# Patient Record
Sex: MALE | Race: OTHER | HISPANIC OR LATINO
[De-identification: names, ages, dates, MRNs, and addresses within clinical notes are randomized per-mention and may not be internally consistent; named-entity substitution may affect disease eponyms.]

---

## 2022-02-14 ENCOUNTER — APPOINTMENT (OUTPATIENT)
Dept: NEUROLOGY | Facility: CLINIC | Age: 77
End: 2022-02-14

## 2022-02-15 ENCOUNTER — APPOINTMENT (OUTPATIENT)
Dept: NEUROLOGY | Facility: CLINIC | Age: 77
End: 2022-02-15

## 2022-03-11 ENCOUNTER — APPOINTMENT (OUTPATIENT)
Dept: CARDIOLOGY | Facility: CLINIC | Age: 77
End: 2022-03-11

## 2022-03-25 ENCOUNTER — APPOINTMENT (OUTPATIENT)
Dept: UROLOGY | Facility: CLINIC | Age: 77
End: 2022-03-25

## 2022-05-24 ENCOUNTER — EMERGENCY (EMERGENCY)
Facility: HOSPITAL | Age: 77
LOS: 1 days | Discharge: HOME | End: 2022-05-24
Attending: EMERGENCY MEDICINE
Payer: COMMERCIAL

## 2022-05-24 VITALS
OXYGEN SATURATION: 97 % | RESPIRATION RATE: 18 BRPM | HEART RATE: 78 BPM | TEMPERATURE: 98 F | SYSTOLIC BLOOD PRESSURE: 175 MMHG | DIASTOLIC BLOOD PRESSURE: 82 MMHG | WEIGHT: 160.06 LBS

## 2022-05-24 LAB
ALBUMIN SERPL ELPH-MCNC: 4.6 G/DL — SIGNIFICANT CHANGE UP (ref 3.3–5)
ALP SERPL-CCNC: 104 U/L — SIGNIFICANT CHANGE UP (ref 40–120)
ALT FLD-CCNC: 46 U/L — HIGH (ref 10–45)
ANION GAP SERPL CALC-SCNC: 15 MMOL/L — SIGNIFICANT CHANGE UP (ref 5–17)
AST SERPL-CCNC: 137 U/L — HIGH (ref 10–40)
BASOPHILS # BLD AUTO: 0.06 K/UL — SIGNIFICANT CHANGE UP (ref 0–0.2)
BASOPHILS NFR BLD AUTO: 0.7 % — SIGNIFICANT CHANGE UP (ref 0–2)
BILIRUB SERPL-MCNC: 0.3 MG/DL — SIGNIFICANT CHANGE UP (ref 0.2–1.2)
BUN SERPL-MCNC: 21 MG/DL — SIGNIFICANT CHANGE UP (ref 7–23)
CALCIUM SERPL-MCNC: 9.2 MG/DL — SIGNIFICANT CHANGE UP (ref 8.4–10.5)
CHLORIDE SERPL-SCNC: 101 MMOL/L — SIGNIFICANT CHANGE UP (ref 96–108)
CO2 SERPL-SCNC: 26 MMOL/L — SIGNIFICANT CHANGE UP (ref 22–31)
CREAT SERPL-MCNC: 1.96 MG/DL — HIGH (ref 0.5–1.3)
EGFR: 35 ML/MIN/1.73M2 — LOW
EOSINOPHIL # BLD AUTO: 0.67 K/UL — HIGH (ref 0–0.5)
EOSINOPHIL NFR BLD AUTO: 7.6 % — HIGH (ref 0–6)
GLUCOSE SERPL-MCNC: 115 MG/DL — HIGH (ref 70–99)
HCT VFR BLD CALC: 41.8 % — SIGNIFICANT CHANGE UP (ref 39–50)
HGB BLD-MCNC: 13.9 G/DL — SIGNIFICANT CHANGE UP (ref 13–17)
IMM GRANULOCYTES NFR BLD AUTO: 0.3 % — SIGNIFICANT CHANGE UP (ref 0–1.5)
LIDOCAIN IGE QN: 38 U/L — SIGNIFICANT CHANGE UP (ref 7–60)
LYMPHOCYTES # BLD AUTO: 2.51 K/UL — SIGNIFICANT CHANGE UP (ref 1–3.3)
LYMPHOCYTES # BLD AUTO: 28.6 % — SIGNIFICANT CHANGE UP (ref 13–44)
MCHC RBC-ENTMCNC: 27.8 PG — SIGNIFICANT CHANGE UP (ref 27–34)
MCHC RBC-ENTMCNC: 33.3 GM/DL — SIGNIFICANT CHANGE UP (ref 32–36)
MCV RBC AUTO: 83.6 FL — SIGNIFICANT CHANGE UP (ref 80–100)
MONOCYTES # BLD AUTO: 0.99 K/UL — HIGH (ref 0–0.9)
MONOCYTES NFR BLD AUTO: 11.3 % — SIGNIFICANT CHANGE UP (ref 2–14)
NEUTROPHILS # BLD AUTO: 4.51 K/UL — SIGNIFICANT CHANGE UP (ref 1.8–7.4)
NEUTROPHILS NFR BLD AUTO: 51.5 % — SIGNIFICANT CHANGE UP (ref 43–77)
NRBC # BLD: 0 /100 WBCS — SIGNIFICANT CHANGE UP (ref 0–0)
PLATELET # BLD AUTO: 251 K/UL — SIGNIFICANT CHANGE UP (ref 150–400)
POTASSIUM SERPL-MCNC: 3.4 MMOL/L — LOW (ref 3.5–5.3)
POTASSIUM SERPL-SCNC: 3.4 MMOL/L — LOW (ref 3.5–5.3)
PROT SERPL-MCNC: 7.6 G/DL — SIGNIFICANT CHANGE UP (ref 6–8.3)
RBC # BLD: 5 M/UL — SIGNIFICANT CHANGE UP (ref 4.2–5.8)
RBC # FLD: 14.7 % — HIGH (ref 10.3–14.5)
SODIUM SERPL-SCNC: 142 MMOL/L — SIGNIFICANT CHANGE UP (ref 135–145)
WBC # BLD: 8.77 K/UL — SIGNIFICANT CHANGE UP (ref 3.8–10.5)
WBC # FLD AUTO: 8.77 K/UL — SIGNIFICANT CHANGE UP (ref 3.8–10.5)

## 2022-05-24 PROCEDURE — 93005 ELECTROCARDIOGRAM TRACING: CPT

## 2022-05-24 PROCEDURE — 72170 X-RAY EXAM OF PELVIS: CPT

## 2022-05-24 PROCEDURE — 85025 COMPLETE CBC W/AUTO DIFF WBC: CPT

## 2022-05-24 PROCEDURE — 70450 CT HEAD/BRAIN W/O DYE: CPT | Mod: 26,MA

## 2022-05-24 PROCEDURE — 71045 X-RAY EXAM CHEST 1 VIEW: CPT | Mod: 26

## 2022-05-24 PROCEDURE — 87086 URINE CULTURE/COLONY COUNT: CPT

## 2022-05-24 PROCEDURE — 82553 CREATINE MB FRACTION: CPT

## 2022-05-24 PROCEDURE — 83880 ASSAY OF NATRIURETIC PEPTIDE: CPT

## 2022-05-24 PROCEDURE — 84484 ASSAY OF TROPONIN QUANT: CPT

## 2022-05-24 PROCEDURE — 80053 COMPREHEN METABOLIC PANEL: CPT

## 2022-05-24 PROCEDURE — U0003: CPT

## 2022-05-24 PROCEDURE — 36415 COLL VENOUS BLD VENIPUNCTURE: CPT

## 2022-05-24 PROCEDURE — 99285 EMERGENCY DEPT VISIT HI MDM: CPT

## 2022-05-24 PROCEDURE — 99285 EMERGENCY DEPT VISIT HI MDM: CPT | Mod: 25

## 2022-05-24 PROCEDURE — 70450 CT HEAD/BRAIN W/O DYE: CPT | Mod: MA

## 2022-05-24 PROCEDURE — 81001 URINALYSIS AUTO W/SCOPE: CPT

## 2022-05-24 PROCEDURE — 83690 ASSAY OF LIPASE: CPT

## 2022-05-24 PROCEDURE — 71045 X-RAY EXAM CHEST 1 VIEW: CPT

## 2022-05-24 PROCEDURE — U0005: CPT

## 2022-05-24 RX ORDER — MECLIZINE HCL 12.5 MG
50 TABLET ORAL ONCE
Refills: 0 | Status: COMPLETED | OUTPATIENT
Start: 2022-05-24 | End: 2022-05-24

## 2022-05-24 RX ORDER — AMLODIPINE BESYLATE 2.5 MG/1
5 TABLET ORAL ONCE
Refills: 0 | Status: COMPLETED | OUTPATIENT
Start: 2022-05-24 | End: 2022-05-24

## 2022-05-24 RX ORDER — SODIUM CHLORIDE 9 MG/ML
500 INJECTION INTRAMUSCULAR; INTRAVENOUS; SUBCUTANEOUS ONCE
Refills: 0 | Status: COMPLETED | OUTPATIENT
Start: 2022-05-24 | End: 2022-05-24

## 2022-05-24 RX ORDER — ACETAMINOPHEN 500 MG
975 TABLET ORAL ONCE
Refills: 0 | Status: COMPLETED | OUTPATIENT
Start: 2022-05-24 | End: 2022-05-24

## 2022-05-24 RX ORDER — POTASSIUM CHLORIDE 20 MEQ
40 PACKET (EA) ORAL ONCE
Refills: 0 | Status: COMPLETED | OUTPATIENT
Start: 2022-05-24 | End: 2022-05-24

## 2022-05-24 RX ADMIN — AMLODIPINE BESYLATE 5 MILLIGRAM(S): 2.5 TABLET ORAL at 22:08

## 2022-05-24 RX ADMIN — SODIUM CHLORIDE 500 MILLILITER(S): 9 INJECTION INTRAMUSCULAR; INTRAVENOUS; SUBCUTANEOUS at 22:06

## 2022-05-24 RX ADMIN — Medication 40 MILLIEQUIVALENT(S): at 23:45

## 2022-05-24 RX ADMIN — Medication 975 MILLIGRAM(S): at 23:45

## 2022-05-24 RX ADMIN — Medication 50 MILLIGRAM(S): at 22:06

## 2022-05-24 NOTE — ED PROVIDER NOTE - CLINICAL SUMMARY MEDICAL DECISION MAKING FREE TEXT BOX
Amado: 77 year old male with vertigo here with b/l le swelling last four days and exertional fatigue.  seen by pmd and prescribed meclizine and anti-hypertenisves.  states has increased vertigo.  will get labs, mediciations, imaging, reassess.

## 2022-05-24 NOTE — ED PROVIDER NOTE - OBJECTIVE STATEMENT
76 y/o male PMHx HTN, vertigo prescribed prn meclizine now presenting to the ED with multiple complaints. Patient reported worsening b/l lower extremity swelling over the last four days with exertional fatigue but no SOB at rest, orthopnea, or weight gain. Patient reported intermittent headache, dizziness with room spinning making it difficult for him to walk as he feels like he is going to fall despite using a cane and left knee pain for the last two weeks. In addition patient reports he feels like he is going deaf. Patient seen by PMD Dr. Kate Elder 3-4 weeks ago and prescribed pt meclizine with anti-hypertensives. Patient unable to reschedule f/u due to insurance issues. Patient denied CP, SOB, abdominal pain, N/V/D, fever chills, cough, syncope, palpitations. Patient does not have a cardiologist     [id number]

## 2022-05-24 NOTE — ED PROVIDER NOTE - NSFOLLOWUPCLINICS_GEN_ALL_ED_FT
[facility]  Family Medicine  [address] [place] [id number]  Phone: [phone]  Fax:   Follow Up Time: Routine

## 2022-05-24 NOTE — ED PROVIDER NOTE - NS ED ATTENDING STATEMENT MOD
This was a shared visit with the UBALDO. I reviewed and verified the documentation and independently performed the documented:

## 2022-05-24 NOTE — ED PROVIDER NOTE - NSFOLLOWUPINSTRUCTIONS_ED_ALL_ED_FT
Your workup today did not show any dangerous conditions - while we do not know the exact cause of your symptoms, we feel it is ok for you to go home.    Take tylenol as needed for pain or fever.    Continue to take meclizine for dizziness    Follow up with your primary care doctor if symptoms persist for more than a few days. If you need a primary doctor, you can call the clinic listed here.    Return to the ED if you have severe pain, inability to eat or drink, fever, or any other major concern.

## 2022-05-24 NOTE — ED PROVIDER NOTE - PATIENT PORTAL LINK FT
You can access the FollowMyHealth Patient Portal offered by [facility] by registering at the following website: [email or web address]. By joining [facility]’s FollowMyHealth portal, you will also be able to view your health information using other applications (apps) compatible with our system.

## 2022-05-24 NOTE — ED ADULT TRIAGE NOTE - CHIEF COMPLAINT QUOTE
dizziness with vomit x 10 months C/o l ear ringing and room spinning  , bilat feet swelling with HTN  BEFAST NEG

## 2022-05-24 NOTE — ED PROVIDER NOTE - PROGRESS NOTE DETAILS
Dr. Singh's note: At the beginning of my shift, i took over care of the patient from outgoing physician with plan to re-evalaute after meds, ambulate. On re-evaluation, pt is well appearing, stable vitals, tolerating PO, ambulatory. Patient is safe for d/c with supportive care, return precautions, and outpt f/u as needed.

## 2022-05-24 NOTE — ED PROVIDER NOTE - RAPID ASSESSMENT
77y M Welsh speaking with translation provided by daughter with PMHx of HTN, anemia, and HLD presents to the ED with bilateral feet swelling x 4 days, dizziness, and difficulty walking 2/2 pain. Also notes multiple other ongoing medical complaints including headaches, dizziness when walking, vomiting x 10 months with difficulty eating, HTN, chills x 2 years. Denies CP, SOB, abdominal pain. No diabetes. Patient is in no acute distress.    Scribe Statement: ILogan Grace, attest that this documentation has been prepared under the direction and in the presence of Chen Vargas (PA) 77y M Malawian speaking with translation provided by daughter with PMHx of HTN, anemia, and HLD presents to the ED with bilateral feet swelling x 4 days, dizziness, and difficulty walking 2/2 pain. Also notes multiple other ongoing medical complaints including headaches, dizziness when walking, vomiting x 10 months with difficulty eating, HTN, chills x 2 years. Denies CP, SOB, abdominal pain. No diabetes. Patient is in no acute distress.    Scribe Statement: I, Vale Ford, attest that this documentation has been prepared under the direction and in the presence of Chen Vargas (PA)      Chen LESTER PA-C, personally performed the service described in the documentation recorded by the scribe in my presence, and it accurately and completely records my words and actions. Pt to receive full H&P in Main ER. 77y M Peruvian speaking with translation provided by daughter with PMHx of HTN, anemia, and HLD presents to the ED with bilateral feet swelling x 4 days, dizziness, and difficulty walking 2/2 pain. Also notes multiple other ongoing medical complaints including headaches, dizziness when walking, vomiting x 10 months with difficulty eating, HTN, chills x 2 years. Denies CP, SOB, abdominal pain. No diabetes. Patient is in no acute distress.    Scribe Statement: I, Vale Ford, attest that this documentation has been prepared under the direction and in the presence of Chen Vargas (PA)      Chen LESTER PA-C, personally performed the service described in the documentation recorded by the scribe in my presence, and it accurately and completely records my words and actions. Pt to receive full H&P in Main ER.    Pt seen by PA via Remote/TELE/Q-doc in triage. MD immediately available for consultation. Pt not eval by this MD Full exam to be performed once pt is transferred to Main ED  Jose Rafael Madsen MD, Facep

## 2022-05-25 VITALS — DIASTOLIC BLOOD PRESSURE: 86 MMHG | HEART RATE: 70 BPM | SYSTOLIC BLOOD PRESSURE: 148 MMHG

## 2022-05-25 LAB
APPEARANCE UR: CLEAR — SIGNIFICANT CHANGE UP
BACTERIA # UR AUTO: NEGATIVE — SIGNIFICANT CHANGE UP
BILIRUB UR-MCNC: NEGATIVE — SIGNIFICANT CHANGE UP
COLOR SPEC: SIGNIFICANT CHANGE UP
CULTURE RESULTS: SIGNIFICANT CHANGE UP
DIFF PNL FLD: ABNORMAL
EPI CELLS # UR: 1 /HPF — SIGNIFICANT CHANGE UP
GLUCOSE UR QL: NEGATIVE — SIGNIFICANT CHANGE UP
HYALINE CASTS # UR AUTO: 0 /LPF — SIGNIFICANT CHANGE UP (ref 0–2)
KETONES UR-MCNC: NEGATIVE — SIGNIFICANT CHANGE UP
LEUKOCYTE ESTERASE UR-ACNC: NEGATIVE — SIGNIFICANT CHANGE UP
NITRITE UR-MCNC: NEGATIVE — SIGNIFICANT CHANGE UP
PH UR: 6.5 — SIGNIFICANT CHANGE UP (ref 5–8)
PROT UR-MCNC: ABNORMAL
RBC CASTS # UR COMP ASSIST: 1 /HPF — SIGNIFICANT CHANGE UP (ref 0–4)
SARS-COV-2 RNA SPEC QL NAA+PROBE: SIGNIFICANT CHANGE UP
SP GR SPEC: 1.01 — SIGNIFICANT CHANGE UP (ref 1.01–1.02)
SPECIMEN SOURCE: SIGNIFICANT CHANGE UP
TROPONIN T, HIGH SENSITIVITY RESULT: 26 NG/L — SIGNIFICANT CHANGE UP (ref 0–51)
UROBILINOGEN FLD QL: NEGATIVE — SIGNIFICANT CHANGE UP
WBC UR QL: 1 /HPF — SIGNIFICANT CHANGE UP (ref 0–5)

## 2022-05-25 PROCEDURE — 72170 X-RAY EXAM OF PELVIS: CPT | Mod: 26

## 2022-05-25 NOTE — ED ADULT NURSE NOTE - OBJECTIVE STATEMENT
78 y/o male presents to the ED with dizziness, b/l leg pain and swelling, HA, and LE edema. Reports to feeling of "room spinning and he is going to fall," denies falls/ LOC. reports to following with PCP; received Meclizine without relief; unable to follow back with PCP due to insurance issues. denies cp, sob, n/v/d, fevers, chills, cough. neuro intact- moving all extremities. blind in left eye; reports to surgeries for eye in past. a&ox3- primarily Honduran speaking;  [id number] used. breathing comfortably in bed- no distress. abdomen soft nondistended. safety maintained- bed locked in lowest position. MD aware of blood pressure; awaiting medication orders. pending labs radiology and dispo.

## 2022-05-25 NOTE — ED ADULT NURSE REASSESSMENT NOTE - NS ED NURSE REASSESS COMMENT FT1
patient ambulated without difficulty; pending dispo. patient ambulated without difficulty; reports to relief of symptoms. pending dispo.

## 2022-05-25 NOTE — ED ADULT NURSE NOTE - NSFALLRSKASSESSDT_ED_ALL_ED
Received message from nursing \"pt lactic 4.4 trending down from 5.3, received 1L LR bolus earlier, if you'd like I will put in a repeat for 0945, would you like to do any other interventions @ this time, pt K this am 3.3, would you like to do a one time dose of PO K+?\"    Patient noted to still be tachycardic. Ordered repeat LR bolus (this would be her third liter) and p.o. potassium. Repeat lactic acid per protocol. 24-May-2022 22:00

## 2022-05-25 NOTE — ED ADULT NURSE NOTE - NS ED NURSE DC PT EDUCATION RESOURCES
Kellie Brown  [address] [facility] [address]      November 10, 2021    YOB: 2003    To Whom it may concern:    Kellie Brown was seen in the pediatric cardiology clinic at [facility]. She has neurocardiogenic insufficiency which can make her dizzy, lightheaded or pass out if she has sudden postural changes. She can take part in the gym schedule at school. However, she needs to rest when she has symptoms of dizziness, lightheadedness, chest pain or shortness of breath.    If there are any questions or concerns, please do not hesitate to call at  Dept: [phone].    Sincerely,          Chris Fuentes MD   [facility] [place] [address] [facility] [address]  Dept Phone: [phone]   None needed

## 2022-06-01 ENCOUNTER — OUTPATIENT (OUTPATIENT)
Dept: OUTPATIENT SERVICES | Facility: HOSPITAL | Age: 77
LOS: 1 days | End: 2022-06-01
Payer: COMMERCIAL

## 2022-06-01 ENCOUNTER — APPOINTMENT (OUTPATIENT)
Dept: FAMILY MEDICINE | Facility: HOSPITAL | Age: 77
End: 2022-06-01

## 2022-06-01 VITALS
HEIGHT: 64 IN | TEMPERATURE: 96.8 F | RESPIRATION RATE: 16 BRPM | OXYGEN SATURATION: 97 % | HEART RATE: 74 BPM | SYSTOLIC BLOOD PRESSURE: 150 MMHG | WEIGHT: 175 LBS | BODY MASS INDEX: 29.88 KG/M2 | DIASTOLIC BLOOD PRESSURE: 74 MMHG

## 2022-06-01 DIAGNOSIS — E87.6 HYPOKALEMIA: ICD-10-CM

## 2022-06-01 DIAGNOSIS — Z00.00 ENCOUNTER FOR GENERAL ADULT MEDICAL EXAMINATION WITHOUT ABNORMAL FINDINGS: ICD-10-CM

## 2022-06-01 PROBLEM — Z86.79 PERSONAL HISTORY OF OTHER DISEASES OF THE CIRCULATORY SYSTEM: Chronic | Status: ACTIVE | Noted: 2022-05-24

## 2022-06-01 PROCEDURE — 83036 HEMOGLOBIN GLYCOSYLATED A1C: CPT

## 2022-06-01 PROCEDURE — 80061 LIPID PANEL: CPT

## 2022-06-01 PROCEDURE — 83735 ASSAY OF MAGNESIUM: CPT

## 2022-06-01 PROCEDURE — G0463: CPT

## 2022-06-01 PROCEDURE — 80053 COMPREHEN METABOLIC PANEL: CPT

## 2022-06-02 LAB
CHOLEST SERPL-MCNC: 179 MG/DL
HDLC SERPL-MCNC: 21 MG/DL
LDLC SERPL CALC-MCNC: 80 MG/DL
NONHDLC SERPL-MCNC: 158 MG/DL
TRIGL SERPL-MCNC: 386 MG/DL

## 2022-06-06 DIAGNOSIS — H54.40 BLINDNESS, ONE EYE, UNSPECIFIED EYE: ICD-10-CM

## 2022-06-06 DIAGNOSIS — H81.10 BENIGN PAROXYSMAL VERTIGO, UNSPECIFIED EAR: ICD-10-CM

## 2022-06-06 DIAGNOSIS — I10 ESSENTIAL (PRIMARY) HYPERTENSION: ICD-10-CM

## 2022-06-13 LAB
ALBUMIN SERPL ELPH-MCNC: 4.8 G/DL
ALP BLD-CCNC: 109 U/L
ALT SERPL-CCNC: 30 U/L
ANION GAP SERPL CALC-SCNC: 15 MMOL/L
AST SERPL-CCNC: 28 U/L
BILIRUB SERPL-MCNC: 0.4 MG/DL
BUN SERPL-MCNC: 29 MG/DL
CALCIUM SERPL-MCNC: 9 MG/DL
CHLORIDE SERPL-SCNC: 102 MMOL/L
CO2 SERPL-SCNC: 23 MMOL/L
CREAT SERPL-MCNC: 1.95 MG/DL
EGFR: 35 ML/MIN/1.73M2
ESTIMATED AVERAGE GLUCOSE: 140 MG/DL
GLUCOSE SERPL-MCNC: 163 MG/DL
HBA1C MFR BLD HPLC: 6.5 %
MAGNESIUM SERPL-MCNC: 2 MG/DL
POTASSIUM SERPL-SCNC: 3.6 MMOL/L
PROT SERPL-MCNC: 7.6 G/DL
SODIUM SERPL-SCNC: 140 MMOL/L

## 2022-06-15 ENCOUNTER — OUTPATIENT (OUTPATIENT)
Dept: OUTPATIENT SERVICES | Facility: HOSPITAL | Age: 77
LOS: 1 days | End: 2022-06-15
Payer: COMMERCIAL

## 2022-06-15 ENCOUNTER — APPOINTMENT (OUTPATIENT)
Dept: FAMILY MEDICINE | Facility: HOSPITAL | Age: 77
End: 2022-06-15

## 2022-06-15 VITALS
TEMPERATURE: 97 F | BODY MASS INDEX: 29.18 KG/M2 | HEART RATE: 71 BPM | OXYGEN SATURATION: 97 % | SYSTOLIC BLOOD PRESSURE: 173 MMHG | WEIGHT: 170 LBS | RESPIRATION RATE: 16 BRPM | DIASTOLIC BLOOD PRESSURE: 85 MMHG

## 2022-06-15 VITALS — DIASTOLIC BLOOD PRESSURE: 75 MMHG | SYSTOLIC BLOOD PRESSURE: 151 MMHG

## 2022-06-15 DIAGNOSIS — Z00.00 ENCOUNTER FOR GENERAL ADULT MEDICAL EXAMINATION WITHOUT ABNORMAL FINDINGS: ICD-10-CM

## 2022-06-15 DIAGNOSIS — R60.0 LOCALIZED EDEMA: ICD-10-CM

## 2022-06-15 DIAGNOSIS — H35.70: ICD-10-CM

## 2022-06-15 PROCEDURE — G0463: CPT

## 2022-06-16 NOTE — PLAN
[FreeTextEntry1] : 78 yo male with PMHx HTN, Potassium waisting without obvious cause and albuminuria presents today for follow up.\par \par #Benign paroxysmal positional vertigo\par - Improving\par - Patient had a recent ED visit for vertigo and was evaluated last appointment. \par - CT head normal.\par - Continue Meclizine\par - Per patient he was seen by Neurologist and was told everything was good and that he needed to f/u with ENT\par - ENT referral given today \par \par #HTN\par - BP today 151/75\par - Review record of 2014, SBP used to be [id number] \par - Patient started having b/l lower extremity edema after starting amlodipine\par - Continue Losartan 100mg qd\par - Will decrease amlodipine to 5mg qd\par - Will start Spironolactone 25mg qd\par - f/u Cardiology recommendations\par \par #Hypokalemia\par - Patient was following with Nephrology and was told he had potassium waisting without obvious cause\par - Currently stable\par - K last appointment 3.6\par \par #CKD3\par - Cr 1.95, eGFR 35\par - Referral for Nephrology given today\par \par #b/l lower extremity edema\par - Possibly 2/2 amlodipine vs less likely CHF\par - Started after increasing amlodipine dose\par - Will continue on amlodipine for now since patient has uncontrolled HTN and leg edema is mild.\par - BNP in the ED on 5/24 was 724\par - CXR in the ED was clear and PE today was normal and had no crackles\par \par #Blindness of left eye\par - Hx of left retinal detachment\par \par #Dispo\par - f/u ENT, Nephrology and Cardiology recommendations\par \par *Case discussed with Dr. Bronson

## 2022-06-16 NOTE — PHYSICAL EXAM
[Normal S1, S2] : normal S1 and S2 [No Murmur] : no murmur heard [de-identified] : +left eye blindness 2/2 left retinal detachment [de-identified] : +b/l cerument impactation [de-identified] : 2+ b/l lower extremity edema

## 2022-06-16 NOTE — HISTORY OF PRESENT ILLNESS
[FreeTextEntry1] : f/u  [de-identified] : 76 yo male with PMHx HTN, Potassium waisting without obvious cause and albuminuria presents today for follow up.  Patient mentions he stopped following with the nephrologist and cardiologist because he was feeling good. \par He still feeling dizzy and after following with neurology and going to the ED he was told it was Vertigo and that he should follow up with an ENT for further management and treatment.  Patient denies abdominal pain, nausea, vomiting, diarrhea, constipation, chest pain, headache, shortness of breath or lightheadedness.

## 2022-06-17 DIAGNOSIS — I10 ESSENTIAL (PRIMARY) HYPERTENSION: ICD-10-CM

## 2022-06-17 DIAGNOSIS — H81.10 BENIGN PAROXYSMAL VERTIGO, UNSPECIFIED EAR: ICD-10-CM

## 2022-06-17 DIAGNOSIS — R60.9 EDEMA, UNSPECIFIED: ICD-10-CM

## 2022-06-17 DIAGNOSIS — E11.9 TYPE 2 DIABETES MELLITUS WITHOUT COMPLICATIONS: ICD-10-CM

## 2022-06-24 ENCOUNTER — OUTPATIENT (OUTPATIENT)
Dept: OUTPATIENT SERVICES | Facility: HOSPITAL | Age: 77
LOS: 1 days | End: 2022-06-24
Payer: COMMERCIAL

## 2022-06-24 ENCOUNTER — APPOINTMENT (OUTPATIENT)
Dept: FAMILY MEDICINE | Facility: HOSPITAL | Age: 77
End: 2022-06-24

## 2022-06-24 VITALS — SYSTOLIC BLOOD PRESSURE: 172 MMHG | DIASTOLIC BLOOD PRESSURE: 96 MMHG

## 2022-06-24 VITALS
OXYGEN SATURATION: 96 % | HEART RATE: 73 BPM | BODY MASS INDEX: 29.52 KG/M2 | TEMPERATURE: 98.5 F | DIASTOLIC BLOOD PRESSURE: 90 MMHG | SYSTOLIC BLOOD PRESSURE: 200 MMHG | RESPIRATION RATE: 16 BRPM | WEIGHT: 172 LBS

## 2022-06-24 DIAGNOSIS — Z00.00 ENCOUNTER FOR GENERAL ADULT MEDICAL EXAMINATION WITHOUT ABNORMAL FINDINGS: ICD-10-CM

## 2022-06-24 DIAGNOSIS — H61.20 IMPACTED CERUMEN, UNSPECIFIED EAR: ICD-10-CM

## 2022-06-24 PROCEDURE — 82043 UR ALBUMIN QUANTITATIVE: CPT

## 2022-06-24 PROCEDURE — G0463: CPT

## 2022-06-25 PROBLEM — H61.20 CERUMEN IMPACTION: Status: ACTIVE | Noted: 2022-06-01

## 2022-06-25 LAB
CREAT SPEC-SCNC: 174 MG/DL
MICROALBUMIN 24H UR DL<=1MG/L-MCNC: 10.2 MG/DL
MICROALBUMIN/CREAT 24H UR-RTO: 59 MG/G

## 2022-06-25 RX ORDER — AMLODIPINE BESYLATE 5 MG/1
5 TABLET ORAL
Qty: 90 | Refills: 1 | Status: COMPLETED | COMMUNITY
Start: 2022-06-01 | End: 2022-06-25

## 2022-06-25 RX ORDER — LOSARTAN POTASSIUM 50 MG/1
50 TABLET, FILM COATED ORAL
Qty: 90 | Refills: 0 | Status: DISCONTINUED | COMMUNITY
Start: 2022-01-08

## 2022-06-25 RX ORDER — OMEGA-3-ACID ETHYL ESTERS CAPSULES 1 G/1
1 CAPSULE, LIQUID FILLED ORAL
Qty: 360 | Refills: 0 | Status: DISCONTINUED | COMMUNITY
Start: 2022-01-08

## 2022-06-25 RX ORDER — FOLIC ACID 1 MG/1
1 TABLET ORAL
Qty: 30 | Refills: 0 | Status: DISCONTINUED | COMMUNITY
Start: 2022-04-02

## 2022-06-25 RX ORDER — ERGOCALCIFEROL 1.25 MG/1
1.25 MG CAPSULE, LIQUID FILLED ORAL
Qty: 12 | Refills: 0 | Status: DISCONTINUED | COMMUNITY
Start: 2022-04-02

## 2022-06-30 DIAGNOSIS — E66.9 OBESITY, UNSPECIFIED: ICD-10-CM

## 2022-06-30 DIAGNOSIS — I10 ESSENTIAL (PRIMARY) HYPERTENSION: ICD-10-CM

## 2022-07-11 NOTE — PLAN
[FreeTextEntry1] : 76 yo male with PMHx hypertension, potassium wasting without obvious cause and albuminuria presents today to the clinic to establish care.  Patient went to [facility] on 5/24 with dizziness. \par \par #Benign paroxysmal  positional vertigo\par - Patient had a recent ED visit for vertigo.  CT head normal.\par - Was improving with Meclizine\par - Per patient he was seen by Neurologist and was told everything was good and that he needed to follow up with ENT, will try to get records and bring on next appointment.\par - Continue taking Meclizine daily\par - ENT referral given today \par \par #HTN\par - BP today 150/74\par - Review record of 2014, SBP used to be [id number] \par - Continue Amlodipine 10mg and Losartan 100mg qd for now, will make adjustments after blood work\par - Cardiology referral given today \par - Per chart review, ?Hx of Aortic regurgitation. normal PE today\par \par #Hypokalemia\par - K in the ED was 3.4\par - Patient was following with Nephrology in 2014 for unexplained potassium waisting\par - Will repeat CMP and mag today and replete if needed\par \par #b/l cerumen impactation\par - Debrox sent to pharmacy\par - Instructions on how to use it were provided today\par - RTC 2 weeks for cerumen removal\par \par #b/l lower extremity edema\par - Possibly 2/2 amlodipine vs less likely CHF\par - Started after increasing amlodipine dose\par - Will continue on amlodipine for now since patient has uncontrolled HTN and leg edema is mild.\par - BNP in the ED on 5/24 was 724\par - CXR in the ED was clear and PE today was normal and had no crackles\par \par #Blindness of left eye\par - Hx of left retinal detachment\par \par #Dispo\par - RTC 2 weeks for cerumen removal\par - f/u ENT and Cardiology recommendations\par \par *Case discussed with Dr. Bronson \par

## 2022-07-11 NOTE — CURRENT MEDS
[FreeTextEntry1] : Losartan 100mg qd\par Amlodipine 10mg qd\par Vitamin D\par Folic Acid 1mg qd\par Meclizine 25mg

## 2022-07-11 NOTE — PHYSICAL EXAM
[Normal] : no acute distress, well nourished, well developed and well-appearing [No JVD] : no jugular venous distention [No Lymphadenopathy] : no lymphadenopathy [Supple] : supple [Thyroid Normal, No Nodules] : the thyroid was normal and there were no nodules present [No Respiratory Distress] : no respiratory distress  [No Accessory Muscle Use] : no accessory muscle use [Clear to Auscultation] : lungs were clear to auscultation bilaterally [Normal Rate] : normal rate  [Regular Rhythm] : with a regular rhythm [Normal S1, S2] : normal S1 and S2 [No Carotid Bruits] : no carotid bruits [No Abdominal Bruit] : a ~M bruit was not heard ~T in the abdomen [No Varicosities] : no varicosities [Pedal Pulses Present] : the pedal pulses are present [No Extremity Clubbing/Cyanosis] : no extremity clubbing/cyanosis [Soft] : abdomen soft [Non Tender] : non-tender [Normal Bowel Sounds] : normal bowel sounds [No CVA Tenderness] : no CVA  tenderness [No Joint Swelling] : no joint swelling [No Rash] : no rash [Coordination Grossly Intact] : coordination grossly intact [Deep Tendon Reflexes (DTR)] : deep tendon reflexes were 2+ and symmetric [Speech Grossly Normal] : speech grossly normal [Normal Affect] : the affect was normal [Normal Insight/Judgement] : insight and judgment were intact [de-identified] : +b/l cerumen impactation [de-identified] : +Legally blind from the left side. s/p left retinal detachment [de-identified] : +b/l lower extremity edema 1+ [de-identified] : +ataxic gait, uses cane to walk

## 2022-07-11 NOTE — HISTORY OF PRESENT ILLNESS
[de-identified] : 76 YO M with PMH Of DM2, HTN, albuminurea, potassium wasting presents for cleaning of his ears from cerumen. Blood pressure was found to be elevated and repeated after the physical exmination. Patient denies any headache, vomiting, shortness of breath, acute changes in vision. Patient present with wife who states that he takes his antihypertensive medications of amlodpipine, losartan and HCTZ , although they do not know the names and only refer to the bottles and take one a day. They are both unaware of the diagnosis of diabetes. A1c 6.5 they have a visit with DM Clinic on 7/18 to follow up. Currently not on any medications.

## 2022-07-11 NOTE — HISTORY OF PRESENT ILLNESS
[FreeTextEntry8] : 76 yo male with PMHx hypertension, potassium wasting without obvious cause and albuminuria presents today to the clinic to establish care.  Patient went to [place] on 5/24 with dizziness.  Patient had b/l lower extremity, dizziness and difficulty walking 2/2 pain for 4 days before going to the ED. \par He described his dizziness as if he was feeling the room was spinning and that made it difficult for him to walk.  \par Patient was seen by PMD Dr. Kate Elder a month before going to the ED and prescribed him Meclizine\par CBC wnl, CMP showed elevated Cr 1.96 eGFR 35 (unknown baseline),  hypokalemia 3.4, , ALT 46\par CXR did no show any acute pathology and had no pleural effusion or pneumothorax and CT showed no acute intracranial hemorrhage, mass effect or midline shift.

## 2022-07-11 NOTE — REVIEW OF SYSTEMS
[Hearing Loss] : hearing loss [Lower Ext Edema] : lower extremity edema [Orthopena] : orthopnea [Dyspnea on Exertion] : dyspnea on exertion [Negative] : Heme/Lymph [Discharge] : no discharge [Pain] : no pain [Redness] : no redness [Dryness] : no dryness [Vision Problems] : no vision problems [Itching] : no itching [Earache] : no earache [Nosebleeds] : no nosebleeds [Postnasal Drip] : no postnasal drip [Sore Throat] : no sore throat [Nasal Discharge] : no nasal discharge [Hoarseness] : no hoarseness [Chest Pain] : no chest pain [Palpitations] : no palpitations [Claudication] : no  leg claudication [Paroxysmal Nocturnal Dyspnea] : no paroxysmal nocturnal dyspnea [Shortness Of Breath] : no shortness of breath [Wheezing] : no wheezing [Cough] : no cough [FreeTextEntry3] : +legally blind from the left side

## 2022-07-11 NOTE — PHYSICAL EXAM
[Normal TMs] : both tympanic membranes were normal [Normal] : no rash [Comprehensive Foot Exam Normal] : Right and left foot were examined and both feet are normal. No ulcers in either foot. Toes are normal and with full ROM.  Normal tactile sensation with monofilament testing throughout both feet [de-identified] : cerumen cleaned bilaterally.

## 2022-07-11 NOTE — HEALTH RISK ASSESSMENT
[Never] : Never [No] : In the past 12 months have you used drugs other than those required for medical reasons? No [No falls in past year] : Patient reported no falls in the past year [0] : 2) Feeling down, depressed, or hopeless: Not at all (0) [PHQ-2 Negative - No further assessment needed] : PHQ-2 Negative - No further assessment needed [[id number]] : 0

## 2022-07-17 ENCOUNTER — RESULT CHARGE (OUTPATIENT)
Age: 77
End: 2022-07-17

## 2022-07-18 ENCOUNTER — OUTPATIENT (OUTPATIENT)
Dept: OUTPATIENT SERVICES | Facility: HOSPITAL | Age: 77
LOS: 1 days | End: 2022-07-18
Payer: COMMERCIAL

## 2022-07-18 ENCOUNTER — APPOINTMENT (OUTPATIENT)
Dept: FAMILY MEDICINE | Facility: HOSPITAL | Age: 77
End: 2022-07-18

## 2022-07-18 VITALS
OXYGEN SATURATION: 99 % | RESPIRATION RATE: 14 BRPM | DIASTOLIC BLOOD PRESSURE: 94 MMHG | BODY MASS INDEX: 28.67 KG/M2 | HEART RATE: 69 BPM | WEIGHT: 167 LBS | TEMPERATURE: 98.4 F | SYSTOLIC BLOOD PRESSURE: 178 MMHG

## 2022-07-18 VITALS — SYSTOLIC BLOOD PRESSURE: 161 MMHG | DIASTOLIC BLOOD PRESSURE: 83 MMHG

## 2022-07-18 DIAGNOSIS — Z00.00 ENCOUNTER FOR GENERAL ADULT MEDICAL EXAMINATION WITHOUT ABNORMAL FINDINGS: ICD-10-CM

## 2022-07-18 PROCEDURE — 93010 ELECTROCARDIOGRAM REPORT: CPT

## 2022-07-18 PROCEDURE — 93005 ELECTROCARDIOGRAM TRACING: CPT

## 2022-07-18 PROCEDURE — G0463: CPT

## 2022-07-18 NOTE — HISTORY OF PRESENT ILLNESS
[de-identified] : 78 YO M with PMH Of DM2, HTN, albuminuria presents to Diabetics clinic for new diagnosis of T2DM. A1c at last visit was 6.5. Patient seen with Diabetes Education Deidre NICHOLAS. Patient states he eats a well rounded diet and tries to exercise but he has difficult ambulating. Diabetic monofilament testing was performed at last visit and WNL. He denies weight gain/loss, polyuria, polydipsia or significant fatigue. \par \par  At this visit, patient states he has painful pre-orbital headaches which are pulsating in nature and associated with blurry vision. Of note BP was elevated consistently over last few visits in upper 170s/90s. Patient states his BP meds were never sent to this pharmacy. Patient also endorses occasional substernal chest pain that feels like a "knot" and worsens on exertion. Patient was given an appointment for cardiology and nephrology at the last visit however he never followed up. EKG performed in house was significant for Left axis deviation with LVH but no ST segment changes or TWI.

## 2022-07-18 NOTE — PHYSICAL EXAM
[No JVD] : no jugular venous distention [No Lymphadenopathy] : no lymphadenopathy [No Respiratory Distress] : no respiratory distress  [No Accessory Muscle Use] : no accessory muscle use [Clear to Auscultation] : lungs were clear to auscultation bilaterally [Normal Percussion] : the chest was normal to percussion [Normal Rate] : normal rate  [Regular Rhythm] : with a regular rhythm [Normal S1, S2] : normal S1 and S2 [No Murmur] : no murmur heard [No Carotid Bruits] : no carotid bruits [No Abdominal Bruit] : a ~M bruit was not heard ~T in the abdomen [Pedal Pulses Present] : the pedal pulses are present [No Edema] : there was no peripheral edema [No Palpable Aorta] : no palpable aorta [No Extremity Clubbing/Cyanosis] : no extremity clubbing/cyanosis [Non Tender] : non-tender [Non-distended] : non-distended [Normal Bowel Sounds] : normal bowel sounds [Normal] : soft, non-tender, non-distended, no masses palpated, no HSM and normal bowel sounds [No CVA Tenderness] : no CVA  tenderness [No Joint Swelling] : no joint swelling [No Rash] : no rash [No Skin Lesions] : no skin lesions [Coordination Grossly Intact] : coordination grossly intact [No Focal Deficits] : no focal deficits [Normal Gait] : normal gait [de-identified] : +right conjunctival hemmorage, stable/chronic

## 2022-07-18 NOTE — REVIEW OF SYSTEMS
[Vision Problems] : vision problems [Earache] : earache [Chest Pain] : chest pain [Dyspnea on Exertion] : dyspnea on exertion [Headache] : headache [Negative] : Musculoskeletal [Hearing Loss] : no hearing loss [Palpitations] : no palpitations [Claudication] : no  leg claudication [Lower Ext Edema] : no lower extremity edema [Orthopena] : no orthopnea [Paroxysmal Nocturnal Dyspnea] : no paroxysmal nocturnal dyspnea [Shortness Of Breath] : no shortness of breath [Wheezing] : no wheezing [Cough] : no cough [Abdominal Pain] : no abdominal pain [Nausea] : no nausea [Constipation] : no constipation [Diarrhea] : no diarrhea [Vomiting] : no vomiting [Heartburn] : no heartburn [Dysuria] : no dysuria [Incontinence] : no incontinence [Hesitancy] : no hesitancy [Nocturia] : no nocturia [Frequency] : no frequency

## 2022-07-19 DIAGNOSIS — E11.9 TYPE 2 DIABETES MELLITUS WITHOUT COMPLICATIONS: ICD-10-CM

## 2022-07-19 DIAGNOSIS — N18.30 CHRONIC KIDNEY DISEASE, STAGE 3 UNSPECIFIED: ICD-10-CM

## 2022-07-19 DIAGNOSIS — I10 ESSENTIAL (PRIMARY) HYPERTENSION: ICD-10-CM

## 2022-07-19 DIAGNOSIS — R07.9 CHEST PAIN, UNSPECIFIED: ICD-10-CM

## 2022-07-26 ENCOUNTER — APPOINTMENT (OUTPATIENT)
Dept: FAMILY MEDICINE | Facility: HOSPITAL | Age: 77
End: 2022-07-26

## 2022-07-26 ENCOUNTER — OUTPATIENT (OUTPATIENT)
Dept: OUTPATIENT SERVICES | Facility: HOSPITAL | Age: 77
LOS: 1 days | End: 2022-07-26
Payer: COMMERCIAL

## 2022-07-26 VITALS
OXYGEN SATURATION: 96 % | SYSTOLIC BLOOD PRESSURE: 162 MMHG | WEIGHT: 170 LBS | HEIGHT: 64 IN | TEMPERATURE: 98.3 F | BODY MASS INDEX: 29.02 KG/M2 | RESPIRATION RATE: 14 BRPM | HEART RATE: 84 BPM | DIASTOLIC BLOOD PRESSURE: 87 MMHG

## 2022-07-26 VITALS — HEART RATE: 61 BPM | DIASTOLIC BLOOD PRESSURE: 90 MMHG | SYSTOLIC BLOOD PRESSURE: 149 MMHG

## 2022-07-26 DIAGNOSIS — H81.10 BENIGN PAROXYSMAL VERTIGO, UNSPECIFIED EAR: ICD-10-CM

## 2022-07-26 DIAGNOSIS — Z00.00 ENCOUNTER FOR GENERAL ADULT MEDICAL EXAMINATION WITHOUT ABNORMAL FINDINGS: ICD-10-CM

## 2022-07-26 PROCEDURE — G0463: CPT

## 2022-07-27 DIAGNOSIS — I10 ESSENTIAL (PRIMARY) HYPERTENSION: ICD-10-CM

## 2022-07-27 DIAGNOSIS — R07.9 CHEST PAIN, UNSPECIFIED: ICD-10-CM

## 2022-07-27 DIAGNOSIS — N18.30 CHRONIC KIDNEY DISEASE, STAGE 3 UNSPECIFIED: ICD-10-CM

## 2022-07-27 DIAGNOSIS — E11.9 TYPE 2 DIABETES MELLITUS WITHOUT COMPLICATIONS: ICD-10-CM

## 2022-07-27 DIAGNOSIS — H81.10 BENIGN PAROXYSMAL VERTIGO, UNSPECIFIED EAR: ICD-10-CM

## 2022-08-08 ENCOUNTER — APPOINTMENT (OUTPATIENT)
Dept: FAMILY MEDICINE | Facility: HOSPITAL | Age: 77
End: 2022-08-08

## 2022-08-09 ENCOUNTER — OUTPATIENT (OUTPATIENT)
Dept: OUTPATIENT SERVICES | Facility: HOSPITAL | Age: 77
LOS: 1 days | End: 2022-08-09
Payer: COMMERCIAL

## 2022-08-09 DIAGNOSIS — R07.9 CHEST PAIN, UNSPECIFIED: ICD-10-CM

## 2022-08-09 PROCEDURE — 93306 TTE W/DOPPLER COMPLETE: CPT | Mod: 26

## 2022-08-09 PROCEDURE — 93306 TTE W/DOPPLER COMPLETE: CPT

## 2022-08-11 DIAGNOSIS — R93.1 ABNORMAL FINDINGS ON DIAGNOSTIC IMAGING OF HEART AND CORONARY CIRCULATION: ICD-10-CM

## 2022-08-16 VITALS — SYSTOLIC BLOOD PRESSURE: 149 MMHG | DIASTOLIC BLOOD PRESSURE: 90 MMHG | HEART RATE: 61 BPM

## 2022-08-22 NOTE — HISTORY OF PRESENT ILLNESS
[FreeTextEntry1] : BP check  [de-identified] : 76 YO M with PMH Of DM2, HTN, albuminuria presents clinic for bp check.Patient last seen one week ago where he was diagnosed with T2DM. A1c at last visit was 6.5. Patient seen with Diabetes Education Deidre NP. Patients chest discomfort and headaches which he complaint on last visit has improved now that he received his medications from pharmacy and is taking as prescribed. He denies weight gain/loss, polyuria, polydipsia or significant fatigue, cp, ha.

## 2022-08-22 NOTE — PLAN
[FreeTextEntry1] : Hypertension, uncontrolled (401.9) (I10) Improved\par  · - /90  \par     - optimize BP meds: Amlodipine 10 mg qd, losartan 100mg qd, spironolactone qd\par     -Maintain Low fat diet\par \par Chest pain, unspecified type (786.50) (R07.9)\par  · - patient c/o occasional chest pain worsened by exertion now improved\par     - EKG shows LAD with LVH\par     - no ST changes or TWI\par     - ECHO ordered\par \par CKD (chronic kidney disease), stage III (585.3) (N18.30)\par  · - stage 3b CKD\par     - nephro consult given \par     - ok to c/w Losartan 100mg QD in setting of uncontrolled hypertension\par \par Diabetes mellitus, type 2 (250.00) (E11.9)\par  · Type 2 DM\par     - patient originally unaware of DM diagnosis\par     - POCT/Last A1c 6.5 \par     - Maintain DM Diet. \par     - c/w current diet plan\par     - no indication for Diabetes medications at this time \par     - HCTZ discontinued last visit as it causes hyperglycemia\par \par RTC in 1-2 months w/ pcp for CPE\par \par d/w Dr. Constantino\par \par \par

## 2022-08-22 NOTE — HISTORY OF PRESENT ILLNESS
[FreeTextEntry1] : BP check [de-identified] : 76 YO M with PMH Of DM2, HTN, albuminuria presents to clinic  for BP check. Patient recently seen in diabetic clinic for new diagnosis of T2DM. A1c at last visit was 6.5. Patient seen with Diabetes Education Deidre NICHOLAS. Patient states he eats a well rounded diet and tries to exercise but he has difficult ambulating. Diabetic monofilament testing was performed at last visit and WNL. He denies weight gain/loss, polyuria, polydipsia or significant fatigue. \par \par  At this visit, patient states he has painful pre-orbital headaches which are pulsating in nature and associated with blurry vision. Of note BP was elevated consistently over last few visits in upper 170s/90s. Patient states his BP meds were never sent to this pharmacy. Patient also endorses occasional substernal chest pain that feels like a "knot" and worsens on exertion. Patient was given an appointment for cardiology and nephrology at the last visit however he never followed up. EKG performed in house was significant for Left axis deviation with LVH but no ST segment changes or TWI. \par

## 2022-08-22 NOTE — PLAN
[FreeTextEntry1] : Hypertension, uncontrolled (401.9) (I10)\par  · - Improved. BP today 149/90\par     - optimize BP meds: Amlodipine 10 mg qd, losartan 100mg qd, spironolactone qd\par     - Low salt diet\par \par Chest pain, unspecified type (786.50) (R07.9)\par    - Improved\par  · - patient c/o occasional chest pain worsened by exertion\par     - EKG  last visit shows LAD with LVH, no ST changes or TWI\par     - ECHO ordered today\par \par CKD (chronic kidney disease), stage III (585.3) (N18.30)\par  · - stage 3b CKD\par     - nephro consult given \par     - ok to c/w Losartan 100mg QD in setting of uncontrolled hypertension\par \par Diabetes mellitus, type 2 (250.00) (E11.9)\par  · Type 2 DM\par     - patient originally unaware of DM diagnosis\par     - POCT/Last A1c 6.5 \par     - Maintain DM Diet. \par     - c/w current diet plan\par     - no indication for Diabetes medications at this time \par     - HCTZ discontinued on last visit as it causes hyperglycemia\par \par RTC in 1-2 month for CPE with PCP\par \par d/w Vira Mckeon\par \par

## 2022-09-22 ENCOUNTER — NON-APPOINTMENT (OUTPATIENT)
Age: 77
End: 2022-09-22

## 2022-09-22 ENCOUNTER — APPOINTMENT (OUTPATIENT)
Dept: CARDIOLOGY | Facility: CLINIC | Age: 77
End: 2022-09-22

## 2022-09-22 VITALS
HEART RATE: 90 BPM | DIASTOLIC BLOOD PRESSURE: 103 MMHG | BODY MASS INDEX: 27 KG/M2 | TEMPERATURE: 98.4 F | OXYGEN SATURATION: 98 % | WEIGHT: 168 LBS | RESPIRATION RATE: 16 BRPM | SYSTOLIC BLOOD PRESSURE: 183 MMHG | HEIGHT: 66 IN

## 2022-09-22 DIAGNOSIS — Z00.00 ENCOUNTER FOR GENERAL ADULT MEDICAL EXAMINATION W/OUT ABNORMAL FINDINGS: ICD-10-CM

## 2022-09-22 DIAGNOSIS — R07.89 OTHER CHEST PAIN: ICD-10-CM

## 2022-09-22 PROCEDURE — 99204 OFFICE O/P NEW MOD 45 MIN: CPT | Mod: 25

## 2022-09-22 PROCEDURE — 93000 ELECTROCARDIOGRAM COMPLETE: CPT

## 2022-09-24 PROBLEM — R07.89 CHEST DISCOMFORT: Status: ACTIVE | Noted: 2022-09-24

## 2022-10-04 ENCOUNTER — NON-APPOINTMENT (OUTPATIENT)
Age: 77
End: 2022-10-04

## 2022-10-05 ENCOUNTER — APPOINTMENT (OUTPATIENT)
Dept: UROLOGY | Facility: CLINIC | Age: 77
End: 2022-10-05

## 2022-10-06 ENCOUNTER — APPOINTMENT (OUTPATIENT)
Dept: CARDIOLOGY | Facility: CLINIC | Age: 77
End: 2022-10-06

## 2022-10-06 VITALS
SYSTOLIC BLOOD PRESSURE: 170 MMHG | DIASTOLIC BLOOD PRESSURE: 82 MMHG | HEART RATE: 71 BPM | BODY MASS INDEX: 27 KG/M2 | WEIGHT: 168 LBS | RESPIRATION RATE: 18 BRPM | OXYGEN SATURATION: 97 % | HEIGHT: 66 IN | TEMPERATURE: 96.7 F

## 2022-10-06 DIAGNOSIS — E78.1 PURE HYPERGLYCERIDEMIA: ICD-10-CM

## 2022-10-06 PROCEDURE — 93306 TTE W/DOPPLER COMPLETE: CPT

## 2022-10-06 PROCEDURE — 99214 OFFICE O/P EST MOD 30 MIN: CPT | Mod: 25

## 2022-10-06 PROCEDURE — 93000 ELECTROCARDIOGRAM COMPLETE: CPT

## 2022-10-09 ENCOUNTER — NON-APPOINTMENT (OUTPATIENT)
Age: 77
End: 2022-10-09

## 2022-10-09 PROBLEM — E78.1 HYPERTRIGLYCERIDEMIA: Status: ACTIVE | Noted: 2022-09-24

## 2022-10-09 NOTE — REVIEW OF SYSTEMS
[Headache] : no headache [Earache] : no earache [Sore Throat] : no sore throat [SOB] : no shortness of breath [Chest Discomfort] : no chest discomfort [Lower Ext Edema] : no extremity edema [Syncope] : no syncope [Cough] : no cough [Abdominal Pain] : no abdominal pain [Urinary Frequency] : no change in urinary frequency [Joint Pain] : no joint pain [Rash] : no rash [Dizziness] : no dizziness [Confusion] : no confusion was observed [Easy Bruising] : no tendency for easy bruising

## 2022-10-09 NOTE — PHYSICAL EXAM
[Normal] : alert and oriented, normal memory [de-identified] : elderly man, no acute distress [de-identified] : chronic left eye injury [de-identified] : supple [de-identified] : JVP ~ 7 cm H20, RRR, s1, s2, no murmurs/rubs [de-identified] : unlabored respirations, clear lung fields [de-identified] : non-distended

## 2022-10-09 NOTE — ASSESSMENT
[FreeTextEntry1] : Assessment:\par David Mai is a 77 year old man, former cigarette smoker with past medical history of Hypertension presents for follow up visit regarding test results ordered for evaluation of hypertension and chest discomfort.\par \par Since his last visit it appears the patient was unable to receive the HCTZ in the pharmacy. BP remains elevated today. ECG consistent with sinus rhythm and nonspecific ST abnormalities. Echocardiogram done today, images reviewed and consistent with normal LV and RV systolic function, mildly dilated aortic root, mild-moderate aortic regurgitation. Labs reviewed and notable for hypertriglyceridemia and abnormal renal function with Cr of 2.0 and GFR 34. Given age, risk factors, including abnormal EKG, recommend further evaluation of chest pain for structural and ischemic heart disease. Chest discomfort may also be due to uncontrolled hypertension.\par \par Recommendations:\par [] Chest discomfort: Symptoms stable at this time. Will proceed with pharmacologic nuclear stress test to evaluate for ischemic heart disease. Recommend hypertension management per below.\par [] Hypertension: BP slightly improved but still uncontrolled, called pharmacy and patient has HCTZ 25 mg prescription already present. Explained to patient and daughter that patient should start HCTZ 25 mg daily. Patient to continue his meds: Losartan 100 mg daily, Spironolactone 25 mg daily but explained to patient that if patient has an BRUNILDA, he would benefit from alternative antihypertensives, follow up Renal. Recommend DASH diet plan. Patient to check BP daily at home, has not been checking BP. \par [] Hypertriglyceridemia: , LDL unable to be calculated, unclear if patient was fasting, will need repeat lipid panel to obtain LDL\par [] CKD: Likely secondary to uncontrolled hypertension, recommend hypertension management as per above. Patient to follow up with Renal\par [] Return to office: 1 month

## 2022-10-09 NOTE — CARDIOLOGY SUMMARY
[de-identified] : ECG (9/22/22): normal sinus rhythm, left axis deviation \par ECG (10/6/22): normal sinus rhythm, left axis deviation, nonspecific ST abnormalities  [de-identified] : TTE (10/6/22): (Images reviewed): Normal LV systolic function,mild LVH. Normal RV size and function. Mildly dilated aortic root (3.9 cm). Mild-moderate AR. No pericardial effusion

## 2022-10-09 NOTE — HISTORY OF PRESENT ILLNESS
[FreeTextEntry1] : David Mai is a 77 year old man, former cigarette smoker with past medical history of Hypertension presents for follow up visit regarding test results ordered for evaluation of hypertension and chest discomfort.\par \par The patient's daughter is present to help translate. The patient denies any chest discomfort recently or shortness of breath. Has noticed neck discomfort when walking. Denies headache. They state that they were not able to receive the hydrochlorothiazide medication in the pharmacy. Has not been checking BP at home.

## 2022-10-20 ENCOUNTER — APPOINTMENT (OUTPATIENT)
Dept: CARDIOLOGY | Facility: CLINIC | Age: 77
End: 2022-10-20

## 2022-11-04 ENCOUNTER — APPOINTMENT (OUTPATIENT)
Dept: CARDIOLOGY | Facility: HOSPITAL | Age: 77
End: 2022-11-04

## 2022-11-10 ENCOUNTER — APPOINTMENT (OUTPATIENT)
Dept: CARDIOLOGY | Facility: CLINIC | Age: 77
End: 2022-11-10

## 2022-11-10 PROCEDURE — A9500: CPT

## 2022-11-10 PROCEDURE — 93015 CV STRESS TEST SUPVJ I&R: CPT

## 2022-11-10 PROCEDURE — 78452 HT MUSCLE IMAGE SPECT MULT: CPT

## 2022-11-11 ENCOUNTER — INPATIENT (INPATIENT)
Facility: HOSPITAL | Age: 77
LOS: 9 days | Discharge: HOME | DRG: 247 | End: 2022-11-21
Attending: STUDENT IN AN ORGANIZED HEALTH CARE EDUCATION/TRAINING PROGRAM | Admitting: STUDENT IN AN ORGANIZED HEALTH CARE EDUCATION/TRAINING PROGRAM
Payer: MEDICARE

## 2022-11-11 ENCOUNTER — NON-APPOINTMENT (OUTPATIENT)
Age: 77
End: 2022-11-11

## 2022-11-11 ENCOUNTER — APPOINTMENT (OUTPATIENT)
Dept: CARDIOLOGY | Facility: CLINIC | Age: 77
End: 2022-11-11

## 2022-11-11 VITALS
DIASTOLIC BLOOD PRESSURE: 92 MMHG | HEIGHT: 66 IN | TEMPERATURE: 97.8 F | HEART RATE: 68 BPM | RESPIRATION RATE: 16 BRPM | OXYGEN SATURATION: 97 % | SYSTOLIC BLOOD PRESSURE: 223 MMHG

## 2022-11-11 VITALS
DIASTOLIC BLOOD PRESSURE: 86 MMHG | HEART RATE: 65 BPM | RESPIRATION RATE: 16 BRPM | HEIGHT: 65 IN | SYSTOLIC BLOOD PRESSURE: 211 MMHG | OXYGEN SATURATION: 94 % | WEIGHT: 149.91 LBS | TEMPERATURE: 98 F

## 2022-11-11 DIAGNOSIS — Z87.898 PERSONAL HISTORY OF OTHER SPECIFIED CONDITIONS: ICD-10-CM

## 2022-11-11 DIAGNOSIS — R94.39 ABNORMAL RESULT OF OTHER CARDIOVASCULAR FUNCTION STUDY: ICD-10-CM

## 2022-11-11 LAB
ALBUMIN SERPL ELPH-MCNC: 4.1 G/DL — SIGNIFICANT CHANGE UP (ref 3.3–5)
ALP SERPL-CCNC: 96 U/L — SIGNIFICANT CHANGE UP (ref 40–120)
ALT FLD-CCNC: 19 U/L — SIGNIFICANT CHANGE UP (ref 10–45)
ANION GAP SERPL CALC-SCNC: 15 MMOL/L — SIGNIFICANT CHANGE UP (ref 5–17)
APTT BLD: 31.7 SEC — SIGNIFICANT CHANGE UP (ref 27.5–35.5)
AST SERPL-CCNC: 20 U/L — SIGNIFICANT CHANGE UP (ref 10–40)
BASOPHILS # BLD AUTO: 0.08 K/UL — SIGNIFICANT CHANGE UP (ref 0–0.2)
BASOPHILS NFR BLD AUTO: 0.8 % — SIGNIFICANT CHANGE UP (ref 0–2)
BILIRUB SERPL-MCNC: 0.3 MG/DL — SIGNIFICANT CHANGE UP (ref 0.2–1.2)
BUN SERPL-MCNC: 28 MG/DL — HIGH (ref 7–23)
CALCIUM SERPL-MCNC: 8.9 MG/DL — SIGNIFICANT CHANGE UP (ref 8.4–10.5)
CHLORIDE SERPL-SCNC: 98 MMOL/L — SIGNIFICANT CHANGE UP (ref 96–108)
CO2 SERPL-SCNC: 27 MMOL/L — SIGNIFICANT CHANGE UP (ref 22–31)
CREAT SERPL-MCNC: 1.94 MG/DL — HIGH (ref 0.5–1.3)
EGFR: 35 ML/MIN/1.73M2 — LOW
EOSINOPHIL # BLD AUTO: 0.32 K/UL — SIGNIFICANT CHANGE UP (ref 0–0.5)
EOSINOPHIL NFR BLD AUTO: 3.2 % — SIGNIFICANT CHANGE UP (ref 0–6)
GLUCOSE SERPL-MCNC: 206 MG/DL — HIGH (ref 70–99)
HCT VFR BLD CALC: 39.9 % — SIGNIFICANT CHANGE UP (ref 39–50)
HGB BLD-MCNC: 13.7 G/DL — SIGNIFICANT CHANGE UP (ref 13–17)
IMM GRANULOCYTES NFR BLD AUTO: 0.6 % — SIGNIFICANT CHANGE UP (ref 0–0.9)
INR BLD: 1.09 RATIO — SIGNIFICANT CHANGE UP (ref 0.88–1.16)
LYMPHOCYTES # BLD AUTO: 3.12 K/UL — SIGNIFICANT CHANGE UP (ref 1–3.3)
LYMPHOCYTES # BLD AUTO: 30.8 % — SIGNIFICANT CHANGE UP (ref 13–44)
MAGNESIUM SERPL-MCNC: 1.8 MG/DL — SIGNIFICANT CHANGE UP (ref 1.6–2.6)
MCHC RBC-ENTMCNC: 29.5 PG — SIGNIFICANT CHANGE UP (ref 27–34)
MCHC RBC-ENTMCNC: 34.3 GM/DL — SIGNIFICANT CHANGE UP (ref 32–36)
MCV RBC AUTO: 85.8 FL — SIGNIFICANT CHANGE UP (ref 80–100)
MONOCYTES # BLD AUTO: 0.79 K/UL — SIGNIFICANT CHANGE UP (ref 0–0.9)
MONOCYTES NFR BLD AUTO: 7.8 % — SIGNIFICANT CHANGE UP (ref 2–14)
NEUTROPHILS # BLD AUTO: 5.76 K/UL — SIGNIFICANT CHANGE UP (ref 1.8–7.4)
NEUTROPHILS NFR BLD AUTO: 56.8 % — SIGNIFICANT CHANGE UP (ref 43–77)
NRBC # BLD: 0 /100 WBCS — SIGNIFICANT CHANGE UP (ref 0–0)
NT-PROBNP SERPL-SCNC: 1778 PG/ML — HIGH (ref 0–300)
PLATELET # BLD AUTO: 234 K/UL — SIGNIFICANT CHANGE UP (ref 150–400)
POTASSIUM SERPL-MCNC: 2.6 MMOL/L — CRITICAL LOW (ref 3.5–5.3)
POTASSIUM SERPL-SCNC: 2.6 MMOL/L — CRITICAL LOW (ref 3.5–5.3)
PROT SERPL-MCNC: 7.7 G/DL — SIGNIFICANT CHANGE UP (ref 6–8.3)
PROTHROM AB SERPL-ACNC: 12.6 SEC — SIGNIFICANT CHANGE UP (ref 10.5–13.4)
RBC # BLD: 4.65 M/UL — SIGNIFICANT CHANGE UP (ref 4.2–5.8)
RBC # FLD: 14.1 % — SIGNIFICANT CHANGE UP (ref 10.3–14.5)
SODIUM SERPL-SCNC: 140 MMOL/L — SIGNIFICANT CHANGE UP (ref 135–145)
TROPONIN T, HIGH SENSITIVITY RESULT: 39 NG/L — SIGNIFICANT CHANGE UP (ref 0–51)
WBC # BLD: 10.13 K/UL — SIGNIFICANT CHANGE UP (ref 3.8–10.5)
WBC # FLD AUTO: 10.13 K/UL — SIGNIFICANT CHANGE UP (ref 3.8–10.5)

## 2022-11-11 PROCEDURE — 99214 OFFICE O/P EST MOD 30 MIN: CPT

## 2022-11-11 PROCEDURE — 99285 EMERGENCY DEPT VISIT HI MDM: CPT | Mod: FS,CS

## 2022-11-11 PROCEDURE — 93000 ELECTROCARDIOGRAM COMPLETE: CPT

## 2022-11-11 PROCEDURE — 71045 X-RAY EXAM CHEST 1 VIEW: CPT | Mod: 26

## 2022-11-11 RX ORDER — LABETALOL HCL 100 MG
10 TABLET ORAL ONCE
Refills: 0 | Status: DISCONTINUED | OUTPATIENT
Start: 2022-11-11 | End: 2022-11-11

## 2022-11-11 RX ORDER — POTASSIUM CHLORIDE 20 MEQ
20 PACKET (EA) ORAL
Refills: 0 | Status: COMPLETED | OUTPATIENT
Start: 2022-11-11 | End: 2022-11-12

## 2022-11-11 RX ORDER — POTASSIUM CHLORIDE 20 MEQ
20 PACKET (EA) ORAL ONCE
Refills: 0 | Status: COMPLETED | OUTPATIENT
Start: 2022-11-11 | End: 2022-11-11

## 2022-11-11 RX ORDER — LABETALOL HCL 100 MG
10 TABLET ORAL ONCE
Refills: 0 | Status: COMPLETED | OUTPATIENT
Start: 2022-11-11 | End: 2022-11-11

## 2022-11-11 RX ADMIN — Medication 10 MILLIGRAM(S): at 22:13

## 2022-11-11 RX ADMIN — Medication 20 MILLIEQUIVALENT(S): at 23:08

## 2022-11-11 RX ADMIN — Medication 50 MILLIEQUIVALENT(S): at 23:25

## 2022-11-11 NOTE — ED PROVIDER NOTE - OBJECTIVE STATEMENT
mew pt of  CKD, HTN,     saw few weeks ago for chest pain, nuclear stress test yesterday which showed inferior ischemia, low EF 30% on stress  today  systolic.   Sent to ED for angio, renal clearance. 78 yo M hx of HTN, HLD, CKD presenting for abnormal outpatient stress test. Pt staets he has intermittent CP for past few months associated with exertion. No associatoin with SOB, vomiting, radiation of pain. Sees Dr. Albright. Had stress test yesterday, and was seeing cardiologist Dr. Albright today and referred to ED. States he had some issue with his kidney but unsure. Pt is poor historian. Pts son at bedside but unable to provide further history, just states pt was sent in for kidney test.   Spoke to Dr. Albright over the phone and she states she sent pt in for r/o HTN emergency. States he is relatively new pt of hers, she has been seeing him for few weeks. Had an outpatient nuclear stress test yesterday which was concerning for inferior ischemia and pt with EF of 30% during stress, compared to 50-55% EF on echo done a few weeks ago. She states she saw him today for followup and his SBP was 220 and so she sent him to ED for r/o HTN emergency. States he needs cardio consult, angio during his stay and likely renal clearance.

## 2022-11-11 NOTE — ED PROVIDER NOTE - INTERPRETATION SERVICES DECLINED
Monitoring labs and inflammatory markers are stable.  
Patient has been updated via [facility]  
Patient/Caregiver requests family/friend to interpret.

## 2022-11-11 NOTE — ASSESSMENT
[FreeTextEntry1] : Assessment:\par David Mai is a 77 year old man, former cigarette smoker with past medical history of Hypertension and Chronic kidney disease presents today for follow-up visit regarding abnormal nuclear stress test.\par \par The patient reports that he has a headache and pain behind his eyes today. Denies chest pain or shortness of breath. ECG today consistent with sinus rhythm, LVH, PVC, nonspecific ST abnormalities. Nuclear stress test from yesterday consistent with medium sized, moderate defects in anterolateral, inferolateral walls that are partially reversible, suggestive of infarction with moderate beatris-infarct ischemia with abnormal post stress LVEF 39%. Echocardiogram images reviewed and consistent with normal LV and RV systolic function, mildly dilated aortic root, mild-moderate aortic regurgitation. Labs reviewed and notable for hypertriglyceridemia and abnormal renal function with Cr of 2.0 and GFR 34. \par \par Recommendations:\par [] Abnormal nuclear stress test: Nuclear stress images reviewed, most notably is moderately reduced LV systolic function. Patient also with hypertensive emergency today in office, repeat /90. Given hypertensive emergency with symptoms of headache, abnormal nuclear stress test and history of chest pain recommended inpatient evaluation at [facility] for coronary angiogram urgently. The patient has chronic kidney disease and so will need Nephrology evaluation and clearance/optimization prior to coronary angiogram. This was discussed with daughter, Gita and patient via  and they agree for inpatient admission tonight. Cardiology team and Interventional cardiology to be consulted in addition to Nephrology when patient is admitted. [facility] ER triage called and notified them of patient's case.\par [] Return to office: Patient will follow up after discharge from [facility]

## 2022-11-11 NOTE — REVIEW OF SYSTEMS
[Headache] : headache [Earache] : no earache [Sore Throat] : no sore throat [SOB] : no shortness of breath [Chest Discomfort] : no chest discomfort [Lower Ext Edema] : no extremity edema [Syncope] : no syncope [Cough] : no cough [Abdominal Pain] : no abdominal pain [Urinary Frequency] : no change in urinary frequency [Joint Pain] : no joint pain [Rash] : no rash [Dizziness] : no dizziness [Confusion] : no confusion was observed [Easy Bruising] : no tendency for easy bruising

## 2022-11-11 NOTE — CARDIOLOGY SUMMARY
[de-identified] : ECG (9/22/22): normal sinus rhythm, left axis deviation \par ECG (10/6/22): normal sinus rhythm, left axis deviation, nonspecific ST abnormalities \par ECG (11/11/22): normal sinus rhythm, left axis deviation, LVH, IVCD, PVC, nonspecific ST abnormalities  [de-identified] : Nuclear Stress Test (11/10/22): Medium-sized, moderate defects in anterolateral, inferolateral walls that are partially reversible, suggestive of infarction with moderate beatris-infarct ischemia. Post stress LVEF 39%.  [de-identified] : TTE (10/6/22): (Images reviewed): Normal LV systolic function,mild LVH. Normal RV size and function. Mildly dilated aortic root (3.9 cm). Mild-moderate AR. No pericardial effusion

## 2022-11-11 NOTE — ED PROVIDER NOTE - CLINICAL SUMMARY MEDICAL DECISION MAKING FREE TEXT BOX
78 yo M hx of HTN, HLD, CKD presenting for abnormal outpatient stress test. Pt staets he has intermittent CP for past few months associated with exertion. No associatoin with SOB, vomiting, radiation of pain. Sees Dr. Albright. Had stress test yesterday, and was seeing cardiologist Dr. Albright today and referred to ED. States he had some issue with his kidney but unsure. Pt is poor historian. Pts son at bedside but unable to provide further history, just states pt was sent in for kidney test.   Spoke to Dr. Albright over the phone and she states she sent pt in for r/o HTN emergency. States he is relatively new pt of hers, she has been seeing him for few weeks. Had an outpatient nuclear stress test yesterday which was concerning for inferior ischemia and pt with EF of 30% during stress, compared to 50-55% EF on echo done a few weeks ago. She states she saw him today for followup and his SBP was 220 and so she sent him to ED for r/o HTN emergency. States he needs cardio consult, angio during his stay and likely renal clearance.   On exam pt with marked HTN, . Otherwise well appearing, non-toxic appearing. Heart exam normal. Lungs clear. Normal and equal radial pulses bilaterally and equal BP bilaterally. No chest pain, no abd pain or HA, less concern for dissection. Will get labs to screen for end organ damage. Will give IV labetalol, reassess BP. Cardiology consulted as well.

## 2022-11-11 NOTE — ED PROVIDER NOTE - NS ED ROS FT
CONSTITUTIONAL: No fevers, chills, fatigue, dizziness, weakness, unexpected weight change  EYES: No double vision, blurry vision  ENT: No ear pain, nasal congestion, runny nose, sore throat  CV: + intermittent chest pain, palpitations  PULM: No cough, shortness of breath  GI: No abdominal pain, nausea, vomiting, diarrhea, constipation  : No dysuria, polyuria, hematuria  SKIN: No rashes, swelling  MSK: No muscle aches  NEURO: No headache, paresthesias  PSYCHIATRIC: Denies suicidal, homicidal ideations. No auditory, visual, tactile hallucinations

## 2022-11-11 NOTE — ED PROVIDER NOTE - RAPID ASSESSMENT
77y M w/ PMHx of HTN presents to the ED for evaluation s/p abnormal stress test and elevated BP done earlier today. Pt was sent by Cardiologist Dr. Albright. Also states his kidneys should be checked due to abnormal labs. Denies CP, difficulty urinating, dysuria. Pt is well appearing in triage.    Cosme LESTER (Scribe) have documented this rapid assessment note under the dictation of Jaki Dukes (PA) which has been reviewed and affirmed to be accurate. Patient was seen as a QPA patient. 77y M w/ PMHx of HTN presents to the ED for evaluation s/p abnormal stress test and elevated BP done earlier today. Pt was sent by Cardiologist Dr. Albright. Also states his kidneys should be checked due to abnormal labs. Denies CP, difficulty urinating, dysuria. Pt is well appearing in triage.    ICosme (Scribe) have documented this rapid assessment note under the dictation of Jaki Dukes (PA) which has been reviewed and affirmed to be accurate. Patient was seen as a QPA patient.    Rapid assessment by Jaki Dukes PA-C full eval to be performed in ED. Above documentation completed by scribe above. I was present for and agree with documentation.   Jaki Dukes PA-C 77y M w/ PMHx of HTN presents to the ED for evaluation s/p abnormal stress test and elevated BP done earlier today. Pt was sent by Cardiologist Dr. Albright. Also states his kidneys should be checked due to abnormal labs. Denies CP, difficulty urinating, dysuria. Pt is well appearing in triage.    Cosme LESTER ([place]) have documented this rapid assessment note under the dictation of Jaki Dukes (PA) which has been reviewed and affirmed to be accurate. Patient was seen as a QPA patient. ap- Patient seen by tele qPa- I available for questions regarding this patient and no questions were asked.     Rapid assessment by Jaki Dukes PA-C full eval to be performed in ED. Above documentation completed by carlos above. I was present for and agree with documentation.   Jaki Dukes PA-C

## 2022-11-11 NOTE — HISTORY OF PRESENT ILLNESS
[FreeTextEntry1] :  ID#: [id number]\par \par David Mai is a 77 year old man, former cigarette smoker with past medical history of Hypertension and Chronic kidney disease presents today for follow-up visit regarding abnormal stress test.\par \par The patient reports that he has a headache and pain behind his eyes today. Denies chest pain or shortness of breath.

## 2022-11-11 NOTE — PHYSICAL EXAM
[Normal] : alert and oriented, normal memory [de-identified] : elderly man, no acute distress [de-identified] : chronic left eye injury [de-identified] : supple [de-identified] : JVP ~ 7 cm H20, RRR, s1, s2, no murmurs/rubs [de-identified] : unlabored respirations, clear lung fields [de-identified] : non-distended

## 2022-11-11 NOTE — ED PROVIDER NOTE - PROGRESS NOTE DETAILS
Trop negative, proBNP mildly elevated.   Renal function at baseline. Cardio recommends PO meds (see chart for recs), no indication for drip.   BP improved s/p IV labetalol. Admitted to medicine under tele.

## 2022-11-11 NOTE — ED PROVIDER NOTE - PHYSICAL EXAMINATION
GENERAL: Vital signs notable for HTN, otherwise wnl  EYES: Conjunctiva noninjected or pale, sclera anicteric  HENT: NC/AT, moist mucous membranes  NECK: Supple, trachea midline  LUNG: Nonlabored respirations, no wheezes, rales  CV: RRR, Pulses- Radial/dorsalis pedis: 2+ bilateral and equal  ABDOMEN: Nondistended, nontender  MSK: No visible deformities, nontender extremities  SKIN: No rashes, bruises  NEURO: AAOx4 (to person, place, time, event), no tremor, steady gait  PSYCH: Normal mood and affect

## 2022-11-12 DIAGNOSIS — I50.9 HEART FAILURE, UNSPECIFIED: ICD-10-CM

## 2022-11-12 DIAGNOSIS — E87.6 HYPOKALEMIA: ICD-10-CM

## 2022-11-12 DIAGNOSIS — I20.0 UNSTABLE ANGINA: ICD-10-CM

## 2022-11-12 DIAGNOSIS — I16.1 HYPERTENSIVE EMERGENCY: ICD-10-CM

## 2022-11-12 DIAGNOSIS — R07.9 CHEST PAIN, UNSPECIFIED: ICD-10-CM

## 2022-11-12 DIAGNOSIS — G44.89 OTHER HEADACHE SYNDROME: ICD-10-CM

## 2022-11-12 DIAGNOSIS — N18.9 CHRONIC KIDNEY DISEASE, UNSPECIFIED: ICD-10-CM

## 2022-11-12 DIAGNOSIS — H57.10 OCULAR PAIN, UNSPECIFIED EYE: ICD-10-CM

## 2022-11-12 LAB
A1C WITH ESTIMATED AVERAGE GLUCOSE RESULT: 6.4 % — HIGH (ref 4–5.6)
ALBUMIN SERPL ELPH-MCNC: 3.4 G/DL — SIGNIFICANT CHANGE UP (ref 3.3–5)
ALP SERPL-CCNC: 79 U/L — SIGNIFICANT CHANGE UP (ref 40–120)
ALT FLD-CCNC: 14 U/L — SIGNIFICANT CHANGE UP (ref 10–45)
ANION GAP SERPL CALC-SCNC: 11 MMOL/L — SIGNIFICANT CHANGE UP (ref 5–17)
APTT BLD: 29.9 SEC — SIGNIFICANT CHANGE UP (ref 27.5–35.5)
AST SERPL-CCNC: 16 U/L — SIGNIFICANT CHANGE UP (ref 10–40)
BASOPHILS # BLD AUTO: 0.05 K/UL — SIGNIFICANT CHANGE UP (ref 0–0.2)
BASOPHILS NFR BLD AUTO: 0.5 % — SIGNIFICANT CHANGE UP (ref 0–2)
BILIRUB SERPL-MCNC: 0.3 MG/DL — SIGNIFICANT CHANGE UP (ref 0.2–1.2)
BUN SERPL-MCNC: 28 MG/DL — HIGH (ref 7–23)
CALCIUM SERPL-MCNC: 7.9 MG/DL — LOW (ref 8.4–10.5)
CHLORIDE SERPL-SCNC: 103 MMOL/L — SIGNIFICANT CHANGE UP (ref 96–108)
CO2 SERPL-SCNC: 27 MMOL/L — SIGNIFICANT CHANGE UP (ref 22–31)
CREAT SERPL-MCNC: 1.72 MG/DL — HIGH (ref 0.5–1.3)
EGFR: 41 ML/MIN/1.73M2 — LOW
EOSINOPHIL # BLD AUTO: 0.3 K/UL — SIGNIFICANT CHANGE UP (ref 0–0.5)
EOSINOPHIL NFR BLD AUTO: 3 % — SIGNIFICANT CHANGE UP (ref 0–6)
ESTIMATED AVERAGE GLUCOSE: 137 MG/DL — HIGH (ref 68–114)
GLUCOSE SERPL-MCNC: 116 MG/DL — HIGH (ref 70–99)
HCT VFR BLD CALC: 33.7 % — LOW (ref 39–50)
HGB BLD-MCNC: 11.7 G/DL — LOW (ref 13–17)
IMM GRANULOCYTES NFR BLD AUTO: 0.4 % — SIGNIFICANT CHANGE UP (ref 0–0.9)
INR BLD: 1.1 RATIO — SIGNIFICANT CHANGE UP (ref 0.88–1.16)
LYMPHOCYTES # BLD AUTO: 2.7 K/UL — SIGNIFICANT CHANGE UP (ref 1–3.3)
LYMPHOCYTES # BLD AUTO: 27.4 % — SIGNIFICANT CHANGE UP (ref 13–44)
MAGNESIUM SERPL-MCNC: 1.6 MG/DL — SIGNIFICANT CHANGE UP (ref 1.6–2.6)
MCHC RBC-ENTMCNC: 29.8 PG — SIGNIFICANT CHANGE UP (ref 27–34)
MCHC RBC-ENTMCNC: 34.7 GM/DL — SIGNIFICANT CHANGE UP (ref 32–36)
MCV RBC AUTO: 86 FL — SIGNIFICANT CHANGE UP (ref 80–100)
MONOCYTES # BLD AUTO: 0.86 K/UL — SIGNIFICANT CHANGE UP (ref 0–0.9)
MONOCYTES NFR BLD AUTO: 8.7 % — SIGNIFICANT CHANGE UP (ref 2–14)
NEUTROPHILS # BLD AUTO: 5.91 K/UL — SIGNIFICANT CHANGE UP (ref 1.8–7.4)
NEUTROPHILS NFR BLD AUTO: 60 % — SIGNIFICANT CHANGE UP (ref 43–77)
NRBC # BLD: 0 /100 WBCS — SIGNIFICANT CHANGE UP (ref 0–0)
PHOSPHATE SERPL-MCNC: 1.7 MG/DL — LOW (ref 2.5–4.5)
PLATELET # BLD AUTO: 208 K/UL — SIGNIFICANT CHANGE UP (ref 150–400)
POTASSIUM SERPL-MCNC: 3.2 MMOL/L — LOW (ref 3.5–5.3)
POTASSIUM SERPL-SCNC: 3.2 MMOL/L — LOW (ref 3.5–5.3)
PROT SERPL-MCNC: 6.4 G/DL — SIGNIFICANT CHANGE UP (ref 6–8.3)
PROTHROM AB SERPL-ACNC: 12.8 SEC — SIGNIFICANT CHANGE UP (ref 10.5–13.4)
RBC # BLD: 3.92 M/UL — LOW (ref 4.2–5.8)
RBC # FLD: 14.4 % — SIGNIFICANT CHANGE UP (ref 10.3–14.5)
SARS-COV-2 RNA SPEC QL NAA+PROBE: SIGNIFICANT CHANGE UP
SODIUM SERPL-SCNC: 141 MMOL/L — SIGNIFICANT CHANGE UP (ref 135–145)
TROPONIN T, HIGH SENSITIVITY RESULT: 39 NG/L — SIGNIFICANT CHANGE UP (ref 0–51)
TSH SERPL-MCNC: 1.31 UIU/ML — SIGNIFICANT CHANGE UP (ref 0.27–4.2)
WBC # BLD: 9.86 K/UL — SIGNIFICANT CHANGE UP (ref 3.8–10.5)
WBC # FLD AUTO: 9.86 K/UL — SIGNIFICANT CHANGE UP (ref 3.8–10.5)

## 2022-11-12 PROCEDURE — 99223 1ST HOSP IP/OBS HIGH 75: CPT

## 2022-11-12 PROCEDURE — 93306 TTE W/DOPPLER COMPLETE: CPT | Mod: 26

## 2022-11-12 PROCEDURE — 99222 1ST HOSP IP/OBS MODERATE 55: CPT

## 2022-11-12 PROCEDURE — 70450 CT HEAD/BRAIN W/O DYE: CPT | Mod: 26

## 2022-11-12 RX ORDER — ISOSORBIDE DINITRATE 5 MG/1
20 TABLET ORAL ONCE
Refills: 0 | Status: COMPLETED | OUTPATIENT
Start: 2022-11-12 | End: 2022-11-12

## 2022-11-12 RX ORDER — OMEGA-3 ACID ETHYL ESTERS 1 G
0 CAPSULE ORAL
Qty: 0 | Refills: 0 | DISCHARGE

## 2022-11-12 RX ORDER — ASPIRIN/CALCIUM CARB/MAGNESIUM 324 MG
81 TABLET ORAL DAILY
Refills: 0 | Status: DISCONTINUED | OUTPATIENT
Start: 2022-11-12 | End: 2022-11-21

## 2022-11-12 RX ORDER — POTASSIUM PHOSPHATE, MONOBASIC POTASSIUM PHOSPHATE, DIBASIC 236; 224 MG/ML; MG/ML
15 INJECTION, SOLUTION INTRAVENOUS ONCE
Refills: 0 | Status: COMPLETED | OUTPATIENT
Start: 2022-11-12 | End: 2022-11-12

## 2022-11-12 RX ORDER — HYDRALAZINE HCL 50 MG
25 TABLET ORAL EVERY 8 HOURS
Refills: 0 | Status: DISCONTINUED | OUTPATIENT
Start: 2022-11-12 | End: 2022-11-13

## 2022-11-12 RX ORDER — POTASSIUM CHLORIDE 20 MEQ
40 PACKET (EA) ORAL ONCE
Refills: 0 | Status: COMPLETED | OUTPATIENT
Start: 2022-11-12 | End: 2022-11-12

## 2022-11-12 RX ORDER — CARVEDILOL PHOSPHATE 80 MG/1
6.25 CAPSULE, EXTENDED RELEASE ORAL EVERY 12 HOURS
Refills: 0 | Status: DISCONTINUED | OUTPATIENT
Start: 2022-11-12 | End: 2022-11-14

## 2022-11-12 RX ORDER — ATORVASTATIN CALCIUM 80 MG/1
40 TABLET, FILM COATED ORAL AT BEDTIME
Refills: 0 | Status: DISCONTINUED | OUTPATIENT
Start: 2022-11-12 | End: 2022-11-21

## 2022-11-12 RX ORDER — HYDRALAZINE HCL 50 MG
25 TABLET ORAL ONCE
Refills: 0 | Status: COMPLETED | OUTPATIENT
Start: 2022-11-12 | End: 2022-11-12

## 2022-11-12 RX ORDER — ACETAMINOPHEN 500 MG
650 TABLET ORAL EVERY 6 HOURS
Refills: 0 | Status: DISCONTINUED | OUTPATIENT
Start: 2022-11-12 | End: 2022-11-21

## 2022-11-12 RX ORDER — ISOSORBIDE DINITRATE 5 MG/1
10 TABLET ORAL THREE TIMES A DAY
Refills: 0 | Status: DISCONTINUED | OUTPATIENT
Start: 2022-11-12 | End: 2022-11-14

## 2022-11-12 RX ORDER — ASPIRIN/CALCIUM CARB/MAGNESIUM 324 MG
325 TABLET ORAL ONCE
Refills: 0 | Status: COMPLETED | OUTPATIENT
Start: 2022-11-12 | End: 2022-11-12

## 2022-11-12 RX ADMIN — ATORVASTATIN CALCIUM 40 MILLIGRAM(S): 80 TABLET, FILM COATED ORAL at 21:50

## 2022-11-12 RX ADMIN — ISOSORBIDE DINITRATE 10 MILLIGRAM(S): 5 TABLET ORAL at 11:35

## 2022-11-12 RX ADMIN — POTASSIUM PHOSPHATE, MONOBASIC POTASSIUM PHOSPHATE, DIBASIC 62.5 MILLIMOLE(S): 236; 224 INJECTION, SOLUTION INTRAVENOUS at 15:58

## 2022-11-12 RX ADMIN — Medication 650 MILLIGRAM(S): at 22:50

## 2022-11-12 RX ADMIN — Medication 40 MILLIEQUIVALENT(S): at 14:22

## 2022-11-12 RX ADMIN — ISOSORBIDE DINITRATE 10 MILLIGRAM(S): 5 TABLET ORAL at 04:35

## 2022-11-12 RX ADMIN — Medication 25 MILLIGRAM(S): at 21:50

## 2022-11-12 RX ADMIN — ISOSORBIDE DINITRATE 20 MILLIGRAM(S): 5 TABLET ORAL at 00:08

## 2022-11-12 RX ADMIN — Medication 650 MILLIGRAM(S): at 21:51

## 2022-11-12 RX ADMIN — Medication 25 MILLIGRAM(S): at 00:06

## 2022-11-12 RX ADMIN — Medication 50 MILLIEQUIVALENT(S): at 03:23

## 2022-11-12 RX ADMIN — ISOSORBIDE DINITRATE 10 MILLIGRAM(S): 5 TABLET ORAL at 15:58

## 2022-11-12 RX ADMIN — Medication 50 MILLIEQUIVALENT(S): at 01:17

## 2022-11-12 RX ADMIN — CARVEDILOL PHOSPHATE 6.25 MILLIGRAM(S): 80 CAPSULE, EXTENDED RELEASE ORAL at 17:07

## 2022-11-12 RX ADMIN — Medication 325 MILLIGRAM(S): at 03:21

## 2022-11-12 NOTE — PATIENT PROFILE ADULT - FALL HARM RISK - HARM RISK INTERVENTIONS

## 2022-11-12 NOTE — CHART NOTE - NSCHARTNOTEFT_GEN_A_CORE
: [id number].    Pt feels well, denies any acute complaints including chest pain, sob, headache.     GENERAL: NAD, comfortable appearing  HEAD:  NCAT  EYES: EOMI, PERRL, R eye unable to close completely  CHEST/LUNG: Clear to auscultation bilaterally; No wheeze  HEART: Regular rate and rhythm; No murmurs, rubs, or gallops  ABDOMEN: Soft, Nontender, Nondistended; Bowel sounds present  PSYCH: AAOx2, appropriate affect  NEUROLOGY: non-focal, burgess  SKIN: No rashes or lesions    Trops flat, awaiting echo, cardiology evaluation. Rest of plan as detailed in H&P : [id number].    Pt feels well, denies any acute complaints including chest pain, sob, headache.     Vital Signs Last 24 Hrs  T(C): 36.9 (12 Nov 2022 05:21), Max: 37.2 (11 Nov 2022 21:57)  T(F): 98.4 (12 Nov 2022 05:21), Max: 98.9 (11 Nov 2022 21:57)  HR: 64 (12 Nov 2022 05:21) (62 - 77)  BP: 167/81 (12 Nov 2022 05:21) (163/78 - 223/105)  BP(mean): 113 (11 Nov 2022 22:42) (113 - 113)  RR: 18 (12 Nov 2022 05:21) (16 - 18)  SpO2: 93% (12 Nov 2022 05:21) (93% - 98%)    Parameters below as of 12 Nov 2022 05:21  Patient On (Oxygen Delivery Method): room air    GENERAL: NAD, comfortable appearing  HEAD:  NCAT  EYES: EOMI, PERRL, R eye unable to close completely  CHEST/LUNG: Clear to auscultation bilaterally; No wheeze  HEART: Regular rate and rhythm; No murmurs, rubs, or gallops  ABDOMEN: Soft, Nontender, Nondistended; Bowel sounds present  PSYCH: AAOx2, appropriate affect  NEUROLOGY: non-focal, burgess  SKIN: No rashes or lesions    Trops flat, awaiting echo, cardiology evaluation. Rest of plan as detailed in H&P : [id number].    Pt feels well, denies any acute complaints including chest pain, sob, headache.     Vital Signs Last 24 Hrs  T(C): 36.9 (12 Nov 2022 05:21), Max: 37.2 (11 Nov 2022 21:57)  T(F): 98.4 (12 Nov 2022 05:21), Max: 98.9 (11 Nov 2022 21:57)  HR: 64 (12 Nov 2022 05:21) (62 - 77)  BP: 167/81 (12 Nov 2022 05:21) (163/78 - 223/105)  BP(mean): 113 (11 Nov 2022 22:42) (113 - 113)  RR: 18 (12 Nov 2022 05:21) (16 - 18)  SpO2: 93% (12 Nov 2022 05:21) (93% - 98%)    Parameters below as of 12 Nov 2022 05:21  Patient On (Oxygen Delivery Method): room air    GENERAL: NAD, comfortable appearing  HEAD:  NCAT  EYES: EOMI, PERRL, R eye unable to close completely  CHEST/LUNG: Clear to auscultation bilaterally; No wheeze  HEART: Regular rate and rhythm; No murmurs, rubs, or gallops  ABDOMEN: Soft, Nontender, Nondistended; Bowel sounds present  PSYCH: AAOx2, appropriate affect  NEUROLOGY: non-focal, burgess  SKIN: No rashes or lesions    Trops flat, awaiting echo, cardiology evaluation. Outpatient labs reviewed: Cr 06/22 1.95, likely with CKD secondary to long standing uncontrolled HTN and diabetes. Chronic left eye blindness, will defer inpatient ophthalmology evaluation.    Rest of plan as detailed in H&P : [id number].    Pt feels well, denies any acute complaints including chest pain, sob, headache.     Vital Signs Last 24 Hrs  T(C): 36.9 (12 Nov 2022 05:21), Max: 37.2 (11 Nov 2022 21:57)  T(F): 98.4 (12 Nov 2022 05:21), Max: 98.9 (11 Nov 2022 21:57)  HR: 64 (12 Nov 2022 05:21) (62 - 77)  BP: 167/81 (12 Nov 2022 05:21) (163/78 - 223/105)  BP(mean): 113 (11 Nov 2022 22:42) (113 - 113)  RR: 18 (12 Nov 2022 05:21) (16 - 18)  SpO2: 93% (12 Nov 2022 05:21) (93% - 98%)    Parameters below as of 12 Nov 2022 05:21  Patient On (Oxygen Delivery Method): room air    GENERAL: NAD, comfortable appearing  HEAD:  NCAT  EYES: EOMI, PERRL, R eye unable to close completely  CHEST/LUNG: Clear to auscultation bilaterally; No wheeze  HEART: Regular rate and rhythm; No murmurs, rubs, or gallops  ABDOMEN: Soft, Nontender, Nondistended; Bowel sounds present  PSYCH: AAOx2, appropriate affect  NEUROLOGY: non-focal, burgess  SKIN: No rashes or lesions    - Trops flat, awaiting echo, cardiology evaluation. Outpatient labs reviewed. Unable to obtain records from Dr. Albright's office (closed on weekends).  - Cr 06/22 1.95, likely with CKD secondary to long standing uncontrolled HTN and diabetes. F/u renal US. If parenchymal disease is noted on echo, no indication for renal consult.  - Chronic left eye blindness, will defer inpatient ophthalmology evaluation.  - Rest of plan as detailed in H&P : [id number].    Pt feels well, denies any acute complaints including chest pain, sob, headache.     Vital Signs Last 24 Hrs  T(C): 36.9 (12 Nov 2022 05:21), Max: 37.2 (11 Nov 2022 21:57)  T(F): 98.4 (12 Nov 2022 05:21), Max: 98.9 (11 Nov 2022 21:57)  HR: 64 (12 Nov 2022 05:21) (62 - 77)  BP: 167/81 (12 Nov 2022 05:21) (163/78 - 223/105)  BP(mean): 113 (11 Nov 2022 22:42) (113 - 113)  RR: 18 (12 Nov 2022 05:21) (16 - 18)  SpO2: 93% (12 Nov 2022 05:21) (93% - 98%)    Parameters below as of 12 Nov 2022 05:21  Patient On (Oxygen Delivery Method): room air    GENERAL: NAD, comfortable appearing  HEAD:  NCAT  EYES: EOMI, PERRL, R eye unable to close completely  CHEST/LUNG: Clear to auscultation bilaterally; No wheeze  HEART: Regular rate and rhythm; No murmurs, rubs, or gallops  ABDOMEN: Soft, Nontender, Nondistended; Bowel sounds present  PSYCH: AAOx2, appropriate affect  NEUROLOGY: non-focal, burgess  SKIN: No rashes or lesions    - Trops flat, awaiting echo, cardiology evaluation. Outpatient labs reviewed. Unable to obtain records from Dr. Albright's office (closed on weekends).  - Gradual return to normotension - start carvedilol today, can start hydralazine tomorrow. Start aspirin, statin, f/u lipid panel.   - Cr 06/22 1.95, likely with CKD secondary to long standing uncontrolled HTN and diabetes. F/u renal US. If parenchymal disease is noted on echo, no indication for renal consult.  - Chronic left eye blindness, will defer inpatient ophthalmology evaluation.  - Rest of plan as detailed in H&P : [id number].    Pt feels well, denies any acute complaints including chest pain, sob, headache.     Vital Signs Last 24 Hrs  T(C): 36.9 (12 Nov 2022 05:21), Max: 37.2 (11 Nov 2022 21:57)  T(F): 98.4 (12 Nov 2022 05:21), Max: 98.9 (11 Nov 2022 21:57)  HR: 64 (12 Nov 2022 05:21) (62 - 77)  BP: 167/81 (12 Nov 2022 05:21) (163/78 - 223/105)  BP(mean): 113 (11 Nov 2022 22:42) (113 - 113)  RR: 18 (12 Nov 2022 05:21) (16 - 18)  SpO2: 93% (12 Nov 2022 05:21) (93% - 98%)    Parameters below as of 12 Nov 2022 05:21  Patient On (Oxygen Delivery Method): room air    GENERAL: NAD, comfortable appearing  HEAD:  NCAT  EYES: EOMI, PERRL, R eye unable to close completely  CHEST/LUNG: Clear to auscultation bilaterally; No wheeze  HEART: Regular rate and rhythm; No murmurs, rubs, or gallops  ABDOMEN: Soft, Nontender, Nondistended; Bowel sounds present  PSYCH: AAOx2, appropriate affect  NEUROLOGY: non-focal, burgess  SKIN: No rashes or lesions    - Trops flat, awaiting echo, cardiology evaluation. Outpatient labs reviewed. Unable to obtain records from Dr. Albright's office (closed on weekends).  - Gradual return to normotension - start carvedilol and hydralazine. Can uptitrate ISDN tomorrow if further control is required. Start aspirin, statin, f/u lipid panel.   - Cr 06/22 1.95, likely with CKD secondary to long standing uncontrolled HTN and diabetes. F/u renal US. If parenchymal disease is noted on echo, no indication for renal consult.  - Chronic left eye blindness, will defer inpatient ophthalmology evaluation.  - Rest of plan as detailed in H&P : [id number].    Pt feels well, denies any acute complaints including chest pain, sob, headache.     Vital Signs Last 24 Hrs  T(C): 36.9 (12 Nov 2022 05:21), Max: 37.2 (11 Nov 2022 21:57)  T(F): 98.4 (12 Nov 2022 05:21), Max: 98.9 (11 Nov 2022 21:57)  HR: 64 (12 Nov 2022 05:21) (62 - 77)  BP: 167/81 (12 Nov 2022 05:21) (163/78 - 223/105)  BP(mean): 113 (11 Nov 2022 22:42) (113 - 113)  RR: 18 (12 Nov 2022 05:21) (16 - 18)  SpO2: 93% (12 Nov 2022 05:21) (93% - 98%)    Parameters below as of 12 Nov 2022 05:21  Patient On (Oxygen Delivery Method): room air    GENERAL: NAD, comfortable appearing  HEAD:  NCAT  EYES: EOMI, PERRL, R eye unable to close completely  CHEST/LUNG: Clear to auscultation bilaterally; No wheeze  HEART: Regular rate and rhythm; No murmurs, rubs, or gallops  ABDOMEN: Soft, Nontender, Nondistended; Bowel sounds present  PSYCH: AAOx2, appropriate affect  NEUROLOGY: non-focal, burgess  SKIN: No rashes or lesions    - Trops flat, awaiting echo, cardiology evaluation. Outpatient labs reviewed. Unable to obtain records from Dr. Albright's office (closed on weekends).  - Gradual return to normotension - start carvedilol and hydralazine. Can uptitrate ISDN tomorrow if further control is required. Start aspirin, statin, f/u lipid panel.   - Cr 06/22 1.95, likely with CKD secondary to long standing uncontrolled HTN and diabetes. F/u renal US. If parenchymal disease is noted on US, this is likely secondary to his medical conditions and is his new baseline Cr; can defer renal consult.  - Chronic left eye blindness, will defer inpatient ophthalmology evaluation.  - Rest of plan as detailed in H&P

## 2022-11-12 NOTE — H&P ADULT - NSHPSOCIALHISTORY_GEN_ALL_CORE
Social History:    Marital Status: ( x ) , (  ) Single, (  ) , (  ) , (  )   # of Children: 6  Lives with: (  ) alone, ( x ) children, ( x ) spouse, (  ) parents, (  ) siblings, (  ) friends, (  ) other:   Occupation:     Substance Use/Illicit Drugs: (  ) never used vs other:   Tobacco Usage: (  ) never smoked, (  x) former smoker, (  ) current smoker and Total Pack-Years: 30 pk yrs  Last Alcohol Usage/Frequency/Amount/Withdrawal/Hx of Abuse:  10 years ago  Foreign travel:   Animal exposure:

## 2022-11-12 NOTE — H&P ADULT - ASSESSMENT
77M Greenlandic only speaking, c hx htn, ckd (baseline Cr 2), left eye blindness (for the past 2 yrs, unclear reason), pw reportedly abnormal stress test, new CM, hypertension.

## 2022-11-12 NOTE — ED ADULT NURSE NOTE - OBJECTIVE STATEMENT
77 y.o. Samoan speaking male coming in from home via private car for abnormal stress test. pt states that he was at his cardiologist today and was told that his stress test results were abnormal. pt states that he has been having intermittent CP on exertion x a few months but denies SOB/weakness/dizziness. pt denies CP/SOB/weakness/dizziness currently. States he had some issue with his kidney but unsure. Pt is poor historian. Pts son at bedside but unable to provide further history, just states pt was sent in for kidney test. PMH HTN. A&Ox3, pt hypertensive to 211/86, all other vitals stable, NSR on tele with PVCs, no abdominal tenderness on palpation, no lower extremity edema noted. bed in lowest position, call bell in reach and teaching on use completed. son at bedside preferring to translate

## 2022-11-12 NOTE — H&P ADULT - HISTORY OF PRESENT ILLNESS
77M Panamanian only speaking, c hx htn, ckd (baseline Cr 2), left eye blindness (for the past 2 yrs, unclear reason), pw reportedly abnormal stress test, new CM, hypertension.    History from chart and pt, who is a poor historian.  [id number].  Pt states he's had chest pain, occipital headache, and right eye pain for the past 1 year. Pt states the left sided CP is exertional, radiates to his back. It is not associated with palpitations.  Per charts, pt had a TTE "a few weeks ago" showing normal EF, and then stress test performed yesterday showed new EF of "30%" and was an "abnormal stress test". Paperwork from Sept '22 showed Cr of 2. Also pt with reportedly SBP to 220s, sent in by cardiologist for poss hypertensive emergency.  77M Bhutanese only speaking, c hx htn, ckd (baseline Cr 2), left eye blindness (for the past 2 yrs, unclear reason), pw reportedly abnormal stress test, new CM, hypertension.    History from chart and pt, who is a poor historian.  [id number].  Pt states he's had chest pain, occipital headache, and right eye pain for the past 1 year. Pt states the left sided CP is exertional, radiates to his back. It is not associated with palpitations.  Per charts, pt had a TTE "a few weeks ago" showing normal EF, and then stress test performed yesterday showed new EF of "30%" and was an "abnormal stress test". Paperwork from Sept '22 showed Cr of 2. Also pt with reportedly SBP to 220s, sent in by cardiologist for poss hypertensive emergency. Review of surescripts shows that pt used to be on losartan 100 and aldactone 25mg, last filled in Aug '22. Pt states that when he went to the pharmacy, he was not given those medications.

## 2022-11-12 NOTE — H&P ADULT - PROBLEM SELECTOR PLAN 1
- SBP 220s with persistent CP, new CM. pt's headache, eye pain are reportedly chronic.  - will cont isordil for now  - VS q4h - SBP 220s with persistent CP, new CM. pt's headache, eye pain are reportedly chronic.  - will cont isordil for now. add on BP meds as pressures increase.  - VS q4h

## 2022-11-12 NOTE — H&P ADULT - NSHPLABSRESULTS_GEN_ALL_CORE
Personally reviewed old records.  Personally reviewed labs.  Personally reviewed imaging.  Personally reviewed EKG. NSR rate 67, .  Ant fascic block. Criteria for LVH.  TWI in 3. No ST changes.                          13.7   10.13 )-----------( 234      ( 11 Nov 2022 22:00 )             39.9       11-11    140  |  98  |  28<H>  ----------------------------<  206<H>  2.6<LL>   |  27  |  1.94<H>    Ca    8.9      11 Nov 2022 22:00  Mg     1.8     11-11    TPro  7.7  /  Alb  4.1  /  TBili  0.3  /  DBili  x   /  AST  20  /  ALT  19  /  AlkPhos  96  11-11            LIVER FUNCTIONS - ( 11 Nov 2022 22:00 )  Alb: 4.1 g/dL / Pro: 7.7 g/dL / ALK PHOS: 96 U/L / ALT: 19 U/L / AST: 20 U/L / GGT: x             PT/INR - ( 11 Nov 2022 22:00 )   PT: 12.6 sec;   INR: 1.09 ratio         PTT - ( 11 Nov 2022 22:00 )  PTT:31.7 sec

## 2022-11-12 NOTE — H&P ADULT - NSHPREVIEWOFSYSTEMS_GEN_ALL_CORE
REVIEW OF SYSTEMS:  CONSTITUTIONAL: No weakness. No fevers. No chills. No rigors. +poor appetite.  EYES: No blurry or double vision. +eye pain.  ENT: No hearing difficulty. No sore throat. No Sinusitis/rhinorrhea.   NECK: No pain. No stiffness/rigidity.  CARDIAC: +chest pain. No palpitations. No lightheadedness. No syncope.  RESPIRATORY: No cough. No SOB.   GASTROINTESTINAL: No abdominal pain. No nausea. No vomiting. No diarrhea. No constipation.   GENITOURINARY: No dysuria. No frequency. No oliguria.  NEUROLOGICAL: No numbness/tingling. No focal weakness. +headache. +unsteady gait.  BACK: No back pain. No flank pain.  EXTREMITIES: No lower extremity edema. Full ROM. No joint pain.  SKIN: No rashes. No itching. No other lesions.  PSYCHIATRIC: No depression. No anxiety.   ALLERGIC: No lip swelling. No hives.  All other review of systems is negative unless indicated above.  Unless indicated above, unable to assess ROS 2/2

## 2022-11-12 NOTE — H&P ADULT - PROBLEM SELECTOR PLAN 2
- seen by cards  - TTE  - primary team to obtain outpt stress test and TTE results from pt's cardiologist  - f/u card regarding poss C  - tele  - trend ROSALIA  - asa

## 2022-11-12 NOTE — ED ADULT NURSE NOTE - NSIMPLEMENTINTERV_GEN_ALL_ED
Implemented All Universal Safety Interventions:  [place] to call system. Call bell, personal items and telephone within reach. Instruct patient to call for assistance. Room bathroom lighting operational. Non-slip footwear when patient is off stretcher. Physically safe environment: no spills, clutter or unnecessary equipment. Stretcher in lowest position, wheels locked, appropriate side rails in place.

## 2022-11-12 NOTE — H&P ADULT - NSHPPHYSICALEXAM_GEN_ALL_CORE
PHYSICAL EXAM:   GENERAL: Alert. Not confused. No acute distress. Not thin. Not cachectic. Not obese.  HEAD:  Atraumatic. Normocephalic.  EYES: EOMI. PERRLA. Normal conjunctiva/sclera.  ENT: Neck supple. No JVD. Moist oral mucosa. Not edentulous. No thrush.  LYMPH: Normal supraclavicular/cervical lymph nodes.   CARDIAC: Not tachy, Not chey. Regular rhythm. Not irregularly irregular. S1. S2.  LUNG/CHEST: CTAB. BS equal bilaterally. No wheezes. No rales. No rhonchi.  ABDOMEN: Soft. No tenderness. No distension. No fluid wave. Normal bowel sounds.  BACK: No midline/vertebral tenderness. No flank tenderness.  VASCULAR: +2 b/l radial or ulnar pulses. Palpable DP pulses.  EXTREMITIES:  No clubbing. No cyanosis. No edema. Moving all 4.  NEUROLOGY: A&Ox3. Non-focal exam. Cranial nerves intact. Normal speech. Sensation intact.  PSYCH: Normal behavior. Normal affect.  SKIN: No jaundice. No erythema. No rash/lesion.  ICU Vital Signs Last 24 Hrs  T(C): 37.2 (11 Nov 2022 21:57), Max: 37.2 (11 Nov 2022 21:57)  T(F): 98.9 (11 Nov 2022 21:57), Max: 98.9 (11 Nov 2022 21:57)  HR: 67 (12 Nov 2022 01:13) (62 - 77)  BP: 163/78 (12 Nov 2022 01:13) (163/78 - 223/105)  BP(mean): 113 (11 Nov 2022 22:42) (113 - 113)  ABP: --  ABP(mean): --  RR: 16 (12 Nov 2022 01:13) (16 - 18)  SpO2: 96% (12 Nov 2022 01:13) (94% - 98%)    O2 Parameters below as of 12 Nov 2022 01:13  Patient On (Oxygen Delivery Method): room air          I&O's Summary

## 2022-11-13 LAB
ANION GAP SERPL CALC-SCNC: 14 MMOL/L — SIGNIFICANT CHANGE UP (ref 5–17)
BUN SERPL-MCNC: 25 MG/DL — HIGH (ref 7–23)
CALCIUM SERPL-MCNC: 8.1 MG/DL — LOW (ref 8.4–10.5)
CHLORIDE SERPL-SCNC: 103 MMOL/L — SIGNIFICANT CHANGE UP (ref 96–108)
CHOLEST SERPL-MCNC: 138 MG/DL — SIGNIFICANT CHANGE UP
CO2 SERPL-SCNC: 26 MMOL/L — SIGNIFICANT CHANGE UP (ref 22–31)
CREAT SERPL-MCNC: 1.67 MG/DL — HIGH (ref 0.5–1.3)
EGFR: 42 ML/MIN/1.73M2 — LOW
GLUCOSE SERPL-MCNC: 109 MG/DL — HIGH (ref 70–99)
HCT VFR BLD CALC: 35.6 % — LOW (ref 39–50)
HCV AB S/CO SERPL IA: 0.38 S/CO — SIGNIFICANT CHANGE UP (ref 0–0.99)
HCV AB SERPL-IMP: SIGNIFICANT CHANGE UP
HDLC SERPL-MCNC: 20 MG/DL — LOW
HGB BLD-MCNC: 12 G/DL — LOW (ref 13–17)
LIPID PNL WITH DIRECT LDL SERPL: 61 MG/DL — SIGNIFICANT CHANGE UP
MAGNESIUM SERPL-MCNC: 1.6 MG/DL — SIGNIFICANT CHANGE UP (ref 1.6–2.6)
MCHC RBC-ENTMCNC: 29.5 PG — SIGNIFICANT CHANGE UP (ref 27–34)
MCHC RBC-ENTMCNC: 33.7 GM/DL — SIGNIFICANT CHANGE UP (ref 32–36)
MCV RBC AUTO: 87.5 FL — SIGNIFICANT CHANGE UP (ref 80–100)
MRSA PCR RESULT.: SIGNIFICANT CHANGE UP
NON HDL CHOLESTEROL: 118 MG/DL — SIGNIFICANT CHANGE UP
NRBC # BLD: 0 /100 WBCS — SIGNIFICANT CHANGE UP (ref 0–0)
PHOSPHATE SERPL-MCNC: 2 MG/DL — LOW (ref 2.5–4.5)
PLATELET # BLD AUTO: 196 K/UL — SIGNIFICANT CHANGE UP (ref 150–400)
POTASSIUM SERPL-MCNC: 2.9 MMOL/L — CRITICAL LOW (ref 3.5–5.3)
POTASSIUM SERPL-SCNC: 2.9 MMOL/L — CRITICAL LOW (ref 3.5–5.3)
RBC # BLD: 4.07 M/UL — LOW (ref 4.2–5.8)
RBC # FLD: 14.3 % — SIGNIFICANT CHANGE UP (ref 10.3–14.5)
S AUREUS DNA NOSE QL NAA+PROBE: SIGNIFICANT CHANGE UP
SODIUM SERPL-SCNC: 143 MMOL/L — SIGNIFICANT CHANGE UP (ref 135–145)
TRIGL SERPL-MCNC: 285 MG/DL — HIGH
WBC # BLD: 9.61 K/UL — SIGNIFICANT CHANGE UP (ref 3.8–10.5)
WBC # FLD AUTO: 9.61 K/UL — SIGNIFICANT CHANGE UP (ref 3.8–10.5)

## 2022-11-13 PROCEDURE — 76770 US EXAM ABDO BACK WALL COMP: CPT | Mod: 26

## 2022-11-13 PROCEDURE — 99233 SBSQ HOSP IP/OBS HIGH 50: CPT

## 2022-11-13 RX ORDER — CHLORHEXIDINE GLUCONATE 213 G/1000ML
1 SOLUTION TOPICAL DAILY
Refills: 0 | Status: DISCONTINUED | OUTPATIENT
Start: 2022-11-13 | End: 2022-11-21

## 2022-11-13 RX ORDER — SODIUM,POTASSIUM PHOSPHATES 278-250MG
1 POWDER IN PACKET (EA) ORAL
Refills: 0 | Status: COMPLETED | OUTPATIENT
Start: 2022-11-13 | End: 2022-11-14

## 2022-11-13 RX ORDER — POTASSIUM CHLORIDE 20 MEQ
40 PACKET (EA) ORAL
Refills: 0 | Status: COMPLETED | OUTPATIENT
Start: 2022-11-13 | End: 2022-11-13

## 2022-11-13 RX ORDER — HYDRALAZINE HCL 50 MG
50 TABLET ORAL EVERY 8 HOURS
Refills: 0 | Status: DISCONTINUED | OUTPATIENT
Start: 2022-11-13 | End: 2022-11-14

## 2022-11-13 RX ORDER — POTASSIUM PHOSPHATE, MONOBASIC POTASSIUM PHOSPHATE, DIBASIC 236; 224 MG/ML; MG/ML
15 INJECTION, SOLUTION INTRAVENOUS ONCE
Refills: 0 | Status: COMPLETED | OUTPATIENT
Start: 2022-11-13 | End: 2022-11-13

## 2022-11-13 RX ORDER — MAGNESIUM SULFATE 500 MG/ML
2 VIAL (ML) INJECTION ONCE
Refills: 0 | Status: COMPLETED | OUTPATIENT
Start: 2022-11-13 | End: 2022-11-13

## 2022-11-13 RX ADMIN — ATORVASTATIN CALCIUM 40 MILLIGRAM(S): 80 TABLET, FILM COATED ORAL at 22:13

## 2022-11-13 RX ADMIN — Medication 650 MILLIGRAM(S): at 19:17

## 2022-11-13 RX ADMIN — Medication 50 MILLIGRAM(S): at 13:43

## 2022-11-13 RX ADMIN — ISOSORBIDE DINITRATE 10 MILLIGRAM(S): 5 TABLET ORAL at 15:56

## 2022-11-13 RX ADMIN — ISOSORBIDE DINITRATE 10 MILLIGRAM(S): 5 TABLET ORAL at 05:59

## 2022-11-13 RX ADMIN — CARVEDILOL PHOSPHATE 6.25 MILLIGRAM(S): 80 CAPSULE, EXTENDED RELEASE ORAL at 05:59

## 2022-11-13 RX ADMIN — Medication 650 MILLIGRAM(S): at 06:45

## 2022-11-13 RX ADMIN — Medication 40 MILLIEQUIVALENT(S): at 07:00

## 2022-11-13 RX ADMIN — POTASSIUM PHOSPHATE, MONOBASIC POTASSIUM PHOSPHATE, DIBASIC 62.5 MILLIMOLE(S): 236; 224 INJECTION, SOLUTION INTRAVENOUS at 10:16

## 2022-11-13 RX ADMIN — Medication 25 MILLIGRAM(S): at 05:59

## 2022-11-13 RX ADMIN — Medication 50 MILLIGRAM(S): at 22:13

## 2022-11-13 RX ADMIN — Medication 81 MILLIGRAM(S): at 11:36

## 2022-11-13 RX ADMIN — Medication 40 MILLIEQUIVALENT(S): at 09:26

## 2022-11-13 RX ADMIN — CARVEDILOL PHOSPHATE 6.25 MILLIGRAM(S): 80 CAPSULE, EXTENDED RELEASE ORAL at 17:02

## 2022-11-13 RX ADMIN — Medication 650 MILLIGRAM(S): at 06:01

## 2022-11-13 RX ADMIN — CHLORHEXIDINE GLUCONATE 1 APPLICATION(S): 213 SOLUTION TOPICAL at 11:37

## 2022-11-13 RX ADMIN — Medication 650 MILLIGRAM(S): at 20:17

## 2022-11-13 RX ADMIN — ISOSORBIDE DINITRATE 10 MILLIGRAM(S): 5 TABLET ORAL at 11:36

## 2022-11-13 RX ADMIN — Medication 1 PACKET(S): at 17:02

## 2022-11-13 RX ADMIN — Medication 25 GRAM(S): at 08:41

## 2022-11-13 NOTE — PROGRESS NOTE ADULT - SUBJECTIVE AND OBJECTIVE BOX
Patient seen and examined at bedside.    Overnight Events:   No events. Denies CP, palpitations, SOB.     REVIEW OF SYSTEMS:  Constitutional:     [x ] negative [ ] fevers [ ] chills [ ] weight loss [ ] weight gain  HEENT:                  [x ] negative [ ] dry eyes [ ] eye irritation [ ] postnasal drip [ ] nasal congestion  CV:                         [ x] negative  [ ] chest pain [ ] orthopnea [ ] palpitations [ ] murmur  Resp:                     [x ] negative [ ] cough [ ] shortness of breath [ ] dyspnea [ ] wheezing [ ] sputum [ ]hemoptysis  GI:                          [x ] negative [ ] nausea [ ] vomiting [ ] diarrhea [ ] constipation [ ] abd pain [ ] dysphagia   :                        [ x] negative [ ] dysuria [ ] nocturia [ ] hematuria [ ] increased urinary frequency  Musculoskeletal: [x ] negative [ ] back pain [ ] myalgias [ ] arthralgias [ ] fracture  Skin:                       [ x] negative [ ] rash [ ] itch  Neurological:        [ x] negative [ ] headache [ ] dizziness [ ] syncope [ ] weakness [ ] numbness  Psychiatric:           [ x] negative [ ] anxiety [ ] depression  Endocrine:            [ x] negative [ ] diabetes [ ] thyroid problem  Heme/Lymph:      [ x] negative [ ] anemia [ ] bleeding problem  Allergic/Immune: [ x] negative [ ] itchy eyes [ ] nasal discharge [ ] hives [ ] angioedema    [ x] All other systems negative          Current Meds:  acetaminophen     Tablet .. 650 milliGRAM(s) Oral every 6 hours PRN  aspirin enteric coated 81 milliGRAM(s) Oral daily  atorvastatin 40 milliGRAM(s) Oral at bedtime  carvedilol 6.25 milliGRAM(s) Oral every 12 hours  hydrALAZINE 25 milliGRAM(s) Oral every 8 hours  isosorbide   dinitrate Tablet (ISORDIL) 10 milliGRAM(s) Oral three times a day  potassium phosphate IVPB 15 milliMole(s) IV Intermittent once      Vitals:  T(F): 97.5 (), Max: 98.6 ()  HR: 66 () (63 - 75)  BP: 174/81 () (172/75 - 190/87)  RR: 18 ()  SpO2: 96% ()  I&O's Summary    2022 07:01  -  2022 07:00  --------------------------------------------------------  IN: 240 mL / OUT: 0 mL / NET: 240 mL      PHYSICAL EXAM:  GENERAL: No acute distress, well-developed  HEAD:  Atraumatic, Normocephalic  ENT: EOMI, PERRLA, conjunctiva and sclera clear, Neck supple, No JVD, moist mucosa  CHEST/LUNG: Clear to auscultation bilaterally; No wheeze, equal breath sounds bilaterally   BACK: No spinal tenderness  HEART: Regular rate and rhythm; No murmurs, rubs, or gallops  ABDOMEN: Soft, Nontender, Nondistended; Bowel sounds present  EXTREMITIES:  nonpitting edema to the lower shins   PSYCH: Nl behavior, nl affect  NEUROLOGY: AAOx3, non-focal, cranial nerves intact  SKIN: Normal color, No rashes or lesions                          12.0   9.61  )-----------( 196      ( 2022 05:45 )             35.6     11-13    143  |  103  |  25<H>  ----------------------------<  109<H>  2.9<LL>   |  26  |  1.67<H>    Ca    8.1<L>      2022 05:45  Phos  2.0     11-  Mg     1.6     -    TPro  6.4  /  Alb  3.4  /  TBili  0.3  /  DBili  x   /  AST  16  /  ALT  14  /  AlkPhos  79  11-12    PT/INR - ( 2022 05:29 )   PT: 12.8 sec;   INR: 1.10 ratio         PTT - ( 2022 05:29 )  PTT:29.9 sec  CARDIAC MARKERS ( 2022 05:29 )  39 ng/L / x     / x     / x     / x     / x      CARDIAC MARKERS ( 2022 22:00 )  39 ng/L / x     / x     / x     / x     / x          Serum Pro-Brain Natriuretic Peptide: 1778 pg/mL ( @ 22:00)    Total Cholesterol: 138  LDL: --  HDL: 20  T        DIAGNOSTIC/IMAGING:  Conclusions:  1. Normal mitral valve. Minimal mitral regurgitation.  2. Calcified aortic valve. There is a focal calcification  seen on the right coronary cusp.  Peak transaortic valve  gradient equals 12 mm Hg, mean transaortic valve gradient  equals 7 mm Hg, aortic valve velocity time integral equals  34 cm, estimated aortic valve area equals 2.1 sqcm. Mild  aortic regurgitation.   ms.  3. Mildly dilated left atrium.  LA volume index = 37 cc/m2.  4. Normal left ventricular systolic function. No segmental  wall motion abnormalities. Endocardial visualization  enhanced with intravenous injection of Ultrasonic Enhancing  Agent (Lumason). No left ventricular thrombus. Septal  motion consistent with conduction defect.  5. Moderate diastolic dysfunction (Stage II).  6. Normal right ventricular size and function.  ------------------------------------------------------------------------  Confirmed on  2022 - 14:51:23 by Sonido Carr M.D.

## 2022-11-13 NOTE — PROGRESS NOTE ADULT - ASSESSMENT
77M Icelandic only speaking, c hx htn, ckd (baseline Cr 2), left eye blindness (for the past 2 yrs, unclear reason), pw reportedly abnormal stress test, new CM, hypertension.

## 2022-11-13 NOTE — PROGRESS NOTE ADULT - PROBLEM SELECTOR PLAN 1
- SBP 220s with persistent CP, new CM. pt's headache, eye pain are reportedly chronic.  - started hydralazine 25 tid, carvedilol 6.25 bid  - c/w isdn 10 mg tid  - VS q4h  - gradual return to normotension - SBP 220s with persistent CP, new CM. pt's headache, eye pain are reportedly chronic.  - started hydralazine 25 tid, carvedilol 6.25 bid 11/12  - increase hydralazine 50 tid 11/13  - c/w isdn 10 mg tid  - VS q4h  - gradual return to normotension - SBP 220s with persistent CP, new CM. pt's headache, eye pain are reportedly chronic.  - started hydralazine 25 tid, carvedilol 6.25 bid 11/12  - increase hydralazine 50 tid 11/13  - c/w isdn 10 mg tid  - VS q4h  - gradual return to normotension  - r/o hyperaldo given HTN and hypokalemia

## 2022-11-13 NOTE — PROGRESS NOTE ADULT - PROBLEM SELECTOR PLAN 2
- started aspirin, statin  - trops flat  - TTE  - Unable to obtain records from Dr. Albright's office (closed on weekends)  - f/u card regarding poss LHC  - tele

## 2022-11-13 NOTE — PROVIDER CONTACT NOTE (CRITICAL VALUE NOTIFICATION) - BACKGROUND
Patient admitted with chest pain, trops flat, +NST, pending renal U/S and cards recs for possible LHC.

## 2022-11-13 NOTE — PROGRESS NOTE ADULT - ASSESSMENT
77M w/ PMHx of HTN presents to the ED with likely acute on chronic hypertensive urgency, for which he is largely asymptomatic. He was triaged from outpatient cardiology in the context of +NST with inferior ischemia, and an interval decline in EF from 50-55% to 30% on stress.  Currently chest pain free, neg enzymes, EKG with LVH, suggesting against ACS. Likely new class B HF, currently with only cosmetic edema to ankles.    #Abnormal stress test  #HTN urgency/emergency  #BRUNILDA on CKD?  #Hypokalemia    Plan:  - Obtain outpatient NST results   - replete K/Mg to goal 4/2 respectively   - Please obtain A1c  - Renal consult, renal US. Anticipate will require hydration pre-cath/ischemic eval given Augusto score   - Continue ASA, statin, Coreg as written  - Increase Hydralazine to 50mg q8; continue Isordil as written for now     77M w/ PMHx of HTN presents to the ED with likely acute on chronic hypertensive urgency, for which he is largely asymptomatic. He was triaged from outpatient cardiology in the context of +NST with inferior ischemia, and an interval decline in EF from 50-55% to 30% on stress.  Currently chest pain free, neg enzymes, EKG with LVH, suggesting against ACS. Likely new class B HF, currently with only cosmetic edema to ankles.    #Abnormal stress test  #HTN urgency/emergency  #BRUNILDA on CKD?  #Hypokalemia    Plan:  - Obtain outpatient NST results   - replete K/Mg to goal 4/2 respectively   - Please obtain A1c  - F/U renal US. Anticipate will require hydration pre-cath/ischemic eval given Augusto score   - Continue ASA, statin, Coreg as written  - Increase Hydralazine to 50mg q8; continue Isordil as written for now

## 2022-11-13 NOTE — PROGRESS NOTE ADULT - PROBLEM SELECTOR PLAN 5
Cr 06/22 1.95, likely with CKD secondary to long standing uncontrolled HTN and diabetes.   - F/u renal US. If medical renal disease is noted on ultrasound, this is likely from his chronic medical conditions and is his new baseline, no need for renal consult

## 2022-11-13 NOTE — PROGRESS NOTE ADULT - SUBJECTIVE AND OBJECTIVE BOX
[facility] Division of Hospital Medicine  Ruslan Medina MD  Available via MS Teams    SUBJECTIVE / OVERNIGHT EVENTS:  ID [id number]. No acute events overnight. Pt seen and examined at bedside. Pt states he feels well, no chest pain or sob. He denies any acute complaints.     ADDITIONAL REVIEW OF SYSTEMS:    MEDICATIONS  (STANDING):  aspirin enteric coated 81 milliGRAM(s) Oral daily  atorvastatin 40 milliGRAM(s) Oral at bedtime  carvedilol 6.25 milliGRAM(s) Oral every 12 hours  hydrALAZINE 25 milliGRAM(s) Oral every 8 hours  isosorbide   dinitrate Tablet (ISORDIL) 10 milliGRAM(s) Oral three times a day  potassium phosphate IVPB 15 milliMole(s) IV Intermittent once    MEDICATIONS  (PRN):  acetaminophen     Tablet .. 650 milliGRAM(s) Oral every 6 hours PRN Mild Pain (1 - 3), Moderate Pain (4 - 6)      I&O's Summary    12 Nov 2022 07:01  -  13 Nov 2022 07:00  --------------------------------------------------------  IN: 240 mL / OUT: 0 mL / NET: 240 mL        PHYSICAL EXAM:  Vital Signs Last 24 Hrs  T(C): 36.4 (13 Nov 2022 08:00), Max: 37 (13 Nov 2022 00:08)  T(F): 97.5 (13 Nov 2022 08:00), Max: 98.6 (13 Nov 2022 00:08)  HR: 66 (13 Nov 2022 08:00) (63 - 75)  BP: 174/81 (13 Nov 2022 08:00) (172/75 - 190/87)  BP(mean): --  RR: 18 (13 Nov 2022 08:00) (18 - 19)  SpO2: 96% (13 Nov 2022 08:00) (94% - 96%)    Parameters below as of 13 Nov 2022 08:00  Patient On (Oxygen Delivery Method): room air    GENERAL: NAD, comfortable appearing  HEAD:  NCAT  EYES: EOMI, PERRL, R eye unable to close completely  CHEST/LUNG: Clear to auscultation bilaterally; No wheeze  HEART: Regular rate and rhythm; No murmurs, rubs, or gallops  ABDOMEN: Soft, Nontender, Nondistended; Bowel sounds present  PSYCH: AAOx2, appropriate affect  NEUROLOGY: non-focal, burgess  SKIN: No rashes or lesions    LABS:                        12.0   9.61  )-----------( 196      ( 13 Nov 2022 05:45 )             35.6     11-13    143  |  103  |  25<H>  ----------------------------<  109<H>  2.9<LL>   |  26  |  1.67<H>    Ca    8.1<L>      13 Nov 2022 05:45  Phos  2.0     11-13  Mg     1.6     11-13    TPro  6.4  /  Alb  3.4  /  TBili  0.3  /  DBili  x   /  AST  16  /  ALT  14  /  AlkPhos  79  11-12    PT/INR - ( 12 Nov 2022 05:29 )   PT: 12.8 sec;   INR: 1.10 ratio         PTT - ( 12 Nov 2022 05:29 )  PTT:29.9 sec          COVID-19 PCR: NotDetec (11 Nov 2022 22:00)  COVID-19 PCR: NotDetec (24 May 2022 20:52)      RADIOLOGY & ADDITIONAL TESTS:  New Results Reviewed Today:   New Imaging Personally Reviewed Today:  New Electrocardiogram Personally Reviewed Today:  Prior or Outpatient Records Reviewed Today:    COMMUNICATION:  Care Discussed with Consultants/Other Providers and Details of Discussion: Discussed with ACP  Discussions with Patient/Family:  PCP Communication:

## 2022-11-14 LAB
ALDOST SERPL-MCNC: 20.8 NG/DL — SIGNIFICANT CHANGE UP
ANION GAP SERPL CALC-SCNC: 13 MMOL/L — SIGNIFICANT CHANGE UP (ref 5–17)
BUN SERPL-MCNC: 28 MG/DL — HIGH (ref 7–23)
CALCIUM SERPL-MCNC: 8.2 MG/DL — LOW (ref 8.4–10.5)
CHLORIDE SERPL-SCNC: 104 MMOL/L — SIGNIFICANT CHANGE UP (ref 96–108)
CO2 SERPL-SCNC: 23 MMOL/L — SIGNIFICANT CHANGE UP (ref 22–31)
CREAT SERPL-MCNC: 2.07 MG/DL — HIGH (ref 0.5–1.3)
EGFR: 33 ML/MIN/1.73M2 — LOW
GLUCOSE SERPL-MCNC: 113 MG/DL — HIGH (ref 70–99)
MAGNESIUM SERPL-MCNC: 1.9 MG/DL — SIGNIFICANT CHANGE UP (ref 1.6–2.6)
PHOSPHATE SERPL-MCNC: 3.1 MG/DL — SIGNIFICANT CHANGE UP (ref 2.5–4.5)
POTASSIUM SERPL-MCNC: 3.5 MMOL/L — SIGNIFICANT CHANGE UP (ref 3.5–5.3)
POTASSIUM SERPL-SCNC: 3.5 MMOL/L — SIGNIFICANT CHANGE UP (ref 3.5–5.3)
SODIUM SERPL-SCNC: 140 MMOL/L — SIGNIFICANT CHANGE UP (ref 135–145)

## 2022-11-14 PROCEDURE — 99233 SBSQ HOSP IP/OBS HIGH 50: CPT

## 2022-11-14 PROCEDURE — 99233 SBSQ HOSP IP/OBS HIGH 50: CPT | Mod: GC

## 2022-11-14 RX ORDER — CARVEDILOL PHOSPHATE 80 MG/1
6.25 CAPSULE, EXTENDED RELEASE ORAL ONCE
Refills: 0 | Status: COMPLETED | OUTPATIENT
Start: 2022-11-14 | End: 2022-11-14

## 2022-11-14 RX ORDER — CARVEDILOL PHOSPHATE 80 MG/1
12.5 CAPSULE, EXTENDED RELEASE ORAL EVERY 12 HOURS
Refills: 0 | Status: DISCONTINUED | OUTPATIENT
Start: 2022-11-14 | End: 2022-11-16

## 2022-11-14 RX ORDER — SODIUM CHLORIDE 9 MG/ML
1000 INJECTION INTRAMUSCULAR; INTRAVENOUS; SUBCUTANEOUS
Refills: 0 | Status: DISCONTINUED | OUTPATIENT
Start: 2022-11-14 | End: 2022-11-15

## 2022-11-14 RX ORDER — ISOSORBIDE DINITRATE 5 MG/1
20 TABLET ORAL THREE TIMES A DAY
Refills: 0 | Status: DISCONTINUED | OUTPATIENT
Start: 2022-11-14 | End: 2022-11-16

## 2022-11-14 RX ORDER — HYDRALAZINE HCL 50 MG
75 TABLET ORAL EVERY 8 HOURS
Refills: 0 | Status: DISCONTINUED | OUTPATIENT
Start: 2022-11-14 | End: 2022-11-15

## 2022-11-14 RX ADMIN — Medication 81 MILLIGRAM(S): at 11:25

## 2022-11-14 RX ADMIN — ATORVASTATIN CALCIUM 40 MILLIGRAM(S): 80 TABLET, FILM COATED ORAL at 21:08

## 2022-11-14 RX ADMIN — CARVEDILOL PHOSPHATE 6.25 MILLIGRAM(S): 80 CAPSULE, EXTENDED RELEASE ORAL at 17:51

## 2022-11-14 RX ADMIN — ISOSORBIDE DINITRATE 10 MILLIGRAM(S): 5 TABLET ORAL at 05:42

## 2022-11-14 RX ADMIN — Medication 75 MILLIGRAM(S): at 21:09

## 2022-11-14 RX ADMIN — ISOSORBIDE DINITRATE 20 MILLIGRAM(S): 5 TABLET ORAL at 17:51

## 2022-11-14 RX ADMIN — Medication 1 PACKET(S): at 05:42

## 2022-11-14 RX ADMIN — CARVEDILOL PHOSPHATE 6.25 MILLIGRAM(S): 80 CAPSULE, EXTENDED RELEASE ORAL at 05:42

## 2022-11-14 RX ADMIN — SODIUM CHLORIDE 100 MILLILITER(S): 9 INJECTION INTRAMUSCULAR; INTRAVENOUS; SUBCUTANEOUS at 20:31

## 2022-11-14 RX ADMIN — Medication 50 MILLIGRAM(S): at 05:42

## 2022-11-14 RX ADMIN — Medication 75 MILLIGRAM(S): at 13:38

## 2022-11-14 RX ADMIN — CHLORHEXIDINE GLUCONATE 1 APPLICATION(S): 213 SOLUTION TOPICAL at 11:32

## 2022-11-14 RX ADMIN — ISOSORBIDE DINITRATE 20 MILLIGRAM(S): 5 TABLET ORAL at 11:25

## 2022-11-14 RX ADMIN — CARVEDILOL PHOSPHATE 6.25 MILLIGRAM(S): 80 CAPSULE, EXTENDED RELEASE ORAL at 19:12

## 2022-11-14 RX ADMIN — Medication 650 MILLIGRAM(S): at 05:41

## 2022-11-14 NOTE — PROGRESS NOTE ADULT - ASSESSMENT
77M w/ PMHx of HTN presents to the ED with likely acute on chronic hypertensive urgency, for which he is largely asymptomatic. He was triaged from outpatient cardiology in the context of +NST with inferior ischemia, and an interval decline in EF from 50-55% to 30% on stress.  Currently chest pain free, neg enzymes, EKG with LVH, suggesting against ACS. Likely new class B HF, currently with only cosmetic edema to ankles.    #Abnormal stress test  #HTN urgency/emergency  #BRUNILDA on CKD?  #Hypokalemia    Plan:  - Obtain outpatient NST results   - replete K/Mg to goal 4/2 respectively   - Please obtain A1c  - F/U renal US. Anticipate will require hydration pre-cath/ischemic eval given Augusto score   - Continue ASA, statin, Coreg as written  - Increase Hydralazine to 50mg q8; continue Isordil as written for now 77M w/ PMHx of HTN presents to the ED with likely acute on chronic hypertensive urgency, for which he is largely asymptomatic. He was triaged from outpatient cardiology in the context of +NST with inferior ischemia, and an interval decline in EF from 50-55% to 30% on stress.  Currently chest pain free, neg enzymes, EKG with LVH, suggesting against ACS. Likely new class B HF, currently with only cosmetic edema to ankles.    #Abnormal stress test  #HTN urgency/emergency  #BRUNILDA on CKD?  #Hypokalemia    Plan:  - Please hydrate patient with NS at 100cc/hr for now  - Obtain outpatient NST results   - replete K/Mg to goal 4/2 respectively   - Please obtain A1c  - F/U renal US. Anticipate will require hydration pre-cath/ischemic eval given Augusto score   - Continue ASA, statin, Coreg as written  - Increase Hydralazine to 50mg q8; continue Isordil as written for now 77M w/ PMHx of HTN presents to the ED with likely acute on chronic hypertensive urgency, for which he is largely asymptomatic. He was triaged from outpatient cardiology in the context of +NST with inferior ischemia, and an interval decline in EF from 50-55% to 30% on stress.  Currently chest pain free, neg enzymes, EKG with LVH, suggesting against ACS. Likely new class B HF, currently with only cosmetic edema to ankles.    #Abnormal stress test  #HTN urgency/emergency  #BRUNILDA on CKD?  #Hypokalemia    Plan:  - Please hydrate patient with NS at 100cc/hr for now  - Obtain outpatient NST results   - replete K/Mg to goal 4/2 respectively   - Please obtain A1c  - Continue ASA, statin, Coreg as written  - Hydralazine 50mg q8; continue Isordil as written for now

## 2022-11-14 NOTE — PROGRESS NOTE ADULT - PROBLEM SELECTOR PLAN 6
- given the chronicity and location, hold off CTH  no focal deficits on exam - left eye blindness chronic

## 2022-11-14 NOTE — PROGRESS NOTE ADULT - PROBLEM SELECTOR PLAN 1
- SBP 220s with persistent CP, new CM. pt's headache, eye pain are reportedly chronic.  - bp still elevated  uptitrate hydralazine to 75 tid and increase isosorbide to 20mg tid  if bp still uncontrolled, will increase carvedilol to 12.5 bid if HR not low  -monitor bp  - f/u renin:iris

## 2022-11-14 NOTE — PROGRESS NOTE ADULT - SUBJECTIVE AND OBJECTIVE BOX
[facility] Division of Hospital Medicine  Mary Mcclure DO  8a-5pm: [phone]  after 5pm call [phone]    Patient is a 77y old  Male who presents with a chief complaint of chest pain, abnormal chest pain (14 Nov 2022 06:54)       ID #[id number]  SUBJECTIVE / OVERNIGHT EVENTS: no acute complaints. at rest, denies chest pain or sob. pt states he hasnt really gotten up to know if he is sob on exertion.       MEDICATIONS  (STANDING):  aspirin enteric coated 81 milliGRAM(s) Oral daily  atorvastatin 40 milliGRAM(s) Oral at bedtime  carvedilol 6.25 milliGRAM(s) Oral every 12 hours  chlorhexidine 2% Cloths 1 Application(s) Topical daily  hydrALAZINE 75 milliGRAM(s) Oral every 8 hours  isosorbide   dinitrate Tablet (ISORDIL) 20 milliGRAM(s) Oral three times a day    MEDICATIONS  (PRN):  acetaminophen     Tablet .. 650 milliGRAM(s) Oral every 6 hours PRN Mild Pain (1 - 3), Moderate Pain (4 - 6)      Vital Signs Last 24 Hrs  T(C): 36.3 (14 Nov 2022 11:17), Max: 36.9 (13 Nov 2022 16:06)  T(F): 97.4 (14 Nov 2022 11:17), Max: 98.5 (13 Nov 2022 20:39)  HR: 61 (14 Nov 2022 11:17) (60 - 74)  BP: 180/85 (14 Nov 2022 13:30) (170/78 - 199/89)  BP(mean): --  RR: 18 (14 Nov 2022 11:17) (18 - 18)  SpO2: 98% (14 Nov 2022 11:17) (94% - 98%)    Parameters below as of 14 Nov 2022 11:17  Patient On (Oxygen Delivery Method): room air      CAPILLARY BLOOD GLUCOSE        I&O's Summary    13 Nov 2022 07:01  -  14 Nov 2022 07:00  --------------------------------------------------------  IN: 1040 mL / OUT: 300 mL / NET: 740 mL    14 Nov 2022 07:01  -  14 Nov 2022 14:58  --------------------------------------------------------  IN: 440 mL / OUT: 0 mL / NET: 440 mL        PHYSICAL EXAM:  GENERAL: NAD, breathing normal  HEAD:  Atraumatic, Normocephalic  EYES: left eye blindness   NECK: supple, No JVD  CHEST/LUNG: CTA b/l  HEART: S1 S2 RRR  ABDOMEN: +BS Soft, NT/ND  EXTREMITIES:  2+ DP Pulses, No c/c. no LE edema  NEUROLOGY: AAOx3, no facial droop, moving all extremities   SKIN: No rashes or lesions    LABS:                        12.0   9.61  )-----------( 196      ( 13 Nov 2022 05:45 )             35.6     11-14    140  |  104  |  28<H>  ----------------------------<  113<H>  3.5   |  23  |  2.07<H>    Ca    8.2<L>      14 Nov 2022 07:26  Phos  3.1     11-14  Mg     1.9     11-14                RADIOLOGY & ADDITIONAL TESTS:    Imaging Personally Reviewed:  Consultant(s) Notes Reviewed:    Care Discussed with Consultants/Other Providers:   [facility] Division of Hospital Medicine  Mary Mcclure DO  8a-5pm: [phone]  after 5pm call [phone]    Patient is a 77y old  Male who presents with a chief complaint of chest pain, abnormal chest pain (14 Nov 2022 06:54)       ID #[id number]  SUBJECTIVE / OVERNIGHT EVENTS: no acute complaints. at rest, denies chest pain or sob. pt states he hasnt really gotten up to know if he is sob on exertion.       MEDICATIONS  (STANDING):  aspirin enteric coated 81 milliGRAM(s) Oral daily  atorvastatin 40 milliGRAM(s) Oral at bedtime  carvedilol 6.25 milliGRAM(s) Oral every 12 hours  chlorhexidine 2% Cloths 1 Application(s) Topical daily  hydrALAZINE 75 milliGRAM(s) Oral every 8 hours  isosorbide   dinitrate Tablet (ISORDIL) 20 milliGRAM(s) Oral three times a day    MEDICATIONS  (PRN):  acetaminophen     Tablet .. 650 milliGRAM(s) Oral every 6 hours PRN Mild Pain (1 - 3), Moderate Pain (4 - 6)      Vital Signs Last 24 Hrs  T(C): 36.3 (14 Nov 2022 11:17), Max: 36.9 (13 Nov 2022 16:06)  T(F): 97.4 (14 Nov 2022 11:17), Max: 98.5 (13 Nov 2022 20:39)  HR: 61 (14 Nov 2022 11:17) (60 - 74)  BP: 180/85 (14 Nov 2022 13:30) (170/78 - 199/89)  BP(mean): --  RR: 18 (14 Nov 2022 11:17) (18 - 18)  SpO2: 98% (14 Nov 2022 11:17) (94% - 98%)    Parameters below as of 14 Nov 2022 11:17  Patient On (Oxygen Delivery Method): room air      CAPILLARY BLOOD GLUCOSE        I&O's Summary    13 Nov 2022 07:01  -  14 Nov 2022 07:00  --------------------------------------------------------  IN: 1040 mL / OUT: 300 mL / NET: 740 mL    14 Nov 2022 07:01  -  14 Nov 2022 14:58  --------------------------------------------------------  IN: 440 mL / OUT: 0 mL / NET: 440 mL        PHYSICAL EXAM:  GENERAL: NAD, breathing normal  HEAD:  Atraumatic, Normocephalic  EYES: left eye blindness   NECK: supple, No JVD  CHEST/LUNG: CTA b/l  HEART: S1 S2 RRR  ABDOMEN: +BS Soft, NT/ND  EXTREMITIES:  2+ DP Pulses, No c/c. no LE edema  NEUROLOGY: AAOx3, no facial droop, moving all extremities   SKIN: No rashes or lesions    LABS:                        12.0   9.61  )-----------( 196      ( 13 Nov 2022 05:45 )             35.6     11-14    140  |  104  |  28<H>  ----------------------------<  113<H>  3.5   |  23  |  2.07<H>    Ca    8.2<L>      14 Nov 2022 07:26  Phos  3.1     11-14  Mg     1.9     11-14                RADIOLOGY & ADDITIONAL TESTS:    Imaging Personally Reviewed: outpatient stress test results obtained - anterolateral, inferlateral ischemia reversible   Consultant(s) Notes Reviewed:    Care Discussed with Consultants/Other Providers:

## 2022-11-14 NOTE — PROGRESS NOTE ADULT - PROBLEM SELECTOR PLAN 2
-c/w aspirin, statin  - trops flat  - TTE EF57% no segmental WMA  called Dr. Albright's office for stress test results  cards f/u  - continue to monitor on telemetry -c/w aspirin, statin  - trops flat  - TTE EF57% no segmental WMA  called Dr. Albright's office for stress test results  cards f/u regarding angiogram  - continue to monitor on telemetry

## 2022-11-14 NOTE — PROGRESS NOTE ADULT - ASSESSMENT
77M Frisian only speaking, c hx htn, ckd (baseline Cr 2), left eye blindness (for the past 2 yrs, unclear reason), p/w reportedly abnormal stress test with new CM and hypertension urgency.

## 2022-11-14 NOTE — PROGRESS NOTE ADULT - SUBJECTIVE AND OBJECTIVE BOX
Patient seen and examined at bedside.    77M w/ PMHx of HTN presents to the ED with likely acute on chronic hypertensive urgency, for which he is largely asymptomatic. He was triaged from outpatient cardiology in the context of +NST with inferior ischemia, and an interval decline in EF from 50-55% to 30% on stress.  Currently chest pain free, neg enzymes, EKG with LVH, suggesting against ACS. Likely new class B HF, currently with only cosmetic edema to ankles.    Overnight Events:     Review Of Systems: No chest pain, shortness of breath, or palpitations            Current Meds:  acetaminophen     Tablet .. 650 milliGRAM(s) Oral every 6 hours PRN  aspirin enteric coated 81 milliGRAM(s) Oral daily  atorvastatin 40 milliGRAM(s) Oral at bedtime  carvedilol 6.25 milliGRAM(s) Oral every 12 hours  chlorhexidine 2% Cloths 1 Application(s) Topical daily  hydrALAZINE 50 milliGRAM(s) Oral every 8 hours  isosorbide   dinitrate Tablet (ISORDIL) 10 milliGRAM(s) Oral three times a day      Vitals:  T(F): 98.3 (11-14), Max: 98.5 (11-13)  HR: 72 (11-14) (60 - 74)  BP: 177/63 (11-14) (170/78 - 190/85)  RR: 18 (11-14)  SpO2: 98% (11-14)  I&O's Summary    12 Nov 2022 07:01  -  13 Nov 2022 07:00  --------------------------------------------------------  IN: 240 mL / OUT: 0 mL / NET: 240 mL    13 Nov 2022 07:01  -  14 Nov 2022 06:55  --------------------------------------------------------  IN: 1040 mL / OUT: 300 mL / NET: 740 mL        Physical Exam:  Appearance: No acute distress; well appearing  Eyes: PERRL, EOMI, pink conjunctiva  HEENT: Normal oral mucosa  Cardiovascular: RRR, S1, S2, no murmurs, rubs, or gallops; no edema; no JVD  Respiratory: Clear to auscultation bilaterally  Gastrointestinal: soft, non-tender, non-distended with normal bowel sounds  Musculoskeletal: No clubbing; no joint deformity   Neurologic: Non-focal  Lymphatic: No lymphadenopathy  Psychiatry: AAOx3, mood & affect appropriate  Skin: No rashes, ecchymoses, or cyanosis                          12.0   9.61  )-----------( 196      ( 13 Nov 2022 05:45 )             35.6     11-13    143  |  103  |  25<H>  ----------------------------<  109<H>  2.9<LL>   |  26  |  1.67<H>    Ca    8.1<L>      13 Nov 2022 05:45  Phos  2.0     11-13  Mg     1.6     11-13        CARDIAC MARKERS ( 12 Nov 2022 05:29 )  39 ng/L / x     / x     / x     / x     / x      CARDIAC MARKERS ( 11 Nov 2022 22:00 )  39 ng/L / x     / x     / x     / x     / x          Serum Pro-Brain Natriuretic Peptide: 1778 pg/mL (11-11 @ 22:00)          New ECG(s): Personally reviewed    Echo:    EF (Modified Jordan Rule): 57 %Doppler Peak Velocity  (m/sec): AoV=1.7  ------------------------------------------------------------------------  Observations:  Mitral Valve: Normal mitral valve. Minimal mitral  regurgitation.  Aortic Valve/Aorta: Calcified aortic valve. There is a  focal calcification seen on the right coronary cusp.  Peak  transaortic valve gradient equals 12 mm Hg, mean  transaortic valve gradient equals 7 mm Hg, aortic valve  velocity time integral equals 34 cm, estimated aortic valve  area equals 2.1 sqcm. Mild aortic regurgitation.    ms.  Peak left ventricular outflow tract gradient equals 4  mmHg, mean gradient is equal to 2 mm Hg, LVOT velocity  time integral equals 19 cm.  Aortic Root: 3.7 cm.  Ascending Aorta: 4 cm.  LVOT diameter: 2.2 cm.  Left Atrium: Mildly dilated left atrium.  LA volume index =  37 cc/m2.  Left Ventricle: Normal left ventricular systolic function.  No segmental wall motion abnormalities. Endocardial  visualization enhanced with intravenous injection of  Ultrasonic Enhancing Agent (Lumason). No left ventricular  thrombus. Septal motion consistent with conductiondefect.  Eccentric left ventricular hypertrophy (dilated left  ventricle with normal relative wall thickness). Moderate  diastolic dysfunction (Stage II).  Right Heart: Normal right atrium. Normal right ventricular  size and function. Normal tricuspid valve. Minimal  tricuspid regurgitation. Normal pulmonic valve. Minimal  pulmonic regurgitation.  Pericardium/Pleura: Normal pericardium with no pericardial  effusion.  Hemodynamic: Estimated right atrial pressure is 8 mm Hg.  ------------------------------------------------------------------------  Conclusions:  1. Normal mitral valve. Minimal mitral regurgitation.  2. Calcified aortic valve. There is a focal calcification  seen on the right coronary cusp.  Peak transaortic valve  gradient equals 12 mm Hg, mean transaortic valve gradient  equals 7 mm Hg, aortic valve velocity time integral equals  34 cm, estimated aortic valve area equals 2.1 sqcm. Mild  aortic regurgitation.   ms.  3. Mildly dilated left atrium.  LA volume index = 37 cc/m2.  4. Normal left ventricular systolic function. No segmental  wall motion abnormalities. Endocardial visualization  enhanced with intravenous injection of Ultrasonic Enhancing  Agent (Lumason). No left ventricular thrombus. Septal  motion consistent with conduction defect.  5. Moderate diastolic dysfunction (Stage II).  6. Normal right ventricular size and function.  ------------------------------------------------------------------------  Confirmed on  11/12/2022 - 14:51:23 by Sonido Carr M.D.  ------------------------------------------------------------------------      Stress Testing:     Cath:    Imaging:    Interpretation of Telemetry:   Patient seen and examined at bedside.    77M w/ PMHx of HTN presents to the ED with likely acute on chronic hypertensive urgency, for which he is largely asymptomatic. He was triaged from outpatient cardiology in the context of +NST with inferior ischemia, and an interval decline in EF from 50-55% to 30% on stress.  Currently chest pain free, neg enzymes, EKG with LVH, suggesting against ACS. Likely new class B HF, currently with only cosmetic edema to ankles.    Overnight Events: Patient has no complaints this morning.     Review Of Systems: No chest pain, shortness of breath, or palpitations            Current Meds:  acetaminophen     Tablet .. 650 milliGRAM(s) Oral every 6 hours PRN  aspirin enteric coated 81 milliGRAM(s) Oral daily  atorvastatin 40 milliGRAM(s) Oral at bedtime  carvedilol 6.25 milliGRAM(s) Oral every 12 hours  chlorhexidine 2% Cloths 1 Application(s) Topical daily  hydrALAZINE 50 milliGRAM(s) Oral every 8 hours  isosorbide   dinitrate Tablet (ISORDIL) 10 milliGRAM(s) Oral three times a day      Vitals:  T(F): 98.3 (11-14), Max: 98.5 (11-13)  HR: 72 (11-14) (60 - 74)  BP: 177/63 (11-14) (170/78 - 190/85)  RR: 18 (11-14)  SpO2: 98% (11-14)  I&O's Summary    12 Nov 2022 07:01  -  13 Nov 2022 07:00  --------------------------------------------------------  IN: 240 mL / OUT: 0 mL / NET: 240 mL    13 Nov 2022 07:01  -  14 Nov 2022 06:55  --------------------------------------------------------  IN: 1040 mL / OUT: 300 mL / NET: 740 mL        Physical Exam:  Appearance: No acute distress; well appearing  Eyes: PERRL, EOMI, pink conjunctiva  HEENT: Normal oral mucosa  Cardiovascular: RRR, S1, S2, no murmurs, rubs, or gallops; no edema; no JVD  Respiratory: Clear to auscultation bilaterally  Gastrointestinal: soft, non-tender, non-distended with normal bowel sounds  Extremities: No LE edema, warm                        12.0   9.61  )-----------( 196      ( 13 Nov 2022 05:45 )             35.6     11-13    143  |  103  |  25<H>  ----------------------------<  109<H>  2.9<LL>   |  26  |  1.67<H>    Ca    8.1<L>      13 Nov 2022 05:45  Phos  2.0     11-13  Mg     1.6     11-13        CARDIAC MARKERS ( 12 Nov 2022 05:29 )  39 ng/L / x     / x     / x     / x     / x      CARDIAC MARKERS ( 11 Nov 2022 22:00 )  39 ng/L / x     / x     / x     / x     / x          Serum Pro-Brain Natriuretic Peptide: 1778 pg/mL (11-11 @ 22:00)          New ECG(s): Personally reviewed    Echo:    EF (Modified Jordan Rule): 57 %Doppler Peak Velocity  (m/sec): AoV=1.7  ------------------------------------------------------------------------  Observations:  Mitral Valve: Normal mitral valve. Minimal mitral  regurgitation.  Aortic Valve/Aorta: Calcified aortic valve. There is a  focal calcification seen on the right coronary cusp.  Peak  transaortic valve gradient equals 12 mm Hg, mean  transaortic valve gradient equals 7 mm Hg, aortic valve  velocity time integral equals 34 cm, estimated aortic valve  area equals 2.1 sqcm. Mild aortic regurgitation.    ms.  Peak left ventricular outflow tract gradient equals 4  mmHg, mean gradient is equal to 2 mm Hg, LVOT velocity  time integral equals 19 cm.  Aortic Root: 3.7 cm.  Ascending Aorta: 4 cm.  LVOT diameter: 2.2 cm.  Left Atrium: Mildly dilated left atrium.  LA volume index =  37 cc/m2.  Left Ventricle: Normal left ventricular systolic function.  No segmental wall motion abnormalities. Endocardial  visualization enhanced with intravenous injection of  Ultrasonic Enhancing Agent (Lumason). No left ventricular  thrombus. Septal motion consistent with conductiondefect.  Eccentric left ventricular hypertrophy (dilated left  ventricle with normal relative wall thickness). Moderate  diastolic dysfunction (Stage II).  Right Heart: Normal right atrium. Normal right ventricular  size and function. Normal tricuspid valve. Minimal  tricuspid regurgitation. Normal pulmonic valve. Minimal  pulmonic regurgitation.  Pericardium/Pleura: Normal pericardium with no pericardial  effusion.  Hemodynamic: Estimated right atrial pressure is 8 mm Hg.  ------------------------------------------------------------------------  Conclusions:  1. Normal mitral valve. Minimal mitral regurgitation.  2. Calcified aortic valve. There is a focal calcification  seen on the right coronary cusp.  Peak transaortic valve  gradient equals 12 mm Hg, mean transaortic valve gradient  equals 7 mm Hg, aortic valve velocity time integral equals  34 cm, estimated aortic valve area equals 2.1 sqcm. Mild  aortic regurgitation.   ms.  3. Mildly dilated left atrium.  LA volume index = 37 cc/m2.  4. Normal left ventricular systolic function. No segmental  wall motion abnormalities. Endocardial visualization  enhanced with intravenous injection of Ultrasonic Enhancing  Agent (Lumason). No left ventricular thrombus. Septal  motion consistent with conduction defect.  5. Moderate diastolic dysfunction (Stage II).  6. Normal right ventricular size and function.  ------------------------------------------------------------------------  Confirmed on  11/12/2022 - 14:51:23 by Sonido Carr M.D.  ------------------------------------------------------------------------      Stress Testing:     Cath:    Imaging:    Interpretation of Telemetry:

## 2022-11-15 LAB
ANION GAP SERPL CALC-SCNC: 12 MMOL/L — SIGNIFICANT CHANGE UP (ref 5–17)
BUN SERPL-MCNC: 34 MG/DL — HIGH (ref 7–23)
CALCIUM SERPL-MCNC: 7.9 MG/DL — LOW (ref 8.4–10.5)
CHLORIDE SERPL-SCNC: 105 MMOL/L — SIGNIFICANT CHANGE UP (ref 96–108)
CO2 SERPL-SCNC: 23 MMOL/L — SIGNIFICANT CHANGE UP (ref 22–31)
CREAT SERPL-MCNC: 1.72 MG/DL — HIGH (ref 0.5–1.3)
EGFR: 41 ML/MIN/1.73M2 — LOW
GLUCOSE SERPL-MCNC: 117 MG/DL — HIGH (ref 70–99)
POTASSIUM SERPL-MCNC: 3.2 MMOL/L — LOW (ref 3.5–5.3)
POTASSIUM SERPL-SCNC: 3.2 MMOL/L — LOW (ref 3.5–5.3)
SODIUM SERPL-SCNC: 140 MMOL/L — SIGNIFICANT CHANGE UP (ref 135–145)

## 2022-11-15 PROCEDURE — 99223 1ST HOSP IP/OBS HIGH 75: CPT | Mod: GC

## 2022-11-15 PROCEDURE — 99232 SBSQ HOSP IP/OBS MODERATE 35: CPT

## 2022-11-15 PROCEDURE — 99233 SBSQ HOSP IP/OBS HIGH 50: CPT | Mod: GC

## 2022-11-15 RX ORDER — POTASSIUM CHLORIDE 20 MEQ
40 PACKET (EA) ORAL ONCE
Refills: 0 | Status: COMPLETED | OUTPATIENT
Start: 2022-11-15 | End: 2022-11-15

## 2022-11-15 RX ORDER — HYDRALAZINE HCL 50 MG
100 TABLET ORAL EVERY 8 HOURS
Refills: 0 | Status: DISCONTINUED | OUTPATIENT
Start: 2022-11-15 | End: 2022-11-21

## 2022-11-15 RX ORDER — SODIUM CHLORIDE 9 MG/ML
1000 INJECTION INTRAMUSCULAR; INTRAVENOUS; SUBCUTANEOUS
Refills: 0 | Status: DISCONTINUED | OUTPATIENT
Start: 2022-11-15 | End: 2022-11-15

## 2022-11-15 RX ADMIN — Medication 75 MILLIGRAM(S): at 05:16

## 2022-11-15 RX ADMIN — ISOSORBIDE DINITRATE 20 MILLIGRAM(S): 5 TABLET ORAL at 11:16

## 2022-11-15 RX ADMIN — CARVEDILOL PHOSPHATE 12.5 MILLIGRAM(S): 80 CAPSULE, EXTENDED RELEASE ORAL at 17:09

## 2022-11-15 RX ADMIN — Medication 100 MILLIGRAM(S): at 13:20

## 2022-11-15 RX ADMIN — ISOSORBIDE DINITRATE 20 MILLIGRAM(S): 5 TABLET ORAL at 17:09

## 2022-11-15 RX ADMIN — Medication 100 MILLIGRAM(S): at 21:45

## 2022-11-15 RX ADMIN — Medication 81 MILLIGRAM(S): at 11:16

## 2022-11-15 RX ADMIN — Medication 40 MILLIEQUIVALENT(S): at 18:59

## 2022-11-15 RX ADMIN — Medication 650 MILLIGRAM(S): at 18:59

## 2022-11-15 RX ADMIN — SODIUM CHLORIDE 50 MILLILITER(S): 9 INJECTION INTRAMUSCULAR; INTRAVENOUS; SUBCUTANEOUS at 10:40

## 2022-11-15 RX ADMIN — ISOSORBIDE DINITRATE 20 MILLIGRAM(S): 5 TABLET ORAL at 05:16

## 2022-11-15 RX ADMIN — ATORVASTATIN CALCIUM 40 MILLIGRAM(S): 80 TABLET, FILM COATED ORAL at 21:45

## 2022-11-15 RX ADMIN — Medication 40 MILLIEQUIVALENT(S): at 07:56

## 2022-11-15 RX ADMIN — CARVEDILOL PHOSPHATE 12.5 MILLIGRAM(S): 80 CAPSULE, EXTENDED RELEASE ORAL at 05:16

## 2022-11-15 RX ADMIN — CHLORHEXIDINE GLUCONATE 1 APPLICATION(S): 213 SOLUTION TOPICAL at 11:17

## 2022-11-15 NOTE — CONSULT NOTE ADULT - ATTENDING COMMENTS
77 year old male with history of HTN and recent positive nuclear stress test with inferior ischemia and post stress LV stunning (EF 50-55% to 30%) (report not available) who was referred by Dr. Albright to the ED for evaluation. He states he had mild chest pressure/SOB when he walks upstairs.    On presentation to ED, he was hypertensive 220s/80s, he was given labetalol IVP. He is currently chest pain free. Troponins flat 39 to 39. ECG shows NSR with PVCs, LVH with repolarization abnormalities.    On exam he was euvolemic, 2/6 diastolic murmur was appreciated.    1) HTNsive urgency  2) positive NST  3) exertional chest pressure  -agree with plan as outlined by fellow above  -start ASA and statin daily  -obtain NST results, resume anti-hypertensives  -obtain renal consult  -Possible LHC early this week pending renal evaluation and stabilization
CKD3B  Cardiomyopathy unclear etiology  For Cath tomorrow  Would stop IV fluids for now and hold diuretics as well.   Risk of contrast nephropathy as above however benefit outweigh the risk. As long as renal function stable can proceed with angio with minimal contrast    Hypokalemia: check iris, renin level  May benefit from aldactone eventually  Check alb:cr ratio    Lala Melissa MD  Off: [phone]  contact me on teams    (After 5 pm or on weekends please page the on-call fellow/attending, can check [email or web address] for schedule. Login is peter jerez, schedule under [facility] medicine, psych, derm)

## 2022-11-15 NOTE — CONSULT NOTE ADULT - PROBLEM SELECTOR PROBLEM 2
Patient was seen in the ER and was ordered   meclizine (ANTIVERT) 25 MG tablet    Patient was only provided 15 tablets, Spouse is asking if he can get a refill, please advise    Pharmacy [facility]   Hypokalemia

## 2022-11-15 NOTE — PROGRESS NOTE ADULT - SUBJECTIVE AND OBJECTIVE BOX
Patient seen and examined at bedside.    77M w/ PMHx of HTN presents to the ED with likely acute on chronic hypertensive urgency, for which he is largely asymptomatic. He was triaged from outpatient cardiology in the context of +NST with inferior ischemia, and an interval decline in EF from 50-55% to 30% on stress.  Currently chest pain free, neg enzymes, EKG with LVH, suggesting against ACS. Likely new class B HF, currently with only cosmetic edema to ankles.    Overnight Events:     Review Of Systems: No chest pain, shortness of breath, or palpitations            Current Meds:  acetaminophen     Tablet .. 650 milliGRAM(s) Oral every 6 hours PRN  aspirin enteric coated 81 milliGRAM(s) Oral daily  atorvastatin 40 milliGRAM(s) Oral at bedtime  carvedilol 12.5 milliGRAM(s) Oral every 12 hours  chlorhexidine 2% Cloths 1 Application(s) Topical daily  hydrALAZINE 75 milliGRAM(s) Oral every 8 hours  isosorbide   dinitrate Tablet (ISORDIL) 20 milliGRAM(s) Oral three times a day  sodium chloride 0.9%. 1000 milliLiter(s) IV Continuous <Continuous>      Vitals:  T(F): 98.3 (11-15), Max: 98.3 (11-15)  HR: 65 (11-15) (61 - 68)  BP: 179/75 (11-15) (168/77 - 199/89)  RR: 18 (11-15)  SpO2: 96% (11-15)  I&O's Summary    14 Nov 2022 07:01  -  15 Nov 2022 07:00  --------------------------------------------------------  IN: 740 mL / OUT: 800 mL / NET: -60 mL        Physical Exam:  Appearance: No acute distress; well appearing  Eyes: PERRL, EOMI, pink conjunctiva  HEENT: Normal oral mucosa  Cardiovascular: RRR, S1, S2, no murmurs, rubs, or gallops; no edema; no JVD  Respiratory: Clear to auscultation bilaterally  Gastrointestinal: soft, non-tender, non-distended with normal bowel sounds  Extremities: No LE edema, warm      11-15    140  |  105  |  34<H>  ----------------------------<  117<H>  3.2<L>   |  23  |  1.72<H>    Ca    7.9<L>      15 Nov 2022 06:20  Phos  3.1     11-14  Mg     1.9     11-14        CARDIAC MARKERS ( 12 Nov 2022 05:29 )  39 ng/L / x     / x     / x     / x     / x      CARDIAC MARKERS ( 11 Nov 2022 22:00 )  39 ng/L / x     / x     / x     / x     / x          Serum Pro-Brain Natriuretic Peptide: 1778 pg/mL (11-11 @ 22:00)          New ECG(s): Personally reviewed    Echo:    EF (Modified Jordan Rule): 57 %Doppler Peak Velocity  (m/sec): AoV=1.7  ------------------------------------------------------------------------  Observations:  Mitral Valve: Normal mitral valve. Minimal mitral  regurgitation.  Aortic Valve/Aorta: Calcified aortic valve. There is a  focal calcification seen on the right coronary cusp.  Peak  transaortic valve gradient equals 12 mm Hg, mean  transaortic valve gradient equals 7 mm Hg, aortic valve  velocity time integral equals 34 cm, estimated aortic valve  area equals 2.1 sqcm. Mild aortic regurgitation.    ms.  Peak left ventricular outflow tract gradient equals 4  mmHg, mean gradient is equal to 2 mm Hg, LVOT velocity  time integral equals 19 cm.  Aortic Root: 3.7 cm.  Ascending Aorta: 4 cm.  LVOT diameter: 2.2 cm.  Left Atrium: Mildly dilated left atrium.  LA volume index =  37 cc/m2.  Left Ventricle: Normal left ventricular systolic function.  No segmental wall motion abnormalities. Endocardial  visualization enhanced with intravenous injection of  Ultrasonic Enhancing Agent (Lumason). No left ventricular  thrombus. Septal motion consistent with conductiondefect.  Eccentric left ventricular hypertrophy (dilated left  ventricle with normal relative wall thickness). Moderate  diastolic dysfunction (Stage II).  Right Heart: Normal right atrium. Normal right ventricular  size and function. Normal tricuspid valve. Minimal  tricuspid regurgitation. Normal pulmonic valve. Minimal  pulmonic regurgitation.  Pericardium/Pleura: Normal pericardium with no pericardial  effusion.  Hemodynamic: Estimated right atrial pressure is 8 mm Hg.  ------------------------------------------------------------------------  Conclusions:  1. Normal mitral valve. Minimal mitral regurgitation.  2. Calcified aortic valve. There is a focal calcification  seen on the right coronary cusp.  Peak transaortic valve  gradient equals 12 mm Hg, mean transaortic valve gradient  equals 7 mm Hg, aortic valve velocity time integral equals  34 cm, estimated aortic valve area equals 2.1 sqcm. Mild  aortic regurgitation.   ms.  3. Mildly dilated left atrium.  LA volume index = 37 cc/m2.  4. Normal left ventricular systolic function. No segmental  wall motion abnormalities. Endocardial visualization  enhanced with intravenous injection of Ultrasonic Enhancing  Agent (Lumason). No left ventricular thrombus. Septal  motion consistent with conduction defect.  5. Moderate diastolic dysfunction (Stage II).  6. Normal right ventricular size and function.  ------------------------------------------------------------------------  Confirmed on  11/12/2022 - 14:51:23 by Sonido Crar M.D.  ------------------------------------------------------------------------        Stress Testing:     Cath:    Imaging:    Interpretation of Telemetry:   Patient seen and examined at bedside.    77M w/ PMHx of HTN presents to the ED with likely acute on chronic hypertensive urgency, for which he is largely asymptomatic. He was triaged from outpatient cardiology in the context of +NST with inferior ischemia, and an interval decline in EF from 50-55% to 30% on stress.  Currently chest pain free, neg enzymes, EKG with LVH, suggesting against ACS. Likely new class B HF, currently with only cosmetic edema to ankles.    Overnight Events:     Review Of Systems: No chest pain, shortness of breath, or palpitations            Current Meds:  acetaminophen     Tablet .. 650 milliGRAM(s) Oral every 6 hours PRN  aspirin enteric coated 81 milliGRAM(s) Oral daily  atorvastatin 40 milliGRAM(s) Oral at bedtime  carvedilol 12.5 milliGRAM(s) Oral every 12 hours  chlorhexidine 2% Cloths 1 Application(s) Topical daily  hydrALAZINE 75 milliGRAM(s) Oral every 8 hours  isosorbide   dinitrate Tablet (ISORDIL) 20 milliGRAM(s) Oral three times a day  sodium chloride 0.9%. 1000 milliLiter(s) IV Continuous <Continuous>      Vitals:  T(F): 98.3 (11-15), Max: 98.3 (11-15)  HR: 65 (11-15) (61 - 68)  BP: 179/75 (11-15) (168/77 - 199/89)  RR: 18 (11-15)  SpO2: 96% (11-15)  I&O's Summary    14 Nov 2022 07:01  -  15 Nov 2022 07:00  --------------------------------------------------------  IN: 740 mL / OUT: 800 mL / NET: -60 mL        Physical Exam:  Appearance: No acute distress; well appearing  Eyes: PERRL, EOMI, pink conjunctiva  HEENT: Normal oral mucosa  Cardiovascular: RRR, S1, S2, no murmurs, rubs, or gallops; no edema; no JVD  Respiratory: Clear to auscultation bilaterally  Gastrointestinal: soft, non-tender, non-distended with normal bowel sounds  Extremities: No LE edema, warm      11-15    140  |  105  |  34<H>  ----------------------------<  117<H>  3.2<L>   |  23  |  1.72<H>    Ca    7.9<L>      15 Nov 2022 06:20  Phos  3.1     11-14  Mg     1.9     11-14        CARDIAC MARKERS ( 12 Nov 2022 05:29 )  39 ng/L / x     / x     / x     / x     / x      CARDIAC MARKERS ( 11 Nov 2022 22:00 )  39 ng/L / x     / x     / x     / x     / x          Serum Pro-Brain Natriuretic Peptide: 1778 pg/mL (11-11 @ 22:00)          New ECG(s): Personally reviewed    Echo:    EF (Modified Jordan Rule): 57 %Doppler Peak Velocity  (m/sec): AoV=1.7  ------------------------------------------------------------------------  Observations:  Mitral Valve: Normal mitral valve. Minimal mitral  regurgitation.  Aortic Valve/Aorta: Calcified aortic valve. There is a  focal calcification seen on the right coronary cusp.  Peak  transaortic valve gradient equals 12 mm Hg, mean  transaortic valve gradient equals 7 mm Hg, aortic valve  velocity time integral equals 34 cm, estimated aortic valve  area equals 2.1 sqcm. Mild aortic regurgitation.    ms.  Peak left ventricular outflow tract gradient equals 4  mmHg, mean gradient is equal to 2 mm Hg, LVOT velocity  time integral equals 19 cm.  Aortic Root: 3.7 cm.  Ascending Aorta: 4 cm.  LVOT diameter: 2.2 cm.  Left Atrium: Mildly dilated left atrium.  LA volume index =  37 cc/m2.  Left Ventricle: Normal left ventricular systolic function.  No segmental wall motion abnormalities. Endocardial  visualization enhanced with intravenous injection of  Ultrasonic Enhancing Agent (Lumason). No left ventricular  thrombus. Septal motion consistent with conductiondefect.  Eccentric left ventricular hypertrophy (dilated left  ventricle with normal relative wall thickness). Moderate  diastolic dysfunction (Stage II).  Right Heart: Normal right atrium. Normal right ventricular  size and function. Normal tricuspid valve. Minimal  tricuspid regurgitation. Normal pulmonic valve. Minimal  pulmonic regurgitation.  Pericardium/Pleura: Normal pericardium with no pericardial  effusion.  Hemodynamic: Estimated right atrial pressure is 8 mm Hg.  ------------------------------------------------------------------------  Conclusions:  1. Normal mitral valve. Minimal mitral regurgitation.  2. Calcified aortic valve. There is a focal calcification  seen on the right coronary cusp.  Peak transaortic valve  gradient equals 12 mm Hg, mean transaortic valve gradient  equals 7 mm Hg, aortic valve velocity time integral equals  34 cm, estimated aortic valve area equals 2.1 sqcm. Mild  aortic regurgitation.   ms.  3. Mildly dilated left atrium.  LA volume index = 37 cc/m2.  4. Normal left ventricular systolic function. No segmental  wall motion abnormalities. Endocardial visualization  enhanced with intravenous injection of Ultrasonic Enhancing  Agent (Lumason). No left ventricular thrombus. Septal  motion consistent with conduction defect.  5. Moderate diastolic dysfunction (Stage II).  6. Normal right ventricular size and function.  ------------------------------------------------------------------------  Confirmed on  11/12/2022 - 14:51:23 by Sonido Carr M.D.  ------------------------------------------------------------------------      Interpretation of Telemetry:

## 2022-11-15 NOTE — PROGRESS NOTE ADULT - ASSESSMENT
77M w/ PMHx of HTN presents to the ED with likely acute on chronic hypertensive urgency, for which he is largely asymptomatic. He was triaged from outpatient cardiology in the context of +NST with inferior ischemia, and an interval decline in EF from 50-55% to 30% on stress.  Currently chest pain free, neg enzymes, EKG with LVH, suggesting against ACS. Likely new class B HF, currently with only cosmetic edema to ankles.    #Abnormal stress test  #HTN urgency/emergency  #BRUNILDA on CKD?  #Hypokalemia    Plan:  - Please hydrate patient with NS at 100cc/hr for now, improved creatinine, will reassess tomorrow for possible cath if continues to downtrend  - Obtain outpatient NST results   - replete K/Mg to goal 4/2 respectively   - A1c noted at 6.4  - Continue ASA, statin  - Please increase coreg to 25mg BID  - Hydralazine 50mg q8 can be increased to 75mg TID; continue Isordil as written for now

## 2022-11-15 NOTE — PROGRESS NOTE ADULT - PROBLEM SELECTOR PLAN 2
-c/w aspirin, statin  - trops flat  - TTE EF57% no segmental WMA  called Dr. Albright's office for stress test results  cards f/u regarding angiogram  - continue to monitor on telemetry -c/w aspirin, statin  - trops flat  - TTE EF57% no segmental WMA  outpt stress test results from Dr. Albright's office placed in chart  cards f/u appreciated - possible cath tomorrow  - continue to monitor on telemetry

## 2022-11-15 NOTE — PHYSICAL THERAPY INITIAL EVALUATION ADULT - ADDITIONAL COMMENTS
PTA pt was independent with functional mobility and ADLs with SC. Pt lives in a PH with his wife and children 14 steps to negotiate

## 2022-11-15 NOTE — CONSULT NOTE ADULT - PROBLEM SELECTOR RECOMMENDATION 2
Hypokalemia with serum bicarb 27 on admission. ?metabolic alkalosis. Would check VBG. Check PRA & Aldosterone levels to r/o hyperaldo states. if BP remains elevated despite discontinuing IVF then would get a renal artery duplex. Would benefit from aldactone. Replace with 40 meq of K today & repeat BMP      Disccussed with primary team      If you have any questions, please feel free to contact me  Sunshine Vasquez  Nephrology Fellow  Pager NS: [phone]/ [facility]: [phone]    (After 5 pm or on weekends please page the on-call fellow, can check [email or web address] for schedule. Login is peter jerez, schedule under [facility] medicine, psych, derm) Hypokalemia with serum bicarb 27 on admission. ?metabolic alkalosis. Would check VBG. Check PRA & Aldosterone levels to r/o hyperaldo states. if BP remains elevated despite discontinuing IVF then would get a renal artery duplex. Would benefit from aldactone. Replace with 40 meq of K today & repeat BMP      Discussed with primary team      If you have any questions, please feel free to contact me  Sunshine Vasquez  Nephrology Fellow  Pager NS: [phone]/ [facility]: [phone]    (After 5 pm or on weekends please page the on-call fellow, can check [email or web address] for schedule. Login is peter jerez, schedule under [facility] medicine, psych, derm)

## 2022-11-15 NOTE — PHYSICAL THERAPY INITIAL EVALUATION ADULT - PLANNED THERAPY INTERVENTIONS, PT EVAL
GOAL: Pt will negotiate 1 flight of steps +UHR independently./balance training/bed mobility training/gait training/strengthening/transfer training

## 2022-11-15 NOTE — PROGRESS NOTE ADULT - PROBLEM SELECTOR PLAN 5
baseline creatinine likely 1.9-2  monitor cr  avoid nephrotoxic medications baseline creatinine likely 1.9-2  renal consult to optimize for possible cath tomorrow  monitor cr  avoid nephrotoxic medications

## 2022-11-15 NOTE — PROGRESS NOTE ADULT - SUBJECTIVE AND OBJECTIVE BOX
[facility] Division of Hospital Medicine  Mary Mcclure DO  8a-5pm: [phone]  after 5pm call [phone]    Patient is a 77y old  Male who presents with a chief complaint of chest pain, abnormal chest pain (15 Nov 2022 07:15)        SUBJECTIVE / OVERNIGHT EVENTS:      MEDICATIONS  (STANDING):  aspirin enteric coated 81 milliGRAM(s) Oral daily  atorvastatin 40 milliGRAM(s) Oral at bedtime  carvedilol 12.5 milliGRAM(s) Oral every 12 hours  chlorhexidine 2% Cloths 1 Application(s) Topical daily  hydrALAZINE 100 milliGRAM(s) Oral every 8 hours  isosorbide   dinitrate Tablet (ISORDIL) 20 milliGRAM(s) Oral three times a day  potassium chloride    Tablet ER 40 milliEquivalent(s) Oral once  sodium chloride 0.9%. 1000 milliLiter(s) (100 mL/Hr) IV Continuous <Continuous>    MEDICATIONS  (PRN):  acetaminophen     Tablet .. 650 milliGRAM(s) Oral every 6 hours PRN Mild Pain (1 - 3), Moderate Pain (4 - 6)      Vital Signs Last 24 Hrs  T(C): 36.8 (15 Nov 2022 05:11), Max: 36.8 (15 Nov 2022 05:11)  T(F): 98.3 (15 Nov 2022 05:11), Max: 98.3 (15 Nov 2022 05:11)  HR: 65 (15 Nov 2022 05:11) (61 - 68)  BP: 179/75 (15 Nov 2022 05:11) (168/77 - 199/89)  BP(mean): --  RR: 18 (15 Nov 2022 05:11) (18 - 18)  SpO2: 96% (15 Nov 2022 05:11) (94% - 98%)    Parameters below as of 15 Nov 2022 05:11  Patient On (Oxygen Delivery Method): room air      CAPILLARY BLOOD GLUCOSE        I&O's Summary    14 Nov 2022 07:01  -  15 Nov 2022 07:00  --------------------------------------------------------  IN: 1940 mL / OUT: 800 mL / NET: 1140 mL        PHYSICAL EXAM:  GENERAL: NAD, breathing normal  HEAD:  Atraumatic, Normocephalic  EYES: left eye blindness   NECK: supple, No JVD  CHEST/LUNG: CTA b/l  HEART: S1 S2 RRR  ABDOMEN: +BS Soft, NT/ND  EXTREMITIES:  2+ DP Pulses, No c/c. no LE edema  NEUROLOGY: AAOx3, no facial droop, moving all extremities   SKIN: No rashes or lesions    LABS:    11-15    140  |  105  |  34<H>  ----------------------------<  117<H>  3.2<L>   |  23  |  1.72<H>    Ca    7.9<L>      15 Nov 2022 06:20  Phos  3.1     11-14  Mg     1.9     11-14                RADIOLOGY & ADDITIONAL TESTS:    Imaging Personally Reviewed:  Consultant(s) Notes Reviewed:    Care Discussed with Consultants/Other Providers:   [facility] Division of Hospital Medicine  Mary Mcclure DO  8a-5pm: [phone]  after 5pm call [phone]    Patient is a 77y old  Male who presents with a chief complaint of chest pain, abnormal chest pain (15 Nov 2022 07:15)       ID# [id number]  SUBJECTIVE / OVERNIGHT EVENTS: states overall feels better       MEDICATIONS  (STANDING):  aspirin enteric coated 81 milliGRAM(s) Oral daily  atorvastatin 40 milliGRAM(s) Oral at bedtime  carvedilol 12.5 milliGRAM(s) Oral every 12 hours  chlorhexidine 2% Cloths 1 Application(s) Topical daily  hydrALAZINE 100 milliGRAM(s) Oral every 8 hours  isosorbide   dinitrate Tablet (ISORDIL) 20 milliGRAM(s) Oral three times a day  potassium chloride    Tablet ER 40 milliEquivalent(s) Oral once  sodium chloride 0.9%. 1000 milliLiter(s) (100 mL/Hr) IV Continuous <Continuous>    MEDICATIONS  (PRN):  acetaminophen     Tablet .. 650 milliGRAM(s) Oral every 6 hours PRN Mild Pain (1 - 3), Moderate Pain (4 - 6)      Vital Signs Last 24 Hrs  T(C): 36.8 (15 Nov 2022 05:11), Max: 36.8 (15 Nov 2022 05:11)  T(F): 98.3 (15 Nov 2022 05:11), Max: 98.3 (15 Nov 2022 05:11)  HR: 65 (15 Nov 2022 05:11) (61 - 68)  BP: 179/75 (15 Nov 2022 05:11) (168/77 - 199/89)  BP(mean): --  RR: 18 (15 Nov 2022 05:11) (18 - 18)  SpO2: 96% (15 Nov 2022 05:11) (94% - 98%)    Parameters below as of 15 Nov 2022 05:11  Patient On (Oxygen Delivery Method): room air      CAPILLARY BLOOD GLUCOSE        I&O's Summary    14 Nov 2022 07:01  -  15 Nov 2022 07:00  --------------------------------------------------------  IN: 1940 mL / OUT: 800 mL / NET: 1140 mL        PHYSICAL EXAM:  GENERAL: NAD, breathing normal  HEAD:  Atraumatic, Normocephalic  EYES: left eye blindness   NECK: supple, No JVD  CHEST/LUNG: slight crackles at left base  HEART: S1 S2 RRR  ABDOMEN: +BS Soft, NT/ND  EXTREMITIES:  2+ DP Pulses, No c/c. no LE edema  NEUROLOGY: AAOx3, no facial droop, moving all extremities   SKIN: No rashes or lesions    LABS:    11-15    140  |  105  |  34<H>  ----------------------------<  117<H>  3.2<L>   |  23  |  1.72<H>    Ca    7.9<L>      15 Nov 2022 06:20  Phos  3.1     11-14  Mg     1.9     11-14                RADIOLOGY & ADDITIONAL TESTS:    Imaging Personally Reviewed:  Consultant(s) Notes Reviewed:    Care Discussed with Consultants/Other Providers: d/w Cardiology fellow - possible cath tomorrow.

## 2022-11-15 NOTE — PROGRESS NOTE ADULT - ASSESSMENT
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

## 2022-11-15 NOTE — CONSULT NOTE ADULT - SUBJECTIVE AND OBJECTIVE BOX
CARDIOLOGY FELLOW CONSULT NOTE    HPI:  77y M w/ PMHx of HTN presents to the ED for evaluation s/p abnormal stress test and elevated BP done earlier today. Pt was sent by Cardiologist Dr. Albright. Also states his kidneys should be checked due to abnormal labs. Denies CP, difficulty urinating, dysuria. Pt is well appearing in triage, where BP was 211/86 and he is satting 94-97% Ra.   I spoke to patient's son, who is accompanying patient at bedside and assists with translation. Says his father ambulates with a cane, a bit dsypneic with fast movement but otherwise able to manage with a slower gait. Does not have anginal chest pain with exertion. Denies any orthopnea, or PND. Denies any lower extremity swelling. Has a normal appetite. Admits that patient's home BP cuff is with high BPs for which patient is always asymptomatic, as high as >220s systolic at home. He does not know which medications has been prescribed outpatient; the only 2 medications that are in the bag at bedside are hctz 25mg (nearly empty bottle) and omega 3. Says patient takes more medications at home, and that he is compliant with medications but unable to recall further details.    Outpatient cardiology: Dr. Albright, who sent patient in for HTN emergency rule out as he arrived to clinic with SBP 220s. Patient reportedly had an outpatient NST with inferior ischemia and pt with EF 30% during stress, compared to 50-55% on echo doen a few weeks ago.     In the ED:  Hypertensive to 200s systolic  K 2.6, Cre 1.94, glucose 206, BNP 1778, hstrop 39  EKG with LVH ; XR with cardiomegaly,   Received labetalol 10mg IVP, potassiumchloride powder 20meq    PMHx:   H/O: HTN (hypertension)    Allergies:  No Known Allergies    Home Meds:    Current Medications:   potassium chloride  20 mEq/100 mL IVPB 20 milliEquivalent(s) IV Intermittent every 2 hours    FAMILY HISTORY: Denies     Social History: Works in [employer]. Prior drinker, nonsmoker.     REVIEW OF SYSTEMS: 14pt ROS neg unless stated above    Physical Exam:  T(F): 98.9 (11-11), Max: 98.9 (11-11)  HR: 67 (11-11) (62 - 77)  BP: 180/86 (11-11) (180/86 - 223/105)  RR: 18 (11-11)  SpO2: 97% (11-11)  GENERAL: No acute distress, well-developed  HEAD:  Atraumatic, Normocephalic  ENT: EOMI, PERRLA, conjunctiva and sclera clear, Neck supple, No JVD, moist mucosa  CHEST/LUNG: Clear to auscultation bilaterally; No wheeze, equal breath sounds bilaterally   BACK: No spinal tenderness  HEART: Regular rate and rhythm; No murmurs, rubs, or gallops  ABDOMEN: Soft, Nontender, Nondistended; Bowel sounds present  EXTREMITIES:  nonpitting edema to the lower shins   PSYCH: Nl behavior, nl affect  NEUROLOGY: AAOx3, non-focal, cranial nerves intact  SKIN: Normal color, No rashes or lesions  LINES:    ECG:   sinus rhythm, left axis deviation, LVH (avL >11 & prabhakar criteria), nonspecific ST changes related to LVH     TTE:  Summary:   1. Technically difficult study with poor endocardial visualization.   2. Normal global left ventricular systolic function.   3. Left ventricular ejection fraction, by visual estimation, is 50 to   55%.   4. Moderately increased LV wall thickness.   5. Evaluation of left ventricle regional wall motion is limited due to   poor endocardial visualization. The inferior wall appears hypokinetic in   some views. Consider use of IV echo contrast to better evaluate   endocardium, if clinically indicated.   6. The right ventricle is not well visualized, appears normal in size.   7. The left atrium appears generally normal in size.   8. Mild thickening and calcification of the anterior and posterior   mitral valve leaflets.   9. Mild mitral valve regurgitation.  10. The aortic valve appears calcified. No aortic valve stenosis.  11. Moderate aortic regurgitation.  12. Mild aortic root calcification.  13. There is no evidence of pericardial effusion.  14. Recommend clinical correlation with the above findings.  15. These results were communicated to the ordering physician.    [id number] Bharati Albright MD Electronically signed on   8/9/2022 at 2:33:14 PM    Labs: Personally reviewed                        13.7   10.13 )-----------( 234      ( 11 Nov 2022 22:00 )             39.9     11-11    140  |  98  |  28<H>  ----------------------------<  206<H>  2.6<LL>   |  27  |  1.94<H>    Ca    8.9      11 Nov 2022 22:00  Mg     1.8     11-11    TPro  7.7  /  Alb  4.1  /  TBili  0.3  /  DBili  x   /  AST  20  /  ALT  19  /  AlkPhos  96  11-11    PT/INR - ( 11 Nov 2022 22:00 )   PT: 12.6 sec;   INR: 1.09 ratio         PTT - ( 11 Nov 2022 22:00 )  PTT:31.7 sec    CARDIAC MARKERS ( 11 Nov 2022 22:00 )  39 ng/L / x     / x     / x     / x     / x        Serum Pro-Brain Natriuretic Peptide: 1778 pg/mL (11-11 @ 22:00)
[facility] DIVISION OF KIDNEY DISEASES AND HYPERTENSION -- [phone]  -- INITIAL CONSULT NOTE  --------------------------------------------------------------------------------  HPI: 77M Jamaican only speaking,  with PMH of htn, ckd, left eye blindness p/w reportedly abnormal stress test with new CM (EF dropped from 50% to 30% recently) and hypertension urgency. Pt in need of cardiac cath tomorrow. Nephrology called for CKD. On review of [facility] SCr at baseline appears to be 1.6-2 since May 2022. No labs on [facility] prior to that. Last SCr as outpatient was 1.8 on 11/7. SCr on arrival was 1.9 on 11/11-->2.07 on 11/14-->remains at baseline 1.7 today 11/15. Pt does not know he has a hx of CKD. On HCTZ, losartan & aldactone at home. Pt has been getting NS @ 100cc/hr since yesterday and now decreased to 50cc/hr.         Patient seen & examined. Laying flat in bed.97% on RA. Denies chest pain, fever, chills, dysuria, hematuria, pus in urine, SOB, leg edema,  N/V/D      PAST HISTORY  --------------------------------------------------------------------------------  PAST MEDICAL & SURGICAL HISTORY:  H/O: HTN (hypertension)      No significant past surgical history        FAMILY HISTORY:  No pertinent family history in first degree relatives      PAST SOCIAL HISTORY:    ALLERGIES & MEDICATIONS  --------------------------------------------------------------------------------  Allergies    No Known Allergies    Intolerances      Standing Inpatient Medications  aspirin enteric coated 81 milliGRAM(s) Oral daily  atorvastatin 40 milliGRAM(s) Oral at bedtime  carvedilol 12.5 milliGRAM(s) Oral every 12 hours  chlorhexidine 2% Cloths 1 Application(s) Topical daily  hydrALAZINE 100 milliGRAM(s) Oral every 8 hours  isosorbide   dinitrate Tablet (ISORDIL) 20 milliGRAM(s) Oral three times a day  sodium chloride 0.9%. 1000 milliLiter(s) IV Continuous <Continuous>    PRN Inpatient Medications  acetaminophen     Tablet .. 650 milliGRAM(s) Oral every 6 hours PRN      REVIEW OF SYSTEMS  --------------------------------------------------------------------------------  Gen: No chills  Respiratory: No dyspnea, cough  CV: No chest pain  GI: No abdominal pain, diarrhea,  nausea, vomiting  : No increased frequency, dysuria, hematuria  MSK:  no edema  Neuro: No dizziness/lightheadedness    All other systems were reviewed and are negative, except as noted.    VITALS/PHYSICAL EXAM  --------------------------------------------------------------------------------  T(C): 37.1 (11-15-22 @ 11:15), Max: 37.1 (11-15-22 @ 11:15)  HR: 67 (11-15-22 @ 13:12) (61 - 68)  BP: 151/71 (11-15-22 @ 13:12) (151/71 - 182/81)  RR: 18 (11-15-22 @ 11:15) (18 - 18)  SpO2: 97% (11-15-22 @ 12:15) (94% - 97%)  Wt(kg): --        11-14-22 @ 07:01  -  11-15-22 @ 07:00  --------------------------------------------------------  IN: 1940 mL / OUT: 800 mL / NET: 1140 mL    11-15-22 @ 07:01  -  11-15-22 @ 14:52  --------------------------------------------------------  IN: 600 mL / OUT: 300 mL / NET: 300 mL      Physical Exam:  	Gen: elderly, NAD, on RA  	HEENT: Anicteric, MMM, no JVD  	Pulm: CTA B/L  	CV: S1S2, no rub  	Abd: Soft, +BS, NT, ND           No presacral edema  	Ext: No LE edema B/L  	Neuro: Awake  	Skin: Warm and dry  	Vascular access:    LABS/STUDIES  --------------------------------------------------------------------------------    140  |  105  |  34  ----------------------------<  117      [11-15-22 @ 06:20]  3.2   |  23  |  1.72        Ca     7.9     [11-15-22 @ 06:20]      Mg     1.9     [11-14-22 @ 07:26]      Phos  3.1     [11-14-22 @ 07:26]            Creatinine Trend:  SCr 1.72 [11-15 @ 06:20]  SCr 2.07 [11-14 @ 07:26]  SCr 1.67 [11-13 @ 05:45]  SCr 1.72 [11-12 @ 05:29]  SCr 1.94 [11-11 @ 22:00]    Urinalysis - [05-24-22 @ 23:41]      Color Light Yellow / Appearance Clear / SG 1.010 / pH 6.5      Gluc Negative / Ketone Negative  / Bili Negative / Urobili Negative       Blood Moderate / Protein 30 mg/dL / Leuk Est Negative / Nitrite Negative      RBC 1 / WBC 1 / Hyaline 0 / Gran  / Sq Epi  / Non Sq Epi 1 / Bacteria Negative      TSH 1.31      [11-12-22 @ 05:29]  Lipid: chol 138, , HDL 20, LDL --      [11-13-22 @ 05:45]    HCV 0.38, Nonreact      [11-13-22 @ 05:46]

## 2022-11-15 NOTE — CONSULT NOTE ADULT - ASSESSMENT
77y M w/ PMHx of HTN presents to the ED with likely acute on chronic hypertensive urgency, for which he is largely asymptomatic. He was triaged from outpatient cardiology in the context of +NST with inferior ischemia, and an interval decline in EF from 50-55% to 30% on stress.  Currently chest pain free, neg enzymes, EKG with LVH, suggesting against ACS. Likely new class B HF, currently with only cosmetic edema to ankles    Plan:  - Elevated BNP, f/u inpatient TTE   - Obtain outpatient NST results   - s/p IV labetalol 10mg x1. BP control with carvedilol 6.25mg bid, hydral 25mg tid, isordil 20mg tid. Target < 25% BP reduction in first 24h  - replete K/Mg to goal 4/2 respectively   - Renal consult, renal US. Anticipate will require hydration pre-cath/ischemic eval given Augusto score   - Risk stratify with A1c, lipids and TSH 
Pt with CKD now with new CMP

## 2022-11-15 NOTE — PROGRESS NOTE ADULT - PROBLEM SELECTOR PLAN 1
- SBP 220s with persistent CP, new CM. pt's headache, eye pain are reportedly chronic.  - bp still elevated  uptitrate hydralazine to 75 tid and increase isosorbide to 20mg tid  if bp still uncontrolled, will increase carvedilol to 12.5 bid if HR not low  -monitor bp  - f/u renin:iris - SBP 220s with persistent CP, new CM. pt's headache, eye pain are reportedly chronic.  - bp still elevated  increase hydralazine to 100 tid and c/w isosorbide to 20mg tid  carvedilol increased to 12.5 bid   -monitor bp  - f/u renin:iris

## 2022-11-15 NOTE — CONSULT NOTE ADULT - PROBLEM SELECTOR RECOMMENDATION 9
CKD likely in the setting of long standing HTN    On review of [facility] SCr at baseline appears to be 1.6-2 since May 2022. No labs on HIE prior to that. Last SCr as outpatient was 1.8 on 11/7. SCr on arrival was 1.9 on 11/11-->2.07 on 11/14-->remains at baseline 1.7 today 11/15. On HCTZ, losartan & aldactone at home. Pt has been getting NS @ 100cc/hr since yesterday and now decreased to 50cc/hr.     Send UA, urine electrolytes, spot urine TP/CR. Renal US with No hydronephrosis. No retention. Mild increased cortical echogenicity suggestive of medical renal disease. Mild discrepancy in size.    Due to pt's preexisting CKD, pt has a Augusto's score of 12 which imparts a 26% risk of developing post-PCI MICHAEL & 1.09 % risk of post-PCI MICHAEL requiring dialysis. Risks explained to the patient in details. Please use minimal contrast during angio. Avoid BP fluctuations. Pt appears euvolemic on exam.  Would hold off on further IVF for now. Hold HCTZ & losartan. Monitor labs and urine output. Avoid NSAIDs, ACEI/ARBS, RCA and nephrotoxins. Dose medications as per eGFR. CKD likely in the setting of long standing HTN    On review of [facility] SCr at baseline appears to be 1.6-2 since May 2022. No labs on [facility] prior to that. Last SCr as outpatient was 1.8 on 11/7. SCr on arrival was 1.9 on 11/11-->2.07 on 11/14-->remains at baseline 1.7 today 11/15. On HCTZ, losartan & aldactone at home.     Send UA, urine electrolytes, spot urine TP/CR. Renal US with No hydronephrosis. No retention. Mild increased cortical echogenicity suggestive of medical renal disease. Mild discrepancy in size.    Due to pt's preexisting CKD, pt has a Augusto's score of 12 which imparts a 26% risk of developing post-PCI MICHAEL & 1.09 % risk of post-PCI MICHAEL requiring dialysis. Risks explained to the patient in details. Please use minimal contrast during angio. Avoid BP fluctuations. Pt appears euvolemic on exam. Pt has been getting NS @ 100cc/hr since yesterday and now decreased to 50cc/hr.  Would hold off on further IVF for now. Hold HCTZ & losartan. Optimized from renal standpoint. Monitor labs and urine output. Avoid NSAIDs, ACEI/ARBS, RCA and nephrotoxins. Dose medications as per eGFR.

## 2022-11-16 LAB
ANION GAP SERPL CALC-SCNC: 12 MMOL/L — SIGNIFICANT CHANGE UP (ref 5–17)
APPEARANCE UR: CLEAR — SIGNIFICANT CHANGE UP
BASE EXCESS BLDV CALC-SCNC: -1.4 MMOL/L — SIGNIFICANT CHANGE UP (ref -2–3)
BILIRUB UR-MCNC: NEGATIVE — SIGNIFICANT CHANGE UP
BUN SERPL-MCNC: 36 MG/DL — HIGH (ref 7–23)
CA-I SERPL-SCNC: 1.09 MMOL/L — LOW (ref 1.15–1.33)
CALCIUM SERPL-MCNC: 7.9 MG/DL — LOW (ref 8.4–10.5)
CHLORIDE BLDV-SCNC: 104 MMOL/L — SIGNIFICANT CHANGE UP (ref 96–108)
CHLORIDE SERPL-SCNC: 105 MMOL/L — SIGNIFICANT CHANGE UP (ref 96–108)
CHLORIDE UR-SCNC: 60 MMOL/L — SIGNIFICANT CHANGE UP
CO2 BLDV-SCNC: 24 MMOL/L — SIGNIFICANT CHANGE UP (ref 22–26)
CO2 SERPL-SCNC: 20 MMOL/L — LOW (ref 22–31)
COLOR SPEC: COLORLESS — SIGNIFICANT CHANGE UP
CREAT ?TM UR-MCNC: 46 MG/DL — SIGNIFICANT CHANGE UP
CREAT SERPL-MCNC: 1.84 MG/DL — HIGH (ref 0.5–1.3)
DIFF PNL FLD: NEGATIVE — SIGNIFICANT CHANGE UP
EGFR: 38 ML/MIN/1.73M2 — LOW
GAS PNL BLDV: 133 MMOL/L — LOW (ref 136–145)
GAS PNL BLDV: SIGNIFICANT CHANGE UP
GAS PNL BLDV: SIGNIFICANT CHANGE UP
GLUCOSE BLDV-MCNC: 118 MG/DL — HIGH (ref 70–99)
GLUCOSE SERPL-MCNC: 111 MG/DL — HIGH (ref 70–99)
GLUCOSE UR QL: NEGATIVE — SIGNIFICANT CHANGE UP
HCO3 BLDV-SCNC: 23 MMOL/L — SIGNIFICANT CHANGE UP (ref 22–29)
HCT VFR BLD CALC: 34.3 % — LOW (ref 39–50)
HCT VFR BLDA CALC: 35 % — LOW (ref 39–51)
HGB BLD CALC-MCNC: 11.5 G/DL — LOW (ref 12.6–17.4)
HGB BLD-MCNC: 11.1 G/DL — LOW (ref 13–17)
KETONES UR-MCNC: NEGATIVE — SIGNIFICANT CHANGE UP
LACTATE BLDV-MCNC: 0.8 MMOL/L — SIGNIFICANT CHANGE UP (ref 0.5–2)
LEUKOCYTE ESTERASE UR-ACNC: NEGATIVE — SIGNIFICANT CHANGE UP
MAGNESIUM SERPL-MCNC: 1.5 MG/DL — LOW (ref 1.6–2.6)
MCHC RBC-ENTMCNC: 29 PG — SIGNIFICANT CHANGE UP (ref 27–34)
MCHC RBC-ENTMCNC: 32.4 GM/DL — SIGNIFICANT CHANGE UP (ref 32–36)
MCV RBC AUTO: 89.6 FL — SIGNIFICANT CHANGE UP (ref 80–100)
NITRITE UR-MCNC: NEGATIVE — SIGNIFICANT CHANGE UP
NRBC # BLD: 0 /100 WBCS — SIGNIFICANT CHANGE UP (ref 0–0)
OSMOLALITY UR: 359 MOS/KG — SIGNIFICANT CHANGE UP (ref 300–900)
PCO2 BLDV: 35 MMHG — LOW (ref 42–55)
PH BLDV: 7.42 — SIGNIFICANT CHANGE UP (ref 7.32–7.43)
PH UR: 6.5 — SIGNIFICANT CHANGE UP (ref 5–8)
PHOSPHATE SERPL-MCNC: 1.8 MG/DL — LOW (ref 2.5–4.5)
PLATELET # BLD AUTO: 215 K/UL — SIGNIFICANT CHANGE UP (ref 150–400)
PO2 BLDV: 158 MMHG — HIGH (ref 25–45)
POTASSIUM BLDV-SCNC: 3.7 MMOL/L — SIGNIFICANT CHANGE UP (ref 3.5–5.1)
POTASSIUM SERPL-MCNC: 3.7 MMOL/L — SIGNIFICANT CHANGE UP (ref 3.5–5.3)
POTASSIUM SERPL-SCNC: 3.7 MMOL/L — SIGNIFICANT CHANGE UP (ref 3.5–5.3)
POTASSIUM UR-SCNC: 42 MMOL/L — SIGNIFICANT CHANGE UP
PROT ?TM UR-MCNC: <7 MG/DL — SIGNIFICANT CHANGE UP (ref 0–12)
PROT UR-MCNC: NEGATIVE — SIGNIFICANT CHANGE UP
PROT/CREAT UR-RTO: <0.2 RATIO — SIGNIFICANT CHANGE UP (ref 0–0.2)
RBC # BLD: 3.83 M/UL — LOW (ref 4.2–5.8)
RBC # FLD: 14.5 % — SIGNIFICANT CHANGE UP (ref 10.3–14.5)
SAO2 % BLDV: 99.2 % — HIGH (ref 67–88)
SODIUM SERPL-SCNC: 137 MMOL/L — SIGNIFICANT CHANGE UP (ref 135–145)
SODIUM UR-SCNC: 53 MMOL/L — SIGNIFICANT CHANGE UP
SP GR SPEC: 1.01 — SIGNIFICANT CHANGE UP (ref 1.01–1.02)
UROBILINOGEN FLD QL: NEGATIVE — SIGNIFICANT CHANGE UP
UUN UR-MCNC: 435 MG/DL — SIGNIFICANT CHANGE UP
WBC # BLD: 9.51 K/UL — SIGNIFICANT CHANGE UP (ref 3.8–10.5)
WBC # FLD AUTO: 9.51 K/UL — SIGNIFICANT CHANGE UP (ref 3.8–10.5)

## 2022-11-16 PROCEDURE — 93454 CORONARY ARTERY ANGIO S&I: CPT | Mod: 26

## 2022-11-16 PROCEDURE — 99152 MOD SED SAME PHYS/QHP 5/>YRS: CPT

## 2022-11-16 PROCEDURE — 99233 SBSQ HOSP IP/OBS HIGH 50: CPT

## 2022-11-16 PROCEDURE — 99233 SBSQ HOSP IP/OBS HIGH 50: CPT | Mod: GC

## 2022-11-16 PROCEDURE — 93010 ELECTROCARDIOGRAM REPORT: CPT

## 2022-11-16 RX ORDER — ISOSORBIDE DINITRATE 5 MG/1
30 TABLET ORAL THREE TIMES A DAY
Refills: 0 | Status: DISCONTINUED | OUTPATIENT
Start: 2022-11-16 | End: 2022-11-21

## 2022-11-16 RX ORDER — ONDANSETRON 8 MG/1
4 TABLET, FILM COATED ORAL ONCE
Refills: 0 | Status: COMPLETED | OUTPATIENT
Start: 2022-11-16 | End: 2022-11-16

## 2022-11-16 RX ORDER — ACETAMINOPHEN 500 MG
500 TABLET ORAL ONCE
Refills: 0 | Status: COMPLETED | OUTPATIENT
Start: 2022-11-16 | End: 2022-11-16

## 2022-11-16 RX ORDER — CARVEDILOL PHOSPHATE 80 MG/1
25 CAPSULE, EXTENDED RELEASE ORAL EVERY 12 HOURS
Refills: 0 | Status: DISCONTINUED | OUTPATIENT
Start: 2022-11-16 | End: 2022-11-21

## 2022-11-16 RX ORDER — MAGNESIUM SULFATE 500 MG/ML
1 VIAL (ML) INJECTION ONCE
Refills: 0 | Status: COMPLETED | OUTPATIENT
Start: 2022-11-16 | End: 2022-11-16

## 2022-11-16 RX ADMIN — ISOSORBIDE DINITRATE 30 MILLIGRAM(S): 5 TABLET ORAL at 11:26

## 2022-11-16 RX ADMIN — ISOSORBIDE DINITRATE 20 MILLIGRAM(S): 5 TABLET ORAL at 05:17

## 2022-11-16 RX ADMIN — ATORVASTATIN CALCIUM 40 MILLIGRAM(S): 80 TABLET, FILM COATED ORAL at 21:17

## 2022-11-16 RX ADMIN — ONDANSETRON 4 MILLIGRAM(S): 8 TABLET, FILM COATED ORAL at 12:17

## 2022-11-16 RX ADMIN — Medication 650 MILLIGRAM(S): at 21:50

## 2022-11-16 RX ADMIN — CARVEDILOL PHOSPHATE 12.5 MILLIGRAM(S): 80 CAPSULE, EXTENDED RELEASE ORAL at 05:16

## 2022-11-16 RX ADMIN — Medication 81 MILLIGRAM(S): at 11:26

## 2022-11-16 RX ADMIN — Medication 100 GRAM(S): at 08:47

## 2022-11-16 RX ADMIN — Medication 100 MILLIGRAM(S): at 21:17

## 2022-11-16 RX ADMIN — Medication 650 MILLIGRAM(S): at 04:17

## 2022-11-16 RX ADMIN — Medication 650 MILLIGRAM(S): at 04:45

## 2022-11-16 RX ADMIN — Medication 500 MILLIGRAM(S): at 06:57

## 2022-11-16 RX ADMIN — CHLORHEXIDINE GLUCONATE 1 APPLICATION(S): 213 SOLUTION TOPICAL at 11:23

## 2022-11-16 RX ADMIN — Medication 63.75 MILLIMOLE(S): at 09:45

## 2022-11-16 RX ADMIN — Medication 650 MILLIGRAM(S): at 21:18

## 2022-11-16 RX ADMIN — CARVEDILOL PHOSPHATE 25 MILLIGRAM(S): 80 CAPSULE, EXTENDED RELEASE ORAL at 21:18

## 2022-11-16 RX ADMIN — Medication 100 MILLIGRAM(S): at 05:15

## 2022-11-16 RX ADMIN — Medication 100 MILLIGRAM(S): at 13:51

## 2022-11-16 RX ADMIN — Medication 200 MILLIGRAM(S): at 06:33

## 2022-11-16 NOTE — PROGRESS NOTE ADULT - SUBJECTIVE AND OBJECTIVE BOX
[facility] Division of Hospital Medicine  Mary Mcclure DO  8a-5pm: [phone]  after 5pm call [phone]    Patient is a 77y old  Male who presents with a chief complaint of chest pain, abnormal chest pain (2022 06:58)       ID#[id number]  SUBJECTIVE / OVERNIGHT EVENTS: c/o eye pain (has had for 1 year) that he says is mildly improved. breathing also mildly improved      MEDICATIONS  (STANDING):  aspirin enteric coated 81 milliGRAM(s) Oral daily  atorvastatin 40 milliGRAM(s) Oral at bedtime  carvedilol 25 milliGRAM(s) Oral every 12 hours  chlorhexidine 2% Cloths 1 Application(s) Topical daily  hydrALAZINE 100 milliGRAM(s) Oral every 8 hours  isosorbide   dinitrate Tablet (ISORDIL) 30 milliGRAM(s) Oral three times a day    MEDICATIONS  (PRN):  acetaminophen     Tablet .. 650 milliGRAM(s) Oral every 6 hours PRN Mild Pain (1 - 3), Moderate Pain (4 - 6)      Vital Signs Last 24 Hrs  T(C): 36.7 (2022 16:38), Max: 36.9 (15 Nov 2022 20:45)  T(F): 98.1 (2022 16:38), Max: 98.5 (15 Nov 2022 20:45)  HR: 65 (2022 16:38) (58 - 72)  BP: 189/86 (2022 16:38) (149/82 - 189/86)  BP(mean): --  RR: 18 (2022 16:38) (18 - 18)  SpO2: 95% (2022 16:38) (95% - 97%)    Parameters below as of 2022 16:38  Patient On (Oxygen Delivery Method): room air      CAPILLARY BLOOD GLUCOSE        I&O's Summary    15 Nov 2022 07:01  -  2022 07:00  --------------------------------------------------------  IN: 600 mL / OUT: 300 mL / NET: 300 mL    2022 07:01  -  2022 17:06  --------------------------------------------------------  IN: 480 mL / OUT: 1150 mL / NET: -670 mL      PHYSICAL EXAM:  GENERAL: NAD, breathing normal  HEAD:  Atraumatic, Normocephalic  EYES: left eye blindness   NECK: supple, No JVD  CHEST/LUNG: slight crackles at left base  HEART: S1 S2 RRR  ABDOMEN: +BS Soft, NT/ND  EXTREMITIES:  2+ DP Pulses, No c/c. no LE edema  NEUROLOGY: AAOx3, no facial droop, moving all extremities   SKIN: No rashes or lesions    LABS:                        11.1   9.51  )-----------( 215      ( 2022 06:29 )             34.3     11-16    137  |  105  |  36<H>  ----------------------------<  111<H>  3.7   |  20<L>  |  1.84<H>    Ca    7.9<L>      2022 06:27  Phos  1.8       Mg     1.5                 Urinalysis Basic - ( 2022 13:00 )    Color: Colorless / Appearance: Clear / S.013 / pH: x  Gluc: x / Ketone: Negative  / Bili: Negative / Urobili: Negative   Blood: x / Protein: Negative / Nitrite: Negative   Leuk Esterase: Negative / RBC: x / WBC x   Sq Epi: x / Non Sq Epi: x / Bacteria: x        RADIOLOGY & ADDITIONAL TESTS:    Imaging Personally Reviewed:  Consultant(s) Notes Reviewed:    Care Discussed with Consultants/Other Providers:

## 2022-11-16 NOTE — PROGRESS NOTE ADULT - PROBLEM SELECTOR PLAN 1
- SBP 220s with persistent CP, new CM. pt's headache, eye pain are reportedly chronic.  - bp still elevated  increase hydralazine to 100 tid and increased isosorbide to 30mg tid  carvedilol increased to 25 bid   -monitor bp  - f/u renin:iris

## 2022-11-16 NOTE — PROGRESS NOTE ADULT - ASSESSMENT
77M w/ PMHx of HTN presents to the ED with likely acute on chronic hypertensive urgency, for which he is largely asymptomatic. He was triaged from outpatient cardiology in the context of +NST with inferior ischemia, and an interval decline in EF from 50-55% to 30% on stress.  Currently chest pain free, neg enzymes, EKG with LVH, suggesting against ACS. Likely new class B HF, currently with only cosmetic edema to ankles.    #Abnormal stress test  #HTN urgency/emergency  #BRUNILDA on CKD?  #Hypokalemia    Plan:  - Possible cath, pending creatinine this morning  - Outpatient NST results noted, thank you for obtaining  - replete K/Mg to goal 4/2 respectively   - A1c noted at 6.4  - Continue ASA, statin  - Please increase coreg to 25mg BID  - Hydralazine 100mg TID; continue Isordil as written for now     77M w/ PMHx of HTN presents to the ED with likely acute on chronic hypertensive urgency, for which he is largely asymptomatic. He was triaged from outpatient cardiology in the context of +NST with inferior ischemia, and an interval decline in EF from 50-55% to 30% on stress.  Currently chest pain free, neg enzymes, EKG with LVH, suggesting against ACS. Likely new class B HF, currently with only cosmetic edema to ankles.    #Abnormal stress test  #HTN urgency/emergency  #BRUNILDA on CKD?  #Hypokalemia    Plan:  - Cath today with Dr. Orosco  - Outpatient NST results noted, thank you for obtaining  - replete K/Mg to goal 4/2 respectively   - A1c noted at 6.4  - Continue ASA, statin  - Please increase coreg to 25mg BID  - Hydralazine 100mg TID; continue Isordil as written for now

## 2022-11-16 NOTE — PROGRESS NOTE ADULT - PROBLEM SELECTOR PLAN 2
-c/w aspirin, statin  - trops flat  - TTE EF57% no segmental WMA  outpt stress test results from Dr. Albright's office placed in chart  cards f/u appreciated - cath today (cleared by renal)  - continue to monitor on telemetry

## 2022-11-16 NOTE — PROGRESS NOTE ADULT - PROBLEM SELECTOR PLAN 5
baseline creatinine likely 1.9-2  renal consult to optimize for possible cath tomorrow  monitor cr  avoid nephrotoxic medications

## 2022-11-16 NOTE — PROGRESS NOTE ADULT - ASSESSMENT
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

## 2022-11-16 NOTE — PROGRESS NOTE ADULT - SUBJECTIVE AND OBJECTIVE BOX
Patient seen and examined at bedside.    77M w/ PMHx of HTN presents to the ED with likely acute on chronic hypertensive urgency, for which he is largely asymptomatic. He was triaged from outpatient cardiology in the context of +NST with inferior ischemia, and an interval decline in EF from 50-55% to 30% on stress.  Currently chest pain free, neg enzymes, EKG with LVH, suggesting against ACS. Likely new class B HF, currently with only cosmetic edema to ankles.    Overnight Events:     Review Of Systems: No chest pain, shortness of breath, or palpitations            Current Meds:  acetaminophen     Tablet .. 650 milliGRAM(s) Oral every 6 hours PRN  aspirin enteric coated 81 milliGRAM(s) Oral daily  atorvastatin 40 milliGRAM(s) Oral at bedtime  carvedilol 12.5 milliGRAM(s) Oral every 12 hours  chlorhexidine 2% Cloths 1 Application(s) Topical daily  hydrALAZINE 100 milliGRAM(s) Oral every 8 hours  isosorbide   dinitrate Tablet (ISORDIL) 20 milliGRAM(s) Oral three times a day      Vitals:  T(F): 97.9 (11-16), Max: 98.7 (11-15)  HR: 61 (11-16) (61 - 72)  BP: 168/77 (11-16) (151/71 - 173/83)  RR: 18 (11-16)  SpO2: 96% (11-16)  I&O's Summary    14 Nov 2022 07:01  -  15 Nov 2022 07:00  --------------------------------------------------------  IN: 1940 mL / OUT: 800 mL / NET: 1140 mL    15 Nov 2022 07:01  -  16 Nov 2022 06:58  --------------------------------------------------------  IN: 600 mL / OUT: 300 mL / NET: 300 mL        Physical Exam:  Appearance: No acute distress; well appearing  Eyes: PERRL, EOMI, pink conjunctiva  HEENT: Normal oral mucosa  Cardiovascular: RRR, S1, S2, no murmurs, rubs, or gallops; no edema; no JVD  Respiratory: Clear to auscultation bilaterally  Gastrointestinal: soft, non-tender, non-distended with normal bowel sounds  Extremities: No LE edema, warm                        11.1   9.51  )-----------( 215      ( 16 Nov 2022 06:29 )             34.3     11-15    140  |  105  |  34<H>  ----------------------------<  117<H>  3.2<L>   |  23  |  1.72<H>    Ca    7.9<L>      15 Nov 2022 06:20  Phos  3.1     11-14  Mg     1.9     11-14        CARDIAC MARKERS ( 12 Nov 2022 05:29 )  39 ng/L / x     / x     / x     / x     / x      CARDIAC MARKERS ( 11 Nov 2022 22:00 )  39 ng/L / x     / x     / x     / x     / x          Serum Pro-Brain Natriuretic Peptide: 1778 pg/mL (11-11 @ 22:00)          New ECG(s): Personally reviewed    Echo:    EF (Modified Jordan Rule): 57 %Doppler Peak Velocity  (m/sec): AoV=1.7  ------------------------------------------------------------------------  Observations:  Mitral Valve: Normal mitral valve. Minimal mitral  regurgitation.  Aortic Valve/Aorta: Calcified aortic valve. There is a  focal calcification seen on the right coronary cusp.  Peak  transaortic valve gradient equals 12 mm Hg, mean  transaortic valve gradient equals 7 mm Hg, aortic valve  velocity time integral equals 34 cm, estimated aortic valve  area equals 2.1 sqcm. Mild aortic regurgitation.    ms.  Peak left ventricular outflow tract gradient equals 4  mmHg, mean gradient is equal to 2 mm Hg, LVOT velocity  time integral equals 19 cm.  Aortic Root: 3.7 cm.  Ascending Aorta: 4 cm.  LVOT diameter: 2.2 cm.  Left Atrium: Mildly dilated left atrium.  LA volume index =  37 cc/m2.  Left Ventricle: Normal left ventricular systolic function.  No segmental wall motion abnormalities. Endocardial  visualization enhanced with intravenous injection of  Ultrasonic Enhancing Agent (Lumason). No left ventricular  thrombus. Septal motion consistent with conductiondefect.  Eccentric left ventricular hypertrophy (dilated left  ventricle with normal relative wall thickness). Moderate  diastolic dysfunction (Stage II).  Right Heart: Normal right atrium. Normal right ventricular  size and function. Normal tricuspid valve. Minimal  tricuspid regurgitation. Normal pulmonic valve. Minimal  pulmonic regurgitation.  Pericardium/Pleura: Normal pericardium with no pericardial  effusion.  Hemodynamic: Estimated right atrial pressure is 8 mm Hg.  ------------------------------------------------------------------------  Conclusions:  1. Normal mitral valve. Minimal mitral regurgitation.  2. Calcified aortic valve. There is a focal calcification  seen on the right coronary cusp.  Peak transaortic valve  gradient equals 12 mm Hg, mean transaortic valve gradient  equals 7 mm Hg, aortic valve velocity time integral equals  34 cm, estimated aortic valve area equals 2.1 sqcm. Mild  aortic regurgitation.   ms.  3. Mildly dilated left atrium.  LA volume index = 37 cc/m2.  4. Normal left ventricular systolic function. No segmental  wall motion abnormalities. Endocardial visualization  enhanced with intravenous injection of Ultrasonic Enhancing  Agent (Lumason). No left ventricular thrombus. Septal  motion consistent with conduction defect.  5. Moderate diastolic dysfunction (Stage II).  6. Normal right ventricular size and function.  ------------------------------------------------------------------------  Confirmed on  11/12/2022 - 14:51:23 by Sonido Carr M.D.  ------------------------------------------------------------------------        Stress Testing:     Cath:    Imaging:    Interpretation of Telemetry:   Patient seen and examined at bedside.    77M w/ PMHx of HTN presents to the ED with likely acute on chronic hypertensive urgency, for which he is largely asymptomatic. He was triaged from outpatient cardiology in the context of +NST with inferior ischemia, and an interval decline in EF from 50-55% to 30% on stress.  Currently chest pain free, neg enzymes, EKG with LVH, suggesting against ACS. Likely new class B HF, currently with only cosmetic edema to ankles.    Overnight Events: Had trouble sleeping but breathing remains at baseline.    Review Of Systems: No chest pain, shortness of breath, or palpitations            Current Meds:  acetaminophen     Tablet .. 650 milliGRAM(s) Oral every 6 hours PRN  aspirin enteric coated 81 milliGRAM(s) Oral daily  atorvastatin 40 milliGRAM(s) Oral at bedtime  carvedilol 12.5 milliGRAM(s) Oral every 12 hours  chlorhexidine 2% Cloths 1 Application(s) Topical daily  hydrALAZINE 100 milliGRAM(s) Oral every 8 hours  isosorbide   dinitrate Tablet (ISORDIL) 20 milliGRAM(s) Oral three times a day      Vitals:  T(F): 97.9 (11-16), Max: 98.7 (11-15)  HR: 61 (11-16) (61 - 72)  BP: 168/77 (11-16) (151/71 - 173/83)  RR: 18 (11-16)  SpO2: 96% (11-16)  I&O's Summary    14 Nov 2022 07:01  -  15 Nov 2022 07:00  --------------------------------------------------------  IN: 1940 mL / OUT: 800 mL / NET: 1140 mL    15 Nov 2022 07:01  -  16 Nov 2022 06:58  --------------------------------------------------------  IN: 600 mL / OUT: 300 mL / NET: 300 mL        Physical Exam:  Appearance: No acute distress; well appearing  Eyes: PERRL, EOMI, pink conjunctiva  HEENT: Normal oral mucosa  Cardiovascular: RRR, S1, S2, no murmurs, rubs, or gallops; no edema; no JVD  Respiratory: Clear to auscultation bilaterally  Gastrointestinal: soft, non-tender, non-distended with normal bowel sounds  Extremities: No LE edema, warm                        11.1   9.51  )-----------( 215      ( 16 Nov 2022 06:29 )             34.3     11-15    140  |  105  |  34<H>  ----------------------------<  117<H>  3.2<L>   |  23  |  1.72<H>    Ca    7.9<L>      15 Nov 2022 06:20  Phos  3.1     11-14  Mg     1.9     11-14        CARDIAC MARKERS ( 12 Nov 2022 05:29 )  39 ng/L / x     / x     / x     / x     / x      CARDIAC MARKERS ( 11 Nov 2022 22:00 )  39 ng/L / x     / x     / x     / x     / x          Serum Pro-Brain Natriuretic Peptide: 1778 pg/mL (11-11 @ 22:00)          New ECG(s): Personally reviewed    Echo:    EF (Modified Jordan Rule): 57 %Doppler Peak Velocity  (m/sec): AoV=1.7  ------------------------------------------------------------------------  Observations:  Mitral Valve: Normal mitral valve. Minimal mitral  regurgitation.  Aortic Valve/Aorta: Calcified aortic valve. There is a  focal calcification seen on the right coronary cusp.  Peak  transaortic valve gradient equals 12 mm Hg, mean  transaortic valve gradient equals 7 mm Hg, aortic valve  velocity time integral equals 34 cm, estimated aortic valve  area equals 2.1 sqcm. Mild aortic regurgitation.    ms.  Peak left ventricular outflow tract gradient equals 4  mmHg, mean gradient is equal to 2 mm Hg, LVOT velocity  time integral equals 19 cm.  Aortic Root: 3.7 cm.  Ascending Aorta: 4 cm.  LVOT diameter: 2.2 cm.  Left Atrium: Mildly dilated left atrium.  LA volume index =  37 cc/m2.  Left Ventricle: Normal left ventricular systolic function.  No segmental wall motion abnormalities. Endocardial  visualization enhanced with intravenous injection of  Ultrasonic Enhancing Agent (Lumason). No left ventricular  thrombus. Septal motion consistent with conductiondefect.  Eccentric left ventricular hypertrophy (dilated left  ventricle with normal relative wall thickness). Moderate  diastolic dysfunction (Stage II).  Right Heart: Normal right atrium. Normal right ventricular  size and function. Normal tricuspid valve. Minimal  tricuspid regurgitation. Normal pulmonic valve. Minimal  pulmonic regurgitation.  Pericardium/Pleura: Normal pericardium with no pericardial  effusion.  Hemodynamic: Estimated right atrial pressure is 8 mm Hg.  ------------------------------------------------------------------------  Conclusions:  1. Normal mitral valve. Minimal mitral regurgitation.  2. Calcified aortic valve. There is a focal calcification  seen on the right coronary cusp.  Peak transaortic valve  gradient equals 12 mm Hg, mean transaortic valve gradient  equals 7 mm Hg, aortic valve velocity time integral equals  34 cm, estimated aortic valve area equals 2.1 sqcm. Mild  aortic regurgitation.   ms.  3. Mildly dilated left atrium.  LA volume index = 37 cc/m2.  4. Normal left ventricular systolic function. No segmental  wall motion abnormalities. Endocardial visualization  enhanced with intravenous injection of Ultrasonic Enhancing  Agent (Lumason). No left ventricular thrombus. Septal  motion consistent with conduction defect.  5. Moderate diastolic dysfunction (Stage II).  6. Normal right ventricular size and function.  ------------------------------------------------------------------------  Confirmed on  11/12/2022 - 14:51:23 by Sonido Carr M.D.  ------------------------------------------------------------------------        Stress Testing:     Cath:    Imaging:    Interpretation of Telemetry:

## 2022-11-17 LAB
ANION GAP SERPL CALC-SCNC: 15 MMOL/L — SIGNIFICANT CHANGE UP (ref 5–17)
BUN SERPL-MCNC: 36 MG/DL — HIGH (ref 7–23)
CALCIUM SERPL-MCNC: 8.1 MG/DL — LOW (ref 8.4–10.5)
CHLORIDE SERPL-SCNC: 102 MMOL/L — SIGNIFICANT CHANGE UP (ref 96–108)
CO2 SERPL-SCNC: 21 MMOL/L — LOW (ref 22–31)
CREAT SERPL-MCNC: 1.76 MG/DL — HIGH (ref 0.5–1.3)
EGFR: 40 ML/MIN/1.73M2 — LOW
GLUCOSE SERPL-MCNC: 113 MG/DL — HIGH (ref 70–99)
HCT VFR BLD CALC: 34.7 % — LOW (ref 39–50)
HGB BLD-MCNC: 11.6 G/DL — LOW (ref 13–17)
MAGNESIUM SERPL-MCNC: 1.9 MG/DL — SIGNIFICANT CHANGE UP (ref 1.6–2.6)
MCHC RBC-ENTMCNC: 29.4 PG — SIGNIFICANT CHANGE UP (ref 27–34)
MCHC RBC-ENTMCNC: 33.4 GM/DL — SIGNIFICANT CHANGE UP (ref 32–36)
MCV RBC AUTO: 88.1 FL — SIGNIFICANT CHANGE UP (ref 80–100)
NRBC # BLD: 0 /100 WBCS — SIGNIFICANT CHANGE UP (ref 0–0)
PHOSPHATE SERPL-MCNC: 3.9 MG/DL — SIGNIFICANT CHANGE UP (ref 2.5–4.5)
PLATELET # BLD AUTO: 230 K/UL — SIGNIFICANT CHANGE UP (ref 150–400)
POTASSIUM SERPL-MCNC: 3.5 MMOL/L — SIGNIFICANT CHANGE UP (ref 3.5–5.3)
POTASSIUM SERPL-SCNC: 3.5 MMOL/L — SIGNIFICANT CHANGE UP (ref 3.5–5.3)
RBC # BLD: 3.94 M/UL — LOW (ref 4.2–5.8)
RBC # FLD: 14.5 % — SIGNIFICANT CHANGE UP (ref 10.3–14.5)
SODIUM SERPL-SCNC: 138 MMOL/L — SIGNIFICANT CHANGE UP (ref 135–145)
WBC # BLD: 9.51 K/UL — SIGNIFICANT CHANGE UP (ref 3.8–10.5)
WBC # FLD AUTO: 9.51 K/UL — SIGNIFICANT CHANGE UP (ref 3.8–10.5)

## 2022-11-17 PROCEDURE — 99233 SBSQ HOSP IP/OBS HIGH 50: CPT

## 2022-11-17 PROCEDURE — 99232 SBSQ HOSP IP/OBS MODERATE 35: CPT | Mod: GC

## 2022-11-17 PROCEDURE — 93010 ELECTROCARDIOGRAM REPORT: CPT

## 2022-11-17 PROCEDURE — 99152 MOD SED SAME PHYS/QHP 5/>YRS: CPT

## 2022-11-17 PROCEDURE — 92928 PRQ TCAT PLMT NTRAC ST 1 LES: CPT | Mod: RC

## 2022-11-17 PROCEDURE — 99233 SBSQ HOSP IP/OBS HIGH 50: CPT | Mod: GC

## 2022-11-17 PROCEDURE — 93454 CORONARY ARTERY ANGIO S&I: CPT | Mod: 26

## 2022-11-17 RX ORDER — POTASSIUM CHLORIDE 20 MEQ
20 PACKET (EA) ORAL ONCE
Refills: 0 | Status: COMPLETED | OUTPATIENT
Start: 2022-11-17 | End: 2022-11-17

## 2022-11-17 RX ORDER — CLOPIDOGREL BISULFATE 75 MG/1
75 TABLET, FILM COATED ORAL DAILY
Refills: 0 | Status: DISCONTINUED | OUTPATIENT
Start: 2022-11-18 | End: 2022-11-21

## 2022-11-17 RX ORDER — ONDANSETRON 8 MG/1
4 TABLET, FILM COATED ORAL ONCE
Refills: 0 | Status: COMPLETED | OUTPATIENT
Start: 2022-11-17 | End: 2022-11-17

## 2022-11-17 RX ORDER — HYDRALAZINE HCL 50 MG
5 TABLET ORAL ONCE
Refills: 0 | Status: COMPLETED | OUTPATIENT
Start: 2022-11-17 | End: 2022-11-17

## 2022-11-17 RX ORDER — FENTANYL CITRATE 50 UG/ML
25 INJECTION INTRAVENOUS ONCE
Refills: 0 | Status: DISCONTINUED | OUTPATIENT
Start: 2022-11-17 | End: 2022-11-17

## 2022-11-17 RX ORDER — MAGNESIUM SULFATE 500 MG/ML
1 VIAL (ML) INJECTION ONCE
Refills: 0 | Status: COMPLETED | OUTPATIENT
Start: 2022-11-17 | End: 2022-11-17

## 2022-11-17 RX ORDER — NITROGLYCERIN 6.5 MG
0.5 CAPSULE, EXTENDED RELEASE ORAL ONCE
Refills: 0 | Status: COMPLETED | OUTPATIENT
Start: 2022-11-17 | End: 2022-11-17

## 2022-11-17 RX ADMIN — ONDANSETRON 4 MILLIGRAM(S): 8 TABLET, FILM COATED ORAL at 19:00

## 2022-11-17 RX ADMIN — CARVEDILOL PHOSPHATE 25 MILLIGRAM(S): 80 CAPSULE, EXTENDED RELEASE ORAL at 05:48

## 2022-11-17 RX ADMIN — Medication 100 GRAM(S): at 13:38

## 2022-11-17 RX ADMIN — Medication 0.5 INCH(S): at 19:00

## 2022-11-17 RX ADMIN — FENTANYL CITRATE 25 MICROGRAM(S): 50 INJECTION INTRAVENOUS at 19:01

## 2022-11-17 RX ADMIN — ATORVASTATIN CALCIUM 40 MILLIGRAM(S): 80 TABLET, FILM COATED ORAL at 21:11

## 2022-11-17 RX ADMIN — Medication 100 MILLIGRAM(S): at 21:13

## 2022-11-17 RX ADMIN — Medication 100 MILLIGRAM(S): at 05:48

## 2022-11-17 RX ADMIN — Medication 20 MILLIEQUIVALENT(S): at 21:17

## 2022-11-17 RX ADMIN — Medication 81 MILLIGRAM(S): at 11:32

## 2022-11-17 RX ADMIN — Medication 5 MILLIGRAM(S): at 19:54

## 2022-11-17 RX ADMIN — ISOSORBIDE DINITRATE 30 MILLIGRAM(S): 5 TABLET ORAL at 11:32

## 2022-11-17 RX ADMIN — CHLORHEXIDINE GLUCONATE 1 APPLICATION(S): 213 SOLUTION TOPICAL at 11:32

## 2022-11-17 RX ADMIN — ISOSORBIDE DINITRATE 30 MILLIGRAM(S): 5 TABLET ORAL at 05:48

## 2022-11-17 RX ADMIN — Medication 100 MILLIGRAM(S): at 13:40

## 2022-11-17 NOTE — PROGRESS NOTE ADULT - ASSESSMENT
77M w/ PMHx of HTN presents to the ED with likely acute on chronic hypertensive urgency, for which he is largely asymptomatic. He was triaged from outpatient cardiology in the context of +NST with inferior ischemia, and an interval decline in EF from 50-55% to 30% on stress.  Currently chest pain free, neg enzymes, EKG with LVH, suggesting against ACS. Likely new class B HF, currently with only cosmetic edema to ankles.    #Abnormal stress test  #HTN urgency/emergency  #BRUNILDA on CKD?  #Hypokalemia    Plan:  s/p DiagnostiC cath pLAD 70%, mid LAD 70%, D1 80%,  of Cx, mid RCA 90%-Staged PCI 11/17/22  - Staged PCI today  - replete K/Mg to goal 4/2 respectively   - A1c noted at 6.4  - Continue ASA 81mg  -Continue atorva 40g daily  - Coreg 25mg BID  - Hydralazine 100mg TID; continue Isordil as written for now

## 2022-11-17 NOTE — PROGRESS NOTE ADULT - PROBLEM SELECTOR PLAN 6
- given the chronicity and location, hold off CTH  no focal deficits on exam - left eye blindness chronic  c/o left eye increased pain - ophtho consult

## 2022-11-17 NOTE — PROGRESS NOTE ADULT - SUBJECTIVE AND OBJECTIVE BOX
Patient seen and examined at bedside.    77M w/ PMHx of HTN presents to the ED with likely acute on chronic hypertensive urgency, for which he is largely asymptomatic. He was triaged from outpatient cardiology in the context of +NST with inferior ischemia, and an interval decline in EF from 50-55% to 30% on stress.  Currently chest pain free, neg enzymes, EKG with LVH, suggesting against ACS. Likely new class B HF, currently with only cosmetic edema to ankles.    Overnight Events:   11/16-s/p DiagnostiC cath pLAD 70%, mid LAD 70%, D1 80%,  of Cx, mid RCA 90%-Staged PCI 11/17/22    Review Of Systems: No chest pain, shortness of breath, or palpitations            Current Meds:  acetaminophen     Tablet .. 650 milliGRAM(s) Oral every 6 hours PRN  aspirin enteric coated 81 milliGRAM(s) Oral daily  atorvastatin 40 milliGRAM(s) Oral at bedtime  carvedilol 25 milliGRAM(s) Oral every 12 hours  chlorhexidine 2% Cloths 1 Application(s) Topical daily  hydrALAZINE 100 milliGRAM(s) Oral every 8 hours  isosorbide   dinitrate Tablet (ISORDIL) 30 milliGRAM(s) Oral three times a day      Vitals:  T(F): 98.3 (11-17), Max: 98.3 (11-16)  HR: 68 (11-17) (58 - 77)  BP: 156/71 (11-17) (113/58 - 207/93)  RR: 18 (11-17)  SpO2: 93% (11-17)  I&O's Summary    16 Nov 2022 07:01  -  17 Nov 2022 07:00  --------------------------------------------------------  IN: 480 mL / OUT: 1750 mL / NET: -1270 mL        Physical Exam:  Appearance: No acute distress; well appearing  Eyes: PERRL, EOMI, pink conjunctiva  HEENT: Normal oral mucosa  Cardiovascular: RRR, S1, S2, no murmurs, rubs, or gallops; no edema; no JVD  Respiratory: Clear to auscultation bilaterally  Gastrointestinal: soft, non-tender, non-distended with normal bowel sounds  Musculoskeletal: No clubbing; no joint deformity   Neurologic: Non-focal  Lymphatic: No lymphadenopathy  Psychiatry: AAOx3, mood & affect appropriate  Skin: No rashes, ecchymoses, or cyanosis                          11.6   9.51  )-----------( 230      ( 17 Nov 2022 05:51 )             34.7     11-17    138  |  102  |  36<H>  ----------------------------<  113<H>  3.5   |  21<L>  |  1.76<H>    Ca    8.1<L>      17 Nov 2022 05:51  Phos  3.9     11-17  Mg     1.9     11-17        CARDIAC MARKERS ( 12 Nov 2022 05:29 )  39 ng/L / x     / x     / x     / x     / x      CARDIAC MARKERS ( 11 Nov 2022 22:00 )  39 ng/L / x     / x     / x     / x     / x          Serum Pro-Brain Natriuretic Peptide: 1778 pg/mL (11-11 @ 22:00)          New ECG(s): Personally reviewed    Echo:    EF (Modified Jordan Rule): 57 %Doppler Peak Velocity  (m/sec): AoV=1.7  ------------------------------------------------------------------------  Observations:  Mitral Valve: Normal mitral valve. Minimal mitral  regurgitation.  Aortic Valve/Aorta: Calcified aortic valve. There is a  focal calcification seen on the right coronary cusp.  Peak  transaortic valve gradient equals 12 mm Hg, mean  transaortic valve gradient equals 7 mm Hg, aortic valve  velocity time integral equals 34 cm, estimated aortic valve  area equals 2.1 sqcm. Mild aortic regurgitation.    ms.  Peak left ventricular outflow tract gradient equals 4  mmHg, mean gradient is equal to 2 mm Hg, LVOT velocity  time integral equals 19 cm.  Aortic Root: 3.7 cm.  Ascending Aorta: 4 cm.  LVOT diameter: 2.2 cm.  Left Atrium: Mildly dilated left atrium.  LA volume index =  37 cc/m2.  Left Ventricle: Normal left ventricular systolic function.  No segmental wall motion abnormalities. Endocardial  visualization enhanced with intravenous injection of  Ultrasonic Enhancing Agent (Lumason). No left ventricular  thrombus. Septal motion consistent with conductiondefect.  Eccentric left ventricular hypertrophy (dilated left  ventricle with normal relative wall thickness). Moderate  diastolic dysfunction (Stage II).  Right Heart: Normal right atrium. Normal right ventricular  size and function. Normal tricuspid valve. Minimal  tricuspid regurgitation. Normal pulmonic valve. Minimal  pulmonic regurgitation.  Pericardium/Pleura: Normal pericardium with no pericardial  effusion.  Hemodynamic: Estimated right atrial pressure is 8 mm Hg.  ------------------------------------------------------------------------  Conclusions:  1. Normal mitral valve. Minimal mitral regurgitation.  2. Calcified aortic valve. There is a focal calcification  seen on the right coronary cusp.  Peak transaortic valve  gradient equals 12 mm Hg, mean transaortic valve gradient  equals 7 mm Hg, aortic valve velocity time integral equals  34 cm, estimated aortic valve area equals 2.1 sqcm. Mild  aortic regurgitation.   ms.  3. Mildly dilated left atrium.  LA volume index = 37 cc/m2.  4. Normal left ventricular systolic function. No segmental  wall motion abnormalities. Endocardial visualization  enhanced with intravenous injection of Ultrasonic Enhancing  Agent (Lumason). No left ventricular thrombus. Septal  motion consistent with conduction defect.  5. Moderate diastolic dysfunction (Stage II).  6. Normal right ventricular size and function.  ------------------------------------------------------------------------  Confirmed on  11/12/2022 - 14:51:23 by Sonido Carr M.D.  ------------------------------------------------------------------------      Stress Testing:     Cath:    Imaging:    Interpretation of Telemetry:   Patient seen and examined at bedside.    77M w/ PMHx of HTN presents to the ED with likely acute on chronic hypertensive urgency, for which he is largely asymptomatic. He was triaged from outpatient cardiology in the context of +NST with inferior ischemia, and an interval decline in EF from 50-55% to 30% on stress.  Currently chest pain free, neg enzymes, EKG with LVH, suggesting against ACS. Likely new class B HF, currently with only cosmetic edema to ankles.    Overnight Events:   11/16-s/p DiagnostiC cath pLAD 70%, mid LAD 70%, D1 80%,  of Cx, mid RCA 90%-Staged PCI 11/17/22  Feels well this morning with no chest pain or shortness of breath    Review Of Systems: No chest pain, shortness of breath, or palpitations            Current Meds:  acetaminophen     Tablet .. 650 milliGRAM(s) Oral every 6 hours PRN  aspirin enteric coated 81 milliGRAM(s) Oral daily  atorvastatin 40 milliGRAM(s) Oral at bedtime  carvedilol 25 milliGRAM(s) Oral every 12 hours  chlorhexidine 2% Cloths 1 Application(s) Topical daily  hydrALAZINE 100 milliGRAM(s) Oral every 8 hours  isosorbide   dinitrate Tablet (ISORDIL) 30 milliGRAM(s) Oral three times a day      Vitals:  T(F): 98.3 (11-17), Max: 98.3 (11-16)  HR: 68 (11-17) (58 - 77)  BP: 156/71 (11-17) (113/58 - 207/93)  RR: 18 (11-17)  SpO2: 93% (11-17)  I&O's Summary    16 Nov 2022 07:01  -  17 Nov 2022 07:00  --------------------------------------------------------  IN: 480 mL / OUT: 1750 mL / NET: -1270 mL        Physical Exam:  Appearance: No acute distress; well appearing  Eyes: PERRL, EOMI, pink conjunctiva  HEENT: Normal oral mucosa  Cardiovascular: RRR, S1, S2, no murmurs, rubs, or gallops; no edema; no JVD  Respiratory: Clear to auscultation bilaterally  Gastrointestinal: soft, non-tender, non-distended with normal bowel sounds  Musculoskeletal: No clubbing; no joint deformity   Extremeties: Radial access site with good pulse, no hematoma                          11.6   9.51  )-----------( 230      ( 17 Nov 2022 05:51 )             34.7     11-17    138  |  102  |  36<H>  ----------------------------<  113<H>  3.5   |  21<L>  |  1.76<H>    Ca    8.1<L>      17 Nov 2022 05:51  Phos  3.9     11-17  Mg     1.9     11-17        CARDIAC MARKERS ( 12 Nov 2022 05:29 )  39 ng/L / x     / x     / x     / x     / x      CARDIAC MARKERS ( 11 Nov 2022 22:00 )  39 ng/L / x     / x     / x     / x     / x          Serum Pro-Brain Natriuretic Peptide: 1778 pg/mL (11-11 @ 22:00)          New ECG(s): Personally reviewed    Echo:    EF (Modified Jordan Rule): 57 %Doppler Peak Velocity  (m/sec): AoV=1.7  ------------------------------------------------------------------------  Observations:  Mitral Valve: Normal mitral valve. Minimal mitral  regurgitation.  Aortic Valve/Aorta: Calcified aortic valve. There is a  focal calcification seen on the right coronary cusp.  Peak  transaortic valve gradient equals 12 mm Hg, mean  transaortic valve gradient equals 7 mm Hg, aortic valve  velocity time integral equals 34 cm, estimated aortic valve  area equals 2.1 sqcm. Mild aortic regurgitation.    ms.  Peak left ventricular outflow tract gradient equals 4  mmHg, mean gradient is equal to 2 mm Hg, LVOT velocity  time integral equals 19 cm.  Aortic Root: 3.7 cm.  Ascending Aorta: 4 cm.  LVOT diameter: 2.2 cm.  Left Atrium: Mildly dilated left atrium.  LA volume index =  37 cc/m2.  Left Ventricle: Normal left ventricular systolic function.  No segmental wall motion abnormalities. Endocardial  visualization enhanced with intravenous injection of  Ultrasonic Enhancing Agent (Lumason). No left ventricular  thrombus. Septal motion consistent with conductiondefect.  Eccentric left ventricular hypertrophy (dilated left  ventricle with normal relative wall thickness). Moderate  diastolic dysfunction (Stage II).  Right Heart: Normal right atrium. Normal right ventricular  size and function. Normal tricuspid valve. Minimal  tricuspid regurgitation. Normal pulmonic valve. Minimal  pulmonic regurgitation.  Pericardium/Pleura: Normal pericardium with no pericardial  effusion.  Hemodynamic: Estimated right atrial pressure is 8 mm Hg.  ------------------------------------------------------------------------  Conclusions:  1. Normal mitral valve. Minimal mitral regurgitation.  2. Calcified aortic valve. There is a focal calcification  seen on the right coronary cusp.  Peak transaortic valve  gradient equals 12 mm Hg, mean transaortic valve gradient  equals 7 mm Hg, aortic valve velocity time integral equals  34 cm, estimated aortic valve area equals 2.1 sqcm. Mild  aortic regurgitation.   ms.  3. Mildly dilated left atrium.  LA volume index = 37 cc/m2.  4. Normal left ventricular systolic function. No segmental  wall motion abnormalities. Endocardial visualization  enhanced with intravenous injection of Ultrasonic Enhancing  Agent (Lumason). No left ventricular thrombus. Septal  motion consistent with conduction defect.  5. Moderate diastolic dysfunction (Stage II).  6. Normal right ventricular size and function.  ------------------------------------------------------------------------  Confirmed on  11/12/2022 - 14:51:23 by Sonido Carr M.D.  ------------------------------------------------------------------------      Stress Testing:     Cath:    Imaging:    Interpretation of Telemetry:   Patient seen and examined at bedside.    77M w/ PMHx of HTN presents to the ED with likely acute on chronic hypertensive urgency, for which he is largely asymptomatic. He was triaged from outpatient cardiology in the context of +NST with inferior ischemia, and an interval decline in EF from 50-55% to 30% on stress.  Currently chest pain free, neg enzymes, EKG with LVH, suggesting against ACS. Likely new class B HF, currently with only cosmetic edema to ankles.    Overnight Events:   11/16-s/p DiagnostiC cath pLAD 70%, mid LAD 70%, D1 80%,  of Cx, mid RCA 90%-Staged PCI 11/17/22  Feels well this morning with no chest pain or shortness of breath    Review Of Systems: No chest pain, shortness of breath, or palpitations            Current Meds:  acetaminophen     Tablet .. 650 milliGRAM(s) Oral every 6 hours PRN  aspirin enteric coated 81 milliGRAM(s) Oral daily  atorvastatin 40 milliGRAM(s) Oral at bedtime  carvedilol 25 milliGRAM(s) Oral every 12 hours  chlorhexidine 2% Cloths 1 Application(s) Topical daily  hydrALAZINE 100 milliGRAM(s) Oral every 8 hours  isosorbide   dinitrate Tablet (ISORDIL) 30 milliGRAM(s) Oral three times a day      Vitals:  T(F): 98.3 (11-17), Max: 98.3 (11-16)  HR: 68 (11-17) (58 - 77)  BP: 156/71 (11-17) (113/58 - 207/93)  RR: 18 (11-17)  SpO2: 93% (11-17)  I&O's Summary    16 Nov 2022 07:01  -  17 Nov 2022 07:00  --------------------------------------------------------  IN: 480 mL / OUT: 1750 mL / NET: -1270 mL        Physical Exam:  Appearance: No acute distress; well appearing  Eyes: PERRL, EOMI, pink conjunctiva  HEENT: Normal oral mucosa  Cardiovascular: RRR, S1, S2, no murmurs, rubs, or gallops; no edema; no JVD  Respiratory: Clear to auscultation bilaterally  Gastrointestinal: soft, non-tender, non-distended with normal bowel sounds  Musculoskeletal: No clubbing; no joint deformity   Extremeties: Radial access site with good pulse, no hematoma                          11.6   9.51  )-----------( 230      ( 17 Nov 2022 05:51 )             34.7     11-17    138  |  102  |  36<H>  ----------------------------<  113<H>  3.5   |  21<L>  |  1.76<H>    Ca    8.1<L>      17 Nov 2022 05:51  Phos  3.9     11-17  Mg     1.9     11-17        CARDIAC MARKERS ( 12 Nov 2022 05:29 )  39 ng/L / x     / x     / x     / x     / x      CARDIAC MARKERS ( 11 Nov 2022 22:00 )  39 ng/L / x     / x     / x     / x     / x          Serum Pro-Brain Natriuretic Peptide: 1778 pg/mL (11-11 @ 22:00)          New ECG(s): Personally reviewed    Echo:    EF (Modified Jordan Rule): 57 %Doppler Peak Velocity  (m/sec): AoV=1.7  ------------------------------------------------------------------------  Observations:  Mitral Valve: Normal mitral valve. Minimal mitral  regurgitation.  Aortic Valve/Aorta: Calcified aortic valve. There is a  focal calcification seen on the right coronary cusp.  Peak  transaortic valve gradient equals 12 mm Hg, mean  transaortic valve gradient equals 7 mm Hg, aortic valve  velocity time integral equals 34 cm, estimated aortic valve  area equals 2.1 sqcm. Mild aortic regurgitation.    ms.  Peak left ventricular outflow tract gradient equals 4  mmHg, mean gradient is equal to 2 mm Hg, LVOT velocity  time integral equals 19 cm.  Aortic Root: 3.7 cm.  Ascending Aorta: 4 cm.  LVOT diameter: 2.2 cm.  Left Atrium: Mildly dilated left atrium.  LA volume index =  37 cc/m2.  Left Ventricle: Normal left ventricular systolic function.  No segmental wall motion abnormalities. Endocardial  visualization enhanced with intravenous injection of  Ultrasonic Enhancing Agent (Lumason). No left ventricular  thrombus. Septal motion consistent with conductiondefect.  Eccentric left ventricular hypertrophy (dilated left  ventricle with normal relative wall thickness). Moderate  diastolic dysfunction (Stage II).  Right Heart: Normal right atrium. Normal right ventricular  size and function. Normal tricuspid valve. Minimal  tricuspid regurgitation. Normal pulmonic valve. Minimal  pulmonic regurgitation.  Pericardium/Pleura: Normal pericardium with no pericardial  effusion.  Hemodynamic: Estimated right atrial pressure is 8 mm Hg.  ------------------------------------------------------------------------  Conclusions:  1. Normal mitral valve. Minimal mitral regurgitation.  2. Calcified aortic valve. There is a focal calcification  seen on the right coronary cusp.  Peak transaortic valve  gradient equals 12 mm Hg, mean transaortic valve gradient  equals 7 mm Hg, aortic valve velocity time integral equals  34 cm, estimated aortic valve area equals 2.1 sqcm. Mild  aortic regurgitation.   ms.  3. Mildly dilated left atrium.  LA volume index = 37 cc/m2.  4. Normal left ventricular systolic function. No segmental  wall motion abnormalities. Endocardial visualization  enhanced with intravenous injection of Ultrasonic Enhancing  Agent (Lumason). No left ventricular thrombus. Septal  motion consistent with conduction defect.  5. Moderate diastolic dysfunction (Stage II).  6. Normal right ventricular size and function.  ------------------------------------------------------------------------  Confirmed on  11/12/2022 - 14:51:23 by Sonido Carr M.D.  ------------------------------------------------------------------------      Stress Testing:     Cath:     Imaging:    Interpretation of Telemetry:

## 2022-11-17 NOTE — PROGRESS NOTE ADULT - PROBLEM SELECTOR PLAN 5
baseline creatinine likely 1.9-2 - cr stable  renal f/u appreciated   monitor cr  avoid nephrotoxic medications

## 2022-11-17 NOTE — PROGRESS NOTE ADULT - PROBLEM SELECTOR PLAN 1
-c/w aspirin, statin  - trops flat  - TTE EF57% no segmental WMA  outpt stress test results from Dr. Albright's office placed in chart  cath 11/16 showed - pLAD 70%, mid LAD 70%, D1 80%,  of Cx, mid RCA 90%-planned for Staged PCI 11/17/22 (cleared by renal)  cards f/u appreciated   - continue to monitor on telemetry

## 2022-11-17 NOTE — PROGRESS NOTE ADULT - SUBJECTIVE AND OBJECTIVE BOX
[facility] Division of Hospital Medicine  Mary Mcclure DO  8a-5pm: [phone]  after 5pm call [phone]    Patient is a 77y old  Male who presents with a chief complaint of chest pain, abnormal chest pain (2022 12:07)       ID #[id number]  SUBJECTIVE / OVERNIGHT EVENTS: c/o left eye pain ? worsen now - pt is a poor historian but states he got something in his eye about 1 year ago and has not been able to see since. he denies seeing an ophthalmologist because he doesn't have money.       MEDICATIONS  (STANDING):  aspirin enteric coated 81 milliGRAM(s) Oral daily  atorvastatin 40 milliGRAM(s) Oral at bedtime  carvedilol 25 milliGRAM(s) Oral every 12 hours  chlorhexidine 2% Cloths 1 Application(s) Topical daily  hydrALAZINE 100 milliGRAM(s) Oral every 8 hours  isosorbide   dinitrate Tablet (ISORDIL) 30 milliGRAM(s) Oral three times a day    MEDICATIONS  (PRN):  acetaminophen     Tablet .. 650 milliGRAM(s) Oral every 6 hours PRN Mild Pain (1 - 3), Moderate Pain (4 - 6)      Vital Signs Last 24 Hrs  T(C): 36.8 (2022 12:08), Max: 36.8 (2022 20:58)  T(F): 98.2 (2022 12:08), Max: 98.3 (2022 23:00)  HR: 63 (2022 12:08) (60 - 77)  BP: 143/62 (2022 12:08) (113/58 - 207/93)  BP(mean): --  RR: 18 (2022 12:08) (16 - 20)  SpO2: 96% (2022 12:08) (92% - 97%)    Parameters below as of 2022 12:08  Patient On (Oxygen Delivery Method): room air      CAPILLARY BLOOD GLUCOSE        I&O's Summary    2022 07:01  -  2022 07:00  --------------------------------------------------------  IN: 480 mL / OUT: 1750 mL / NET: -1270 mL    2022 07:01  -  2022 12:39  --------------------------------------------------------  IN: 480 mL / OUT: 0 mL / NET: 480 mL          PHYSICAL EXAM:  GENERAL: NAD, breathing normal  HEAD:  Atraumatic, Normocephalic  EYES: left eye blindness   NECK: supple, No JVD  CHEST/LUNG: slight crackles at left base  HEART: S1 S2 RRR  ABDOMEN: +BS Soft, NT/ND  EXTREMITIES:  2+ DP Pulses, No c/c. no LE edema  NEUROLOGY: AAOx3, no facial droop, moving all extremities   SKIN: No rashes or lesions    LABS:                        11.6   9.51  )-----------( 230      ( 2022 05:51 )             34.7     11-17    138  |  102  |  36<H>  ----------------------------<  113<H>  3.5   |  21<L>  |  1.76<H>    Ca    8.1<L>      2022 05:51  Phos  3.9       Mg     1.9                 Urinalysis Basic - ( 2022 13:00 )    Color: Colorless / Appearance: Clear / S.013 / pH: x  Gluc: x / Ketone: Negative  / Bili: Negative / Urobili: Negative   Blood: x / Protein: Negative / Nitrite: Negative   Leuk Esterase: Negative / RBC: x / WBC x   Sq Epi: x / Non Sq Epi: x / Bacteria: x        RADIOLOGY & ADDITIONAL TESTS:    Imaging Personally Reviewed: Cath today  Consultant(s) Notes Reviewed:    Care Discussed with Consultants/Other Providers: d/w renal - Dr. Waddell regarding angiogram today - okay to proceed - continue to hold fluids and diuretics

## 2022-11-17 NOTE — CHART NOTE - NSCHARTNOTEFT_GEN_A_CORE
S/p PCI to prox and mid RCA with NOMAN x 3  Arrived to recovery with complaint of 10/10 chest pain  EKG SR 60 with TWI aVL and ST depression 0.5mm V4-V6    EKG reviewed with Dr Gordon   Fentanyl given as ordered  O2 nasal cannula     will monitor serial EKG  Cards fellow at bedside S/p PCI to prox and mid RCA with NOMAN x 3  Arrived to recovery with complaint of 10/10 chest pain  EKG SR 60 with TWI aVL and ST depression 0.5mm V4-V6    EKG reviewed with Dr Gordon   Fentanyl given as ordered  O2 nasal cannula     will monitor serial EKG  Cards fellow at bedside  post procedure fluids held d/t elevated BP and EF 30% S/p PCI to prox and mid RCA with NOMAN x 3  Arrived to recovery with complaint of 10/10 chest pain  EKG SR 60 with TWI aVL and ST depression 0.5mm V4-V6    EKG reviewed with Dr Gordon   Fentanyl given as ordered  O2 nasal cannula     will monitor serial EKG  Cards fellow at bedside  post procedure fluids held d/t elevated BP and EF 30%    update  patient sleeping  Serial EKG reviewed with cardiology fellow Dr Joseph Mckeon S/p PCI to prox and mid RCA with NOMAN x 3  Arrived to recovery with complaint of 10/10 chest pain  EKG SR 60 with TWI aVL and ST depression 0.5mm V4-V6    EKG reviewed with Dr Gordon   Fentanyl given as ordered  O2 nasal cannula applied    will monitor serial EKG  Cards fellow at bedside  post procedure fluids held d/t elevated BP and EF 30%      update  patient resting. CP has subsided. No SOB. Reports nausea and treated with Zofran 4mg IV   Serial EKG reviewed with cardiology fellow Dr Joseph Mckeon and Dr Almodovar  Hydralazine 5mg IV for BP control S/p PCI to prox and mid RCA with NOMAN x 3  Arrived to recovery with complaint of 10/10 chest pain  EKG SR 60 with TWI aVL and ST depression 0.5mm V4-V6    EKG reviewed with Dr Gordon   Fentanyl given as ordered  O2 nasal cannula applied    will monitor serial EKG  Cards fellow at bedside  post procedure fluids held d/t elevated BP and EF 30%      update  patient resting. CP has subsided. No SOB. Reports nausea and treated with Zofran 4mg IV   Serial EKG reviewed with cardiology fellow Dr Joseph Mckeon and Dr Almodovar  Hydralazine 5mg IV for BP control    Patient reported CP. Taken to the cath lab by Dr Rae S/p PCI to prox and mid RCA with NOMAN x 3  Arrived to recovery with complaint of 10/10 chest pain  EKG SR 60 with TWI aVL and ST depression 0.5mm V4-V6    EKG reviewed with Dr Gordon   Fentanyl given as ordered  O2 nasal cannula applied    will monitor serial EKGs  Cards fellow at bedside  post procedure fluids held d/t elevated BP and EF 30%      update  patient resting. CP has subsided. No SOB. Reports nausea and treated with Zofran 4mg IV   Serial EKG reviewed with cardiology fellow Dr Joseph Orosco and Dr Almodovar  Hydralazine 5mg IV for BP control    Patient reported recurrence of CP. Taken to the cath lab by Dr Rae. RCA with patent stents   RRA access with radial band in place- remove as per protocol (call CICU ACP for removal)   No sedation given  Transferred to floor.    Sign out to CICU ACP /Floor ACP S/p PCI to prox and mid RCA with NOMAN x 3  Arrived to recovery with complaint of 10/10 chest pain  EKG SR 60 with TWI aVL and ST depression 0.5mm V4-V6    EKG reviewed with Dr Gordon   Fentanyl given as ordered  O2 nasal cannula applied    will monitor serial EKGs  Cards fellow at bedside  post procedure fluids held d/t elevated BP and EF 30%      update  patient resting. CP has subsided. No SOB. Reports nausea and treated with Zofran 4mg IV   Serial EKG reviewed with cardiology fellow Dr Joseph Orosco and Dr Almodovar  Hydralazine 5mg IV for BP control    Patient reported recurrence of CP. Taken back to the cath lab by Dr Rae. RCA with patent stents   RRA access with radial band in place- remove as per protocol (call CICU ACP for removal 21:30 hours ) - no hematoma or bleeding +Radial pulse intact and cap refill < 2 secs.   No sedation given  Transferred to floor.    Sign out given to CICU ACP Rachel and Floor ACP Karolina

## 2022-11-17 NOTE — PROGRESS NOTE ADULT - PROBLEM SELECTOR PLAN 2
Hypokalemia on admission. PRA pending & Aldosterone 20. BP better controlled after IVF discontinued. Would benefit from aldactone.             If you have any questions, please feel free to contact me  Sunshine Vasquez  Nephrology Fellow  Pager NS: [phone]/ [facility]: [phone]    (After 5 pm or on weekends please page the on-call fellow, can check [email or web address] for schedule. Login is peter jerez, schedule under [facility] medicine, psych, derm).

## 2022-11-17 NOTE — PROGRESS NOTE ADULT - PROBLEM SELECTOR PLAN 2
- SBP 220s with persistent CP, new CM. pt's headache, eye pain are reportedly chronic.  - bp still elevated  c/w hydralazine 100mg tid and isosorbide 30mg tid  c/w carvedilol 25mg bid   -monitor bp  - f/u renin:iris

## 2022-11-17 NOTE — PROGRESS NOTE ADULT - ASSESSMENT
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

## 2022-11-17 NOTE — PROGRESS NOTE ADULT - PROBLEM SELECTOR PLAN 1
CKD likely in the setting of long standing HTN    On review of [facility] SCr at baseline appears to be 1.6-2 since May 2022. No labs on [facility] prior to that. Last SCr as outpatient was 1.8 on 11/7. SCr on arrival was 1.9 on 11/11-->2.07 on 11/14-->remains at baseline 1.76 today. On HCTZ, losartan & aldactone at home.     UA bland. FeUrea suggesting intrinsic renal dz. spot urine TP/CR < 0.2. Renal US with No hydronephrosis. No retention. Mild increased cortical echogenicity suggestive of CKD. Mild discrepancy in size.    s/p coronary angiography and intervention yesterday and staged PCI today. Due to pt's preexisting CKD, pt has a Augusto's score of 12 which imparts a 26% risk of developing post-PCI MICHAEL & 1.09 % risk of post-PCI MICHAEL requiring dialysis. Risks explained to the patient in details. Please use minimal contrast during angio. Avoid BP fluctuations. Pt appears euvolemic on exam. s/p NS. Would hold further IVF as EF 30%.  Continue to hold HCTZ & losartan. Optimized from renal standpoint. Monitor labs and urine output. Avoid NSAIDs, ACEI/ARBS, RCA and nephrotoxins. Dose medications as per eGFR.

## 2022-11-17 NOTE — PROGRESS NOTE ADULT - SUBJECTIVE AND OBJECTIVE BOX
[facility] Division of Kidney Diseases & Hypertension  FOLLOW UP NOTE  [phone]--------------------------------------------------------------------------------  Chief Complaint:Chest pain      HPI: 77M Thai only speaking,  with PMH of htn, ckd, left eye blindness p/w reportedly abnormal stress test with new CM (EF dropped from 50% to 30% recently) and hypertension urgency. Pt in need of cardiac cath tomorrow. Nephrology called for CKD. On review of [facility] SCr at baseline appears to be 1.6-2 since May 2022. No labs on [facility] prior to that. Last SCr as outpatient was 1.8 on 11/7. SCr on arrival was 1.9 on 11/11-->2.07 on 11/14-->remains at baseline 1.7 today 11/15. Pt does not know he has a hx of CKD. On HCTZ, losartan & aldactone at home. s/p NS; now off it        Patient seen & examined. Laying flat in bed.97% on RA. s/p coronary angiography and intervention yesterday and staged PCI today. Denies chest pain, fever, chills, dysuria, hematuria, pus in urine, SOB, leg edema,  N/V/D. UOP 1.7L in 24 hrs            PAST HISTORY  --------------------------------------------------------------------------------  No significant changes to PMH, PSH, FHx, SHx, unless otherwise noted    ALLERGIES & MEDICATIONS  --------------------------------------------------------------------------------  Allergies    No Known Allergies    Intolerances      Standing Inpatient Medications  aspirin enteric coated 81 milliGRAM(s) Oral daily  atorvastatin 40 milliGRAM(s) Oral at bedtime  carvedilol 25 milliGRAM(s) Oral every 12 hours  chlorhexidine 2% Cloths 1 Application(s) Topical daily  hydrALAZINE 100 milliGRAM(s) Oral every 8 hours  isosorbide   dinitrate Tablet (ISORDIL) 30 milliGRAM(s) Oral three times a day    PRN Inpatient Medications  acetaminophen     Tablet .. 650 milliGRAM(s) Oral every 6 hours PRN      REVIEW OF SYSTEMS  --------------------------------------------------------------------------------  Gen: No chills  Respiratory: No dyspnea, cough  CV: No chest pain  GI: No abdominal pain, diarrhea,  nausea, vomiting  : No increased frequency, dysuria, hematuria  MSK:  no edema  Neuro: No dizziness/lightheadedness      All other systems were reviewed and are negative, except as noted.    VITALS/PHYSICAL EXAM  --------------------------------------------------------------------------------  T(C): 36.7 (11-17-22 @ 11:19), Max: 36.8 (11-16-22 @ 20:58)  HR: 60 (11-17-22 @ 11:19) (60 - 77)  BP: 148/68 (11-17-22 @ 11:19) (113/58 - 207/93)  RR: 17 (11-17-22 @ 11:19) (16 - 20)  SpO2: 95% (11-17-22 @ 11:19) (92% - 97%)  Wt(kg): --  Height (cm): 165.1 (11-16-22 @ 16:38)  Weight (kg): 68 (11-16-22 @ 16:38)  BMI (kg/m2): 24.9 (11-16-22 @ 16:38)  BSA (m2): 1.75 (11-16-22 @ 16:38)      11-16-22 @ 07:01  -  11-17-22 @ 07:00  --------------------------------------------------------  IN: 480 mL / OUT: 1750 mL / NET: -1270 mL      Physical Exam:  	Gen: elderly, NAD, on RA  	HEENT: Anicteric, MMM, no JVD  	Pulm: CTA B/L  	CV: S1S2, no rub  	Abd: Soft, +BS, NT, ND           No presacral edema  	Ext: No LE edema B/L  	Neuro: Awake  	Skin: Warm and dry  	Vascular access:      LABS/STUDIES  --------------------------------------------------------------------------------              11.6   9.51  >-----------<  230      [11-17-22 @ 05:51]              34.7     138  |  102  |  36  ----------------------------<  113      [11-17-22 @ 05:51]  3.5   |  21  |  1.76        Ca     8.1     [11-17-22 @ 05:51]      Mg     1.9     [11-17-22 @ 05:51]      Phos  3.9     [11-17-22 @ 05:51]            Creatinine Trend:  SCr 1.76 [11-17 @ 05:51]  SCr 1.84 [11-16 @ 06:27]  SCr 1.72 [11-15 @ 06:20]  SCr 2.07 [11-14 @ 07:26]  SCr 1.67 [11-13 @ 05:45]    Urinalysis - [11-16-22 @ 13:00]      Color Colorless / Appearance Clear / SG 1.013 / pH 6.5      Gluc Negative / Ketone Negative  / Bili Negative / Urobili Negative       Blood Negative / Protein Negative / Leuk Est Negative / Nitrite Negative      RBC  / WBC  / Hyaline  / Gran  / Sq Epi  / Non Sq Epi  / Bacteria     Urine Creatinine 46      [11-16-22 @ 13:02]  Urine Protein <7      [11-16-22 @ 13:02]  Urine Sodium 53      [11-16-22 @ 13:02]  Urine Urea Nitrogen 435      [11-16-22 @ 13:00]  Urine Potassium 42      [11-16-22 @ 13:02]  Urine Chloride 60      [11-16-22 @ 13:02]  Urine Osmolality 359      [11-16-22 @ 13:01]    TSH 1.31      [11-12-22 @ 05:29]  Lipid: chol 138, , HDL 20, LDL --      [11-13-22 @ 05:45]    HCV 0.38, Nonreact      [11-13-22 @ 05:46]

## 2022-11-18 DIAGNOSIS — R41.0 DISORIENTATION, UNSPECIFIED: ICD-10-CM

## 2022-11-18 LAB
ANION GAP SERPL CALC-SCNC: 12 MMOL/L — SIGNIFICANT CHANGE UP (ref 5–17)
APTT BLD: 30.2 SEC — SIGNIFICANT CHANGE UP (ref 27.5–35.5)
BUN SERPL-MCNC: 38 MG/DL — HIGH (ref 7–23)
CALCIUM SERPL-MCNC: 7.8 MG/DL — LOW (ref 8.4–10.5)
CHLORIDE SERPL-SCNC: 100 MMOL/L — SIGNIFICANT CHANGE UP (ref 96–108)
CO2 SERPL-SCNC: 22 MMOL/L — SIGNIFICANT CHANGE UP (ref 22–31)
CREAT ?TM UR-MCNC: 97 MG/DL — SIGNIFICANT CHANGE UP
CREAT SERPL-MCNC: 1.92 MG/DL — HIGH (ref 0.5–1.3)
EGFR: 36 ML/MIN/1.73M2 — LOW
GLUCOSE SERPL-MCNC: 102 MG/DL — HIGH (ref 70–99)
HCT VFR BLD CALC: 30.7 % — LOW (ref 39–50)
HGB BLD-MCNC: 10.4 G/DL — LOW (ref 13–17)
MAGNESIUM SERPL-MCNC: 2.1 MG/DL — SIGNIFICANT CHANGE UP (ref 1.6–2.6)
MCHC RBC-ENTMCNC: 29.8 PG — SIGNIFICANT CHANGE UP (ref 27–34)
MCHC RBC-ENTMCNC: 33.9 GM/DL — SIGNIFICANT CHANGE UP (ref 32–36)
MCV RBC AUTO: 88 FL — SIGNIFICANT CHANGE UP (ref 80–100)
NRBC # BLD: 0 /100 WBCS — SIGNIFICANT CHANGE UP (ref 0–0)
PHOSPHATE SERPL-MCNC: 2.7 MG/DL — SIGNIFICANT CHANGE UP (ref 2.5–4.5)
PLATELET # BLD AUTO: 210 K/UL — SIGNIFICANT CHANGE UP (ref 150–400)
POTASSIUM SERPL-MCNC: 3.6 MMOL/L — SIGNIFICANT CHANGE UP (ref 3.5–5.3)
POTASSIUM SERPL-SCNC: 3.6 MMOL/L — SIGNIFICANT CHANGE UP (ref 3.5–5.3)
RBC # BLD: 3.49 M/UL — LOW (ref 4.2–5.8)
RBC # FLD: 14.4 % — SIGNIFICANT CHANGE UP (ref 10.3–14.5)
SODIUM SERPL-SCNC: 134 MMOL/L — LOW (ref 135–145)
SODIUM UR-SCNC: 20 MMOL/L — SIGNIFICANT CHANGE UP
WBC # BLD: 10.06 K/UL — SIGNIFICANT CHANGE UP (ref 3.8–10.5)
WBC # FLD AUTO: 10.06 K/UL — SIGNIFICANT CHANGE UP (ref 3.8–10.5)

## 2022-11-18 PROCEDURE — 99233 SBSQ HOSP IP/OBS HIGH 50: CPT | Mod: GC

## 2022-11-18 PROCEDURE — 93010 ELECTROCARDIOGRAM REPORT: CPT

## 2022-11-18 PROCEDURE — 99233 SBSQ HOSP IP/OBS HIGH 50: CPT

## 2022-11-18 PROCEDURE — 70450 CT HEAD/BRAIN W/O DYE: CPT | Mod: 26

## 2022-11-18 PROCEDURE — 99222 1ST HOSP IP/OBS MODERATE 55: CPT

## 2022-11-18 RX ORDER — HEPARIN SODIUM 5000 [USP'U]/ML
5500 INJECTION INTRAVENOUS; SUBCUTANEOUS EVERY 6 HOURS
Refills: 0 | Status: DISCONTINUED | OUTPATIENT
Start: 2022-11-18 | End: 2022-11-20

## 2022-11-18 RX ORDER — HEPARIN SODIUM 5000 [USP'U]/ML
INJECTION INTRAVENOUS; SUBCUTANEOUS
Qty: 25000 | Refills: 0 | Status: DISCONTINUED | OUTPATIENT
Start: 2022-11-18 | End: 2022-11-20

## 2022-11-18 RX ORDER — HEPARIN SODIUM 5000 [USP'U]/ML
2500 INJECTION INTRAVENOUS; SUBCUTANEOUS EVERY 6 HOURS
Refills: 0 | Status: DISCONTINUED | OUTPATIENT
Start: 2022-11-18 | End: 2022-11-20

## 2022-11-18 RX ADMIN — ISOSORBIDE DINITRATE 30 MILLIGRAM(S): 5 TABLET ORAL at 17:42

## 2022-11-18 RX ADMIN — CLOPIDOGREL BISULFATE 75 MILLIGRAM(S): 75 TABLET, FILM COATED ORAL at 11:31

## 2022-11-18 RX ADMIN — Medication 100 MILLIGRAM(S): at 21:52

## 2022-11-18 RX ADMIN — CHLORHEXIDINE GLUCONATE 1 APPLICATION(S): 213 SOLUTION TOPICAL at 11:32

## 2022-11-18 RX ADMIN — Medication 100 MILLIGRAM(S): at 05:33

## 2022-11-18 RX ADMIN — ATORVASTATIN CALCIUM 40 MILLIGRAM(S): 80 TABLET, FILM COATED ORAL at 21:52

## 2022-11-18 RX ADMIN — ISOSORBIDE DINITRATE 30 MILLIGRAM(S): 5 TABLET ORAL at 05:33

## 2022-11-18 RX ADMIN — ISOSORBIDE DINITRATE 30 MILLIGRAM(S): 5 TABLET ORAL at 11:31

## 2022-11-18 RX ADMIN — Medication 100 MILLIGRAM(S): at 14:19

## 2022-11-18 RX ADMIN — CARVEDILOL PHOSPHATE 25 MILLIGRAM(S): 80 CAPSULE, EXTENDED RELEASE ORAL at 17:42

## 2022-11-18 RX ADMIN — Medication 81 MILLIGRAM(S): at 11:31

## 2022-11-18 RX ADMIN — HEPARIN SODIUM 1200 UNIT(S)/HR: 5000 INJECTION INTRAVENOUS; SUBCUTANEOUS at 19:55

## 2022-11-18 RX ADMIN — Medication 0.5 INCH(S): at 06:47

## 2022-11-18 NOTE — PROGRESS NOTE ADULT - ATTENDING COMMENTS
77 year old man with HTN, recent abnormal nuclear stress test with inferior ischemia and post stress LV stunning (EF 50-55% to 30%) referred by Dr. Albright to ED for evaluation. He has mild chest pressure/SOB when he walks upstairs. Initially in ER hypertensive 220s/80s. Troponin flat 39, 39. ECG NSR, PVCs, LVH, repolarization abnormalities. Controlling hypertension and now has BRUNILDA on CKD, improving and continue to optimize prior to coronary angiography.    To contact call Cardiology Fellow or Attending as listed on [email or web address] password: [email or web address].
77 year old male with history of HTN and recent positive nuclear stress test with inferior ischemia and post stress LV stunning (EF 50-55% to 30%) (report not available) who was referred by Dr. Albright to the ED for evaluation. He states he had mild chest pressure/SOB when he walks upstairs.    On presentation to ED, he was hypertensive 220s/80s, he was given labetalol IVP. He is currently chest pain free. Troponins flat 39 to 39. ECG shows NSR with PVCs, LVH with repolarization abnormalities.    TTE shows normal LV systolic function (EF 57%), mild AI, moderate diastolic dysfunction.    1) HTNsive urgency  2) positive NST  3) exertional chest pressure  -continue ASA and statin daily  -obtain NST results  -given BPs 170s/70s today, would uptitrate hydralazine  -pt is not on diuretics, but has persistent hypokalemia (2.6, 3.2, 2.9) in setting of severe hypertension, would check serum renin, iris, and renin/iris ratio to r/o primary aldosteronism  -Possible LHC early this week pending renal evaluation and stabilization
77 year old man with HTN, recent abnormal nuclear stress test with inferior ischemia and post stress LV stunning (EF 50-55% to 30%) referred by Dr. Albright to ED for evaluation. He has mild chest pressure/SOB when he walks upstairs. Initially in ER hypertensive 220s/80s. Troponin flat 39, 39. ECG NSR, PVCs, LVH, repolarization abnormalities. Controlling hypertension and BRUNILDA on CKD, improved. Had coronary angiography and intervention then staged procedure and now ready for discharge.    To contact call Cardiology Fellow or Attending as listed on [email or web address] password: [email or web address].
77 year old man with HTN, recent abnormal nuclear stress test with inferior ischemia and post stress LV stunning (EF 50-55% to 30%) referred by Dr. Albright to ED for evaluation. He has mild chest pressure/SOB when he walks upstairs. Initially in ER hypertensive 220s/80s. Troponin flat 39, 39. ECG NSR, PVCs, LVH, repolarization abnormalities. Controlling hypertension and BRUNILDA on CKD, improved. Had coronary angiography and intervention yesterday and stage procedure today.    To contact call Cardiology Fellow or Attending as listed on [email or web address] password: [email or web address].
77 year old man with HTN, recent abnormal nuclear stress test with inferior ischemia and post stress LV stunning (EF 50-55% to 30%) referred by Dr. Albright to ED for evaluation. He has mild chest pressure/SOB when he walks upstairs. Initially in ER hypertensive 220s/80s. Troponin flat 39, 39. ECG NSR, PVCs, LVH, repolarization abnormalities. Controlling hypertension and now has BRUNILDA on CKD, seeking to optimize prior to coronary angiography.    To contact call Cardiology Fellow or Attending as listed on [email or web address] password: [email or web address].
77 year old man with HTN, recent abnormal nuclear stress test with inferior ischemia and post stress LV stunning (EF 50-55% to 30%) referred by Dr. Albright to ED for evaluation. He has mild chest pressure/SOB when he walks upstairs. Initially in ER hypertensive 220s/80s. Troponin flat 39, 39. ECG NSR, PVCs, LVH, repolarization abnormalities. Controlling hypertension and BRUNILDA on CKD, improving. Ready for coronary angiography today.    To contact call Cardiology Fellow or Attending as listed on [email or web address] password: [email or web address].
s/p PCI   Can resume home diuretic now     krishna perea  nephrology attending   please contact me on TEAMS   Office- [phone]
krishna perea  nephrology attending   please contact me on TEAMS   Office- [phone]

## 2022-11-18 NOTE — PROGRESS NOTE ADULT - PROBLEM SELECTOR PLAN 6
baseline creatinine likely 1.9-2 - cr stable  renal f/u appreciated   monitor cr  avoid nephrotoxic medications baseline creatinine likely 1.9-2 - monitor cr after contrast studies   renal f/u  monitor cr  avoid nephrotoxic medications

## 2022-11-18 NOTE — PROGRESS NOTE ADULT - SUBJECTIVE AND OBJECTIVE BOX
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    History from chart and pt, who is a poor historian.  [id number].  Pt states he's had chest pain, occipital headache, and right eye pain for the past 1 year. Pt states the left sided CP is exertional, radiates to his back. It is not associated with palpitations.  Per charts, pt had a TTE "a few weeks ago" showing normal EF, and then stress test performed yesterday showed new EF of "30%" and was an "abnormal stress test". Paperwork from  showed Cr of 2. Also pt with reportedly SBP to 220s, sent in by cardiologist for poss hypertensive emergency. Review of surescripts shows that pt used to be on losartan 100 and aldactone 25mg, last filled in Aug '22. Pt states that when he went to the pharmacy, he was not given those medications. (2022 02:00)        Subjective/Observations: patient seen and examined; resting in bed in no acute distress.  Kenyan speaking only, nurse at bedside fluent in Kenyan able to translate; patient A&O X1 to self only ( baseline mental status as per RN); denies chest pain, dyspnea dizziness, palpitations, N&V, HA      Objective:  Vital Signs Last 24 Hrs  T(C): 36.6 (2022 07:58), Max: 37.7 (2022 05:45)  T(F): 97.9 (2022 07:58), Max: 99.9 (2022 05:45)  HR: 60 (2022 07:58) (57 - 71)  BP: 160/69 (2022 07:58) (117/61 - 194/76)  BP(mean): --  RR: 18 (2022 07:58) (15 - 20)  SpO2: 96% (2022 07:58) (93% - 99%)    Parameters below as of 2022 07:58  Patient On (Oxygen Delivery Method): room air        22 @ 07:01  -  22 @ 07:00  --------------------------------------------------------  IN: 480 mL / OUT: 550 mL / NET: -70 mL        PAST MEDICAL & SURGICAL HISTORY:  H/O: HTN (hypertension)      No significant past surgical history          SOCIAL HISTORY:  No tobacco, ethanol, or drug abuse.    FAMILY HISTORY:  No pertinent family history in first degree relatives      No family history of acute MI or sudden cardiac death.    MEDICATIONS  (STANDING):  aspirin enteric coated 81 milliGRAM(s) Oral daily  atorvastatin 40 milliGRAM(s) Oral at bedtime  carvedilol 25 milliGRAM(s) Oral every 12 hours  chlorhexidine 2% Cloths 1 Application(s) Topical daily  clopidogrel Tablet 75 milliGRAM(s) Oral daily  hydrALAZINE 100 milliGRAM(s) Oral every 8 hours  isosorbide   dinitrate Tablet (ISORDIL) 30 milliGRAM(s) Oral three times a day    MEDICATIONS  (PRN):  acetaminophen     Tablet .. 650 milliGRAM(s) Oral every 6 hours PRN Mild Pain (1 - 3), Moderate Pain (4 - 6)      Allergies    No Known Allergies    Intolerances                                10.4   10.06 )-----------( 210      ( 2022 06:37 )             30.7     11-18    134<L>  |  100  |  38<H>  ----------------------------<  102<H>  3.6   |  22  |  1.92<H>    Ca    7.8<L>      2022 06:36  Phos  2.7       Mg     2.1             Urinalysis Basic - ( 2022 13:00 )    Color: Colorless / Appearance: Clear / S.013 / pH: x  Gluc: x / Ketone: Negative  / Bili: Negative / Urobili: Negative   Blood: x / Protein: Negative / Nitrite: Negative   Leuk Esterase: Negative / RBC: x / WBC x   Sq Epi: x / Non Sq Epi: x / Bacteria: x            REVIEW OF SYSTEMS: all negative unless below indicated     Physical Exam:  No apparent distress, alert and oriented x1, to self only ( baseline as per nursing staff)   JVD is not elevated, supple  Clear to auscultation with no wheezing, rhonchi or crackles  Regular rate and rhythm with no murmur, rub or gallop  Positive bowel sounds, soft, non-tender, non-distended, no masses/guarding or rebound tenderness  Right wrist stable w/o  bleeding or hematoma; site soft, non tender.  Right radial pulse palpable +2.  Denies chest pain, denies right wrist/arm/hand:  pain, numbness, or tingling       TELEMETRY: SR 60-70s bpm, PVCs, no overnight events     < from: TTE with Doppler (w/Cont) (22 @ 11:04) >  EF (Modified Jordan Rule): 57 %Doppler Peak Velocity  (m/sec): AoV=1.7  ------------------------------------------------------------------------  Observations:  Mitral Valve: Normal mitral valve. Minimal mitral  regurgitation.  Aortic Valve/Aorta: Calcified aortic valve. There is a  focal calcification seen on the right coronary cusp.  Peak  transaortic valve gradient equals 12 mm Hg, mean  transaortic valve gradient equals 7 mm Hg, aortic valve  velocity time integral equals 34 cm, estimated aortic valve  area equals 2.1 sqcm. Mild aortic regurgitation.    ms.  Peak left ventricular outflow tract gradient equals 4  mmHg, mean gradient is equal to 2 mm Hg, LVOT velocity  time integral equals 19 cm.  Aortic Root: 3.7 cm.  Ascending Aorta: 4 cm.  LVOT diameter: 2.2 cm.  Left Atrium: Mildly dilated left atrium.  LA volume index =  37 cc/m2.  Left Ventricle: Normal left ventricular systolic function.  No segmental wall motion abnormalities. Endocardial  visualization enhanced with intravenous injection of  Ultrasonic Enhancing Agent (Lumason). No left ventricular  thrombus. Septal motion consistent with conductiondefect.  Eccentric left ventricular hypertrophy (dilated left  ventricle with normal relative wall thickness). Moderate  diastolic dysfunction (Stage II).  Right Heart: Normal right atrium. Normal right ventricular  size and function. Normal tricuspid valve. Minimal  tricuspid regurgitation. Normal pulmonic valve. Minimal  pulmonic regurgitation.  Pericardium/Pleura: Normal pericardium with no pericardial  effusion.  Hemodynamic: Estimated right atrial pressure is 8 mm Hg.  ------------------------------------------------------------------------  Conclusions:  1. Normal mitral valve. Minimal mitral regurgitation.  2. Calcified aortic valve. There is a focal calcification  seen on the right coronary cusp.  Peak transaortic valve  gradient equals 12 mm Hg, mean transaortic valve gradient  equals 7 mm Hg, aortic valve velocity time integral equals  34 cm, estimated aortic valve area equals 2.1 sqcm. Mild  aortic regurgitation.   ms.  3. Mildly dilated left atrium.  LA volume index = 37 cc/m2.  4. Normal left ventricular systolic function. No segmental  wall motion abnormalities. Endocardial visualization  enhanced with intravenous injection of Ultrasonic Enhancing  Agent (Lumason). No left ventricular thrombus. Septal  motion consistent with conduction defect.  5. Moderate diastolic dysfunction (Stage II).  6. Normal right ventricular size and function.    < end of copied text >    < from: Cardiac Catheterization (22 @ 17:30) >  Procedures Performed   Procedures:               1.    Arterial Access - Right Radial     2.    Diagnostic Coronary Angiography     Indications:               Positive Stress Test   Lab Visit Indication:      suspected CAD     Diagnostic Conclusions:     TVD with mRCA lesion appear to be hazy and likely culprit for  patient's presentation and newly reduced EF  Recommendations:     Plan for PCI of the mRCA on     Continue DAPT and optimal medical care      Acute complication:    No complications     Presentation:   77M with PMH of HTN, HLD, and HFrEF p/w abnormal stress test with high  risk features.  Chief Compaint: chest pain      Procedure Narrative:   The risks and alternatives of the procedures and conscious sedation  were explained to the patient and informed consent was  obtained. The patient was brought to the cath lab and placed on the  exam table.  Access   Right radial artery:   The puncture site was infiltrated with 1% Lidocaine. Vascular access  was obtained using modified seldinger technique.    Coronary Angiography   Left Coronary System:   A EXPO FL3.5 was positioned into the vessel under fluoroscopic  guidance. Contrast injections were performed using power  injection. Angiograms were obtained in multiple views.   Right Coronary System:   A EXPO AR1 was positioned into the vessel under fluoroscopic guidance.  Contrast injections were performed using power  injection. Angiograms were obtained in multiple views.       DiagnosticFindings:     Coronary Angiography   The coronary circulation is right dominant. Cardiac catheterization  was performed electively.    LM   Left main artery: Angiography shows minor irregularities.      LAD   Proximal left anterior descending: Angiography shows severe  atherosclerosis. There is a 70 % stenosis. Mid left anterior  descending: Angiography shows moderate atherosclerosis. There is a 60  % stenosis. First diagonal: Angiography shows  severe atherosclerosis. There is an 80 % stenosis. Second diagonal:  Angiography shows moderate atherosclerosis. There is a  40 % stenosis.      CX   Circumflex: Angiography shows complete occlusion.  of the Cx past  the proximal segment .    RCA   Mid right coronary artery: Angiography shows severe atherosclerosis.  There is an 80 % stenosis. Right posterior descending  artery: The vessel contour is hazy. There is an 80 % stenosis.      < end of copied text >      < from: 12 Lead ECG (22 @ 19:34) >    Ventricular Rate 67 BPM    Atrial Rate 67 BPM    P-R Interval 170 ms    QRS Duration 118 ms    Q-T Interval 482 ms    QTC Calculation(Bazett) 509 ms    P Axis 44 degrees    R Axis -33 degrees    T Axis -26 degrees    Diagnosis Line SINUS RHYTHM WITH PREMATURE ATRIAL COMPLEXES WITH ABERRANT CONDUCTION  LEFT AXIS DEVIATION  LEFT VENTRICULAR HYPERTROPHY WITH QRS WIDENING AND REPOLARIZATION ABNORMALITY ( R in aVL , [place] product )  PROLONGED QT  ABNORMAL ECG  NO PREVIOUS ECGS AVAILABLE  Confirmedby MD STEPHANIE, JOSE CARLOS ([id number]) on 2022 4:31:54 PM    < end of copied text >

## 2022-11-18 NOTE — PROGRESS NOTE ADULT - ASSESSMENT
Assessment/Plan:  HPI:  77M Indonesian only speaking,  w/ PMHx of HTN presents to the ED with likely acute on chronic hypertensive urgency, for which he is largely asymptomatic. He was triaged from outpatient cardiology in the context of +NST with inferior ischemia, and an interval decline in EF from 50-55% to 30% on stress.         11/17  s/p staged C with x3DES to RCA with Dr Gordon, via RRA   - RRA stable , no chest pain or discomfort   - Continue DAPT (aspirin 81mg and clopidogrel 75mg) for 12 months   - Cont Atorvastatin   - on admission with HTN urgency/emergency  - currently SBP 140s-150s, HR 60-70s ( see flow sheet) , no events on tele  - cont Coreg 25mg BID ( with hold parameters)   - cont  Hydralazine 100mg TID  - cont  Isordil   - BRUNILDA on CKD, Creat  bump noted ( 1.96 today from 1.76 yesterday ) could be contrast induced from [facility] yest? would recommend IVF and repeat creat either later this evening or tomorrow morning, renal f/u await their recs   - ECHO11/12: with EF 57% ( see report above for details)       - pt confused, A&Ox 1 ( baseline as per nursing staff); due to mental status unable to give any teaching about DAPT or post PCI instructions at this time, no family available at bedside   -please make sure DAPT is prescribed to pt's preferred pharmacy on dc   -f/u appt in 2 weeks post dc with outpt cardiologist  -Keep Mg >2 K>4   - cont to monitor on tele  - all other care as per primary team, house cards f/u for any cards related questions     Darron Alcocer NP  invasive cardiology     Please check [email or web address] password [email or web address] for cardiology service schedule and contact information via TEAMS

## 2022-11-18 NOTE — PROGRESS NOTE ADULT - ASSESSMENT
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

## 2022-11-18 NOTE — PROGRESS NOTE ADULT - PROBLEM SELECTOR PLAN 2
Hypokalemia on admission. PRA pending & Aldosterone 20. BP better controlled after IVF discontinued. Would benefit from aldactone if okay with cardiology            Upon discharge please make appointment with Nephrology clinic. For scheduling please email Nephrology at [email or web address]    If you have any questions, please feel free to contact me via [employer]  Sunshine Vasquez  Nephrology Fellow   Pager NS: [phone]/ [facility]: [phone]    (After 5 pm or on weekends please page the on-call fellow, can check [email or web address] for schedule. Login is peter jerez, schedule under [facility] medicine, psych, derm)

## 2022-11-18 NOTE — CHART NOTE - NSCHARTNOTEFT_GEN_A_CORE
Pt converted to afib -120 on tele, no hx of Afib documented in chart. Pt also reports  some chest discomfort Pt s/p cath x 2 yesterday with 3 stents to the RCA. Findings  d/w cards fellow, Dr. Paiz will start heparin drip, no CE at this time. Continue BB and monitor on tele.    Vital Signs Last 24 Hrs  T(C): 36.8 (18 Nov 2022 17:39), Max: 37.7 (18 Nov 2022 05:45)  T(F): 98.2 (18 Nov 2022 17:39), Max: 99.9 (18 Nov 2022 05:45)  HR: 110 (18 Nov 2022 17:39) (59 - 110)  BP: 155/82 (18 Nov 2022 17:39) (132/77 - 179/82)  RR: 19 (18 Nov 2022 17:39) (18 - 19)  SpO2: 96% (18 Nov 2022 17:39) (93% - 96%)    Parameters below as of 18 Nov 2022 17:39  Patient On (Oxygen Delivery Method): nasal cannula  O2 Flow (L/min): 2      Lea Melton NP   [id number]

## 2022-11-18 NOTE — PROGRESS NOTE ADULT - REASON FOR ADMISSION
chest pain, abnormal chest pain DISPLAY PLAN FREE TEXT DISPLAY PLAN FREE TEXT DISPLAY PLAN FREE TEXT DISPLAY PLAN FREE TEXT DISPLAY PLAN FREE TEXT

## 2022-11-18 NOTE — PROGRESS NOTE ADULT - PROBLEM SELECTOR PLAN 1
CKD likely in the setting of long standing HTN    On review of [facility] SCr at baseline appears to be 1.6-2 since May 2022. No labs on [facility] prior to that. Last SCr as outpatient was 1.8 on 11/7. SCr on arrival was 1.9 on 11/11-->2.07 on 11/14-->remains at baseline 1.92 today. On HCTZ, losartan & aldactone at home.     UA bland. FeUrea suggesting intrinsic renal dz. spot urine TP/CR < 0.2. Renal US with No hydronephrosis. No retention. Mild increased cortical echogenicity suggestive of CKD. Mild discrepancy in size.    s/p coronary angiography and intervention on 11/16 &  staged PCI 11/17. SCr remains stable. Pt appears euvolemic on exam. May resume home diuretics. Would favour loops over HCTZ. Resume losartan as an outpatient. Monitor labs and urine output. Avoid NSAIDs, ACEI/ARBS, RCA and nephrotoxins. Dose medications as per eGFR.

## 2022-11-18 NOTE — PROGRESS NOTE ADULT - PROBLEM SELECTOR PLAN 1
more confused today though part of the problem may be difficulty hearing the   attempted using staff to help interpret which he did better with but still confused   no new focal deficits on exam - will check ct head r/o CVA - called down to CT   suspect hospital delirium - per family never really been hospitalized (only been to ed) - forgetful at home suggesting early dementia

## 2022-11-18 NOTE — PROGRESS NOTE ADULT - SUBJECTIVE AND OBJECTIVE BOX
Patient seen and examined at bedside.    77M w/ PMHx of HTN presents to the ED with likely acute on chronic hypertensive urgency, for which he is largely asymptomatic. He was triaged from outpatient cardiology in the context of +NST with inferior ischemia, and an interval decline in EF from 50-55% to 30% on stress.  Currently chest pain free, neg enzymes, EKG with LVH, suggesting against ACS. Likely new class B HF, currently with only cosmetic edema to ankles.    Overnight Events:     S/p PCI to prox and mid RCA with NOMAN x 3  Arrived to recovery with complaint of 10/10 chest pain  EKG SR 60 with TWI aVL and ST depression 0.5mm V4-V6    EKG reviewed with Dr Gordon, Fentanyl given as ordered.    Later in the night, patient reported recurrence of CP. Taken back to the cath lab by Dr Rae. RCA with patent stents       Review Of Systems: No chest pain, shortness of breath, or palpitations            Current Meds:  acetaminophen     Tablet .. 650 milliGRAM(s) Oral every 6 hours PRN  aspirin enteric coated 81 milliGRAM(s) Oral daily  atorvastatin 40 milliGRAM(s) Oral at bedtime  carvedilol 25 milliGRAM(s) Oral every 12 hours  chlorhexidine 2% Cloths 1 Application(s) Topical daily  clopidogrel Tablet 75 milliGRAM(s) Oral daily  hydrALAZINE 100 milliGRAM(s) Oral every 8 hours  isosorbide   dinitrate Tablet (ISORDIL) 30 milliGRAM(s) Oral three times a day      Vitals:  T(F): 99.9 (11-18), Max: 99.9 (11-18)  HR: 59 (11-18) (57 - 71)  BP: 143/65 (11-18) (117/61 - 194/76)  RR: 18 (11-18)  SpO2: 96% (11-18)  I&O's Summary    16 Nov 2022 07:01  -  17 Nov 2022 07:00  --------------------------------------------------------  IN: 480 mL / OUT: 1750 mL / NET: -1270 mL    17 Nov 2022 07:01  -  18 Nov 2022 06:59  --------------------------------------------------------  IN: 480 mL / OUT: 550 mL / NET: -70 mL        Physical Exam:  Appearance: No acute distress; well appearing  Eyes: PERRL, EOMI, pink conjunctiva  HEENT: Normal oral mucosa  Cardiovascular: RRR, S1, S2, no murmurs, rubs, or gallops; no edema; no JVD  Respiratory: Clear to auscultation bilaterally  Gastrointestinal: soft, non-tender, non-distended with normal bowel sounds  Musculoskeletal: No clubbing; no joint deformity   Extremeties: Radial access site with good pulse, no hematoma                        10.4   10.06 )-----------( 210      ( 18 Nov 2022 06:37 )             30.7     11-17    138  |  102  |  36<H>  ----------------------------<  113<H>  3.5   |  21<L>  |  1.76<H>    Ca    8.1<L>      17 Nov 2022 05:51  Phos  3.9     11-17  Mg     1.9     11-17        CARDIAC MARKERS ( 12 Nov 2022 05:29 )  39 ng/L / x     / x     / x     / x     / x      CARDIAC MARKERS ( 11 Nov 2022 22:00 )  39 ng/L / x     / x     / x     / x     / x          Serum Pro-Brain Natriuretic Peptide: 1778 pg/mL (11-11 @ 22:00)          New ECG(s): Personally reviewed    Echo:    EF (Modified Jordan Rule): 57 %Doppler Peak Velocity  (m/sec): AoV=1.7  ------------------------------------------------------------------------  Observations:  Mitral Valve: Normal mitral valve. Minimal mitral  regurgitation.  Aortic Valve/Aorta: Calcified aortic valve. There is a  focal calcification seen on the right coronary cusp.  Peak  transaortic valve gradient equals 12 mm Hg, mean  transaortic valve gradient equals 7 mm Hg, aortic valve  velocity time integral equals 34 cm, estimated aortic valve  area equals 2.1 sqcm. Mild aortic regurgitation.    ms.  Peak left ventricular outflow tract gradient equals 4  mmHg, mean gradient is equal to 2 mm Hg, LVOT velocity  time integral equals 19 cm.  Aortic Root: 3.7 cm.  Ascending Aorta: 4 cm.  LVOT diameter: 2.2 cm.  Left Atrium: Mildly dilated left atrium.  LA volume index =  37 cc/m2.  Left Ventricle: Normal left ventricular systolic function.  No segmental wall motion abnormalities. Endocardial  visualization enhanced with intravenous injection of  Ultrasonic Enhancing Agent (Lumason). No left ventricular  thrombus. Septal motion consistent with conductiondefect.  Eccentric left ventricular hypertrophy (dilated left  ventricle with normal relative wall thickness). Moderate  diastolic dysfunction (Stage II).  Right Heart: Normal right atrium. Normal right ventricular  size and function. Normal tricuspid valve. Minimal  tricuspid regurgitation. Normal pulmonic valve. Minimal  pulmonic regurgitation.  Pericardium/Pleura: Normal pericardium with no pericardial  effusion.  Hemodynamic: Estimated right atrial pressure is 8 mm Hg.  ------------------------------------------------------------------------  Conclusions:  1. Normal mitral valve. Minimal mitral regurgitation.  2. Calcified aortic valve. There is a focal calcification  seen on the right coronary cusp.  Peak transaortic valve  gradient equals 12 mm Hg, mean transaortic valve gradient  equals 7 mm Hg, aortic valve velocity time integral equals  34 cm, estimated aortic valve area equals 2.1 sqcm. Mild  aortic regurgitation.   ms.  3. Mildly dilated left atrium.  LA volume index = 37 cc/m2.  4. Normal left ventricular systolic function. No segmental  wall motion abnormalities. Endocardial visualization  enhanced with intravenous injection of Ultrasonic Enhancing  Agent (Lumason). No left ventricular thrombus. Septal  motion consistent with conduction defect.  5. Moderate diastolic dysfunction (Stage II).  6. Normal right ventricular size and function.  ------------------------------------------------------------------------  Confirmed on  11/12/2022 - 14:51:23 by Sonido Carr M.D.  ------------------------------------------------------------------------        Stress Testing:     Cath:    Imaging:    Interpretation of Telemetry:   Patient seen and examined at bedside.    77M w/ PMHx of HTN presents to the ED with likely acute on chronic hypertensive urgency, for which he is largely asymptomatic. He was triaged from outpatient cardiology in the context of +NST with inferior ischemia, and an interval decline in EF from 50-55% to 30% on stress.  Currently chest pain free, neg enzymes, EKG with LVH, suggesting against ACS. Likely new class B HF, currently with only cosmetic edema to ankles.    Overnight Events:     S/p PCI to prox and mid RCA with NOMAN x 3  Arrived to recovery with complaint of 10/10 chest pain  EKG SR 60 with TWI aVL and ST depression 0.5mm V4-V6    EKG reviewed with Dr Gordon, Fentanyl given as ordered.    Later in the night, patient reported recurrence of CP. Taken back to the cath lab by Dr Rae. RCA with patent stents       Review Of Systems: No chest pain, shortness of breath, or palpitations            Current Meds:  acetaminophen     Tablet .. 650 milliGRAM(s) Oral every 6 hours PRN  aspirin enteric coated 81 milliGRAM(s) Oral daily  atorvastatin 40 milliGRAM(s) Oral at bedtime  carvedilol 25 milliGRAM(s) Oral every 12 hours  chlorhexidine 2% Cloths 1 Application(s) Topical daily  clopidogrel Tablet 75 milliGRAM(s) Oral daily  hydrALAZINE 100 milliGRAM(s) Oral every 8 hours  isosorbide   dinitrate Tablet (ISORDIL) 30 milliGRAM(s) Oral three times a day      Vitals:  T(F): 99.9 (11-18), Max: 99.9 (11-18)  HR: 59 (11-18) (57 - 71)  BP: 143/65 (11-18) (117/61 - 194/76)  RR: 18 (11-18)  SpO2: 96% (11-18)  I&O's Summary    16 Nov 2022 07:01  -  17 Nov 2022 07:00  --------------------------------------------------------  IN: 480 mL / OUT: 1750 mL / NET: -1270 mL    17 Nov 2022 07:01  -  18 Nov 2022 06:59  --------------------------------------------------------  IN: 480 mL / OUT: 550 mL / NET: -70 mL        Physical Exam:  Appearance: No acute distress; well appearing  Eyes: PERRL, EOMI, pink conjunctiva  HEENT: Normal oral mucosa  Cardiovascular: RRR, S1, S2, no murmurs, rubs, or gallops; no edema; no JVD  Respiratory: Clear to auscultation bilaterally  Gastrointestinal: soft, non-tender, non-distended with normal bowel sounds  Musculoskeletal: No clubbing; no joint deformity   Extremeties: Radial access site with good pulse, no hematoma                        10.4   10.06 )-----------( 210      ( 18 Nov 2022 06:37 )             30.7     11-17    138  |  102  |  36<H>  ----------------------------<  113<H>  3.5   |  21<L>  |  1.76<H>    Ca    8.1<L>      17 Nov 2022 05:51  Phos  3.9     11-17  Mg     1.9     11-17        CARDIAC MARKERS ( 12 Nov 2022 05:29 )  39 ng/L / x     / x     / x     / x     / x      CARDIAC MARKERS ( 11 Nov 2022 22:00 )  39 ng/L / x     / x     / x     / x     / x          Serum Pro-Brain Natriuretic Peptide: 1778 pg/mL (11-11 @ 22:00)          New ECG(s): Personally reviewed    Echo:    EF (Modified Jordan Rule): 57 %Doppler Peak Velocity  (m/sec): AoV=1.7  ------------------------------------------------------------------------  Observations:  Mitral Valve: Normal mitral valve. Minimal mitral  regurgitation.  Aortic Valve/Aorta: Calcified aortic valve. There is a  focal calcification seen on the right coronary cusp.  Peak  transaortic valve gradient equals 12 mm Hg, mean  transaortic valve gradient equals 7 mm Hg, aortic valve  velocity time integral equals 34 cm, estimated aortic valve  area equals 2.1 sqcm. Mild aortic regurgitation.    ms.  Peak left ventricular outflow tract gradient equals 4  mmHg, mean gradient is equal to 2 mm Hg, LVOT velocity  time integral equals 19 cm.  Aortic Root: 3.7 cm.  Ascending Aorta: 4 cm.  LVOT diameter: 2.2 cm.  Left Atrium: Mildly dilated left atrium.  LA volume index =  37 cc/m2.  Left Ventricle: Normal left ventricular systolic function.  No segmental wall motion abnormalities. Endocardial  visualization enhanced with intravenous injection of  Ultrasonic Enhancing Agent (Lumason). No left ventricular  thrombus. Septal motion consistent with conductiondefect.  Eccentric left ventricular hypertrophy (dilated left  ventricle with normal relative wall thickness). Moderate  diastolic dysfunction (Stage II).  Right Heart: Normal right atrium. Normal right ventricular  size and function. Normal tricuspid valve. Minimal  tricuspid regurgitation. Normal pulmonic valve. Minimal  pulmonic regurgitation.  Pericardium/Pleura: Normal pericardium with no pericardial  effusion.  Hemodynamic: Estimated right atrial pressure is 8 mm Hg.  ------------------------------------------------------------------------  Conclusions:  1. Normal mitral valve. Minimal mitral regurgitation.  2. Calcified aortic valve. There is a focal calcification  seen on the right coronary cusp.  Peak transaortic valve  gradient equals 12 mm Hg, mean transaortic valve gradient  equals 7 mm Hg, aortic valve velocity time integral equals  34 cm, estimated aortic valve area equals 2.1 sqcm. Mild  aortic regurgitation.   ms.  3. Mildly dilated left atrium.  LA volume index = 37 cc/m2.  4. Normal left ventricular systolic function. No segmental  wall motion abnormalities. Endocardial visualization  enhanced with intravenous injection of Ultrasonic Enhancing  Agent (Lumason). No left ventricular thrombus. Septal  motion consistent with conduction defect.  5. Moderate diastolic dysfunction (Stage II).  6. Normal right ventricular size and function.  ------------------------------------------------------------------------  Confirmed on  11/12/2022 - 14:51:23 by Sonido Carr M.D.  ------------------------------------------------------------------------        Stress Testing:     Cath:    Procedures Performed   Procedures:               1.    Arterial Access - Right Radial     2.    Diagnostic Coronary Angiography     Indications:               Positive Stress Test   Lab Visit Indication:      suspected CAD     Diagnostic Conclusions:     TVD with mRCA lesion appear to be hazy and likely culprit for  patient's presentation and newly reduced EF  Recommendations:     Plan for PCI of the mRCA on 11/17    Continue DAPT and optimal medical care      11/17  S/p PCI to prox and mid RCA with NOMAN x 3      Imaging:    Interpretation of Telemetry: Sinus rhythm

## 2022-11-18 NOTE — PROGRESS NOTE ADULT - SUBJECTIVE AND OBJECTIVE BOX
[facility] Division of Hospital Medicine  Mary Mcclure DO  8a-5pm: [phone]  after 5pm call [phone]    Patient is a 77y old  Male who presents with a chief complaint of chest pain, abnormal chest pain (18 Nov 2022 09:27)       ID#[id number]  SUBJECTIVE / OVERNIGHT EVENTS: confused today - stating he has no chest pain or sob but initially stated with  phone that he was at home and then later with staff that "he doesnt know how he got to the hospital"      MEDICATIONS  (STANDING):  aspirin enteric coated 81 milliGRAM(s) Oral daily  atorvastatin 40 milliGRAM(s) Oral at bedtime  carvedilol 25 milliGRAM(s) Oral every 12 hours  chlorhexidine 2% Cloths 1 Application(s) Topical daily  clopidogrel Tablet 75 milliGRAM(s) Oral daily  hydrALAZINE 100 milliGRAM(s) Oral every 8 hours  isosorbide   dinitrate Tablet (ISORDIL) 30 milliGRAM(s) Oral three times a day    MEDICATIONS  (PRN):  acetaminophen     Tablet .. 650 milliGRAM(s) Oral every 6 hours PRN Mild Pain (1 - 3), Moderate Pain (4 - 6)      Vital Signs Last 24 Hrs  T(C): 36.7 (18 Nov 2022 11:27), Max: 37.7 (18 Nov 2022 05:45)  T(F): 98.1 (18 Nov 2022 11:27), Max: 99.9 (18 Nov 2022 05:45)  HR: 64 (18 Nov 2022 11:27) (57 - 71)  BP: 158/73 (18 Nov 2022 11:27) (117/61 - 194/76)  BP(mean): --  RR: 18 (18 Nov 2022 11:27) (15 - 20)  SpO2: 95% (18 Nov 2022 11:27) (93% - 99%)    Parameters below as of 18 Nov 2022 11:27  Patient On (Oxygen Delivery Method): room air      CAPILLARY BLOOD GLUCOSE        I&O's Summary    17 Nov 2022 07:01  -  18 Nov 2022 07:00  --------------------------------------------------------  IN: 480 mL / OUT: 550 mL / NET: -70 mL    18 Nov 2022 07:01  -  18 Nov 2022 13:49  --------------------------------------------------------  IN: 340 mL / OUT: 200 mL / NET: 140 mL        PHYSICAL EXAM:  GENERAL: NAD, breathing normal  HEAD:  Atraumatic, Normocephalic  EYES: left eye blindness   NECK: supple, No JVD  CHEST/LUNG: slight crackles at left base  HEART: S1 S2 RRR  ABDOMEN: +BS Soft, NT/ND  EXTREMITIES:  2+ DP Pulses, No c/c. no LE edema  NEUROLOGY: AAOx3, no facial droop, moving all extremities   SKIN: No rashes or lesions    LABS:                        10.4   10.06 )-----------( 210      ( 18 Nov 2022 06:37 )             30.7     11-18    134<L>  |  100  |  38<H>  ----------------------------<  102<H>  3.6   |  22  |  1.92<H>    Ca    7.8<L>      18 Nov 2022 06:36  Phos  2.7     11-18  Mg     2.1     11-18                RADIOLOGY & ADDITIONAL TESTS:    Imaging Personally Reviewed:  Consultant(s) Notes Reviewed:    Care Discussed with Consultants/Other Providers:

## 2022-11-18 NOTE — PROGRESS NOTE ADULT - SUBJECTIVE AND OBJECTIVE BOX
[facility] Division of Kidney Diseases & Hypertension  FOLLOW UP NOTE  [phone]--------------------------------------------------------------------------------  Chief Complaint:Chest pain          HPI: 77M Azeri only speaking,  with PMH of htn, ckd, left eye blindness p/w reportedly abnormal stress test with new CM (EF dropped from 50% to 30% recently) and hypertension urgency. Pt in need of cardiac cath tomorrow. Nephrology called for CKD. On review of [facility] SCr at baseline appears to be 1.6-2 since May 2022. No labs on [facility] prior to that. Last SCr as outpatient was 1.8 on 11/7. SCr on arrival was 1.9 on 11/11-->2.07 on 11/14-->remains at baseline 1.7 today 11/15. Pt does not know he has a hx of CKD. On HCTZ, losartan & aldactone at home. s/p NS; now off it. s/p coronary angiography and intervention yesterday and staged PCI on 11/16 & 11/17        Patient seen & examined. Laying flat in bed.97% on RA. s/p staged PCI yesterday. C/o dizziness. SBP in 140-160's. Denies chest pain, fever, chills, dysuria, hematuria, pus in urine, SOB, leg edema,  N/V/D. UOP 550mL in 24 hrs        PAST HISTORY  --------------------------------------------------------------------------------  No significant changes to PMH, PSH, FHx, SHx, unless otherwise noted    ALLERGIES & MEDICATIONS  --------------------------------------------------------------------------------  Allergies    No Known Allergies    Intolerances      Standing Inpatient Medications  aspirin enteric coated 81 milliGRAM(s) Oral daily  atorvastatin 40 milliGRAM(s) Oral at bedtime  carvedilol 25 milliGRAM(s) Oral every 12 hours  chlorhexidine 2% Cloths 1 Application(s) Topical daily  clopidogrel Tablet 75 milliGRAM(s) Oral daily  hydrALAZINE 100 milliGRAM(s) Oral every 8 hours  isosorbide   dinitrate Tablet (ISORDIL) 30 milliGRAM(s) Oral three times a day    PRN Inpatient Medications  acetaminophen     Tablet .. 650 milliGRAM(s) Oral every 6 hours PRN      REVIEW OF SYSTEMS  --------------------------------------------------------------------------------        All other systems were reviewed and are negative, except as noted.    VITALS/PHYSICAL EXAM  --------------------------------------------------------------------------------  T(C): 36.6 (11-18-22 @ 07:58), Max: 37.7 (11-18-22 @ 05:45)  HR: 60 (11-18-22 @ 07:58) (57 - 71)  BP: 160/69 (11-18-22 @ 07:58) (117/61 - 194/76)  RR: 18 (11-18-22 @ 07:58) (15 - 20)  SpO2: 96% (11-18-22 @ 07:58) (93% - 99%)  Wt(kg): --  Height (cm): 165.1 (11-16-22 @ 16:38)  Weight (kg): 68 (11-16-22 @ 16:38)  BMI (kg/m2): 24.9 (11-16-22 @ 16:38)  BSA (m2): 1.75 (11-16-22 @ 16:38)      11-17-22 @ 07:01  -  11-18-22 @ 07:00  --------------------------------------------------------  IN: 480 mL / OUT: 550 mL / NET: -70 mL    11-18-22 @ 07:01  -  11-18-22 @ 10:46  --------------------------------------------------------  IN: 100 mL / OUT: 200 mL / NET: -100 mL      Physical Exam:  	Gen: elderly, NAD, on RA  	HEENT: Anicteric, MMM, no JVD  	Pulm: CTA B/L  	CV: S1S2, no rub  	Abd: Soft, +BS, NT, ND           No presacral edema  	Ext: No LE edema B/L  	Neuro: Awake  	Skin: Warm and dry  	Vascular access:      LABS/STUDIES  --------------------------------------------------------------------------------              10.4   10.06 >-----------<  210      [11-18-22 @ 06:37]              30.7     134  |  100  |  38  ----------------------------<  102      [11-18-22 @ 06:36]  3.6   |  22  |  1.92        Ca     7.8     [11-18-22 @ 06:36]      Mg     2.1     [11-18-22 @ 06:36]      Phos  2.7     [11-18-22 @ 06:36]            Creatinine Trend:  SCr 1.92 [11-18 @ 06:36]  SCr 1.76 [11-17 @ 05:51]  SCr 1.84 [11-16 @ 06:27]  SCr 1.72 [11-15 @ 06:20]  SCr 2.07 [11-14 @ 07:26]    Urinalysis - [11-16-22 @ 13:00]      Color Colorless / Appearance Clear / SG 1.013 / pH 6.5      Gluc Negative / Ketone Negative  / Bili Negative / Urobili Negative       Blood Negative / Protein Negative / Leuk Est Negative / Nitrite Negative      RBC  / WBC  / Hyaline  / Gran  / Sq Epi  / Non Sq Epi  / Bacteria     Urine Creatinine 97      [11-18-22 @ 09:50]  Urine Protein <7      [11-16-22 @ 13:02]  Urine Sodium 20      [11-18-22 @ 09:50]  Urine Urea Nitrogen 435      [11-16-22 @ 13:00]  Urine Potassium 42      [11-16-22 @ 13:02]  Urine Chloride 60      [11-16-22 @ 13:02]  Urine Osmolality 359      [11-16-22 @ 13:01]    TSH 1.31      [11-12-22 @ 05:29]  Lipid: chol 138, , HDL 20, LDL --      [11-13-22 @ 05:45]    HCV 0.38, Nonreact      [11-13-22 @ 05:46]

## 2022-11-18 NOTE — PROGRESS NOTE ADULT - PROBLEM SELECTOR PLAN 2
-c/w aspirin, statin  - trops flat  - TTE EF57% no segmental WMA  outpt stress test results from Dr. Albright's office placed in chart  cath 11/16 showed - pLAD 70%, mid LAD 70%, D1 80%,  of Cx, mid RCA 90%-s/p Staged cath 11/17 (cleared by renal) s/p NOMAN x 3 to RCA  cards f/u appreciated   - continue to monitor on telemetry

## 2022-11-18 NOTE — PROGRESS NOTE ADULT - ASSESSMENT
77M w/ PMHx of HTN presents to the ED with likely acute on chronic hypertensive urgency, for which he is largely asymptomatic. He was triaged from outpatient cardiology in the context of +NST with inferior ischemia, and an interval decline in EF from 50-55% to 30% on stress.  Currently chest pain free, neg enzymes, EKG with LVH, suggesting against ACS. Likely new class B HF, currently with only cosmetic edema to ankles.    #Abnormal stress test  #HTN urgency/emergency  #BRUNILDA on CKD?  #Hypokalemia    Plan:  s/p DiagnostiC cath pLAD 70%, mid LAD 70%, D1 80%,  of Cx, mid RCA 90%-Staged PCI 11/17/22  He is s/p 3DES to RCA  - replete K/Mg to goal 4/2 respectively   - A1c noted at 6.4  - Continue ASA 81mg  - Increase atorvastatin to 80mg daily  - Coreg 25mg BID  - Hydralazine 100mg TID; continue Isordil as written for now     77M w/ PMHx of HTN presents to the ED with likely acute on chronic hypertensive urgency, for which he is largely asymptomatic. He was triaged from outpatient cardiology in the context of +NST with inferior ischemia, and an interval decline in EF from 50-55% to 30% on stress.  Currently chest pain free, neg enzymes, EKG with LVH, suggesting against ACS. Likely new class B HF, currently with only cosmetic edema to ankles.    #Abnormal stress test  #HTN urgency/emergency  #BRUNILDA on CKD?  #Hypokalemia    Plan:  s/p DiagnostiC cath pLAD 70%, mid LAD 70%, D1 80%,  of Cx, mid RCA 90%-Staged PCI 11/17/22  He is s/p 3DES to RCA  - replete K/Mg to goal 4/2 respectively   - A1c noted at 6.4  - Continue ASA 81mg and plavix 75mg daily  - Atorvastatin to 80mg daily  - Coreg 25mg BID  - Hydralazine 100mg TID; continue Isordil as written for now  -No further workup/intervention from cardiology perspective, can discharge with cardiology follow in clinic

## 2022-11-19 LAB
ANION GAP SERPL CALC-SCNC: 12 MMOL/L — SIGNIFICANT CHANGE UP (ref 5–17)
APTT BLD: 45.2 SEC — HIGH (ref 27.5–35.5)
APTT BLD: 89.2 SEC — HIGH (ref 27.5–35.5)
APTT BLD: 98.8 SEC — HIGH (ref 27.5–35.5)
BUN SERPL-MCNC: 32 MG/DL — HIGH (ref 7–23)
CALCIUM SERPL-MCNC: 7.8 MG/DL — LOW (ref 8.4–10.5)
CHLORIDE SERPL-SCNC: 101 MMOL/L — SIGNIFICANT CHANGE UP (ref 96–108)
CO2 SERPL-SCNC: 24 MMOL/L — SIGNIFICANT CHANGE UP (ref 22–31)
CREAT SERPL-MCNC: 1.87 MG/DL — HIGH (ref 0.5–1.3)
EGFR: 37 ML/MIN/1.73M2 — LOW
GLUCOSE SERPL-MCNC: 134 MG/DL — HIGH (ref 70–99)
HCT VFR BLD CALC: 30 % — LOW (ref 39–50)
HCT VFR BLD CALC: 31.1 % — LOW (ref 39–50)
HGB BLD-MCNC: 10.1 G/DL — LOW (ref 13–17)
HGB BLD-MCNC: 10.4 G/DL — LOW (ref 13–17)
MAGNESIUM SERPL-MCNC: 2 MG/DL — SIGNIFICANT CHANGE UP (ref 1.6–2.6)
MCHC RBC-ENTMCNC: 29.1 PG — SIGNIFICANT CHANGE UP (ref 27–34)
MCHC RBC-ENTMCNC: 29.4 PG — SIGNIFICANT CHANGE UP (ref 27–34)
MCHC RBC-ENTMCNC: 33.4 GM/DL — SIGNIFICANT CHANGE UP (ref 32–36)
MCHC RBC-ENTMCNC: 33.7 GM/DL — SIGNIFICANT CHANGE UP (ref 32–36)
MCV RBC AUTO: 86.9 FL — SIGNIFICANT CHANGE UP (ref 80–100)
MCV RBC AUTO: 87.2 FL — SIGNIFICANT CHANGE UP (ref 80–100)
NRBC # BLD: 0 /100 WBCS — SIGNIFICANT CHANGE UP (ref 0–0)
NRBC # BLD: 0 /100 WBCS — SIGNIFICANT CHANGE UP (ref 0–0)
PLATELET # BLD AUTO: 185 K/UL — SIGNIFICANT CHANGE UP (ref 150–400)
PLATELET # BLD AUTO: 204 K/UL — SIGNIFICANT CHANGE UP (ref 150–400)
POTASSIUM SERPL-MCNC: 3.6 MMOL/L — SIGNIFICANT CHANGE UP (ref 3.5–5.3)
POTASSIUM SERPL-SCNC: 3.6 MMOL/L — SIGNIFICANT CHANGE UP (ref 3.5–5.3)
RBC # BLD: 3.44 M/UL — LOW (ref 4.2–5.8)
RBC # BLD: 3.58 M/UL — LOW (ref 4.2–5.8)
RBC # FLD: 13.9 % — SIGNIFICANT CHANGE UP (ref 10.3–14.5)
RBC # FLD: 14.2 % — SIGNIFICANT CHANGE UP (ref 10.3–14.5)
RENIN PLAS-CCNC: 0.43 NG/ML/HR — SIGNIFICANT CHANGE UP (ref 0.17–5.38)
SODIUM SERPL-SCNC: 137 MMOL/L — SIGNIFICANT CHANGE UP (ref 135–145)
WBC # BLD: 9.83 K/UL — SIGNIFICANT CHANGE UP (ref 3.8–10.5)
WBC # BLD: 9.93 K/UL — SIGNIFICANT CHANGE UP (ref 3.8–10.5)
WBC # FLD AUTO: 9.83 K/UL — SIGNIFICANT CHANGE UP (ref 3.8–10.5)
WBC # FLD AUTO: 9.93 K/UL — SIGNIFICANT CHANGE UP (ref 3.8–10.5)

## 2022-11-19 PROCEDURE — 99233 SBSQ HOSP IP/OBS HIGH 50: CPT

## 2022-11-19 RX ADMIN — CLOPIDOGREL BISULFATE 75 MILLIGRAM(S): 75 TABLET, FILM COATED ORAL at 11:52

## 2022-11-19 RX ADMIN — HEPARIN SODIUM 1300 UNIT(S)/HR: 5000 INJECTION INTRAVENOUS; SUBCUTANEOUS at 19:14

## 2022-11-19 RX ADMIN — CARVEDILOL PHOSPHATE 25 MILLIGRAM(S): 80 CAPSULE, EXTENDED RELEASE ORAL at 05:20

## 2022-11-19 RX ADMIN — HEPARIN SODIUM 1300 UNIT(S)/HR: 5000 INJECTION INTRAVENOUS; SUBCUTANEOUS at 09:23

## 2022-11-19 RX ADMIN — Medication 100 MILLIGRAM(S): at 13:50

## 2022-11-19 RX ADMIN — ISOSORBIDE DINITRATE 30 MILLIGRAM(S): 5 TABLET ORAL at 05:20

## 2022-11-19 RX ADMIN — HEPARIN SODIUM 1300 UNIT(S)/HR: 5000 INJECTION INTRAVENOUS; SUBCUTANEOUS at 07:14

## 2022-11-19 RX ADMIN — Medication 81 MILLIGRAM(S): at 11:49

## 2022-11-19 RX ADMIN — HEPARIN SODIUM 1300 UNIT(S)/HR: 5000 INJECTION INTRAVENOUS; SUBCUTANEOUS at 16:00

## 2022-11-19 RX ADMIN — Medication 100 MILLIGRAM(S): at 05:20

## 2022-11-19 RX ADMIN — HEPARIN SODIUM 1300 UNIT(S)/HR: 5000 INJECTION INTRAVENOUS; SUBCUTANEOUS at 15:25

## 2022-11-19 RX ADMIN — ISOSORBIDE DINITRATE 30 MILLIGRAM(S): 5 TABLET ORAL at 17:06

## 2022-11-19 RX ADMIN — HEPARIN SODIUM 2500 UNIT(S): 5000 INJECTION INTRAVENOUS; SUBCUTANEOUS at 02:41

## 2022-11-19 RX ADMIN — HEPARIN SODIUM 1300 UNIT(S)/HR: 5000 INJECTION INTRAVENOUS; SUBCUTANEOUS at 02:36

## 2022-11-19 RX ADMIN — Medication 650 MILLIGRAM(S): at 09:54

## 2022-11-19 RX ADMIN — ATORVASTATIN CALCIUM 40 MILLIGRAM(S): 80 TABLET, FILM COATED ORAL at 21:06

## 2022-11-19 RX ADMIN — CARVEDILOL PHOSPHATE 25 MILLIGRAM(S): 80 CAPSULE, EXTENDED RELEASE ORAL at 17:06

## 2022-11-19 RX ADMIN — CHLORHEXIDINE GLUCONATE 1 APPLICATION(S): 213 SOLUTION TOPICAL at 11:49

## 2022-11-19 RX ADMIN — ISOSORBIDE DINITRATE 30 MILLIGRAM(S): 5 TABLET ORAL at 11:52

## 2022-11-19 RX ADMIN — Medication 100 MILLIGRAM(S): at 21:06

## 2022-11-19 RX ADMIN — Medication 650 MILLIGRAM(S): at 09:24

## 2022-11-19 NOTE — PROGRESS NOTE ADULT - ASSESSMENT
77M Tajik only speaking, c hx htn, ckd (baseline Cr 2), left eye blindness (for the past 2 yrs, unclear reason), p/w reportedly abnormal stress test with new CM and hypertension urgency found to have significant CAD on cath with plan for staged cath today for PCI.

## 2022-11-19 NOTE — PROVIDER CONTACT NOTE (OTHER) - ACTION/TREATMENT ORDERED:
orders to continue current med management and cardiac monitoring.
Orders to continue current med management. Stat aptt to be drawn for Hep gtt.
As per pa orders continue to monitor and provide cooling measures. RN provided patient with ice packs. Monitoring remains ongoing.
Notified RODDY Paige & awaiting response due to tele tech delay in notifying me at 7am during change of shift. Notified Day RN and RN will follow up with provider. Patient safety maintained.

## 2022-11-19 NOTE — PROVIDER CONTACT NOTE (OTHER) - ASSESSMENT
Patient is A&Ox2, confused. no c/o CP/SOB/headache/dizziness. Heparin gtt at 13 ml/hr, no signs of bleeding.
Pt alert and oriented x 2. VSS.  Daughter and Spouse at the bedside.  Tristanian speaking male c/o CP but denies palpitations. Slight dyspnea noted, O2 sat 91% at room air O2 supplemented at 2L/min via NC.
Pt remains aox4;vss. Warm to touch. Rectal temperature showed 99.9. Denies cp, sob, dizziness. cath insertion sites remain clean, dry, intact, pt denies any pain in those insertion sites.
Pt alert and oriented x 2. Calm and comfortable resting in bed. Heparin gtt for A-fib at 13ml/hr continues.

## 2022-11-19 NOTE — PROGRESS NOTE ADULT - PROBLEM SELECTOR PLAN 1
more confused today though part of the problem may be difficulty hearing the   attempted using staff to help interpret which he did better with but still confused   no new focal deficits on exam - will check ct head r/o CVA - called down to CT -negative for acute processes.   suspect hospital delirium - per family never really been hospitalized (only been to ed) - forgetful at home suggesting early dementia  -improved now.

## 2022-11-19 NOTE — PROVIDER CONTACT NOTE (OTHER) - SITUATION
Pt converted from A-Fib to NSR overnight
Patient converted from afib back to normal sinus rhythm at 2:11am
Pt converted from NSR to Afib RVR with HR up to 120's c/o slight chest pain, pointing towards upper midsternum. Per family, pt ate apple and feels like it is stuck in the throat.
Pt s/p PCI to prox and mid RCA with NOMAN x 3. Warm to touch. rectal temperature 99.9

## 2022-11-19 NOTE — PROVIDER CONTACT NOTE (OTHER) - REASON
Pt converted from NSR to Afib RVR with HR up to 120's c/o slight chest pain, pointing towards upper midsternum.
Patient converted from afib back to normal sinus rhythm
Pt converted from A-Fib to NSR overnight
Pt rectal temperature 99.9

## 2022-11-19 NOTE — PROVIDER CONTACT NOTE (OTHER) - BACKGROUND
Pt admitted with  Unstable angina w/ +NST; s/p LHC 11/16 w/ MVD; s/p staged PCI 11/17 w/ NOMAN x3 to RCA, Chronic headache w/ elevated SBP; CTH unremarkable. H/o CHF w/ cardiomyopathy (EF 30%);
Patient is a 77y old  Male who presents with a chief complaint of chest pain, abnormal chest pain
Unstable angina w/ +NST; s/p LHC 11/16 w/ MVD; s/p staged PCI 11/17 w/ NOMAN x3 to RCA,  H/o CHF w/ cardiomyopathy (EF 30%); cardio following, Chronic headache w/ elevated SBP; CTH unremarkable
Dx: Chest pain  PMH: HTN

## 2022-11-19 NOTE — PROGRESS NOTE ADULT - PROBLEM SELECTOR PLAN 8
- given the chronicity and location, hold off [facility]  no focal deficits on exam - left eye blindness chronic- denied eye pain later - pt to follow up with ophtho as outpatient.    9. Updated grandson at bedside who helped translate.

## 2022-11-19 NOTE — PROVIDER CONTACT NOTE (OTHER) - RECOMMENDATIONS
Notify PA
NP notified. Stat EKG obtained. BB coreg and Isosorbide PO given per orders.
Notified RODDY Paige
PA notified

## 2022-11-19 NOTE — PROGRESS NOTE ADULT - SUBJECTIVE AND OBJECTIVE BOX
Patient is a 77y old  Male who presents with a chief complaint of chest pain, abnormal chest pain (19 Nov 2022 16:00)        SUBJECTIVE / OVERNIGHT EVENTS: denies any complaints. converted from afib to sinus.      MEDICATIONS  (STANDING):  aspirin enteric coated 81 milliGRAM(s) Oral daily  atorvastatin 40 milliGRAM(s) Oral at bedtime  carvedilol 25 milliGRAM(s) Oral every 12 hours  chlorhexidine 2% Cloths 1 Application(s) Topical daily  clopidogrel Tablet 75 milliGRAM(s) Oral daily  heparin  Infusion.  Unit(s)/Hr (12 mL/Hr) IV Continuous <Continuous>  hydrALAZINE 100 milliGRAM(s) Oral every 8 hours  isosorbide   dinitrate Tablet (ISORDIL) 30 milliGRAM(s) Oral three times a day    MEDICATIONS  (PRN):  acetaminophen     Tablet .. 650 milliGRAM(s) Oral every 6 hours PRN Mild Pain (1 - 3), Moderate Pain (4 - 6)  heparin   Injectable 5500 Unit(s) IV Push every 6 hours PRN For aPTT less than 40  heparin   Injectable 2500 Unit(s) IV Push every 6 hours PRN For aPTT between 40 - 57      Vital Signs Last 24 Hrs  T(C): 36.8 (20 Nov 2022 04:31), Max: 36.8 (20 Nov 2022 04:31)  T(F): 98.3 (20 Nov 2022 04:31), Max: 98.3 (20 Nov 2022 04:31)  HR: 68 (20 Nov 2022 04:31) (57 - 69)  BP: 168/76 (20 Nov 2022 04:31) (94/59 - 168/76)  BP(mean): --  RR: 18 (20 Nov 2022 04:31) (18 - 18)  SpO2: 94% (20 Nov 2022 04:31) (94% - 97%)    Parameters below as of 20 Nov 2022 04:31  Patient On (Oxygen Delivery Method): nasal cannula  O2 Flow (L/min): 2    CAPILLARY BLOOD GLUCOSE        I&O's Summary    19 Nov 2022 07:01  -  20 Nov 2022 07:00  --------------------------------------------------------  IN: 1086 mL / OUT: 1300 mL / NET: -214 mL          PHYSICAL EXAM:   GENERAL: NAD, well-developed  HEAD:  Atraumatic, Normocephalic  EYES: EOMI, PERRLA, conjunctiva and sclera clear  NECK: Supple, No JVD  CHEST/LUNG: Clear to auscultation bilaterally; No wheeze  HEART: S1S2 normal. Regular rate and rhythm; No murmurs, rubs, or gallops  ABDOMEN: Soft, Nontender, Nondistended; Bowel sounds present  EXTREMITIES:  2+ Peripheral Pulses, No clubbing, cyanosis, or edema  PSYCH/Neuro: AAOx3. Non-focal.   SKIN: No rashes or lesions      LABS:                        10.4   9.54  )-----------( 194      ( 20 Nov 2022 06:24 )             31.1     11-19    137  |  101  |  32<H>  ----------------------------<  134<H>  3.6   |  24  |  1.87<H>    Ca    7.8<L>      19 Nov 2022 08:43  Mg     2.0     11-19      PTT - ( 20 Nov 2022 06:25 )  PTT:66.3 sec    < from: CT Head No Cont (11.18.22 @ 15:05) >    IMPRESSION: No significant change when allowing for differences in   technique.    --- End of Report ---    < end of copied text >      tele: sinus 50-70s    RADIOLOGY & ADDITIONAL TESTS:    Imaging Personally Reviewed: ct head   Consultant(s) Notes Reviewed:  cards  Care Discussed with Consultants/Other Providers:

## 2022-11-20 DIAGNOSIS — I48.91 UNSPECIFIED ATRIAL FIBRILLATION: ICD-10-CM

## 2022-11-20 LAB
APTT BLD: 66.3 SEC — HIGH (ref 27.5–35.5)
HCT VFR BLD CALC: 31.1 % — LOW (ref 39–50)
HGB BLD-MCNC: 10.4 G/DL — LOW (ref 13–17)
MCHC RBC-ENTMCNC: 29.5 PG — SIGNIFICANT CHANGE UP (ref 27–34)
MCHC RBC-ENTMCNC: 33.4 GM/DL — SIGNIFICANT CHANGE UP (ref 32–36)
MCV RBC AUTO: 88.4 FL — SIGNIFICANT CHANGE UP (ref 80–100)
NRBC # BLD: 0 /100 WBCS — SIGNIFICANT CHANGE UP (ref 0–0)
PLATELET # BLD AUTO: 194 K/UL — SIGNIFICANT CHANGE UP (ref 150–400)
RBC # BLD: 3.52 M/UL — LOW (ref 4.2–5.8)
RBC # FLD: 14.1 % — SIGNIFICANT CHANGE UP (ref 10.3–14.5)
WBC # BLD: 9.54 K/UL — SIGNIFICANT CHANGE UP (ref 3.8–10.5)
WBC # FLD AUTO: 9.54 K/UL — SIGNIFICANT CHANGE UP (ref 3.8–10.5)

## 2022-11-20 PROCEDURE — 99232 SBSQ HOSP IP/OBS MODERATE 35: CPT

## 2022-11-20 RX ORDER — APIXABAN 2.5 MG/1
5 TABLET, FILM COATED ORAL EVERY 12 HOURS
Refills: 0 | Status: DISCONTINUED | OUTPATIENT
Start: 2022-11-20 | End: 2022-11-21

## 2022-11-20 RX ORDER — IPRATROPIUM/ALBUTEROL SULFATE 18-103MCG
3 AEROSOL WITH ADAPTER (GRAM) INHALATION EVERY 6 HOURS
Refills: 0 | Status: DISCONTINUED | OUTPATIENT
Start: 2022-11-20 | End: 2022-11-21

## 2022-11-20 RX ADMIN — Medication 3 MILLILITER(S): at 18:10

## 2022-11-20 RX ADMIN — Medication 81 MILLIGRAM(S): at 12:38

## 2022-11-20 RX ADMIN — Medication 100 MILLIGRAM(S): at 14:53

## 2022-11-20 RX ADMIN — ISOSORBIDE DINITRATE 30 MILLIGRAM(S): 5 TABLET ORAL at 17:45

## 2022-11-20 RX ADMIN — Medication 100 MILLIGRAM(S): at 05:17

## 2022-11-20 RX ADMIN — CHLORHEXIDINE GLUCONATE 1 APPLICATION(S): 213 SOLUTION TOPICAL at 12:38

## 2022-11-20 RX ADMIN — ISOSORBIDE DINITRATE 30 MILLIGRAM(S): 5 TABLET ORAL at 05:17

## 2022-11-20 RX ADMIN — APIXABAN 5 MILLIGRAM(S): 2.5 TABLET, FILM COATED ORAL at 18:10

## 2022-11-20 RX ADMIN — Medication 100 MILLIGRAM(S): at 21:07

## 2022-11-20 RX ADMIN — HEPARIN SODIUM 1300 UNIT(S)/HR: 5000 INJECTION INTRAVENOUS; SUBCUTANEOUS at 06:47

## 2022-11-20 RX ADMIN — HEPARIN SODIUM 1300 UNIT(S)/HR: 5000 INJECTION INTRAVENOUS; SUBCUTANEOUS at 07:21

## 2022-11-20 RX ADMIN — ATORVASTATIN CALCIUM 40 MILLIGRAM(S): 80 TABLET, FILM COATED ORAL at 21:07

## 2022-11-20 RX ADMIN — CARVEDILOL PHOSPHATE 25 MILLIGRAM(S): 80 CAPSULE, EXTENDED RELEASE ORAL at 17:46

## 2022-11-20 RX ADMIN — ISOSORBIDE DINITRATE 30 MILLIGRAM(S): 5 TABLET ORAL at 12:38

## 2022-11-20 RX ADMIN — CARVEDILOL PHOSPHATE 25 MILLIGRAM(S): 80 CAPSULE, EXTENDED RELEASE ORAL at 05:17

## 2022-11-20 RX ADMIN — CLOPIDOGREL BISULFATE 75 MILLIGRAM(S): 75 TABLET, FILM COATED ORAL at 12:39

## 2022-11-20 NOTE — PROGRESS NOTE ADULT - PROBLEM SELECTOR PLAN 7
baseline creatinine likely 1.9-2 - monitor cr after contrast studies   renal f/u  monitor cr  avoid nephrotoxic medications
chronic left eye blindness  ctm
- given the chronicity and location, hold off CTH  no focal deficits on exam - left eye blindness chronic- denied eye pain later - pt to follow up with ophtho as outpatient.
baseline creatinine likely 1.9-2 - monitor cr after contrast studies   renal f/u  monitor cr  avoid nephrotoxic medications

## 2022-11-20 NOTE — PROGRESS NOTE ADULT - PROBLEM SELECTOR PLAN 8
- given the chronicity and location, CTH negative for any acute processes.   no focal deficits on exam - left eye blindness chronic- denied eye pain later - pt to follow up with ophtho as outpatient.    9. Discussed with patient and RN and ACP Vivian.   -PT recs outpatient PT.   -DCP soon.

## 2022-11-20 NOTE — PROGRESS NOTE ADULT - PROBLEM SELECTOR PLAN 3
- SBP 220s with persistent CP, new CM. pt's headache, eye pain are reportedly chronic.  - bp still elevated  c/w hydralazine 100mg tid and isosorbide 30mg tid  c/w carvedilol 25mg bid   -monitor bp  - f/u renin:iris
resolved s/p repletion
- SBP 220s with persistent CP, new CM. pt's headache, eye pain are reportedly chronic.  - bp still elevated  c/w hydralazine 100mg tid and isosorbide 30mg tid  c/w carvedilol 25mg bid   -monitor bp  - f/u renin:iris  -F/u renal/cards recs for consideration of adding another agent for better BP control.
resolved s/p repletion
- tele  - replete K, mag, phos
- SBP 220s with persistent CP, new CM. pt's headache, eye pain are reportedly chronic.  - bp still elevated  c/w hydralazine 100mg tid and isosorbide 30mg tid  c/w carvedilol 25mg bid   -monitor bp  - f/u renin:iris
resolved s/p repletion
replete k >4, mg >2

## 2022-11-20 NOTE — PROGRESS NOTE ADULT - PROBLEM SELECTOR PROBLEM 6
Chronic kidney disease, unspecified CKD stage
Congestive heart failure with cardiomyopathy
Congestive heart failure with cardiomyopathy
Headache syndrome

## 2022-11-20 NOTE — SWALLOW BEDSIDE ASSESSMENT ADULT - SLP GENERAL OBSERVATIONS
Pt encountered in bed, awake, on room air. Surinamese speaking, this clinician performed evaluation in simple Surinamese as patient unable to utilize  phone per discussion with RN. Pt is oriented x2. Able to follow simple directives. Vocal quality WFL.

## 2022-11-20 NOTE — SWALLOW BEDSIDE ASSESSMENT ADULT - SWALLOW EVAL: DIAGNOSIS
77M Belarusian only speaking, c hx notable for left eye blindness (for the past 2 yrs, unclear reason), p/w reportedly abnormal stress test with new CM and hypertension urgency, s/p PCI. Pt presents with an overtly functional oropharyngeal swallow sequence. Adequate mastication (despite edentulous state) and bolus prep/transfer noted across consistencies, with pharyngeal swallow trigger judged to be timely. No overt signs of laryngeal penetration/aspiration observed. Pt denies difficulty swallowing.

## 2022-11-20 NOTE — SWALLOW BEDSIDE ASSESSMENT ADULT - SPECIFY REASON(S)
To subjectively assess swallow function, r/o dysphagia To subjectively assess swallow function, r/o dysphagia; "sensation of food being stuck in his throat"

## 2022-11-20 NOTE — PROGRESS NOTE ADULT - PROBLEM SELECTOR PLAN 2
-c/w aspirin, statin  - trops flat  - TTE EF 57% no segmental WMA  outpt stress test results from Dr. Albright's office placed in chart  cath 11/16 showed - pLAD 70%, mid LAD 70%, D1 80%,  of Cx, mid RCA 90%-s/p Staged cath 11/17 (cleared by renal) s/p NOMAN x 3 to RCA  cards f/u appreciated   - continue to monitor on telemetry

## 2022-11-20 NOTE — PROGRESS NOTE ADULT - PROBLEM SELECTOR PROBLEM 4
Congestive heart failure with cardiomyopathy
Atrial fibrillation
Congestive heart failure with cardiomyopathy
Congestive heart failure with cardiomyopathy
Hypokalemia
Atrial fibrillation
Congestive heart failure with cardiomyopathy
Congestive heart failure with cardiomyopathy

## 2022-11-20 NOTE — PROGRESS NOTE ADULT - PROBLEM SELECTOR PLAN 1
more confused the other day though part of the problem may be difficulty hearing the   attempted using staff to help interpret which he did better with but still was confused   no new focal deficits on exam - will check ct head r/o CVA - called down to CT -negative for acute processes.   suspect hospital delirium - per family never really been hospitalized (only been to ed) - forgetful at home suggesting ?early dementia  -improved now.

## 2022-11-20 NOTE — SWALLOW BEDSIDE ASSESSMENT ADULT - ASPIRATION PRECAUTIONS
Monitor for s/s aspiration/laryngeal penetration. If noted:  D/C p.o. intake, provide non-oral nutrition/hydration/meds, and contact this service @ [phone]

## 2022-11-20 NOTE — PROGRESS NOTE ADULT - ASSESSMENT
77M Faroese only speaking, c hx htn, ckd (baseline Cr 2), left eye blindness (for the past 2 yrs, unclear reason), p/w reportedly abnormal stress test with new CM and hypertension urgency found to have significant CAD on cath with plan for staged cath for PCI.

## 2022-11-20 NOTE — PROGRESS NOTE ADULT - PROBLEM SELECTOR PROBLEM 5
Chronic kidney disease, unspecified CKD stage
Congestive heart failure with cardiomyopathy
Chronic kidney disease, unspecified CKD stage
Hypokalemia
Hypokalemia

## 2022-11-20 NOTE — SWALLOW BEDSIDE ASSESSMENT ADULT - SWALLOW EVAL: PATIENT/FAMILY GOALS STATEMENT
Pt does not state goals. Pt denies difficulty swallowing at this time despite consult order stating that pt feels food stuck in his throat. LADAN Huerta reports pt has been eating/ taking meds without difficulty or complaints. Will remain available for reconsult if pt reports further difficulty.

## 2022-11-20 NOTE — PROGRESS NOTE ADULT - SUBJECTIVE AND OBJECTIVE BOX
Patient is a 77y old  Male who presents with a chief complaint of chest pain, abnormal chest pain (20 Nov 2022 15:21)        SUBJECTIVE / OVERNIGHT EVENTS: Denies any complaints. No CP or SOB.       MEDICATIONS  (STANDING):  apixaban 5 milliGRAM(s) Oral every 12 hours  aspirin enteric coated 81 milliGRAM(s) Oral daily  atorvastatin 40 milliGRAM(s) Oral at bedtime  carvedilol 25 milliGRAM(s) Oral every 12 hours  chlorhexidine 2% Cloths 1 Application(s) Topical daily  clopidogrel Tablet 75 milliGRAM(s) Oral daily  hydrALAZINE 100 milliGRAM(s) Oral every 8 hours  isosorbide   dinitrate Tablet (ISORDIL) 30 milliGRAM(s) Oral three times a day    MEDICATIONS  (PRN):  acetaminophen     Tablet .. 650 milliGRAM(s) Oral every 6 hours PRN Mild Pain (1 - 3), Moderate Pain (4 - 6)  albuterol/ipratropium for Nebulization 3 milliLiter(s) Nebulizer every 6 hours PRN Wheezing      Vital Signs Last 24 Hrs  T(C): 36.7 (20 Nov 2022 11:26), Max: 36.8 (20 Nov 2022 04:31)  T(F): 98.1 (20 Nov 2022 11:26), Max: 98.3 (20 Nov 2022 04:31)  HR: 61 (20 Nov 2022 17:43) (57 - 68)  BP: 157/62 (20 Nov 2022 17:43) (151/72 - 168/76)  BP(mean): --  RR: 18 (20 Nov 2022 17:43) (18 - 20)  SpO2: 96% (20 Nov 2022 17:43) (94% - 98%)    Parameters below as of 20 Nov 2022 17:43  Patient On (Oxygen Delivery Method): room air      CAPILLARY BLOOD GLUCOSE        I&O's Summary    19 Nov 2022 07:01  -  20 Nov 2022 07:00  --------------------------------------------------------  IN: 1086 mL / OUT: 1300 mL / NET: -214 mL    20 Nov 2022 07:01  -  20 Nov 2022 18:03  --------------------------------------------------------  IN: 0 mL / OUT: 400 mL / NET: -400 mL          PHYSICAL EXAM:   GENERAL: NAD, well-developed  HEAD:  Atraumatic, Normocephalic  EYES: left eye blind chronic  NECK: Supple,    CHEST/LUNG: Clear to auscultation bilaterally; No wheeze  HEART: S1S2 normal. Regular rate and rhythm; No murmurs, rubs, or gallops  ABDOMEN: Soft, Nontender, Nondistended; Bowel sounds present  EXTREMITIES:  2+ Peripheral Pulses, No clubbing, cyanosis, or edema  PSYCH/Neuro: AAOx3. Non-focal.   SKIN: No rashes or lesions      LABS:                        10.4   9.54  )-----------( 194      ( 20 Nov 2022 06:24 )             31.1     11-19    137  |  101  |  32<H>  ----------------------------<  134<H>  3.6   |  24  |  1.87<H>    Ca    7.8<L>      19 Nov 2022 08:43  Mg     2.0     11-19      PTT - ( 20 Nov 2022 06:25 )  PTT:66.3 sec            RADIOLOGY & ADDITIONAL TESTS:    Imaging Personally Reviewed:  Consultant(s) Notes Reviewed:    Care Discussed with Consultants/Other Providers:

## 2022-11-20 NOTE — PROGRESS NOTE ADULT - PROBLEM SELECTOR PROBLEM 2
Unstable angina
Hypokalemia
Hypokalemia
Unstable angina
Hypertensive emergency
Unstable angina

## 2022-11-20 NOTE — PROGRESS NOTE ADULT - PROBLEM SELECTOR PROBLEM 7
Eye pain
Headache syndrome
Chronic kidney disease, unspecified CKD stage
Chronic kidney disease, unspecified CKD stage

## 2022-11-20 NOTE — PROGRESS NOTE ADULT - PROBLEM SELECTOR PLAN 4
- TTE EF 57% no segmental wma  Cards f/u   c/w medication as above
- TTE EF 57% no segmental wma  Cards f/u   c/w medication as above
-went into afib on 11/18. Now back to NSR.   -C/w heparin drip.   -C/w carvedilol.   -currently on triple therapy, but in a month should only be on plavix and AC.   -cards f/u.
- TTE EF 57% no segmental wma  Cards f/u   c/w medication as above
replete k >4, mg >2
-went into afib on 11/18. Now back to NSR.   -tolerated heparin drip, will change to eliquis tonight 11/20.   -C/w carvedilol.   -currently on triple therapy, but in a month should only be on plavix and eliquis.   -cards f/u.
- TTE  - ischemic eval as above  - afterload reduction as above
- TTE EF 57% no segmental wma  Cards f/u   c/w medication as above

## 2022-11-20 NOTE — SWALLOW BEDSIDE ASSESSMENT ADULT - COMMENTS
Cardiology consulted -> On presentation to ED, he was hypertensive 220s/80s, he was given labetalol IVP. He is currently chest pain free. Troponins flat 39 to 39. ECG shows NSR with PVCs, LVH with repolarization abnormalities.    11/17: S/p PCI to prox and mid RCA with NOMAN x 3  11/18: more confused today though part of the problem may be difficulty hearing the   attempted using staff to help interpret which he did better with but still confused   no new focal deficits on exam - will check ct head r/o CVA - called down to CT   suspect hospital delirium - per family never really been hospitalized (only been to ed) - forgetful at home suggesting early dementia.  11/19: confusion improved     CT Head 11/18: IMPRESSION: No significant change when allowing for differences in   technique.    Swallow history: Pt is new to this service

## 2022-11-20 NOTE — PROGRESS NOTE ADULT - PROBLEM SELECTOR PROBLEM 3
Hypokalemia
Hypertensive emergency
Hypokalemia

## 2022-11-20 NOTE — SWALLOW BEDSIDE ASSESSMENT ADULT - SLP PERTINENT HISTORY OF CURRENT PROBLEM
77M Congolese only speaking, c hx htn, ckd (baseline Cr 2), left eye blindness (for the past 2 yrs, unclear reason), pw reportedly abnormal stress test, new CM, hypertension. Pt states he's had chest pain, occipital headache, and right eye pain for the past 1 year. Pt states the left sided CP is exertional, radiates to his back. It is not associated with palpitations. Per charts, pt had a TTE "a few weeks ago" showing normal EF, and then stress test performed yesterday showed new EF of "30%" and was an "abnormal stress test". Paperwork from Sept '22 showed Cr of 2. Also pt with reportedly SBP to 220s, sent in by cardiologist for poss hypertensive emergency. Review of surescripts shows that pt used to be on losartan 100 and aldactone 25mg, last filled in Aug '22. Pt states that when he went to the pharmacy, he was not given those medications.

## 2022-11-21 ENCOUNTER — TRANSCRIPTION ENCOUNTER (OUTPATIENT)
Age: 77
End: 2022-11-21

## 2022-11-21 VITALS
OXYGEN SATURATION: 96 % | TEMPERATURE: 99 F | RESPIRATION RATE: 17 BRPM | HEART RATE: 68 BPM | DIASTOLIC BLOOD PRESSURE: 74 MMHG | SYSTOLIC BLOOD PRESSURE: 152 MMHG

## 2022-11-21 LAB
APTT BLD: 34.7 SEC — SIGNIFICANT CHANGE UP (ref 27.5–35.5)
HCT VFR BLD CALC: 28.8 % — LOW (ref 39–50)
HGB BLD-MCNC: 9.7 G/DL — LOW (ref 13–17)
MCHC RBC-ENTMCNC: 29.7 PG — SIGNIFICANT CHANGE UP (ref 27–34)
MCHC RBC-ENTMCNC: 33.7 GM/DL — SIGNIFICANT CHANGE UP (ref 32–36)
MCV RBC AUTO: 88.1 FL — SIGNIFICANT CHANGE UP (ref 80–100)
NRBC # BLD: 0 /100 WBCS — SIGNIFICANT CHANGE UP (ref 0–0)
PLATELET # BLD AUTO: 173 K/UL — SIGNIFICANT CHANGE UP (ref 150–400)
RBC # BLD: 3.27 M/UL — LOW (ref 4.2–5.8)
RBC # FLD: 14 % — SIGNIFICANT CHANGE UP (ref 10.3–14.5)
WBC # BLD: 9.19 K/UL — SIGNIFICANT CHANGE UP (ref 3.8–10.5)
WBC # FLD AUTO: 9.19 K/UL — SIGNIFICANT CHANGE UP (ref 3.8–10.5)

## 2022-11-21 PROCEDURE — 84156 ASSAY OF PROTEIN URINE: CPT

## 2022-11-21 PROCEDURE — 93306 TTE W/DOPPLER COMPLETE: CPT

## 2022-11-21 PROCEDURE — 85027 COMPLETE CBC AUTOMATED: CPT

## 2022-11-21 PROCEDURE — 70450 CT HEAD/BRAIN W/O DYE: CPT

## 2022-11-21 PROCEDURE — 92610 EVALUATE SWALLOWING FUNCTION: CPT

## 2022-11-21 PROCEDURE — 97530 THERAPEUTIC ACTIVITIES: CPT

## 2022-11-21 PROCEDURE — 36415 COLL VENOUS BLD VENIPUNCTURE: CPT

## 2022-11-21 PROCEDURE — C1769: CPT

## 2022-11-21 PROCEDURE — 87641 MR-STAPH DNA AMP PROBE: CPT

## 2022-11-21 PROCEDURE — U0005: CPT

## 2022-11-21 PROCEDURE — 82435 ASSAY OF BLOOD CHLORIDE: CPT

## 2022-11-21 PROCEDURE — C1760: CPT

## 2022-11-21 PROCEDURE — 83880 ASSAY OF NATRIURETIC PEPTIDE: CPT

## 2022-11-21 PROCEDURE — 97116 GAIT TRAINING THERAPY: CPT

## 2022-11-21 PROCEDURE — C1887: CPT

## 2022-11-21 PROCEDURE — 84443 ASSAY THYROID STIM HORMONE: CPT

## 2022-11-21 PROCEDURE — 82088 ASSAY OF ALDOSTERONE: CPT

## 2022-11-21 PROCEDURE — 83036 HEMOGLOBIN GLYCOSYLATED A1C: CPT

## 2022-11-21 PROCEDURE — 84244 ASSAY OF RENIN: CPT

## 2022-11-21 PROCEDURE — 97161 PT EVAL LOW COMPLEX 20 MIN: CPT

## 2022-11-21 PROCEDURE — 82436 ASSAY OF URINE CHLORIDE: CPT

## 2022-11-21 PROCEDURE — 84100 ASSAY OF PHOSPHORUS: CPT

## 2022-11-21 PROCEDURE — 84300 ASSAY OF URINE SODIUM: CPT

## 2022-11-21 PROCEDURE — C1725: CPT

## 2022-11-21 PROCEDURE — 99152 MOD SED SAME PHYS/QHP 5/>YRS: CPT

## 2022-11-21 PROCEDURE — 80048 BASIC METABOLIC PNL TOTAL CA: CPT

## 2022-11-21 PROCEDURE — 83735 ASSAY OF MAGNESIUM: CPT

## 2022-11-21 PROCEDURE — 99285 EMERGENCY DEPT VISIT HI MDM: CPT

## 2022-11-21 PROCEDURE — 83935 ASSAY OF URINE OSMOLALITY: CPT

## 2022-11-21 PROCEDURE — 85730 THROMBOPLASTIN TIME PARTIAL: CPT

## 2022-11-21 PROCEDURE — 87640 STAPH A DNA AMP PROBE: CPT

## 2022-11-21 PROCEDURE — 92928 PRQ TCAT PLMT NTRAC ST 1 LES: CPT | Mod: LC

## 2022-11-21 PROCEDURE — 76770 US EXAM ABDO BACK WALL COMP: CPT

## 2022-11-21 PROCEDURE — 84295 ASSAY OF SERUM SODIUM: CPT

## 2022-11-21 PROCEDURE — C1894: CPT

## 2022-11-21 PROCEDURE — 84133 ASSAY OF URINE POTASSIUM: CPT

## 2022-11-21 PROCEDURE — C9600: CPT | Mod: RC

## 2022-11-21 PROCEDURE — 85018 HEMOGLOBIN: CPT

## 2022-11-21 PROCEDURE — 80061 LIPID PANEL: CPT

## 2022-11-21 PROCEDURE — 96375 TX/PRO/DX INJ NEW DRUG ADDON: CPT

## 2022-11-21 PROCEDURE — 93454 CORONARY ARTERY ANGIO S&I: CPT | Mod: 26,59

## 2022-11-21 PROCEDURE — 83605 ASSAY OF LACTIC ACID: CPT

## 2022-11-21 PROCEDURE — 85610 PROTHROMBIN TIME: CPT

## 2022-11-21 PROCEDURE — 85014 HEMATOCRIT: CPT

## 2022-11-21 PROCEDURE — 93005 ELECTROCARDIOGRAM TRACING: CPT

## 2022-11-21 PROCEDURE — U0003: CPT

## 2022-11-21 PROCEDURE — 82803 BLOOD GASES ANY COMBINATION: CPT

## 2022-11-21 PROCEDURE — 82947 ASSAY GLUCOSE BLOOD QUANT: CPT

## 2022-11-21 PROCEDURE — 99239 HOSP IP/OBS DSCHRG MGMT >30: CPT

## 2022-11-21 PROCEDURE — 84484 ASSAY OF TROPONIN QUANT: CPT

## 2022-11-21 PROCEDURE — 93454 CORONARY ARTERY ANGIO S&I: CPT

## 2022-11-21 PROCEDURE — 82570 ASSAY OF URINE CREATININE: CPT

## 2022-11-21 PROCEDURE — 80053 COMPREHEN METABOLIC PANEL: CPT

## 2022-11-21 PROCEDURE — 71045 X-RAY EXAM CHEST 1 VIEW: CPT

## 2022-11-21 PROCEDURE — 85025 COMPLETE CBC W/AUTO DIFF WBC: CPT

## 2022-11-21 PROCEDURE — 94640 AIRWAY INHALATION TREATMENT: CPT

## 2022-11-21 PROCEDURE — 84540 ASSAY OF URINE/UREA-N: CPT

## 2022-11-21 PROCEDURE — 82330 ASSAY OF CALCIUM: CPT

## 2022-11-21 PROCEDURE — 84132 ASSAY OF SERUM POTASSIUM: CPT

## 2022-11-21 PROCEDURE — 81003 URINALYSIS AUTO W/O SCOPE: CPT

## 2022-11-21 PROCEDURE — 86803 HEPATITIS C AB TEST: CPT

## 2022-11-21 PROCEDURE — 96374 THER/PROPH/DIAG INJ IV PUSH: CPT

## 2022-11-21 PROCEDURE — C1874: CPT

## 2022-11-21 RX ORDER — ASPIRIN/CALCIUM CARB/MAGNESIUM 324 MG
1 TABLET ORAL
Qty: 30 | Refills: 0
Start: 2022-11-21 | End: 2022-12-20

## 2022-11-21 RX ORDER — CARVEDILOL PHOSPHATE 80 MG/1
1 CAPSULE, EXTENDED RELEASE ORAL
Qty: 60 | Refills: 0
Start: 2022-11-21 | End: 2022-12-20

## 2022-11-21 RX ORDER — HYDROCHLOROTHIAZIDE 25 MG
1 TABLET ORAL
Qty: 0 | Refills: 0 | DISCHARGE

## 2022-11-21 RX ORDER — CLOPIDOGREL BISULFATE 75 MG/1
1 TABLET, FILM COATED ORAL
Qty: 30 | Refills: 0
Start: 2022-11-21 | End: 2022-12-20

## 2022-11-21 RX ORDER — HYDRALAZINE HCL 50 MG
1 TABLET ORAL
Qty: 90 | Refills: 0
Start: 2022-11-21 | End: 2022-12-20

## 2022-11-21 RX ORDER — ATORVASTATIN CALCIUM 80 MG/1
1 TABLET, FILM COATED ORAL
Qty: 30 | Refills: 0
Start: 2022-11-21 | End: 2022-12-20

## 2022-11-21 RX ORDER — ISOSORBIDE DINITRATE 5 MG/1
1 TABLET ORAL
Qty: 90 | Refills: 0
Start: 2022-11-21 | End: 2022-12-20

## 2022-11-21 RX ORDER — LOSARTAN POTASSIUM 100 MG/1
1 TABLET, FILM COATED ORAL
Qty: 0 | Refills: 0 | DISCHARGE

## 2022-11-21 RX ORDER — MECLIZINE HCL 12.5 MG
1 TABLET ORAL
Qty: 0 | Refills: 0 | DISCHARGE

## 2022-11-21 RX ORDER — APIXABAN 2.5 MG/1
1 TABLET, FILM COATED ORAL
Qty: 60 | Refills: 0
Start: 2022-11-21 | End: 2022-12-20

## 2022-11-21 RX ADMIN — Medication 100 MILLIGRAM(S): at 16:42

## 2022-11-21 RX ADMIN — Medication 100 MILLIGRAM(S): at 05:25

## 2022-11-21 RX ADMIN — APIXABAN 5 MILLIGRAM(S): 2.5 TABLET, FILM COATED ORAL at 17:35

## 2022-11-21 RX ADMIN — ISOSORBIDE DINITRATE 30 MILLIGRAM(S): 5 TABLET ORAL at 13:42

## 2022-11-21 RX ADMIN — CARVEDILOL PHOSPHATE 25 MILLIGRAM(S): 80 CAPSULE, EXTENDED RELEASE ORAL at 17:35

## 2022-11-21 RX ADMIN — Medication 3 MILLILITER(S): at 09:51

## 2022-11-21 RX ADMIN — APIXABAN 5 MILLIGRAM(S): 2.5 TABLET, FILM COATED ORAL at 05:24

## 2022-11-21 RX ADMIN — CHLORHEXIDINE GLUCONATE 1 APPLICATION(S): 213 SOLUTION TOPICAL at 12:18

## 2022-11-21 RX ADMIN — CLOPIDOGREL BISULFATE 75 MILLIGRAM(S): 75 TABLET, FILM COATED ORAL at 11:49

## 2022-11-21 RX ADMIN — ISOSORBIDE DINITRATE 30 MILLIGRAM(S): 5 TABLET ORAL at 05:24

## 2022-11-21 RX ADMIN — Medication 81 MILLIGRAM(S): at 11:49

## 2022-11-21 RX ADMIN — CARVEDILOL PHOSPHATE 25 MILLIGRAM(S): 80 CAPSULE, EXTENDED RELEASE ORAL at 05:25

## 2022-11-21 NOTE — DIETITIAN INITIAL EVALUATION ADULT - OTHER INFO
Pt's grandson reports pt -150lb.   Dosing wt: 150lb (11/16, stated) - ?accuracy of admission wt  Wt history per [facility] HIE/chart: 162.7lb (11/21, standing), 170.6lb (11/20, standing), 168.6lb (11/18, standing), 171.9lb (11/16, standing), 169.6lb (07/26), 166.8lb (07/19), 159.7lb (05/24)  RD to continue monitoring wts as able.     Per chart, pt ordered for atorvastatin.  Pt's grandson reports pt -150lb.   Dosing wt: 150lb (11/16, stated) - ?accuracy of admission wt  Wt history per [facility] HIE/chart (2022): 162.7lb (11/21, standing), 170.6lb (11/20, standing), 168.6lb (11/18, standing), 171.9lb (11/16, standing), 169.6lb (07/26), 166.8lb (07/19), 159.7lb (05/24). Wt history indicates pt wt relatively stable.   RD to continue monitoring wts as able.     Per chart, pt ordered for atorvastatin.

## 2022-11-21 NOTE — DISCHARGE NOTE PROVIDER - CARE PROVIDER_API CALL
Pamela Albright)  Cardiovascular Disease; Internal Medicine  [address], [address]  [place] [address]  Phone: [phone]  Fax: [phone]  Established Patient  Follow Up Time: 2 weeks

## 2022-11-21 NOTE — DISCHARGE NOTE PROVIDER - HOSPITAL COURSE
Pt discharged home and to [facility] for transport. Pt instructed to take her RXs and return here as needed. Pt verbalized understanding. Pt left with [facility] and her belongings.    77M Cambodian only speaking,  w/ PMHx of HTN presents to the ED with likely acute on chronic hypertensive urgency, for which he is largely asymptomatic. He was triaged from outpatient cardiology in the context of +NST with inferior ischemia, and an interval decline in EF from 50-55% to 30% on stress. 11/17  s/p staged LHC with x3DES to RCA with Dr Gordon, via RRA        Problem/Plan - 1:  ·  Problem: Acute delirium.   ·  Plan: likely hospital acquired delirium  now resolved and back to baseline     Problem/Plan - 2:  ·  Problem: Unstable angina.   ·  Plan: -c/w aspirin, plavix, statin  - TTE EF 57% no segmental WMA  - cath 11/16 showed - pLAD 70%, mid LAD 70%, D1 80%,  of Cx, mid RCA 90%-s/p Staged cath 11/17 (cleared by renal) s/p NOMAN x 3 to RCA  - cards f/u appreciated   - continue to monitor on telemetry.     Problem/Plan - 3:  ·  Problem: Hypertensive emergency.   ·  Plan: - SBP 220s with persistent CP, new CM. pt's headache, eye pain are reportedly chronic.  - bp still elevated  - c/w hydralazine 100mg tid and isosorbide 30mg tid  - c/w carvedilol 25mg bid   - monitor bp     Problem/Plan - 4:  ·  Problem: Atrial fibrillation.   ·  Plan: -went into afib on 11/18. Now back to NSR.   -continue eliquis 5mg BID  -C/w carvedilol.   -currently on triple therapy, but in a month should only be on plavix and eliquis.   -cards f/u.     Problem/Plan - 5:  ·  Problem: Hypokalemia.   ·  Plan: replete k >4, mg >2.     Problem/Plan - 6:  ·  Problem: Congestive heart failure with cardiomyopathy.   ·  Plan: - TTE EF 57% no segmental wma  Cards f/u   c/w medication as above.     Problem/Plan - 7:  ·  Problem: Chronic kidney disease, unspecified CKD stage.   ·  Plan: baseline creatinine likely 1.9-2 - monitor cr after contrast studies   renal f/u  monitor cr  avoid nephrotoxic medications.    Patient is stable for discharge with close follow up with cardiology

## 2022-11-21 NOTE — DISCHARGE NOTE PROVIDER - NSDCCAREPROVSEEN_GEN_ALL_CORE_FT
Carol, Ruslan Paiz, Shannan Vasquez, Sunshine Melissa, Lala Nobles, Eleanor Bang, Giuliano Ott, Jeff Waddell, Christy WILL

## 2022-11-21 NOTE — DIETITIAN INITIAL EVALUATION ADULT - ADD RECOMMEND
1. Continue DASH/TLC diet with Ensure Enlive 3x/day as ordered.   2. Encourage & monitor PO intake. [place] dietary preferences as able.   >>Monitor need/pt willingness for additional oral nutrition supplements.  3. Monitor GI tolerance, weight trends, labs, & skin integrity.  1. Consider liberalizing diet to low sodium with Ensure Enlive 3x/day to optimize intake.   2. Encourage & monitor PO intake. [place] dietary preferences as able.   >>Monitor need/pt willingness for additional oral nutrition supplements.  3. Monitor GI tolerance, weight trends, labs, & skin integrity.

## 2022-11-21 NOTE — DISCHARGE NOTE NURSING/CASE MANAGEMENT/SOCIAL WORK - NSDCPEFALRISK_GEN_ALL_CORE
For information on Fall & Injury Prevention, visit: [email or web address] OR  [email or web address] OR  https://www.cdc.gov/steadi/patient.html

## 2022-11-21 NOTE — DIETITIAN INITIAL EVALUATION ADULT - NSFNSNUTRCHEWSWALLOWFT_GEN_A_CORE
Pt's grandson denies pt history of chew/swallow difficulty. SLP brooke 11/20 recommends regular diet with thin liquids.

## 2022-11-21 NOTE — DIETITIAN INITIAL EVALUATION ADULT - NSFNSNUTRHOMESUPPLEMENTFT_GEN_A_CORE
Pt's grandson denies pt use of dietary supplements.  Pt's grandson denies pt use of dietary/micronutrient supplements PTA.

## 2022-11-21 NOTE — DIETITIAN INITIAL EVALUATION ADULT - PERSON TAUGHT/METHOD
Discussed with pt's grandson heart healthy reduced sodium nutrition therapy. Left written education with patient, grandson to pass along to pt's wife who prepares meals./verbal instruction/written material/video/caregiver

## 2022-11-21 NOTE — DIETITIAN INITIAL EVALUATION ADULT - REASON INDICATOR FOR ASSESSMENT
Pt seen for nutrition assessment, education, & poor appetite.   Source: patient (via  services Lynnette #[id number]), pt's grandson (via phone #[phone]), & medical record. Chart reviewed, events noted.

## 2022-11-21 NOTE — DISCHARGE NOTE PROVIDER - NSDCMRMEDTOKEN_GEN_ALL_CORE_FT
apixaban 5 mg oral tablet: 1 tab(s) orally every 12 hours  aspirin 81 mg oral delayed release tablet: 1 tab(s) orally once a day  atorvastatin 40 mg oral tablet: 1 tab(s) orally once a day (at bedtime)  carvedilol 25 mg oral tablet: 1 tab(s) orally every 12 hours  clopidogrel 75 mg oral tablet: 1 tab(s) orally once a day  hydrALAZINE 100 mg oral tablet: 1 tab(s) orally every 8 hours  isosorbide dinitrate 30 mg oral tablet: 1 tab(s) orally 3 times a day  Lovaza 1000 mg oral capsule: 2 cap(s) orally 2 times a day    NOTE: LAST FILLED ON OCT 2022 for 30days supply  spironolactone 25 mg oral tablet: 1 tab(s) orally once a day    NOTE: LAST FILLED ON AUG 2022 for 90 days supply   23:16

## 2022-11-21 NOTE — DISCHARGE NOTE NURSING/CASE MANAGEMENT/SOCIAL WORK - PATIENT PORTAL LINK FT
You can access the FollowMyHealth Patient Portal offered by [facility] by registering at the following website: [email or web address]. By joining [facility]’s FollowMyHealth portal, you will also be able to view your health information using other applications (apps) compatible with our system.

## 2022-11-21 NOTE — DISCHARGE NOTE PROVIDER - ATTENDING DISCHARGE PHYSICAL EXAMINATION:
Pt seen and examined at bedside. No acute events overnight, vss, afebrile  Pt with mental status back to baseline, AAOx4  Pt currently denies chest pain, dyspnea, palpitation, dizziness, lightheadedness    Vital Signs Last 24 Hrs  T(C): 36.4 (21 Nov 2022 13:24), Max: 37.1 (21 Nov 2022 04:14)  T(F): 97.5 (21 Nov 2022 13:24), Max: 98.8 (21 Nov 2022 04:14)  HR: 60 (21 Nov 2022 13:24) (59 - 64)  BP: 146/69 (21 Nov 2022 13:24) (139/56 - 157/62)  BP(mean): 94 (21 Nov 2022 13:24) (94 - 94)  RR: 17 (21 Nov 2022 13:24) (17 - 18)  SpO2: 96% (21 Nov 2022 13:24) (94% - 96%)    Parameters below as of 21 Nov 2022 13:24  Patient On (Oxygen Delivery Method): room air    I&O's Summary    20 Nov 2022 07:01  -  21 Nov 2022 07:00  --------------------------------------------------------  IN: 0 mL / OUT: 700 mL / NET: -700 mL    21 Nov 2022 07:01  -  21 Nov 2022 14:10  --------------------------------------------------------  IN: 480 mL / OUT: 0 mL / NET: 480 mL        PHYSICAL EXAM:  GENERAL: NAD, well-developed  HEAD:  Atraumatic, Normocephalic  EYES: EOMI, PERRLA, conjunctiva and sclera clear  MOUTH: no oral thrush  NECK: Supple, No JVD  CHEST/LUNG: Clear to auscultation bilaterally; No wheeze  HEART: Regular rate and rhythm; No murmurs, rubs, or gallops  ABDOMEN: Soft, Nontender, Nondistended; Bowel sounds present  EXTREMITIES:  2+ Peripheral Pulses, No clubbing, cyanosis, or edema  NEUROLOGY: AAOx3, non-focal  SKIN: No rashes or lesions    s/p NOMAN x3, extensive education provided on medication compliance

## 2022-11-21 NOTE — DIETITIAN INITIAL EVALUATION ADULT - NSPROEDAABILITYLEARNOTHER_GEN_A_NUR
The Service to outpatient cardiac rehab order in workqueue [id number] requested on 2/28/2020 has been cancelled as, unable to contact. Ordering provider has been notified.    Please contact patient, if further communication is needed.   none

## 2022-11-21 NOTE — DIETITIAN INITIAL EVALUATION ADULT - REASON FOR ADMISSION
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 Pt presents with reportedly abnormal stress test with new CM and hypertensive urgency found to have significant CAD on cath with plan for staged cath for PCI.

## 2022-11-21 NOTE — DIETITIAN INITIAL EVALUATION ADULT - ORAL INTAKE PTA/DIET HISTORY
Pt confused upon assessment & unable to provide diet history. Pt's grandson reports pt with minorly decreased appetite & intake ~2-3 weeks PTA secondary to feeling unwell. Pt's wife prepares meals. Denies therapeutic diet/dietary restrictions. Confirms NKFA.

## 2022-11-21 NOTE — DIETITIAN INITIAL EVALUATION ADULT - PERTINENT MEDS FT
MEDICATIONS  (STANDING):  apixaban 5 milliGRAM(s) Oral every 12 hours  aspirin enteric coated 81 milliGRAM(s) Oral daily  atorvastatin 40 milliGRAM(s) Oral at bedtime  carvedilol 25 milliGRAM(s) Oral every 12 hours  chlorhexidine 2% Cloths 1 Application(s) Topical daily  clopidogrel Tablet 75 milliGRAM(s) Oral daily  hydrALAZINE 100 milliGRAM(s) Oral every 8 hours  isosorbide   dinitrate Tablet (ISORDIL) 30 milliGRAM(s) Oral three times a day    MEDICATIONS  (PRN):  acetaminophen     Tablet .. 650 milliGRAM(s) Oral every 6 hours PRN Mild Pain (1 - 3), Moderate Pain (4 - 6)  albuterol/ipratropium for Nebulization 3 milliLiter(s) Nebulizer every 6 hours PRN Wheezing

## 2022-11-21 NOTE — PROGRESS NOTE ADULT - PROVIDER SPECIALTY LIST ADULT
Cardiology
Hospitalist
Hospitalist
Nephrology
Cardiology
Nephrology
Nephrology
Cardiology
Internal Medicine
Intervent Cardiology
Cardiology
Internal Medicine

## 2022-11-21 NOTE — DIETITIAN INITIAL EVALUATION ADULT - PERTINENT LABORATORY DATA
Na 137 WNL (11/19)  K+ 3.6 WNL (11/19)  Mg 2.0 WNL (11/19)  Phos 2.7 WNL (11/18)     <H> (11/19)  HgbA1c 6.4% <H> (11/12)    BUN 32 <H> (11/19)  Cr 1.87 <H> (11/19)  eGFR 37 <L> (11/19)    Ca 7.8 <L> (11/19)

## 2022-11-21 NOTE — DIETITIAN INITIAL EVALUATION ADULT - ENERGY INTAKE
Pt's grandson reports pt with fair intake, consuming ~x2 meals/day. Reports pt likely consuming most Ensure supplements, provided pt flavor preferences. Observed 0% completion lunch today 11/21. Reports pt does not like in-house food served. Reports pt's provider has recently discussed with the family menu options the pt may prefer. Denies pt need/desire for additional nutrition supplements.  Pt's grandson reports pt with fair intake, consuming ~x2 meals/day in-house. Reports pt likely consuming most Ensure supplements, provided pt flavor preferences. Observed 0% completion lunch today 11/21. Reports pt does not like in-house food served. Reports pt's provider recently discussed with the family menu options the pt may prefer. Denies pt need/desire for additional nutrition supplements.

## 2022-11-21 NOTE — DISCHARGE NOTE PROVIDER - NSDCCPCAREPLAN_GEN_ALL_CORE_FT
PRINCIPAL DISCHARGE DIAGNOSIS  Diagnosis: Chest pain  Assessment and Plan of Treatment: Unstable angina.   -c/w aspirin, plavix, statin  s/p  cath 11/16 showed - pLAD 70%, mid LAD 70%, D1 80%,  of Cx, mid RCA 90%-s/p Staged cath 11/17 (cleared by renal) s/p NOMAN x 3 to RCA  Outpatient follow up with cardiology      SECONDARY DISCHARGE DIAGNOSES  Diagnosis: Hypertensive emergency  Assessment and Plan of Treatment: SBP 220s with persistent CP, new CM. pt's headache, eye pain are reportedly chronic.  Continue hydralazine 100mg tid and isosorbide 30mg tid  Continue Carvedilol 25mg bid   BP stable but continue to monitor       Diagnosis: Congestive heart failure with cardiomyopathy  Assessment and Plan of Treatment: Echo  EF 57% no segmental wall motion abnormality   Daily weight  strict intake and output  Notify Cardiology with a greater thna 3 lb weight gain       Diagnosis: Atrial fibrillation  Assessment and Plan of Treatment: went into afib on 11/18. Now back to [facility].   -continue eliquis 5mg BID  Continue carvedilol.   -currently on triple therapy, but in a month should only be on plavix and eliquis.   Outpatient follow up with Cardiologist       Diagnosis: Chronic kidney disease, unspecified CKD stage  Assessment and Plan of Treatment: baseline creatinine likely 1.9-2 - monitor cr after contrast studies   outpatient follow up with nephrologist   avoid nephrotoxic medications

## 2022-11-21 NOTE — DIETITIAN INITIAL EVALUATION ADULT - NSFNSGIIOFT_GEN_A_CORE
Pt denies nausea/vomiting/diarrhea/constipation, verified by chart. Last recorded BM 11/19 per chart.

## 2022-11-21 NOTE — DIETITIAN INITIAL EVALUATION ADULT - OTHER CALCULATIONS
Estimated needs based on current wt 162.7lb (11/21). Needs adjusted for advanced age, BMI, & CKD.  Estimated needs based on current wt 162.7lb (11/21). Needs adjusted for advanced age, BMI, & CKD.   Defer fluids to team.

## 2022-11-29 ENCOUNTER — APPOINTMENT (OUTPATIENT)
Dept: CARE COORDINATION | Facility: HOME HEALTH | Age: 77
End: 2022-11-29

## 2022-11-29 VITALS
HEART RATE: 60 BPM | SYSTOLIC BLOOD PRESSURE: 180 MMHG | RESPIRATION RATE: 17 BRPM | OXYGEN SATURATION: 94 % | WEIGHT: 166 LBS | DIASTOLIC BLOOD PRESSURE: 84 MMHG | BODY MASS INDEX: 28.49 KG/M2

## 2022-11-29 DIAGNOSIS — R05.9 COUGH, UNSPECIFIED: ICD-10-CM

## 2022-11-29 DIAGNOSIS — E78.5 HYPERLIPIDEMIA, UNSPECIFIED: ICD-10-CM

## 2022-11-29 PROCEDURE — 99495 TRANSJ CARE MGMT MOD F2F 14D: CPT

## 2022-11-29 RX ORDER — OMEGA-3-ACID ETHYL ESTERS 1 G/1
1 CAPSULE, LIQUID FILLED ORAL TWICE DAILY
Refills: 0 | Status: ACTIVE | COMMUNITY
Start: 2022-11-29

## 2022-11-29 RX ORDER — LOSARTAN POTASSIUM 100 MG/1
100 TABLET, FILM COATED ORAL DAILY
Qty: 30 | Refills: 3 | Status: DISCONTINUED | COMMUNITY
Start: 2022-06-01 | End: 2022-11-29

## 2022-11-29 RX ORDER — BENZONATATE 100 MG/1
100 CAPSULE ORAL 3 TIMES DAILY
Qty: 90 | Refills: 0 | Status: ACTIVE | COMMUNITY
Start: 2022-11-29

## 2022-11-29 RX ORDER — MECLIZINE HYDROCHLORIDE 25 MG/1
25 TABLET ORAL
Qty: 90 | Refills: 0 | Status: DISCONTINUED | COMMUNITY
Start: 2022-06-01 | End: 2022-11-29

## 2022-11-29 RX ORDER — AMLODIPINE BESYLATE 10 MG/1
10 TABLET ORAL
Qty: 30 | Refills: 3 | Status: DISCONTINUED | COMMUNITY
Start: 2022-03-26 | End: 2022-11-29

## 2022-11-29 RX ORDER — VITAMIN K2 90 MCG
125 MCG CAPSULE ORAL
Qty: 90 | Refills: 0 | Status: DISCONTINUED | COMMUNITY
Start: 2022-06-01 | End: 2022-11-29

## 2022-11-29 NOTE — INTERPRETER SERVICES
[[employer] ] : provided by [employer]   [Time Spent: ____ minutes] : Total time spent using  services: [unfilled] minutes. The patient's primary language is not English thus required  services. [Interpreters_IDNumber] : [id number] [Interpreters_FullName] : Ty [TWNoteComboBox1] : Slovenian

## 2022-11-29 NOTE — PHYSICAL EXAM
[No Acute Distress] : no acute distress [Well Nourished] : well nourished [Well Developed] : well developed [Well-Appearing] : well-appearing [Normal Outer Ear/Nose] : the outer ears and nose were normal in appearance [Normal Oropharynx] : the oropharynx was normal [No JVD] : no jugular venous distention [Supple] : supple [No Lymphadenopathy] : no lymphadenopathy [Thyroid Normal, No Nodules] : the thyroid was normal and there were no nodules present [No Respiratory Distress] : no respiratory distress  [Clear to Auscultation] : lungs were clear to auscultation bilaterally [No Accessory Muscle Use] : no accessory muscle use [Normal Rate] : normal rate  [Regular Rhythm] : with a regular rhythm [Normal S1, S2] : normal S1 and S2 [No Murmur] : no murmur heard [No Carotid Bruits] : no carotid bruits [No Varicosities] : no varicosities [Pedal Pulses Present] : the pedal pulses are present [No Edema] : there was no peripheral edema [No Extremity Clubbing/Cyanosis] : no extremity clubbing/cyanosis [Soft] : abdomen soft [Non Tender] : non-tender [Non-distended] : non-distended [No Masses] : no abdominal mass palpated [No HSM] : no HSM [Normal Bowel Sounds] : normal bowel sounds [Normal Posterior Cervical Nodes] : no posterior cervical lymphadenopathy [Normal Anterior Cervical Nodes] : no anterior cervical lymphadenopathy [No CVA Tenderness] : no CVA  tenderness [No Spinal Tenderness] : no spinal tenderness [No Joint Swelling] : no joint swelling [Grossly Normal Strength/Tone] : grossly normal strength/tone [No Rash] : no rash [Normal Gait] : normal gait [Coordination Grossly Intact] : coordination grossly intact [No Focal Deficits] : no focal deficits [Normal Affect] : the affect was normal [Normal Insight/Judgement] : insight and judgment were intact [de-identified] : left eye ptosis

## 2022-11-29 NOTE — HISTORY OF PRESENT ILLNESS
[Post-hospitalization from ___ Hospital] : Post-hospitalization from [unfilled] Hospital [Admitted on: ___] : The patient was admitted on [unfilled] [Discharged on ___] : discharged on [unfilled] [Discharge Summary] : discharge summary [Discharge Med List] : discharge medication list [Patient Contacted By: ____] : and contacted by [unfilled] [FreeTextEntry3] : Patient was contacted by TCM RN on 11/22/22 for 24/48 hour call and documentation is present in the Clinical Viewer  [FreeTextEntry2] : 77M Maltese only speaking,  w/ PMHx of HTN presents to the ED with likely acute on chronic hypertensive urgency, for which he is largely asymptomatic. He was triaged from outpatient cardiology in the context of +NST with inferior ischemia, and an interval decline in EF from 50-55% to 30% on stress. 11/17  s/p staged LHC with x3DES to RCA with Dr Gordon, via RRA \par \par Since dc he feels fatigued and tired.  Denies cp/sob/weber/pnd.  States that he has a headache, ear pain, and eye pain which is chronic.  His RRA site is CDI, no s/s of hypervolemia.   Did not take AM medications today because he said he had head pain, currently hypertensive 180/84, inpatient BP was in 220s.  Pt instructed to take all AM medications, to repeat BP in one hour, if elevated outside of provided parameters to call TCM.  I followed up with patient's son Denver and daughter Yoselin and gave them same instructions at request of patient.  I messaged Dr. Albright (his cardio) about BP and she is aware and her office will be calling patient to schedule an appt.  Today's weight 166lbs, dc weight 167lbs per pt.  \par \par He is c/o dry cough, sent Tessalon Pearls [facility], daughter aware to make PMD appt if cough persists to r/o viral/bacterial illness. \par \par  Currently does not have HC/PT, HC is necessary due to uncontrolled BP, physical debility, and he is a high risk for readmission.  Pt is agreeable to HC.  TCM numbers provided for any questions/concerns.

## 2022-11-30 ENCOUNTER — NON-APPOINTMENT (OUTPATIENT)
Age: 77
End: 2022-11-30

## 2022-12-01 ENCOUNTER — INPATIENT (INPATIENT)
Facility: HOSPITAL | Age: 77
LOS: 3 days | Discharge: HOME | DRG: 193 | End: 2022-12-05
Attending: STUDENT IN AN ORGANIZED HEALTH CARE EDUCATION/TRAINING PROGRAM | Admitting: HOSPITALIST
Payer: MEDICARE

## 2022-12-01 VITALS
HEIGHT: 65 IN | TEMPERATURE: 98 F | HEART RATE: 69 BPM | WEIGHT: 166.01 LBS | OXYGEN SATURATION: 97 % | SYSTOLIC BLOOD PRESSURE: 171 MMHG | RESPIRATION RATE: 18 BRPM | DIASTOLIC BLOOD PRESSURE: 81 MMHG

## 2022-12-01 DIAGNOSIS — R41.82 ALTERED MENTAL STATUS, UNSPECIFIED: ICD-10-CM

## 2022-12-01 LAB
ALBUMIN SERPL ELPH-MCNC: 2.7 G/DL — LOW (ref 3.3–5)
ALP SERPL-CCNC: 127 U/L — HIGH (ref 40–120)
ALT FLD-CCNC: 27 U/L — SIGNIFICANT CHANGE UP (ref 10–45)
ANION GAP SERPL CALC-SCNC: 10 MMOL/L — SIGNIFICANT CHANGE UP (ref 5–17)
ANION GAP SERPL CALC-SCNC: 6 MMOL/L — SIGNIFICANT CHANGE UP (ref 5–17)
ANION GAP SERPL CALC-SCNC: 9 MMOL/L — SIGNIFICANT CHANGE UP (ref 5–17)
APPEARANCE UR: CLEAR — SIGNIFICANT CHANGE UP
APTT BLD: 40.5 SEC — HIGH (ref 27.5–35.5)
AST SERPL-CCNC: 31 U/L — SIGNIFICANT CHANGE UP (ref 10–40)
BASOPHILS # BLD AUTO: 0.06 K/UL — SIGNIFICANT CHANGE UP (ref 0–0.2)
BASOPHILS NFR BLD AUTO: 0.5 % — SIGNIFICANT CHANGE UP (ref 0–2)
BILIRUB SERPL-MCNC: 0.9 MG/DL — SIGNIFICANT CHANGE UP (ref 0.2–1.2)
BILIRUB UR-MCNC: NEGATIVE — SIGNIFICANT CHANGE UP
BUN SERPL-MCNC: 30 MG/DL — HIGH (ref 7–23)
BUN SERPL-MCNC: 31 MG/DL — HIGH (ref 7–23)
BUN SERPL-MCNC: 31 MG/DL — HIGH (ref 7–23)
CALCIUM SERPL-MCNC: 7.8 MG/DL — LOW (ref 8.4–10.5)
CALCIUM SERPL-MCNC: 7.9 MG/DL — LOW (ref 8.4–10.5)
CALCIUM SERPL-MCNC: 8.2 MG/DL — LOW (ref 8.4–10.5)
CHLORIDE SERPL-SCNC: 90 MMOL/L — LOW (ref 96–108)
CHLORIDE SERPL-SCNC: 92 MMOL/L — LOW (ref 96–108)
CHLORIDE SERPL-SCNC: 95 MMOL/L — LOW (ref 96–108)
CO2 SERPL-SCNC: 23 MMOL/L — SIGNIFICANT CHANGE UP (ref 22–31)
CO2 SERPL-SCNC: 25 MMOL/L — SIGNIFICANT CHANGE UP (ref 22–31)
CO2 SERPL-SCNC: 26 MMOL/L — SIGNIFICANT CHANGE UP (ref 22–31)
COLOR SPEC: YELLOW — SIGNIFICANT CHANGE UP
CREAT SERPL-MCNC: 1.74 MG/DL — HIGH (ref 0.5–1.3)
CREAT SERPL-MCNC: 1.75 MG/DL — HIGH (ref 0.5–1.3)
CREAT SERPL-MCNC: 1.88 MG/DL — HIGH (ref 0.5–1.3)
DIFF PNL FLD: NEGATIVE — SIGNIFICANT CHANGE UP
EGFR: 37 ML/MIN/1.73M2 — LOW
EGFR: 40 ML/MIN/1.73M2 — LOW
EGFR: 40 ML/MIN/1.73M2 — LOW
EOSINOPHIL # BLD AUTO: 0.23 K/UL — SIGNIFICANT CHANGE UP (ref 0–0.5)
EOSINOPHIL NFR BLD AUTO: 1.8 % — SIGNIFICANT CHANGE UP (ref 0–6)
GLUCOSE SERPL-MCNC: 108 MG/DL — HIGH (ref 70–99)
GLUCOSE SERPL-MCNC: 108 MG/DL — HIGH (ref 70–99)
GLUCOSE SERPL-MCNC: 114 MG/DL — HIGH (ref 70–99)
GLUCOSE UR QL: NEGATIVE — SIGNIFICANT CHANGE UP
HCT VFR BLD CALC: 27.9 % — LOW (ref 39–50)
HGB BLD-MCNC: 9.3 G/DL — LOW (ref 13–17)
IMM GRANULOCYTES NFR BLD AUTO: 0.4 % — SIGNIFICANT CHANGE UP (ref 0–0.9)
INR BLD: 2.56 RATIO — HIGH (ref 0.88–1.16)
KETONES UR-MCNC: NEGATIVE — SIGNIFICANT CHANGE UP
LACTATE SERPL-SCNC: 0.7 MMOL/L — SIGNIFICANT CHANGE UP (ref 0.7–2)
LEUKOCYTE ESTERASE UR-ACNC: NEGATIVE — SIGNIFICANT CHANGE UP
LYMPHOCYTES # BLD AUTO: 1.22 K/UL — SIGNIFICANT CHANGE UP (ref 1–3.3)
LYMPHOCYTES # BLD AUTO: 9.5 % — LOW (ref 13–44)
MCHC RBC-ENTMCNC: 28.7 PG — SIGNIFICANT CHANGE UP (ref 27–34)
MCHC RBC-ENTMCNC: 33.3 GM/DL — SIGNIFICANT CHANGE UP (ref 32–36)
MCV RBC AUTO: 86.1 FL — SIGNIFICANT CHANGE UP (ref 80–100)
MONOCYTES # BLD AUTO: 1.34 K/UL — HIGH (ref 0–0.9)
MONOCYTES NFR BLD AUTO: 10.4 % — SIGNIFICANT CHANGE UP (ref 2–14)
NEUTROPHILS # BLD AUTO: 9.96 K/UL — HIGH (ref 1.8–7.4)
NEUTROPHILS NFR BLD AUTO: 77.4 % — HIGH (ref 43–77)
NITRITE UR-MCNC: NEGATIVE — SIGNIFICANT CHANGE UP
NRBC # BLD: 0 /100 WBCS — SIGNIFICANT CHANGE UP (ref 0–0)
NT-PROBNP SERPL-SCNC: 9693 PG/ML — HIGH (ref 0–300)
PH UR: 6 — SIGNIFICANT CHANGE UP (ref 5–8)
PLATELET # BLD AUTO: 226 K/UL — SIGNIFICANT CHANGE UP (ref 150–400)
POTASSIUM SERPL-MCNC: 3.4 MMOL/L — LOW (ref 3.5–5.3)
POTASSIUM SERPL-MCNC: 3.8 MMOL/L — SIGNIFICANT CHANGE UP (ref 3.5–5.3)
POTASSIUM SERPL-MCNC: 3.9 MMOL/L — SIGNIFICANT CHANGE UP (ref 3.5–5.3)
POTASSIUM SERPL-SCNC: 3.4 MMOL/L — LOW (ref 3.5–5.3)
POTASSIUM SERPL-SCNC: 3.8 MMOL/L — SIGNIFICANT CHANGE UP (ref 3.5–5.3)
POTASSIUM SERPL-SCNC: 3.9 MMOL/L — SIGNIFICANT CHANGE UP (ref 3.5–5.3)
PROCALCITONIN SERPL-MCNC: 0.15 NG/ML — HIGH
PROT SERPL-MCNC: 6.8 G/DL — SIGNIFICANT CHANGE UP (ref 6–8.3)
PROT UR-MCNC: 30 MG/DL
PROTHROM AB SERPL-ACNC: 30 SEC — HIGH (ref 10.5–13.4)
RAPID RVP RESULT: SIGNIFICANT CHANGE UP
RBC # BLD: 3.24 M/UL — LOW (ref 4.2–5.8)
RBC # FLD: 13.4 % — SIGNIFICANT CHANGE UP (ref 10.3–14.5)
SARS-COV-2 RNA SPEC QL NAA+PROBE: SIGNIFICANT CHANGE UP
SODIUM SERPL-SCNC: 122 MMOL/L — LOW (ref 135–145)
SODIUM SERPL-SCNC: 124 MMOL/L — LOW (ref 135–145)
SODIUM SERPL-SCNC: 130 MMOL/L — LOW (ref 135–145)
SODIUM UR-SCNC: <20 MMOL/L — SIGNIFICANT CHANGE UP
SP GR SPEC: 1.01 — SIGNIFICANT CHANGE UP (ref 1.01–1.02)
TROPONIN I, HIGH SENSITIVITY RESULT: 131.5 NG/L — HIGH
TROPONIN I, HIGH SENSITIVITY RESULT: 133.8 NG/L — HIGH
UROBILINOGEN FLD QL: NEGATIVE — SIGNIFICANT CHANGE UP
WBC # BLD: 12.86 K/UL — HIGH (ref 3.8–10.5)
WBC # FLD AUTO: 12.86 K/UL — HIGH (ref 3.8–10.5)

## 2022-12-01 PROCEDURE — 71250 CT THORAX DX C-: CPT | Mod: 26,MA

## 2022-12-01 PROCEDURE — 70450 CT HEAD/BRAIN W/O DYE: CPT | Mod: 26,MA

## 2022-12-01 PROCEDURE — 99223 1ST HOSP IP/OBS HIGH 75: CPT

## 2022-12-01 PROCEDURE — 99291 CRITICAL CARE FIRST HOUR: CPT

## 2022-12-01 PROCEDURE — 71045 X-RAY EXAM CHEST 1 VIEW: CPT | Mod: 26

## 2022-12-01 RX ORDER — FUROSEMIDE 40 MG
20 TABLET ORAL ONCE
Refills: 0 | Status: COMPLETED | OUTPATIENT
Start: 2022-12-01 | End: 2022-12-01

## 2022-12-01 RX ORDER — ACETAMINOPHEN 500 MG
650 TABLET ORAL EVERY 6 HOURS
Refills: 0 | Status: DISCONTINUED | OUTPATIENT
Start: 2022-12-01 | End: 2022-12-05

## 2022-12-01 RX ORDER — OMEGA-3 ACID ETHYL ESTERS 1 G
2 CAPSULE ORAL
Refills: 0 | Status: DISCONTINUED | OUTPATIENT
Start: 2022-12-01 | End: 2022-12-05

## 2022-12-01 RX ORDER — ISOSORBIDE DINITRATE 5 MG/1
30 TABLET ORAL THREE TIMES A DAY
Refills: 0 | Status: DISCONTINUED | OUTPATIENT
Start: 2022-12-01 | End: 2022-12-05

## 2022-12-01 RX ORDER — PIPERACILLIN AND TAZOBACTAM 4; .5 G/20ML; G/20ML
3.38 INJECTION, POWDER, LYOPHILIZED, FOR SOLUTION INTRAVENOUS EVERY 8 HOURS
Refills: 0 | Status: DISCONTINUED | OUTPATIENT
Start: 2022-12-01 | End: 2022-12-05

## 2022-12-01 RX ORDER — ATORVASTATIN CALCIUM 80 MG/1
40 TABLET, FILM COATED ORAL AT BEDTIME
Refills: 0 | Status: DISCONTINUED | OUTPATIENT
Start: 2022-12-01 | End: 2022-12-05

## 2022-12-01 RX ORDER — CLOPIDOGREL BISULFATE 75 MG/1
75 TABLET, FILM COATED ORAL DAILY
Refills: 0 | Status: DISCONTINUED | OUTPATIENT
Start: 2022-12-01 | End: 2022-12-05

## 2022-12-01 RX ORDER — SPIRONOLACTONE 25 MG/1
25 TABLET, FILM COATED ORAL DAILY
Refills: 0 | Status: DISCONTINUED | OUTPATIENT
Start: 2022-12-01 | End: 2022-12-01

## 2022-12-01 RX ORDER — CEFEPIME 1 G/1
1000 INJECTION, POWDER, FOR SOLUTION INTRAMUSCULAR; INTRAVENOUS ONCE
Refills: 0 | Status: COMPLETED | OUTPATIENT
Start: 2022-12-01 | End: 2022-12-01

## 2022-12-01 RX ORDER — APIXABAN 2.5 MG/1
5 TABLET, FILM COATED ORAL EVERY 12 HOURS
Refills: 0 | Status: DISCONTINUED | OUTPATIENT
Start: 2022-12-01 | End: 2022-12-05

## 2022-12-01 RX ORDER — ASPIRIN/CALCIUM CARB/MAGNESIUM 324 MG
81 TABLET ORAL DAILY
Refills: 0 | Status: DISCONTINUED | OUTPATIENT
Start: 2022-12-02 | End: 2022-12-05

## 2022-12-01 RX ORDER — LABETALOL HCL 100 MG
10 TABLET ORAL ONCE
Refills: 0 | Status: COMPLETED | OUTPATIENT
Start: 2022-12-01 | End: 2022-12-01

## 2022-12-01 RX ORDER — LANOLIN ALCOHOL/MO/W.PET/CERES
3 CREAM (GRAM) TOPICAL AT BEDTIME
Refills: 0 | Status: DISCONTINUED | OUTPATIENT
Start: 2022-12-01 | End: 2022-12-05

## 2022-12-01 RX ORDER — FUROSEMIDE 40 MG
40 TABLET ORAL DAILY
Refills: 0 | Status: DISCONTINUED | OUTPATIENT
Start: 2022-12-02 | End: 2022-12-02

## 2022-12-01 RX ORDER — HYDRALAZINE HCL 50 MG
100 TABLET ORAL EVERY 8 HOURS
Refills: 0 | Status: DISCONTINUED | OUTPATIENT
Start: 2022-12-01 | End: 2022-12-05

## 2022-12-01 RX ORDER — POLYMYXIN B SULF/TRIMETHOPRIM 10000-1/ML
1 DROPS OPHTHALMIC (EYE) ONCE
Refills: 0 | Status: COMPLETED | OUTPATIENT
Start: 2022-12-01 | End: 2022-12-01

## 2022-12-01 RX ORDER — SODIUM CHLORIDE 9 MG/ML
1000 INJECTION INTRAMUSCULAR; INTRAVENOUS; SUBCUTANEOUS
Refills: 0 | Status: DISCONTINUED | OUTPATIENT
Start: 2022-12-01 | End: 2022-12-01

## 2022-12-01 RX ORDER — CARVEDILOL PHOSPHATE 80 MG/1
25 CAPSULE, EXTENDED RELEASE ORAL EVERY 12 HOURS
Refills: 0 | Status: DISCONTINUED | OUTPATIENT
Start: 2022-12-01 | End: 2022-12-05

## 2022-12-01 RX ADMIN — Medication 2 GRAM(S): at 17:52

## 2022-12-01 RX ADMIN — Medication 20 MILLIGRAM(S): at 17:51

## 2022-12-01 RX ADMIN — Medication 200 MILLIGRAM(S): at 17:51

## 2022-12-01 RX ADMIN — APIXABAN 5 MILLIGRAM(S): 2.5 TABLET, FILM COATED ORAL at 17:51

## 2022-12-01 RX ADMIN — PIPERACILLIN AND TAZOBACTAM 25 GRAM(S): 4; .5 INJECTION, POWDER, LYOPHILIZED, FOR SOLUTION INTRAVENOUS at 16:25

## 2022-12-01 RX ADMIN — Medication 1 DROP(S): at 09:15

## 2022-12-01 RX ADMIN — CARVEDILOL PHOSPHATE 25 MILLIGRAM(S): 80 CAPSULE, EXTENDED RELEASE ORAL at 17:52

## 2022-12-01 RX ADMIN — Medication 100 MILLIGRAM(S): at 16:24

## 2022-12-01 RX ADMIN — Medication 100 MILLIGRAM(S): at 22:31

## 2022-12-01 RX ADMIN — ISOSORBIDE DINITRATE 30 MILLIGRAM(S): 5 TABLET ORAL at 16:25

## 2022-12-01 RX ADMIN — ATORVASTATIN CALCIUM 40 MILLIGRAM(S): 80 TABLET, FILM COATED ORAL at 22:33

## 2022-12-01 RX ADMIN — CLOPIDOGREL BISULFATE 75 MILLIGRAM(S): 75 TABLET, FILM COATED ORAL at 12:31

## 2022-12-01 RX ADMIN — Medication 10 MILLIGRAM(S): at 07:57

## 2022-12-01 RX ADMIN — Medication 100 MILLIGRAM(S): at 12:31

## 2022-12-01 RX ADMIN — CEFEPIME 1000 MILLIGRAM(S): 1 INJECTION, POWDER, FOR SOLUTION INTRAMUSCULAR; INTRAVENOUS at 10:10

## 2022-12-01 RX ADMIN — Medication 100 MILLIGRAM(S): at 22:32

## 2022-12-01 RX ADMIN — CEFEPIME 100 MILLIGRAM(S): 1 INJECTION, POWDER, FOR SOLUTION INTRAMUSCULAR; INTRAVENOUS at 09:41

## 2022-12-01 RX ADMIN — SODIUM CHLORIDE 75 MILLILITER(S): 9 INJECTION INTRAMUSCULAR; INTRAVENOUS; SUBCUTANEOUS at 18:00

## 2022-12-01 NOTE — ED PROVIDER NOTE - CLINICAL SUMMARY MEDICAL DECISION MAKING FREE TEXT BOX
77-year-old male with history of hypertension CAD, recent stents x3, CHF presents with cough SOB AMS slurred speech x2 days.  Possible pneumonia versus URI versus CHF.  Check sepsis labs, RVP, cardiac labs, chest x-ray.  slurred speech/confusion likely due to delirium. will check cth r/o cva. Patient signed out to Dr. Barriga at 0705.  All labs/testing /dispo pending

## 2022-12-01 NOTE — CONSULT NOTE ADULT - ASSESSMENT
77 year old man being admitted with generalized weakness and confusion.   Found to be hyponatremic  Cardiac history includes CAD s/p staged coronary interventions RCA and LAD mid Nov 2022.  PAF  Most recent echo done at [facility] demonstrates normal LV systolic function and moderate diastolic dysfunction.  Elevated troponin x2.. flat.  Elevated BNP   EKG is sinus rhythm, no ST elevation   BRUNILDA   CT chest suggests pneumonia   Doubt ACS     Plan   - continue hydralazine   - continue carvedilol   - continue dual antiplatelet therapy for 4 weeks post interventions   - continue apixaban  - consider changing diuretic to lasix   - monitor labs   - hyponatremia rx as per Medicine   - antibiotics as per Medicine     discussed with Medicine team

## 2022-12-01 NOTE — H&P ADULT - NSHPSOCIALHISTORY_GEN_ALL_CORE
Former smoker (30 pack year), use to consume alcohol 12 yrs ago  Ambulates with a can usually.   and lives with his wife. He has 6 children

## 2022-12-01 NOTE — CONSULT NOTE ADULT - SUBJECTIVE AND OBJECTIVE BOX
[facility] Consultation    CHIEF COMPLAINT: Patient is a 77y old  Male who presents with a chief complaint of Hyponatremia (01 Dec 2022 09:49)  asked to see patient regarding elevated cardiac troponin s/p recent coronary interventions     HPI:  78 yo man with history of Hypertension, CKDIIIb, CHF due to CM, CAD s/p stents x3 to RCA admitted for hyponatremia. Patient was BIBA for generalized weakness and cough. Patient possibly has dementia and does not have any complaints. He did not contribute to the story. His wife and son-in-law reported that since he came back [place] he has been coughing, unable to lie flat, and feeling weak. If he lies flat he wakes up coughing gasping for air. He was found on the floor at 4am this morning. This is the second time he was too weak to get up from the couch himself and ended up sliding to the floor. He usually ambulates with a cane. He also has urinary frequency as well.  (01 Dec 2022 09:49)    As above.  Patient in stretcher in ED... appears confused and agitated.     PAST MEDICAL & SURGICAL HISTORY:  H/O: HTN (hypertension)      CAD (coronary artery disease)      Diabetic vitreous hemorrhage      Hypokalemia      Type 2 diabetes mellitus      Left ventricular hypertrophy      History of alcohol withdrawal delirium      S/P cardiac catheterization  11/17 3 stents to RCA  Social History:  Former smoker (30 pack year), use to consume alcohol 12 yrs ago  Ambulates with a cane usually.   and lives with his wife. He has 6 children (01 Dec 2022 09:49)      Home Medications:  Lovaza 1000 mg oral capsule: 2 cap(s) orally 2 times a day    NOTE: LAST FILLED ON OCT 2022 for 30days supply (01 Dec 2022 07:55)  spironolactone 25 mg oral tablet: 1 tab(s) orally once a day    NOTE: LAST FILLED ON AUG 2022 for 90 days supply (01 Dec 2022 07:55)      MEDICATIONS  (STANDING):  apixaban 5 milliGRAM(s) Oral every 12 hours  atorvastatin 40 milliGRAM(s) Oral at bedtime  benzonatate 100 milliGRAM(s) Oral three times a day  carvedilol 25 milliGRAM(s) Oral every 12 hours  clopidogrel Tablet 75 milliGRAM(s) Oral daily  hydrALAZINE 100 milliGRAM(s) Oral every 8 hours  isosorbide   dinitrate Tablet (ISORDIL) 30 milliGRAM(s) Oral three times a day  omega-3-Acid Ethyl Esters 2 Gram(s) Oral two times a day  piperacillin/tazobactam IVPB.. 3.375 Gram(s) IV Intermittent every 8 hours  sodium chloride 0.9%. 1000 milliLiter(s) (75 mL/Hr) IV Continuous <Continuous>  spironolactone 25 milliGRAM(s) Oral daily    MEDICATIONS  (PRN):  acetaminophen     Tablet .. 650 milliGRAM(s) Oral every 6 hours PRN Temp greater or equal to 38C (100.4F), Mild Pain (1 - 3)  guaiFENesin Oral Liquid (Sugar-Free) 200 milliGRAM(s) Oral every 6 hours PRN Cough  melatonin 3 milliGRAM(s) Oral at bedtime PRN Insomnia      Allergies    No Known Allergies    Intolerances        REVIEW OF SYSTEMS: not able to obtain     VITAL SIGNS:   Vital Signs Last 24 Hrs  T(C): 37.4 (01 Dec 2022 06:45), Max: 37.4 (01 Dec 2022 06:45)  T(F): 99.3 (01 Dec 2022 06:45), Max: 99.3 (01 Dec 2022 06:45)  HR: 67 (01 Dec 2022 12:33) (65 - 73)  BP: 167/82 (01 Dec 2022 12:33) (146/71 - 187/104)  BP(mean): --  RR: 17 (01 Dec 2022 12:33) (17 - 24)  SpO2: 100% (01 Dec 2022 12:33) (97% - 100%)    Parameters below as of 01 Dec 2022 08:00  Patient On (Oxygen Delivery Method): room air        I&O's Summary      PHYSICAL EXAM:    Constitutional: older appearing man confused and agitated   Eyes:  EOMI,  Pupils round, no lesions  ENMT: no exudate or erythema  Pulmonary: poor effort, decreased breath sounds at bases   Cardiovascular: PMI not palpable non-displaced Regular S1 and S2 l/Vl systolic murmur   Gastrointestinal: Bowel Sounds present, soft, nontender.   Lymph: No peripheral edema. No cervical lymphadenopathy.  Skin: No rashes. Changes of chronic venous stasis. No cyanosis.    LABS: All Labs Reviewed:                        9.3    12.86 )-----------( 226      ( 01 Dec 2022 06:55 )             27.9     01 Dec 2022 06:55    122    |  90     |  30     ----------------------------<  114    3.8     |  23     |  1.88     Ca    7.8        01 Dec 2022 06:55    TPro  6.8    /  Alb  2.7    /  TBili  0.9    /  DBili  x      /  AST  31     /  ALT  27     /  AlkPhos  127    01 Dec 2022 06:55    PT/INR - ( 01 Dec 2022 06:55 )   PT: 30.0 sec;   INR: 2.56 ratio         PTT - ( 01 Dec 2022 06:55 )  PTT:40.5 sec        12-01 @ 06:55  Pro Bnp [id number]    < from: TTE with Doppler (w/Cont) (11.12.22 @ 11:04) >  1. Normal mitral valve. Minimal mitral regurgitation.  2. Calcified aortic valve. There is a focal calcification  seen on the right coronary cusp.  Peak transaortic valve  gradient equals 12 mm Hg, mean transaortic valve gradient  equals 7 mm Hg, aortic valve velocity time integral equals  34 cm, estimated aortic valve area equals 2.1 sqcm. Mild  aortic regurgitation.   ms.  3. Mildly dilated left atrium.  LA volume index = 37 cc/m2.  4. Normal left ventricular systolic function. No segmental  wall motion abnormalities. Endocardial visualization  enhanced with intravenous injection of Ultrasonic Enhancing  Agent (Lumason). No left ventricular thrombus. Septal  motion consistent with conduction defect.  5. Moderate diastolic dysfunction (Stage II).  6. Normal right ventricular size and function.    < end of copied text >

## 2022-12-01 NOTE — H&P ADULT - NSHPPHYSICALEXAM_GEN_ALL_CORE
T(C): 37.4 (12-01-22 @ 06:45), Max: 37.4 (12-01-22 @ 06:45)  HR: 65 (12-01-22 @ 09:16) (65 - 73)  BP: 168/74 (12-01-22 @ 09:16) (162/74 - 187/104)  RR: 22 (12-01-22 @ 09:16) (18 - 24)  SpO2: 100% (12-01-22 @ 09:16) (97% - 100%)  Wt(kg): --Vital Signs Last 24 Hrs  T(C): 37.4 (01 Dec 2022 06:45), Max: 37.4 (01 Dec 2022 06:45)  T(F): 99.3 (01 Dec 2022 06:45), Max: 99.3 (01 Dec 2022 06:45)  HR: 65 (01 Dec 2022 09:16) (65 - 73)  BP: 168/74 (01 Dec 2022 09:16) (162/74 - 187/104)  BP(mean): --  RR: 22 (01 Dec 2022 09:16) (18 - 24)  SpO2: 100% (01 Dec 2022 09:16) (97% - 100%)    Parameters below as of 01 Dec 2022 08:00  Patient On (Oxygen Delivery Method): room air    PHYSICAL EXAM:  GENERAL: NAD, + cough, hard of hearing  HENT:  Atraumatic, Normocephalic; No tonsillar erythema, exudates, or enlargement; Moist mucous membranes;   EYES: left dropping eye lid, EOMI, PERRLA, conjunctiva and sclera clear, no lid-lag  NECK: Supple, No JVD, Normal thyroid  NERVOUS SYSTEM:  CN II - XII intact; Sensation intact  CHEST/LUNG: Clear to percussion bilaterally; No rales, rhonchi, wheezing, or rubs; normal respiratory effort, no intercostal retractions; No pitting edema  HEART: Regular rate and rhythm; No murmurs, rubs, or gallops  ABDOMEN: Soft, Nontender, Nondistended; Bowel sounds present; No HSM  MUSCULOSKELETAL/EXTREMITIES:  2+ Peripheral Pulses, No clubbing, or digital cyanosis  SKIN: No rashes or lesions; normal texture and temperature  PSYCH: Appropriate affect, Alert & Awake

## 2022-12-01 NOTE — H&P ADULT - HISTORY OF PRESENT ILLNESS
76 yo man with history of Hypertension, CKDIIIb, CHF due to CM, CAD s/p stents x3 to RCA admitted for hyponatremia. Patient was BIBA for generalized weakness and cough. Patient possibly has dementia and does not have any complaints. He did not contribute to the story. His wife and son-in-law reported that since he came back [place] he has been coughing, unable to lie flat, and feeling weak. If he lies flat he wakes up coughing gasping for air. He was found on the floor at 4am this morning. This is the second time he was too weak to get up from the couch himself and ended up sliding to the floor. He usually ambulates with a cane. He also has urinary frequency as well.

## 2022-12-01 NOTE — ED PROVIDER NOTE - CARE PLAN
Principal Discharge DX:	Altered mental status  Secondary Diagnosis:	RLL pneumonia  Secondary Diagnosis:	Acute CHF  Secondary Diagnosis:	Hyponatremia   1

## 2022-12-01 NOTE — H&P ADULT - NSHPREVIEWOFSYSTEMS_GEN_ALL_CORE
CONSTITUTIONAL: No fever, weight loss, or fatigue  EYES: No eye pain, visual disturbances, or discharge  ENMT:  No difficulty hearing, tinnitus, vertigo; No sinus or throat pain  NECK: No pain or stiffness  RESPIRATORY: +cough; No wheezing, chills or hemoptysis; No shortness of breath  CARDIOVASCULAR: No chest pain, palpitations, dizziness, or leg swelling  GASTROINTESTINAL: No abdominal or epigastric pain. No nausea, vomiting, or hematemesis; No diarrhea or constipation. No melena or hematochezia.  GENITOURINARY: No dysuria, frequency, hematuria, or incontinence  NEUROLOGICAL: No headaches, memory loss, loss of strength, numbness, or tremors  SKIN: No itching, burning, rashes, or lesions   MUSCULOSKELETAL: No joint pain or swelling; No muscle, back, or extremity pain  PSYCHIATRIC: No depression, anxiety, mood swings, or difficulty sleeping  ALLERGY AND IMMUNOLOGIC: No hives or eczema    ALL ROS REVIEWED AND NORMAL EXCEPT AS STATED ABOVE

## 2022-12-01 NOTE — ED ADULT NURSE NOTE - NSIMPLEMENTINTERV_GEN_ALL_ED
Implemented All Universal Safety Interventions:  [place] to call system. Call bell, personal items and telephone within reach. Instruct patient to call for assistance. Room bathroom lighting operational. Non-slip footwear when patient is off stretcher. Physically safe environment: no spills, clutter or unnecessary equipment. Stretcher in lowest position, wheels locked, appropriate side rails in place. Implemented All Fall Risk Interventions:  [place] to call system. Call bell, personal items and telephone within reach. Instruct patient to call for assistance. Room bathroom lighting operational. Non-slip footwear when patient is off stretcher. Physically safe environment: no spills, clutter or unnecessary equipment. Stretcher in lowest position, wheels locked, appropriate side rails in place. Provide visual cue, wrist band, yellow gown, etc. Monitor gait and stability. Monitor for mental status changes and reorient to person, place, and time. Review medications for side effects contributing to fall risk. Reinforce activity limits and safety measures with patient and family.

## 2022-12-01 NOTE — H&P ADULT - ASSESSMENT
78 yo man with history of Hypertension, CKDIIIb, CHF due to CM, CAD s/p stents x3 to RCA admitted for hyponatremia. Patient was BIBA for generalized weakness and cough.    Acute Metabolic Encephalopathy and lethargy due to hyponatremia and/OR multifocal pneumonia  -CT Chest No Cont (12.01.22 @ 09:05) >Findings suggestive of multilobar pneumonia in the appropriate clinical setting.  -Given Cefepime in the ED  -Correct sodium at a rate of < 0.5 mEq/L/hour  -Sent UA, urine lytes, spot urine creatine protein ratio.  -Repeat BMP q 6hrs  -Monitor BUN/Cr levels daily.  -Continue with Zosyn since was in the hospital for 10 days  -O2 via NC@ 2L/Min, keep sat >94% at all times.  -F/U BCx X 2 sets  -Albuterol 1 unit neb Q6hrs ATC x 1 day then PRN  -Tessalon Perle ATC and Robitussin PO Q6hrs PRN for cough.  -PT evaluation ordered    CAD s/p 3DES to RCA (11/17)  Chronic HFpEF, Recent diagnosis of CM  -TTE (11/12/22) EF 57%, Normal LVSF. Stage II DD. Calcified AV  -ProBNP >9600  -Continue with aspirin, plavix, statin, lovaza, coreg, and isordil  -Monitor vital signs  -Repeating troponin level  -Cardiology Consulted    Uncontrolled hypertension  -Pt's headache, eye pain are reportedly chronic  --170, DBP   -Renin/Aldosterone ratio 48.5  -Continue hydralazine 100mg tid, isosorbide 30mg tid, spironolactone, and carvedilol 25mg bid   -Monitor vital signs    Possible Dementia  -CT head (12/1/22) Chronic ischemic changes in both hemispheres with volume loss.  -No reported history of CVA  -F/U b12, tsh level    Paroxysmal Atrial Fibrillation  -EKG (12/1) NSR, twi III, LVH, qtc 474 (No significant change from previous)  -Continue carvedilol and eliquis    CKD IIIb  -baseline creatinine 1.9 (5/2022)  -Encourage oral intake  -Avoid nephrotoxic agents    Normocytic Anemia  -continue to monitor as he is on eliquis    DVT Prophylaxis with eliquis    Spoke to wife and son-in-law in length at bedside.  is the daughter Yoselin [phone]. They are aware of the plan 78 yo man with history of Hypertension, CKDIIIb, CHF due to CM, CAD s/p stents x3 to RCA admitted for hyponatremia. Patient was BIBA for generalized weakness and cough.    Acute Metabolic Encephalopathy and lethargy due to hyponatremia and/OR multifocal pneumonia  -CT Chest No Cont (12.01.22 @ 09:05) >Findings suggestive of multilobar pneumonia in the appropriate clinical setting.  -Given Cefepime in the ED  -Correct sodium at a rate of < 0.5 mEq/L/hour  -Sent UA, urine lytes, spot urine creatine protein ratio.  -Repeat BMP q 6hrs  -Monitor BUN/Cr levels daily.  -Continue with Zosyn since was in the hospital for 10 days  -O2 via NC@ 2L/Min, keep sat >94% at all times.  -F/U BCx X 2 sets  -Albuterol 1 unit neb Q6hrs ATC x 1 day then PRN  -Tessalon Perle ATC and Robitussin PO Q6hrs PRN for cough.  -PT evaluation ordered    CAD s/p 3DES to RCA (11/17)  Chronic HFpEF, Recent diagnosis of CM  Elevated troponin with recent PCI  -TTE (11/12/22) EF 57%, Normal LVSF. Stage II DD. Calcified AV  -ProBNP >9600  -Continue with aspirin, plavix, statin, lovaza, coreg, and isordil  -Monitor vital signs  -Repeating troponin level  -Cardiology Consulted    Uncontrolled hypertension  -Pt's headache, eye pain are reportedly chronic  --170, DBP   -Renin/Aldosterone ratio 48.5  -Continue hydralazine 100mg tid, isosorbide 30mg tid, spironolactone, and carvedilol 25mg bid   -Monitor vital signs    Possible Dementia  -CT head (12/1/22) Chronic ischemic changes in both hemispheres with volume loss.  -No reported history of CVA  -F/U b12, tsh level    Paroxysmal Atrial Fibrillation  -EKG (12/1) NSR, twi III, LVH, qtc 474 (No significant change from previous)  -Continue carvedilol and eliquis    CKD IIIb  -baseline creatinine 1.9 (5/2022)  -Encourage oral intake  -Avoid nephrotoxic agents    Normocytic Anemia  -continue to monitor as he is on eliquis    DVT Prophylaxis with eliquis    Spoke to wife and son-in-law in length at bedside.  is the daughter Yoselin [phone]. They are aware of the plan

## 2022-12-01 NOTE — ED PROVIDER NOTE - CRITICAL CARE ATTENDING CONTRIBUTION TO CARE
[place]: Patient was critically ill with a high probability of imminent or life threatening deterioration.  direct patient care (not related to procedure), additional history taking, interpretation of diagnostic studies, documentation, consultation with other physicians, telephone consultation with the patient's family  I have personally and independently provided the amount of critical care time documented below excluding time spent on separate procedures.  40 mins

## 2022-12-01 NOTE — H&P ADULT - NSHPLABSRESULTS_GEN_ALL_CORE
LABS:                        9.3    12.86 )-----------( 226      ( 01 Dec 2022 06:55 )             27.9     12    122<L>  |  90<L>  |  30<H>  ----------------------------<  114<H>  3.8   |  23  |  1.88<H>    Ca    7.8<L>      01 Dec 2022 06:55    TPro  6.8  /  Alb  2.7<L>  /  TBili  0.9  /  DBili  x   /  AST  31  /  ALT  27  /  AlkPhos  127<H>  12    PT/INR - ( 01 Dec 2022 06:55 )   PT: 30.0 sec;   INR: 2.56 ratio         PTT - ( 01 Dec 2022 06:55 )  PTT:40.5 sec  Urinalysis Basic - ( 01 Dec 2022 07:30 )    Color: Yellow / Appearance: Clear / S.015 / pH: x  Gluc: x / Ketone: Negative  / Bili: Negative / Urobili: Negative   Blood: x / Protein: 30 mg/dL / Nitrite: Negative   Leuk Esterase: Negative / RBC: Negative /HPF / WBC 3-5 /HPF   Sq Epi: x / Non Sq Epi: Neg.-Few / Bacteria: Negative /HPF    CAPILLARY BLOOD GLUCOSE    Urinalysis Basic - ( 01 Dec 2022 07:30 )    Color: Yellow / Appearance: Clear / S.015 / pH: x  Gluc: x / Ketone: Negative  / Bili: Negative / Urobili: Negative   Blood: x / Protein: 30 mg/dL / Nitrite: Negative   Leuk Esterase: Negative / RBC: Negative /HPF / WBC 3-5 /HPF   Sq Epi: x / Non Sq Epi: Neg.-Few / Bacteria: Negative /HPF      RADIOLOGY & ADDITIONAL TESTS:  CT Head No Cont (22 @ 09:05) > Chronic ischemic changes in both hemispheres with volume loss. No   acute abnormality suggested. Follow-up MR imaging of the brain may be considered for further   assessment.    CT Chest No Cont (22 @ 09:05) >Findings suggestive of multilobar pneumonia in the appropriate clinical   setting. A repeat chest CT scan is recommended in 6 weeks following   treatment to assess for resolution.    Previous Consultant(s) Notes Reviewed:  [x ] YES  [ ] NO  Care Discussed with Consultants/Other Providers [ x] YES  [ ] NO  Imaging Personally Reviewed:  [x ] YES  [ ] NO

## 2022-12-01 NOTE — PATIENT PROFILE ADULT - HEALTH LITERACY
Consent: The patient's consent was obtained including but not limited to risks of crusting, scabbing, blistering, scarring, darker or lighter pigmentary change, recurrence, incomplete removal and infection. Post-Care Instructions: I reviewed with the patient in detail post-care instructions. Patient is to wear sunprotection, and avoid picking at any of the treated lesions. Pt may apply Vaseline to crusted or scabbing areas. Number Of Freeze-Thaw Cycles: 1 freeze-thaw cycle Render Post-Care Instructions In Note?: no Detail Level: Detailed Duration Of Freeze Thaw-Cycle (Seconds): 8 Medical Necessity Information: It is in your best interest to select a reason for this procedure from the list below. All of these items fulfill various CMS LCD requirements except the new and changing color options. Medical Necessity Clause: This procedure was medically necessary because the lesions that were treated were: no

## 2022-12-01 NOTE — PATIENT PROFILE ADULT - FALL HARM RISK - HARM RISK INTERVENTIONS
Assistance with ambulation/Assistance OOB with selected safe patient handling equipment/Communicate Risk of Fall with Harm to all staff/Discuss with provider need for PT consult/Monitor gait and stability/Reinforce activity limits and safety measures with patient and family/Tailored Fall Risk Interventions/Visual Cue: Yellow wristband and red socks/Bed in lowest position, wheels locked, appropriate side rails in place/Call bell, personal items and telephone in reach/Instruct patient to call for assistance before getting out of bed or chair/Non-slip footwear when patient is out of bed/[place] to call system/Physically safe environment - no spills, clutter or unnecessary equipment/Purposeful Proactive Rounding/Room/bathroom lighting operational, light cord in reach

## 2022-12-01 NOTE — ED PROVIDER NOTE - CARDIAC, MLM
Normal rate, regular rhythm.  Heart sounds S1, S2.  No murmurs, rubs or gallops. 2+ rad pulses. no signif leg edema

## 2022-12-01 NOTE — ED ADULT NURSE NOTE - NSICDXPASTMEDICALHX_GEN_ALL_CORE_FT
PAST MEDICAL HISTORY:  H/O: HTN (hypertension)      PAST MEDICAL HISTORY:  CAD (coronary artery disease)     Diabetic vitreous hemorrhage     H/O: HTN (hypertension)     History of alcohol withdrawal delirium     Hypokalemia     Left ventricular hypertrophy     Type 2 diabetes mellitus

## 2022-12-01 NOTE — ED PROVIDER NOTE - OBJECTIVE STATEMENT
77-year-old male with history of hypertension CAD, CHF status post recent stents x3 to RCA November 17 brought in by ambulance for cough, moderate, for last 2 days associated with generalized weakness.  No chest pain.  Wife also states patient with slurred speech and at times unable to speak and confused for last 2 days.  Unknown exact onset.  No fever or chills.

## 2022-12-01 NOTE — H&P ADULT - NSICDXPASTMEDICALHX_GEN_ALL_CORE_FT
PAST MEDICAL HISTORY:  CAD (coronary artery disease)     Diabetic vitreous hemorrhage     H/O: HTN (hypertension)     History of alcohol withdrawal delirium     Hypokalemia     Left ventricular hypertrophy     Type 2 diabetes mellitus

## 2022-12-02 ENCOUNTER — APPOINTMENT (OUTPATIENT)
Dept: CARDIOLOGY | Facility: CLINIC | Age: 77
End: 2022-12-02

## 2022-12-02 LAB
A1C WITH ESTIMATED AVERAGE GLUCOSE RESULT: 6.1 % — HIGH (ref 4–5.6)
ALBUMIN SERPL ELPH-MCNC: 2.5 G/DL — LOW (ref 3.3–5)
ALP SERPL-CCNC: 134 U/L — HIGH (ref 40–120)
ALT FLD-CCNC: 29 U/L — SIGNIFICANT CHANGE UP (ref 10–45)
ANION GAP SERPL CALC-SCNC: 6 MMOL/L — SIGNIFICANT CHANGE UP (ref 5–17)
ANION GAP SERPL CALC-SCNC: 9 MMOL/L — SIGNIFICANT CHANGE UP (ref 5–17)
ANION GAP SERPL CALC-SCNC: 9 MMOL/L — SIGNIFICANT CHANGE UP (ref 5–17)
APTT BLD: 36.4 SEC — HIGH (ref 27.5–35.5)
AST SERPL-CCNC: 30 U/L — SIGNIFICANT CHANGE UP (ref 10–40)
BASOPHILS # BLD AUTO: 0.04 K/UL — SIGNIFICANT CHANGE UP (ref 0–0.2)
BASOPHILS NFR BLD AUTO: 0.4 % — SIGNIFICANT CHANGE UP (ref 0–2)
BILIRUB SERPL-MCNC: 0.6 MG/DL — SIGNIFICANT CHANGE UP (ref 0.2–1.2)
BUN SERPL-MCNC: 35 MG/DL — HIGH (ref 7–23)
BUN SERPL-MCNC: 37 MG/DL — HIGH (ref 7–23)
BUN SERPL-MCNC: 45 MG/DL — HIGH (ref 7–23)
CALCIUM SERPL-MCNC: 7.7 MG/DL — LOW (ref 8.4–10.5)
CALCIUM SERPL-MCNC: 7.8 MG/DL — LOW (ref 8.4–10.5)
CALCIUM SERPL-MCNC: 8.1 MG/DL — LOW (ref 8.4–10.5)
CHLORIDE SERPL-SCNC: 97 MMOL/L — SIGNIFICANT CHANGE UP (ref 96–108)
CHLORIDE SERPL-SCNC: 98 MMOL/L — SIGNIFICANT CHANGE UP (ref 96–108)
CHLORIDE SERPL-SCNC: 98 MMOL/L — SIGNIFICANT CHANGE UP (ref 96–108)
CHOLEST SERPL-MCNC: 74 MG/DL — SIGNIFICANT CHANGE UP
CO2 SERPL-SCNC: 25 MMOL/L — SIGNIFICANT CHANGE UP (ref 22–31)
CO2 SERPL-SCNC: 27 MMOL/L — SIGNIFICANT CHANGE UP (ref 22–31)
CO2 SERPL-SCNC: 29 MMOL/L — SIGNIFICANT CHANGE UP (ref 22–31)
CREAT SERPL-MCNC: 1.93 MG/DL — HIGH (ref 0.5–1.3)
CREAT SERPL-MCNC: 2.04 MG/DL — HIGH (ref 0.5–1.3)
CREAT SERPL-MCNC: 2.65 MG/DL — HIGH (ref 0.5–1.3)
CULTURE RESULTS: NO GROWTH — SIGNIFICANT CHANGE UP
EGFR: 24 ML/MIN/1.73M2 — LOW
EGFR: 33 ML/MIN/1.73M2 — LOW
EGFR: 36 ML/MIN/1.73M2 — LOW
EOSINOPHIL # BLD AUTO: 0.24 K/UL — SIGNIFICANT CHANGE UP (ref 0–0.5)
EOSINOPHIL NFR BLD AUTO: 2.6 % — SIGNIFICANT CHANGE UP (ref 0–6)
ESTIMATED AVERAGE GLUCOSE: 128 MG/DL — HIGH (ref 68–114)
GLUCOSE SERPL-MCNC: 123 MG/DL — HIGH (ref 70–99)
GLUCOSE SERPL-MCNC: 149 MG/DL — HIGH (ref 70–99)
GLUCOSE SERPL-MCNC: 163 MG/DL — HIGH (ref 70–99)
HCT VFR BLD CALC: 27.7 % — LOW (ref 39–50)
HDLC SERPL-MCNC: 20 MG/DL — LOW
HGB BLD-MCNC: 9.1 G/DL — LOW (ref 13–17)
IMM GRANULOCYTES NFR BLD AUTO: 0.6 % — SIGNIFICANT CHANGE UP (ref 0–0.9)
INR BLD: 2.23 RATIO — HIGH (ref 0.88–1.16)
LIPID PNL WITH DIRECT LDL SERPL: 36 MG/DL — SIGNIFICANT CHANGE UP
LYMPHOCYTES # BLD AUTO: 0.91 K/UL — LOW (ref 1–3.3)
LYMPHOCYTES # BLD AUTO: 9.7 % — LOW (ref 13–44)
MCHC RBC-ENTMCNC: 29.2 PG — SIGNIFICANT CHANGE UP (ref 27–34)
MCHC RBC-ENTMCNC: 32.9 GM/DL — SIGNIFICANT CHANGE UP (ref 32–36)
MCV RBC AUTO: 88.8 FL — SIGNIFICANT CHANGE UP (ref 80–100)
MONOCYTES # BLD AUTO: 0.67 K/UL — SIGNIFICANT CHANGE UP (ref 0–0.9)
MONOCYTES NFR BLD AUTO: 7.2 % — SIGNIFICANT CHANGE UP (ref 2–14)
NEUTROPHILS # BLD AUTO: 7.44 K/UL — HIGH (ref 1.8–7.4)
NEUTROPHILS NFR BLD AUTO: 79.5 % — HIGH (ref 43–77)
NON HDL CHOLESTEROL: 54 MG/DL — SIGNIFICANT CHANGE UP
NRBC # BLD: 0 /100 WBCS — SIGNIFICANT CHANGE UP (ref 0–0)
OSMOLALITY UR: 200 MOSM/KG — SIGNIFICANT CHANGE UP (ref 50–1200)
PLATELET # BLD AUTO: 212 K/UL — SIGNIFICANT CHANGE UP (ref 150–400)
POTASSIUM SERPL-MCNC: 3.2 MMOL/L — LOW (ref 3.5–5.3)
POTASSIUM SERPL-MCNC: 3.5 MMOL/L — SIGNIFICANT CHANGE UP (ref 3.5–5.3)
POTASSIUM SERPL-MCNC: 3.8 MMOL/L — SIGNIFICANT CHANGE UP (ref 3.5–5.3)
POTASSIUM SERPL-SCNC: 3.2 MMOL/L — LOW (ref 3.5–5.3)
POTASSIUM SERPL-SCNC: 3.5 MMOL/L — SIGNIFICANT CHANGE UP (ref 3.5–5.3)
POTASSIUM SERPL-SCNC: 3.8 MMOL/L — SIGNIFICANT CHANGE UP (ref 3.5–5.3)
PROT SERPL-MCNC: 6.6 G/DL — SIGNIFICANT CHANGE UP (ref 6–8.3)
PROTHROM AB SERPL-ACNC: 26.1 SEC — HIGH (ref 10.5–13.4)
RBC # BLD: 3.12 M/UL — LOW (ref 4.2–5.8)
RBC # FLD: 13.7 % — SIGNIFICANT CHANGE UP (ref 10.3–14.5)
SODIUM SERPL-SCNC: 131 MMOL/L — LOW (ref 135–145)
SODIUM SERPL-SCNC: 133 MMOL/L — LOW (ref 135–145)
SODIUM SERPL-SCNC: 134 MMOL/L — LOW (ref 135–145)
SPECIMEN SOURCE: SIGNIFICANT CHANGE UP
TRIGL SERPL-MCNC: 91 MG/DL — SIGNIFICANT CHANGE UP
TROPONIN I, HIGH SENSITIVITY RESULT: 190.7 NG/L — HIGH
TSH SERPL-MCNC: 0.46 UIU/ML — SIGNIFICANT CHANGE UP (ref 0.36–3.74)
VIT B12 SERPL-MCNC: 461 PG/ML — SIGNIFICANT CHANGE UP (ref 232–1245)
WBC # BLD: 9.36 K/UL — SIGNIFICANT CHANGE UP (ref 3.8–10.5)
WBC # FLD AUTO: 9.36 K/UL — SIGNIFICANT CHANGE UP (ref 3.8–10.5)

## 2022-12-02 PROCEDURE — 99233 SBSQ HOSP IP/OBS HIGH 50: CPT

## 2022-12-02 PROCEDURE — 99232 SBSQ HOSP IP/OBS MODERATE 35: CPT

## 2022-12-02 RX ORDER — OMEGA-3 ACID ETHYL ESTERS 1 G
2 CAPSULE ORAL
Qty: 0 | Refills: 0 | DISCHARGE

## 2022-12-02 RX ORDER — FUROSEMIDE 40 MG
40 TABLET ORAL DAILY
Refills: 0 | Status: DISCONTINUED | OUTPATIENT
Start: 2022-12-02 | End: 2022-12-03

## 2022-12-02 RX ADMIN — PIPERACILLIN AND TAZOBACTAM 25 GRAM(S): 4; .5 INJECTION, POWDER, LYOPHILIZED, FOR SOLUTION INTRAVENOUS at 06:12

## 2022-12-02 RX ADMIN — PIPERACILLIN AND TAZOBACTAM 25 GRAM(S): 4; .5 INJECTION, POWDER, LYOPHILIZED, FOR SOLUTION INTRAVENOUS at 21:28

## 2022-12-02 RX ADMIN — Medication 100 MILLIGRAM(S): at 06:19

## 2022-12-02 RX ADMIN — CARVEDILOL PHOSPHATE 25 MILLIGRAM(S): 80 CAPSULE, EXTENDED RELEASE ORAL at 06:19

## 2022-12-02 RX ADMIN — ISOSORBIDE DINITRATE 30 MILLIGRAM(S): 5 TABLET ORAL at 12:14

## 2022-12-02 RX ADMIN — APIXABAN 5 MILLIGRAM(S): 2.5 TABLET, FILM COATED ORAL at 06:26

## 2022-12-02 RX ADMIN — Medication 2 GRAM(S): at 18:06

## 2022-12-02 RX ADMIN — Medication 100 MILLIGRAM(S): at 21:36

## 2022-12-02 RX ADMIN — ISOSORBIDE DINITRATE 30 MILLIGRAM(S): 5 TABLET ORAL at 06:18

## 2022-12-02 RX ADMIN — Medication 40 MILLIGRAM(S): at 12:14

## 2022-12-02 RX ADMIN — Medication 81 MILLIGRAM(S): at 12:15

## 2022-12-02 RX ADMIN — Medication 100 MILLIGRAM(S): at 13:52

## 2022-12-02 RX ADMIN — Medication 200 MILLIGRAM(S): at 04:50

## 2022-12-02 RX ADMIN — Medication 40 MILLIGRAM(S): at 06:19

## 2022-12-02 RX ADMIN — PIPERACILLIN AND TAZOBACTAM 25 GRAM(S): 4; .5 INJECTION, POWDER, LYOPHILIZED, FOR SOLUTION INTRAVENOUS at 00:18

## 2022-12-02 RX ADMIN — Medication 100 MILLIGRAM(S): at 21:37

## 2022-12-02 RX ADMIN — Medication 100 MILLIGRAM(S): at 06:20

## 2022-12-02 RX ADMIN — CLOPIDOGREL BISULFATE 75 MILLIGRAM(S): 75 TABLET, FILM COATED ORAL at 12:14

## 2022-12-02 RX ADMIN — APIXABAN 5 MILLIGRAM(S): 2.5 TABLET, FILM COATED ORAL at 18:06

## 2022-12-02 RX ADMIN — Medication 2 GRAM(S): at 06:19

## 2022-12-02 RX ADMIN — CARVEDILOL PHOSPHATE 25 MILLIGRAM(S): 80 CAPSULE, EXTENDED RELEASE ORAL at 18:06

## 2022-12-02 RX ADMIN — ATORVASTATIN CALCIUM 40 MILLIGRAM(S): 80 TABLET, FILM COATED ORAL at 21:36

## 2022-12-02 RX ADMIN — PIPERACILLIN AND TAZOBACTAM 25 GRAM(S): 4; .5 INJECTION, POWDER, LYOPHILIZED, FOR SOLUTION INTRAVENOUS at 13:51

## 2022-12-02 NOTE — PHARMACOTHERAPY INTERVENTION NOTE - COMMENTS
Spoke with pt's daughter Yoselin and counseled on CHF. EF >55%. Reviewed current medications' reason for use, directions for use and possible side effects. Pt's daughter reports adherence, she helps set up patient's pill boxes. She also reports patient limits salt intake and weighs himself daily. All questions/concerns addressed. 10 minutes spent on CHF education.

## 2022-12-02 NOTE — PROGRESS NOTE ADULT - ASSESSMENT
77M with PMH HTN, CKD Stage 3B, diastolic CHF (EF 57% 11/22), CAD s/p stents x3 to RCA on 11/17/22 presents with generalized weakness and cough admitted for metabolic encephalopathy 2/2 hyponatremia and multifocal PNA.     #Acute diastolic CHF exacerbation   -Continue tele monitoring  -Continue IV Lasix daily  -Strict i&Os, daily weights  -TTE performed 11/22, no need to repeat at this time    #Multifocal PNA  -CT Chest No Cont (12.01.22 @ 09:05) >Findings suggestive of multilobar pneumonia in the appropriate clinical setting.  -Continue IV Zosyn   -Follow up blood cultures x 2  -Albuterol 1 unit neb Q6hrs ATC x 1 day then PRN  -Tessalon Perle ATC and Robitussin PO Q6hrs PRN for cough.    #Leukocytosis  Likely reactive to above  -Continue IV abx  -Follow up AM CBC    #Hyponatremia  Could be in setting of volume overload  -Continue IV Lasix  -Correct sodium at a rate of < 0.5 mEq/L/hour  -Repeat BMP q 6 hours  -Follow up UA, urine lytes, spot urine creatine protein ratio.    #Metabolic encephalopathy  Likely due to above, multifactorial  Risk factors include: age, acute medical illness, recent hospitalization, acute electrolyte imbalances, acute renal dysfunction, underlying cognitive impairment  -CT head performed: chronic ischemic changes in both hemispheres with volume loss  -TSH, B12 within range    #CAD s/p 3DES to RCA (11/17)  Elevated troponin with recent PCI  -TTE (11/12/22) EF 57%, Normal LVSF. Stage II DD. Calcified AV  -Continue with ASA, Plavix  -Continue Lipitor, Lovaza  -Continue Coreg  -Continue Hydralazine, Isordil    #p A fib  -Continue AC with Eliquis  -Continue rate control with Coreg    #CKD IIIb  -Baseline creatinine 1.9 (5/2022)  -Monitor Cr while on Lasix  -Avoid other nephrotoxic medications  -Follow up AM BMP    #Normocytic Anemia  -Likely anemia of chronic disease  -H/H no overt signs of bleeding  -Follow up AM CBC    #HTN  -Continue Coreg, Hydralazine, Isordil  -Monitor vitals    #HLD  -Continue Lipitor, Lovaza    #Prophylactic Measure  -DVT ppx: on Eliquis  -PT evaluation    daughter Yoselin [phone] 77M with PMH HTN, CKD Stage 3B, diastolic CHF (EF 57% 11/22), CAD s/p stents x3 to RCA on 11/17/22 presents with generalized weakness and cough admitted for metabolic encephalopathy 2/2 hyponatremia and multifocal PNA.     #Acute diastolic CHF exacerbation   -Continue tele monitoring  -Continue IV Lasix daily  -Strict i&Os, daily weights  -TTE performed 11/22, no need to repeat at this time    #Multifocal PNA  -CT Chest No Cont (12.01.22 @ 09:05) >Findings suggestive of multilobar pneumonia in the appropriate clinical setting.  -Continue IV Zosyn   -Follow up blood cultures x 2  -Albuterol 1 unit neb Q6hrs ATC x 1 day then PRN  -Tessalon Perle ATC and Robitussin PO Q6hrs PRN for cough  -Repeat CT chest in 6 weeks outpatient    #Leukocytosis  Likely reactive to above  -Continue IV abx  -Follow up AM CBC    #Hyponatremia  Could be in setting of volume overload  -Continue IV Lasix  -Correct sodium at a rate of < 0.5 mEq/L/hour  -Repeat BMP q 6 hours  -Follow up UA, urine lytes, spot urine creatine protein ratio.    #Metabolic encephalopathy  Likely due to above, multifactorial  Risk factors include: age, acute medical illness, recent hospitalization, acute electrolyte imbalances, acute renal dysfunction, underlying cognitive impairment  -CT head performed: chronic ischemic changes in both hemispheres with volume loss  -TSH, B12 within range    #CAD s/p 3DES to RCA (11/17)  Elevated troponin with recent PCI  -TTE (11/12/22) EF 57%, Normal LVSF. Stage II DD. Calcified AV  -Continue with ASA, Plavix  -Continue Lipitor, Lovaza  -Continue Coreg  -Continue Hydralazine, Isordil    #p A fib  -Continue AC with Eliquis  -Continue rate control with Coreg    #CKD IIIb  -Baseline creatinine 1.9 (5/2022)  -Monitor Cr while on Lasix  -Avoid other nephrotoxic medications  -Follow up AM BMP    #Normocytic Anemia  -Likely anemia of chronic disease  -H/H no overt signs of bleeding  -Follow up AM CBC    #HTN  -Continue Coreg, Hydralazine, Isordil  -Monitor vitals    #HLD  -Continue Lipitor, Lovaza    #Prophylactic Measure  -DVT ppx: on Eliquis  -PT evaluation    Discussed with daughter Yoselin [phone] aware and in agreement with above

## 2022-12-03 LAB
ANION GAP SERPL CALC-SCNC: 8 MMOL/L — SIGNIFICANT CHANGE UP (ref 5–17)
BUN SERPL-MCNC: 44 MG/DL — HIGH (ref 7–23)
CALCIUM SERPL-MCNC: 7.7 MG/DL — LOW (ref 8.4–10.5)
CHLORIDE SERPL-SCNC: 98 MMOL/L — SIGNIFICANT CHANGE UP (ref 96–108)
CO2 SERPL-SCNC: 29 MMOL/L — SIGNIFICANT CHANGE UP (ref 22–31)
CREAT SERPL-MCNC: 2.56 MG/DL — HIGH (ref 0.5–1.3)
EGFR: 25 ML/MIN/1.73M2 — LOW
GLUCOSE SERPL-MCNC: 132 MG/DL — HIGH (ref 70–99)
HCT VFR BLD CALC: 27.1 % — LOW (ref 39–50)
HGB BLD-MCNC: 8.8 G/DL — LOW (ref 13–17)
MAGNESIUM SERPL-MCNC: 2 MG/DL — SIGNIFICANT CHANGE UP (ref 1.6–2.6)
MCHC RBC-ENTMCNC: 28.8 PG — SIGNIFICANT CHANGE UP (ref 27–34)
MCHC RBC-ENTMCNC: 32.5 GM/DL — SIGNIFICANT CHANGE UP (ref 32–36)
MCV RBC AUTO: 88.6 FL — SIGNIFICANT CHANGE UP (ref 80–100)
NRBC # BLD: 0 /100 WBCS — SIGNIFICANT CHANGE UP (ref 0–0)
PLATELET # BLD AUTO: 230 K/UL — SIGNIFICANT CHANGE UP (ref 150–400)
POTASSIUM SERPL-MCNC: 3.1 MMOL/L — LOW (ref 3.5–5.3)
POTASSIUM SERPL-SCNC: 3.1 MMOL/L — LOW (ref 3.5–5.3)
RBC # BLD: 3.06 M/UL — LOW (ref 4.2–5.8)
RBC # FLD: 14.1 % — SIGNIFICANT CHANGE UP (ref 10.3–14.5)
SODIUM SERPL-SCNC: 135 MMOL/L — SIGNIFICANT CHANGE UP (ref 135–145)
TROPONIN I, HIGH SENSITIVITY RESULT: 136.4 NG/L — HIGH
WBC # BLD: 12.36 K/UL — HIGH (ref 3.8–10.5)
WBC # FLD AUTO: 12.36 K/UL — HIGH (ref 3.8–10.5)

## 2022-12-03 PROCEDURE — 73560 X-RAY EXAM OF KNEE 1 OR 2: CPT | Mod: 26,RT

## 2022-12-03 PROCEDURE — 99232 SBSQ HOSP IP/OBS MODERATE 35: CPT

## 2022-12-03 PROCEDURE — 93010 ELECTROCARDIOGRAM REPORT: CPT

## 2022-12-03 PROCEDURE — 99233 SBSQ HOSP IP/OBS HIGH 50: CPT

## 2022-12-03 RX ORDER — POTASSIUM CHLORIDE 20 MEQ
40 PACKET (EA) ORAL EVERY 4 HOURS
Refills: 0 | Status: COMPLETED | OUTPATIENT
Start: 2022-12-03 | End: 2022-12-03

## 2022-12-03 RX ORDER — ALBUTEROL 90 UG/1
2.5 AEROSOL, METERED ORAL ONCE
Refills: 0 | Status: DISCONTINUED | OUTPATIENT
Start: 2022-12-03 | End: 2022-12-03

## 2022-12-03 RX ORDER — LIDOCAINE 4 G/100G
1 CREAM TOPICAL DAILY
Refills: 0 | Status: DISCONTINUED | OUTPATIENT
Start: 2022-12-03 | End: 2022-12-05

## 2022-12-03 RX ORDER — OXYCODONE HYDROCHLORIDE 5 MG/1
5 TABLET ORAL ONCE
Refills: 0 | Status: DISCONTINUED | OUTPATIENT
Start: 2022-12-03 | End: 2022-12-03

## 2022-12-03 RX ORDER — ALBUTEROL 90 UG/1
2.5 AEROSOL, METERED ORAL ONCE
Refills: 0 | Status: COMPLETED | OUTPATIENT
Start: 2022-12-03 | End: 2022-12-03

## 2022-12-03 RX ORDER — ALBUTEROL 90 UG/1
2 AEROSOL, METERED ORAL EVERY 6 HOURS
Refills: 0 | Status: DISCONTINUED | OUTPATIENT
Start: 2022-12-03 | End: 2022-12-05

## 2022-12-03 RX ADMIN — ALBUTEROL 2.5 MILLIGRAM(S): 90 AEROSOL, METERED ORAL at 15:55

## 2022-12-03 RX ADMIN — Medication 2 GRAM(S): at 17:59

## 2022-12-03 RX ADMIN — Medication 100 MILLIGRAM(S): at 05:29

## 2022-12-03 RX ADMIN — OXYCODONE HYDROCHLORIDE 5 MILLIGRAM(S): 5 TABLET ORAL at 15:24

## 2022-12-03 RX ADMIN — Medication 2 GRAM(S): at 05:30

## 2022-12-03 RX ADMIN — Medication 100 MILLIGRAM(S): at 21:14

## 2022-12-03 RX ADMIN — ATORVASTATIN CALCIUM 40 MILLIGRAM(S): 80 TABLET, FILM COATED ORAL at 21:13

## 2022-12-03 RX ADMIN — Medication 100 MILLIGRAM(S): at 15:24

## 2022-12-03 RX ADMIN — CLOPIDOGREL BISULFATE 75 MILLIGRAM(S): 75 TABLET, FILM COATED ORAL at 11:24

## 2022-12-03 RX ADMIN — Medication 650 MILLIGRAM(S): at 13:05

## 2022-12-03 RX ADMIN — OXYCODONE HYDROCHLORIDE 5 MILLIGRAM(S): 5 TABLET ORAL at 16:24

## 2022-12-03 RX ADMIN — Medication 650 MILLIGRAM(S): at 14:05

## 2022-12-03 RX ADMIN — APIXABAN 5 MILLIGRAM(S): 2.5 TABLET, FILM COATED ORAL at 17:59

## 2022-12-03 RX ADMIN — CARVEDILOL PHOSPHATE 25 MILLIGRAM(S): 80 CAPSULE, EXTENDED RELEASE ORAL at 05:30

## 2022-12-03 RX ADMIN — Medication 40 MILLIEQUIVALENT(S): at 10:01

## 2022-12-03 RX ADMIN — ISOSORBIDE DINITRATE 30 MILLIGRAM(S): 5 TABLET ORAL at 05:30

## 2022-12-03 RX ADMIN — ISOSORBIDE DINITRATE 30 MILLIGRAM(S): 5 TABLET ORAL at 11:24

## 2022-12-03 RX ADMIN — Medication 40 MILLIGRAM(S): at 05:39

## 2022-12-03 RX ADMIN — ISOSORBIDE DINITRATE 30 MILLIGRAM(S): 5 TABLET ORAL at 17:59

## 2022-12-03 RX ADMIN — PIPERACILLIN AND TAZOBACTAM 25 GRAM(S): 4; .5 INJECTION, POWDER, LYOPHILIZED, FOR SOLUTION INTRAVENOUS at 05:22

## 2022-12-03 RX ADMIN — PIPERACILLIN AND TAZOBACTAM 25 GRAM(S): 4; .5 INJECTION, POWDER, LYOPHILIZED, FOR SOLUTION INTRAVENOUS at 15:24

## 2022-12-03 RX ADMIN — ALBUTEROL 2 PUFF(S): 90 AEROSOL, METERED ORAL at 10:59

## 2022-12-03 RX ADMIN — Medication 100 MILLIGRAM(S): at 21:13

## 2022-12-03 RX ADMIN — Medication 3 MILLIGRAM(S): at 21:13

## 2022-12-03 RX ADMIN — APIXABAN 5 MILLIGRAM(S): 2.5 TABLET, FILM COATED ORAL at 05:29

## 2022-12-03 RX ADMIN — Medication 650 MILLIGRAM(S): at 06:46

## 2022-12-03 RX ADMIN — Medication 81 MILLIGRAM(S): at 11:24

## 2022-12-03 RX ADMIN — LIDOCAINE 1 PATCH: 4 CREAM TOPICAL at 19:16

## 2022-12-03 RX ADMIN — Medication 40 MILLIEQUIVALENT(S): at 15:25

## 2022-12-03 RX ADMIN — PIPERACILLIN AND TAZOBACTAM 25 GRAM(S): 4; .5 INJECTION, POWDER, LYOPHILIZED, FOR SOLUTION INTRAVENOUS at 21:13

## 2022-12-03 RX ADMIN — LIDOCAINE 1 PATCH: 4 CREAM TOPICAL at 13:04

## 2022-12-03 RX ADMIN — ALBUTEROL 2 PUFF(S): 90 AEROSOL, METERED ORAL at 21:34

## 2022-12-03 RX ADMIN — CARVEDILOL PHOSPHATE 25 MILLIGRAM(S): 80 CAPSULE, EXTENDED RELEASE ORAL at 17:59

## 2022-12-03 RX ADMIN — Medication 200 MILLIGRAM(S): at 11:24

## 2022-12-03 NOTE — PROGRESS NOTE ADULT - ASSESSMENT
77M with PMH HTN, CKD Stage 3B, diastolic CHF (EF 57% 11/22), CAD s/p stents x3 to RCA on 11/17/22 presents with generalized weakness and cough admitted for metabolic encephalopathy 2/2 hyponatremia, multifocal PNA, and acute diastolic CHF exacerbation.     #Acute diastolic CHF exacerbation   #CAD s/p 3DES to RCA (11/17)  Elevated troponin with recent PCI  -Continue tele monitoring  -Continue IV Lasix daily  -Strict I&Os, daily weights  -TTE (11/12/22) EF 57%, Normal LVSF. Stage II DD. Calcified AV  -Continue with ASA, Plavix  -Continue Lipitor, Lovaza  -Continue Coreg  -Continue Hydralazine, Isordil    #Multifocal PNA  -CT Chest No Cont (12.01.22 @ 09:05) >Findings suggestive of multilobar pneumonia in the appropriate clinical setting.  -Continue IV Zosyn   -Blood cultures x 2 - no growth  -Continue Albuterol q 6 hours standing  -Continue Tessalon Perle TID and Robitussin PO Q6hrs PRN for cough  -Repeat CT chest in 6 weeks outpatient    #Leukocytosis  Likely reactive to above  -Continue IV abx  -Follow up AM CBC    #Hyponatremia  Could be in setting of volume overload, improving  -Continue IV Lasix  -UA, urine lytes, spot urine creatine protein ratio performed    #Metabolic encephalopathy  Likely due to above, multifactorial  Risk factors include: age, acute medical illness, recent hospitalization, acute electrolyte imbalances, acute renal dysfunction, underlying cognitive impairment  -CT head performed: chronic ischemic changes in both hemispheres with volume loss  -TSH, B12 within range    #p A fib  -Continue AC with Eliquis  -Continue rate control with Coreg    #CKD IIIb  -Baseline creatinine 1.9 (5/2022)  -Monitor Cr while on Lasix  -Avoid other nephrotoxic medications  -Follow up AM BMP    #Normocytic Anemia  -Likely anemia of chronic disease  -H/H no overt signs of bleeding  -Follow up AM CBC    #HTN  -Continue Coreg, Hydralazine, Isordil  -Monitor vitals    #HLD  -Continue Lipitor, Lovaza    #Prophylactic Measure  -DVT ppx: on Eliquis  -PT evaluation    daughter Yoselin [phone]  77M with PMH HTN, CKD Stage 3B, diastolic CHF (EF 57% ), CAD s/p stents x3 to RCA on 22 presents with generalized weakness and cough admitted for metabolic encephalopathy 2/2 hyponatremia, multifocal PNA, and acute diastolic CHF exacerbation.     #Acute diastolic CHF exacerbation   #CAD s/p 3DES to RCA ()  Elevated troponin with recent PCI  -Continue tele monitoring  -Hold IV Lasix  -Strict I&Os, daily weights  Daily Weight in k.4 (03 Dec 2022 05:48)  -TTE (22) EF 57%, Normal LVSF. Stage II DD. Calcified AV  -Continue with ASA, Plavix  -Continue Lipitor, Lovaza  -Continue Coreg  -Continue Hydralazine, Isordil  -Repeat chest x-ray in AM    #Multifocal PNA  -CT Chest No Cont (22 @ 09:05) >Findings suggestive of multilobar pneumonia in the appropriate clinical setting.  -Continue IV Zosyn limit to 5 days  -Blood cultures x 2 - no growth  -Continue Albuterol q 6 hours standing  -Continue Tessalon Perle TID and Robitussin PO Q6hrs PRN for cough  -Repeat CT chest in 6 weeks outpatient    #Leukocytosis  Likely reactive to above  -Continue IV abx  -Follow up AM CBC    #Hyponatremia  Could be in setting of volume overload, resolving  -UA, urine lytes, spot urine creatine protein ratio performed  -Follow up AM BMP    #Metabolic encephalopathy  Likely due to above, multifactorial  Risk factors include: age, acute medical illness, recent hospitalization, acute electrolyte imbalances, acute renal dysfunction, underlying cognitive impairment  -CT head performed: chronic ischemic changes in both hemispheres with volume loss  -TSH, B12 within range    #p A fib  -Continue AC with Eliquis  -Continue rate control with Coreg    #BRUNILDA on CKD IIIb  -Baseline creatinine 1.9 (2022)  Likely due to Lasix use  -Avoid other nephrotoxic medications, hold Lasix  -Follow up AM BMP    #Normocytic Anemia  -Likely anemia of chronic disease  -H/H no overt signs of bleeding  -Follow up AM CBC    #HTN  -Continue Coreg, Hydralazine, Isordil  -Monitor vitals    #HLD  -Continue Lipitor, Lovaza    #Right knee pain  -Check R knee x-ray  -Lidocaine patch    #Prophylactic Measure  -DVT ppx: on Eliquis  -PT evaluation    Discussed with daughter Yoselin [phone] aware and in agreement with above

## 2022-12-04 LAB
ANION GAP SERPL CALC-SCNC: 13 MMOL/L — SIGNIFICANT CHANGE UP (ref 5–17)
BUN SERPL-MCNC: 58 MG/DL — HIGH (ref 7–23)
CALCIUM SERPL-MCNC: 8.3 MG/DL — LOW (ref 8.4–10.5)
CHLORIDE SERPL-SCNC: 99 MMOL/L — SIGNIFICANT CHANGE UP (ref 96–108)
CO2 SERPL-SCNC: 24 MMOL/L — SIGNIFICANT CHANGE UP (ref 22–31)
CREAT SERPL-MCNC: 3.64 MG/DL — HIGH (ref 0.5–1.3)
EGFR: 17 ML/MIN/1.73M2 — LOW
GLUCOSE SERPL-MCNC: 156 MG/DL — HIGH (ref 70–99)
HCT VFR BLD CALC: 27.2 % — LOW (ref 39–50)
HGB BLD-MCNC: 9 G/DL — LOW (ref 13–17)
MCHC RBC-ENTMCNC: 29 PG — SIGNIFICANT CHANGE UP (ref 27–34)
MCHC RBC-ENTMCNC: 33.1 GM/DL — SIGNIFICANT CHANGE UP (ref 32–36)
MCV RBC AUTO: 87.7 FL — SIGNIFICANT CHANGE UP (ref 80–100)
NRBC # BLD: 0 /100 WBCS — SIGNIFICANT CHANGE UP (ref 0–0)
PLATELET # BLD AUTO: 237 K/UL — SIGNIFICANT CHANGE UP (ref 150–400)
POTASSIUM SERPL-MCNC: 3.7 MMOL/L — SIGNIFICANT CHANGE UP (ref 3.5–5.3)
POTASSIUM SERPL-SCNC: 3.7 MMOL/L — SIGNIFICANT CHANGE UP (ref 3.5–5.3)
RBC # BLD: 3.1 M/UL — LOW (ref 4.2–5.8)
RBC # FLD: 14.3 % — SIGNIFICANT CHANGE UP (ref 10.3–14.5)
SODIUM SERPL-SCNC: 136 MMOL/L — SIGNIFICANT CHANGE UP (ref 135–145)
WBC # BLD: 11.9 K/UL — HIGH (ref 3.8–10.5)
WBC # FLD AUTO: 11.9 K/UL — HIGH (ref 3.8–10.5)

## 2022-12-04 PROCEDURE — 71045 X-RAY EXAM CHEST 1 VIEW: CPT | Mod: 26

## 2022-12-04 PROCEDURE — 99233 SBSQ HOSP IP/OBS HIGH 50: CPT

## 2022-12-04 RX ADMIN — Medication 2 GRAM(S): at 05:23

## 2022-12-04 RX ADMIN — ALBUTEROL 2 PUFF(S): 90 AEROSOL, METERED ORAL at 09:30

## 2022-12-04 RX ADMIN — LIDOCAINE 1 PATCH: 4 CREAM TOPICAL at 11:57

## 2022-12-04 RX ADMIN — PIPERACILLIN AND TAZOBACTAM 25 GRAM(S): 4; .5 INJECTION, POWDER, LYOPHILIZED, FOR SOLUTION INTRAVENOUS at 05:23

## 2022-12-04 RX ADMIN — ALBUTEROL 2 PUFF(S): 90 AEROSOL, METERED ORAL at 20:59

## 2022-12-04 RX ADMIN — Medication 2 GRAM(S): at 17:24

## 2022-12-04 RX ADMIN — Medication 100 MILLIGRAM(S): at 21:42

## 2022-12-04 RX ADMIN — LIDOCAINE 1 PATCH: 4 CREAM TOPICAL at 19:15

## 2022-12-04 RX ADMIN — ISOSORBIDE DINITRATE 30 MILLIGRAM(S): 5 TABLET ORAL at 11:57

## 2022-12-04 RX ADMIN — ISOSORBIDE DINITRATE 30 MILLIGRAM(S): 5 TABLET ORAL at 05:23

## 2022-12-04 RX ADMIN — Medication 100 MILLIGRAM(S): at 05:24

## 2022-12-04 RX ADMIN — PIPERACILLIN AND TAZOBACTAM 25 GRAM(S): 4; .5 INJECTION, POWDER, LYOPHILIZED, FOR SOLUTION INTRAVENOUS at 21:43

## 2022-12-04 RX ADMIN — PIPERACILLIN AND TAZOBACTAM 25 GRAM(S): 4; .5 INJECTION, POWDER, LYOPHILIZED, FOR SOLUTION INTRAVENOUS at 15:09

## 2022-12-04 RX ADMIN — ISOSORBIDE DINITRATE 30 MILLIGRAM(S): 5 TABLET ORAL at 17:25

## 2022-12-04 RX ADMIN — CLOPIDOGREL BISULFATE 75 MILLIGRAM(S): 75 TABLET, FILM COATED ORAL at 11:57

## 2022-12-04 RX ADMIN — Medication 100 MILLIGRAM(S): at 15:04

## 2022-12-04 RX ADMIN — ALBUTEROL 2 PUFF(S): 90 AEROSOL, METERED ORAL at 05:24

## 2022-12-04 RX ADMIN — Medication 100 MILLIGRAM(S): at 15:05

## 2022-12-04 RX ADMIN — CARVEDILOL PHOSPHATE 25 MILLIGRAM(S): 80 CAPSULE, EXTENDED RELEASE ORAL at 17:25

## 2022-12-04 RX ADMIN — Medication 81 MILLIGRAM(S): at 11:57

## 2022-12-04 RX ADMIN — LIDOCAINE 1 PATCH: 4 CREAM TOPICAL at 23:30

## 2022-12-04 RX ADMIN — LIDOCAINE 1 PATCH: 4 CREAM TOPICAL at 02:04

## 2022-12-04 RX ADMIN — APIXABAN 5 MILLIGRAM(S): 2.5 TABLET, FILM COATED ORAL at 17:25

## 2022-12-04 RX ADMIN — ATORVASTATIN CALCIUM 40 MILLIGRAM(S): 80 TABLET, FILM COATED ORAL at 21:42

## 2022-12-04 RX ADMIN — Medication 100 MILLIGRAM(S): at 05:23

## 2022-12-04 RX ADMIN — APIXABAN 5 MILLIGRAM(S): 2.5 TABLET, FILM COATED ORAL at 05:23

## 2022-12-04 NOTE — PHYSICAL THERAPY INITIAL EVALUATION ADULT - ADDITIONAL COMMENTS
Pt lives in private house with wife, daughter and son in law. 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 Ambulates independently with cane. Family assist with ADLs/iADLS

## 2022-12-04 NOTE — DIETITIAN INITIAL EVALUATION ADULT - PERTINENT MEDS FT
MEDICATIONS  (STANDING):  albuterol    90 MICROgram(s) HFA Inhaler 2 Puff(s) Inhalation every 6 hours  apixaban 5 milliGRAM(s) Oral every 12 hours  aspirin enteric coated 81 milliGRAM(s) Oral daily  atorvastatin 40 milliGRAM(s) Oral at bedtime  benzonatate 100 milliGRAM(s) Oral three times a day  carvedilol 25 milliGRAM(s) Oral every 12 hours  clopidogrel Tablet 75 milliGRAM(s) Oral daily  hydrALAZINE 100 milliGRAM(s) Oral every 8 hours  isosorbide   dinitrate Tablet (ISORDIL) 30 milliGRAM(s) Oral three times a day  lidocaine   4% Patch 1 Patch Transdermal daily  omega-3-Acid Ethyl Esters 2 Gram(s) Oral two times a day  piperacillin/tazobactam IVPB.. 3.375 Gram(s) IV Intermittent every 8 hours    MEDICATIONS  (PRN):  acetaminophen     Tablet .. 650 milliGRAM(s) Oral every 6 hours PRN Temp greater or equal to 38C (100.4F), Mild Pain (1 - 3)  guaiFENesin Oral Liquid (Sugar-Free) 200 milliGRAM(s) Oral every 6 hours PRN Cough  melatonin 3 milliGRAM(s) Oral at bedtime PRN Insomnia

## 2022-12-04 NOTE — PHYSICAL THERAPY INITIAL EVALUATION ADULT - NSPTDMEREC_GEN_A_CORE
The patient has a mobility limitation that significantly impairs their ability to perform Mobility Related Activities of Daily Living (MRADLs) in the home. The patient can safely use the rolling walker and their functional mobility deficit is sufficiently resolved with use of a rolling walker./rolling walker

## 2022-12-04 NOTE — DIETITIAN INITIAL EVALUATION ADULT - ADD RECOMMEND
Honor pt's food preferences as feasible with prescribed diet.   Obtain and record PO intake and weights daily  Monitor daily weights trend secondary to acute exacerbated CHF, and CKD stage 3   Honor pt's food preferences as feasible with prescribed diet.   Obtain and record PO intake and weights daily

## 2022-12-04 NOTE — DIETITIAN INITIAL EVALUATION ADULT - ORAL INTAKE PTA/DIET HISTORY
Pt reported consuming a regular diet PTA, does not eat red meats, or turkey, mostly eats chicken, or eggs or yogurt for protein. Pt usually eats ~2 meals a day, stated the last couple of weeks had lost his appetite, but compensates w/ Ensure supplement, 1 or 2 per day.

## 2022-12-04 NOTE — PROGRESS NOTE ADULT - ASSESSMENT
77M with PMH HTN, CKD Stage 3B, diastolic CHF (EF 57% ), CAD s/p stents x3 to RCA on 22 presents with generalized weakness and cough admitted for metabolic encephalopathy 2/2 hyponatremia, multifocal PNA, and acute diastolic CHF exacerbation.     #Acute diastolic CHF exacerbation   #CAD s/p 3DES to RCA ()  Elevated troponin with recent PCI  -Continue tele monitoring  -Hold IV Lasix  -Strict I&Os, daily weights  Daily Weight in k.4 (03 Dec 2022 05:48)  Daily Weight in k.7 (04 Dec 2022 05:39)  -TTE (22) EF 57%, Normal LVSF. Stage II DD. Calcified AV  -Continue with ASA, Plavix  -Continue Lipitor, Lovaza  -Continue Coreg  -Continue Hydralazine, Isordil  -Repeat chest x-ray in AM    #Multifocal PNA  -CT Chest No Cont (22 @ 09:05) >Findings suggestive of multilobar pneumonia in the appropriate clinical setting.  -Continue IV Zosyn limit to 5 days  -Blood cultures x 2 - no growth  -Continue Albuterol q 6 hours standing  -Continue Tessalon Perle TID and Robitussin PO Q6hrs PRN for cough  -Repeat CT chest in 6 weeks outpatient    #Leukocytosis  Likely reactive to above, downtrending  -Continue IV abx  -Follow up AM CBC    #Hyponatremia  Could be in setting of volume overload, resolving  -UA, urine lytes, spot urine creatine protein ratio performed  -Follow up AM BMP    #Metabolic encephalopathy  Likely due to above, multifactorial  Risk factors include: age, acute medical illness, recent hospitalization, acute electrolyte imbalances, acute renal dysfunction, underlying cognitive impairment  -CT head performed: chronic ischemic changes in both hemispheres with volume loss  -TSH, B12 within range    #p A fib  -Continue AC with Eliquis  -Continue rate control with Coreg    #BRUNILDA on CKD IIIb  -Baseline creatinine 1.9 (2022)  Likely due to Lasix use  -Avoid other nephrotoxic medications, hold Lasix  -Follow up AM BMP    #Normocytic Anemia  -Likely anemia of chronic disease  -H/H no overt signs of bleeding  -Follow up AM CBC    #HTN  -Continue Coreg, Hydralazine, Isordil  -Monitor vitals    #HLD  -Continue Lipitor, Lovaza    #Right knee pain  -Check R knee x-ray  -Lidocaine patch    #Prophylactic Measure  -DVT ppx: on Eliquis  -PT evaluation    daughter Yoselin [phone]     77M with PMH HTN, CKD Stage 3B, diastolic CHF (EF 57% ), CAD s/p stents x3 to RCA on 22 presents with generalized weakness and cough admitted for metabolic encephalopathy 2/2 hyponatremia, multifocal PNA, and acute diastolic CHF exacerbation.     #Acute diastolic CHF exacerbation   #CAD s/p 3DES to RCA ()  Elevated troponin with recent PCI  -Continue tele monitoring  -Hold IV Lasix  -Strict I&Os, daily weights  Daily Weight in k.4 (03 Dec 2022 05:48)  Daily Weight in k.7 (04 Dec 2022 05:39)  -TTE (22) EF 57%, Normal LVSF. Stage II DD. Calcified AV  -Continue with ASA, Plavix  -Continue Lipitor, Lovaza  -Continue Coreg  -Continue Hydralazine, Isordil  -Repeat chest x-ray in AM    #Multifocal PNA  -CT Chest No Cont (22 @ 09:05) >Findings suggestive of multilobar pneumonia in the appropriate clinical setting.  -Continue IV Zosyn limit to 5 days  -Blood cultures x 2 - no growth  -Continue Albuterol q 6 hours standing  -Continue Tessalon Perle TID and Robitussin PO Q6hrs PRN for cough  -Repeat CT chest in 6 weeks outpatient    #Leukocytosis  Likely reactive to above, downtrending  -Continue IV abx  -Follow up AM CBC    #Hyponatremia  Could be in setting of volume overload, resolving  -UA, urine lytes, spot urine creatine protein ratio performed  -Follow up AM BMP    #Metabolic encephalopathy  Likely due to above, multifactorial  Risk factors include: age, acute medical illness, recent hospitalization, acute electrolyte imbalances, acute renal dysfunction, underlying cognitive impairment  -CT head performed: chronic ischemic changes in both hemispheres with volume loss  -TSH, B12 within range    #p A fib  -Continue AC with Eliquis  -Continue rate control with Coreg    #BRUNILDA on CKD IIIb  -Baseline creatinine 1.9 (2022)  Likely due to Lasix use  -Avoid other nephrotoxic medications, hold Lasix  -Follow up AM BMP    #Normocytic Anemia  -Likely anemia of chronic disease  -H/H no overt signs of bleeding  -Follow up AM CBC    #HTN  -Continue Coreg, Hydralazine, Isordil  -Monitor vitals    #HLD  -Continue Lipitor, Lovaza    #Right knee pain  -Check R knee x-ray  -Lidocaine patch    #Prophylactic Measure  -DVT ppx: on Eliquis  -PT evaluation    Discussed with daughter Yoselin [phone] aware and in agreement with above.     77M with PMH HTN, CKD Stage 3B, diastolic CHF (EF 57% ), CAD s/p stents x3 to RCA on 22 presents with generalized weakness and cough admitted for metabolic encephalopathy 2/2 hyponatremia, multifocal PNA, and acute diastolic CHF exacerbation.     #Acute diastolic CHF exacerbation   #CAD s/p 3DES to RCA ()  Elevated troponin with recent PCI  -Continue tele monitoring  -Continue to hold Lasix  -Strict I&Os, daily weights  Daily Weight in k.4 (03 Dec 2022 05:48)  Daily Weight in k.7 (04 Dec 2022 05:39)  -TTE (22) EF 57%, Normal LVSF. Stage II DD. Calcified AV  -Continue with ASA, Plavix  -Continue Lipitor, Lovaza  -Continue Coreg  -Continue Hydralazine, Isordil  -Chest x-ray personally reviewed  - increased markings bilaterally improved from previous    #Multifocal PNA  -CT Chest No Cont (22 @ 09:05) >Findings suggestive of multilobar pneumonia in the appropriate clinical setting.  -Continue IV Zosyn limit to 5 days  -Blood cultures x 2 - no growth  -Continue Albuterol q 6 hours standing  -Continue Tessalon Perle TID and Robitussin PO Q6hrs PRN for cough  -Repeat CT chest in 6 weeks outpatient    #BRUNILDA on CKD IIIb  -Baseline creatinine 1.9 (2022)  Likely due to Lasix use  -Avoid other nephrotoxic medications, continue to hold Lasix  -Follow up AM BMP    #Leukocytosis  Likely reactive to above, downtrending  -Continue IV abx  -Follow up AM CBC    #Hyponatremia  Could be in setting of volume overload, resolving  -UA, urine lytes, spot urine creatine protein ratio performed  -Follow up AM BMP    #Metabolic encephalopathy  Likely due to above, multifactorial  Risk factors include: age, acute medical illness, recent hospitalization, acute electrolyte imbalances, acute renal dysfunction, underlying cognitive impairment  -CT head performed: chronic ischemic changes in both hemispheres with volume loss  -TSH, B12 within range    #p A fib  -Continue AC with Eliquis  -Continue rate control with Coreg    #Normocytic Anemia  -Likely anemia of chronic disease  -H/H no overt signs of bleeding  -Follow up AM CBC    #HTN  -Continue Coreg, Hydralazine, Isordil  -Monitor vitals    #HLD  -Continue Lipitor, Lovaza    #Right knee pain  -R knee x-ray reviewed, moderate degenerative changes noted  -Lidocaine patch  -Continue pain control    #Prophylactic Measure  -DVT ppx: on Eliquis  -PT evaluation    Discussed with daughter Yoselin [phone] aware and in agreement with above.

## 2022-12-04 NOTE — DIETITIAN INITIAL EVALUATION ADULT - PERTINENT LABORATORY DATA
12-04    136  |  99  |  58<H>  ----------------------------<  156<H>  3.7   |  24  |  3.64<H>    Ca    8.3<L>      04 Dec 2022 06:45  Mg     2.0     12-03    A1C with Estimated Average Glucose Result: 6.1 % (12-02-22 @ 06:27)  A1C with Estimated Average Glucose Result: 6.4 % (11-12-22 @ 05:29)

## 2022-12-04 NOTE — DIETITIAN INITIAL EVALUATION ADULT - OTHER INFO
H&P: 77M with PMH HTN, CKD Stage 3B, diastolic CHF (EF 57% 11/22), CAD s/p stents x3 to RCA on 11/17/22 presents with generalized weakness and cough admitted for metabolic encephalopathy 2/2 hyponatremia and multifocal PNA, Acute diastolic CHF exacerbation.    Pt reports fair appetite. PO intake 51-75% as per chart. Pt feeds self, reports no chewing/swallowing difficulties, however prefers softer foods due to poor dentition. NKFA. Denies N/V/C/D. Last BM: 12/03. UBW: 170 Lbs, current weight 169 Lbs. No edema. Skin WDL. 12/03 labs: Na 135 (L), K 3.1 (L), KCl supplementation provided, BUN 44 (H), Cr 2.56 (H), eGFR 25 (L), Glu 132 (H), Alb 2.5 (L), HgA1C 6.3 (H). Food preferences updated. No muscle/fat loss visually noted.  H&P: 77M with PMH HTN, CKD Stage 3B, diastolic CHF (EF 57% 11/22), CAD s/p stents x3 to RCA on 11/17/22 presents with generalized weakness and cough admitted for metabolic encephalopathy 2/2 hyponatremia and multifocal PNA, Acute diastolic CHF exacerbation.    Interview conducted in [place], by Dietitian fluent in this language. Pt reports fair appetite. PO intake 51-75% as per chart. Pt feeds self, reports no chewing/swallowing difficulties, however prefers softer foods due to poor dentition. NKFA. Denies N/V/C/D. Last BM: 12/03. UBW: 170 Lbs, current weight 169 Lbs. No edema. Skin WDL. 12/03 labs: Na 135 (L), K 3.1 (L), KCl supplementation provided, BUN 44 (H), Cr 2.56 (H), eGFR 25 (L), Glu 132 (H), Alb 2.5 (L), HgA1C 6.3 (H). Food preferences updated. No muscle/fat loss visually noted.

## 2022-12-05 ENCOUNTER — TRANSCRIPTION ENCOUNTER (OUTPATIENT)
Age: 77
End: 2022-12-05

## 2022-12-05 VITALS — OXYGEN SATURATION: 98 %

## 2022-12-05 LAB
ANION GAP SERPL CALC-SCNC: 9 MMOL/L — SIGNIFICANT CHANGE UP (ref 5–17)
BUN SERPL-MCNC: 62 MG/DL — HIGH (ref 7–23)
CALCIUM SERPL-MCNC: 8.4 MG/DL — SIGNIFICANT CHANGE UP (ref 8.4–10.5)
CHLORIDE SERPL-SCNC: 102 MMOL/L — SIGNIFICANT CHANGE UP (ref 96–108)
CO2 SERPL-SCNC: 27 MMOL/L — SIGNIFICANT CHANGE UP (ref 22–31)
CREAT SERPL-MCNC: 2.88 MG/DL — HIGH (ref 0.5–1.3)
EGFR: 22 ML/MIN/1.73M2 — LOW
GLUCOSE BLDC GLUCOMTR-MCNC: 107 MG/DL — HIGH (ref 70–99)
GLUCOSE SERPL-MCNC: 129 MG/DL — HIGH (ref 70–99)
HCT VFR BLD CALC: 26.3 % — LOW (ref 39–50)
HGB BLD-MCNC: 8.4 G/DL — LOW (ref 13–17)
MCHC RBC-ENTMCNC: 28.7 PG — SIGNIFICANT CHANGE UP (ref 27–34)
MCHC RBC-ENTMCNC: 31.9 GM/DL — LOW (ref 32–36)
MCV RBC AUTO: 89.8 FL — SIGNIFICANT CHANGE UP (ref 80–100)
NRBC # BLD: 0 /100 WBCS — SIGNIFICANT CHANGE UP (ref 0–0)
PLATELET # BLD AUTO: 255 K/UL — SIGNIFICANT CHANGE UP (ref 150–400)
POTASSIUM SERPL-MCNC: 3.9 MMOL/L — SIGNIFICANT CHANGE UP (ref 3.5–5.3)
POTASSIUM SERPL-SCNC: 3.9 MMOL/L — SIGNIFICANT CHANGE UP (ref 3.5–5.3)
RBC # BLD: 2.93 M/UL — LOW (ref 4.2–5.8)
RBC # FLD: 14.6 % — HIGH (ref 10.3–14.5)
SODIUM SERPL-SCNC: 138 MMOL/L — SIGNIFICANT CHANGE UP (ref 135–145)
WBC # BLD: 11.55 K/UL — HIGH (ref 3.8–10.5)
WBC # FLD AUTO: 11.55 K/UL — HIGH (ref 3.8–10.5)

## 2022-12-05 PROCEDURE — 99239 HOSP IP/OBS DSCHRG MGMT >30: CPT

## 2022-12-05 PROCEDURE — 85610 PROTHROMBIN TIME: CPT

## 2022-12-05 PROCEDURE — 84484 ASSAY OF TROPONIN QUANT: CPT

## 2022-12-05 PROCEDURE — 96365 THER/PROPH/DIAG IV INF INIT: CPT

## 2022-12-05 PROCEDURE — 99232 SBSQ HOSP IP/OBS MODERATE 35: CPT

## 2022-12-05 PROCEDURE — 80061 LIPID PANEL: CPT

## 2022-12-05 PROCEDURE — 83935 ASSAY OF URINE OSMOLALITY: CPT

## 2022-12-05 PROCEDURE — 82962 GLUCOSE BLOOD TEST: CPT

## 2022-12-05 PROCEDURE — 84145 PROCALCITONIN (PCT): CPT

## 2022-12-05 PROCEDURE — 85025 COMPLETE CBC W/AUTO DIFF WBC: CPT

## 2022-12-05 PROCEDURE — 85027 COMPLETE CBC AUTOMATED: CPT

## 2022-12-05 PROCEDURE — 0225U NFCT DS DNA&RNA 21 SARSCOV2: CPT

## 2022-12-05 PROCEDURE — 71250 CT THORAX DX C-: CPT | Mod: MA

## 2022-12-05 PROCEDURE — 83735 ASSAY OF MAGNESIUM: CPT

## 2022-12-05 PROCEDURE — 36415 COLL VENOUS BLD VENIPUNCTURE: CPT

## 2022-12-05 PROCEDURE — 87086 URINE CULTURE/COLONY COUNT: CPT

## 2022-12-05 PROCEDURE — 81001 URINALYSIS AUTO W/SCOPE: CPT

## 2022-12-05 PROCEDURE — 93005 ELECTROCARDIOGRAM TRACING: CPT

## 2022-12-05 PROCEDURE — 83036 HEMOGLOBIN GLYCOSYLATED A1C: CPT

## 2022-12-05 PROCEDURE — 85730 THROMBOPLASTIN TIME PARTIAL: CPT

## 2022-12-05 PROCEDURE — 84300 ASSAY OF URINE SODIUM: CPT

## 2022-12-05 PROCEDURE — 73560 X-RAY EXAM OF KNEE 1 OR 2: CPT

## 2022-12-05 PROCEDURE — 99285 EMERGENCY DEPT VISIT HI MDM: CPT | Mod: 25

## 2022-12-05 PROCEDURE — 97162 PT EVAL MOD COMPLEX 30 MIN: CPT

## 2022-12-05 PROCEDURE — 82607 VITAMIN B-12: CPT

## 2022-12-05 PROCEDURE — 83880 ASSAY OF NATRIURETIC PEPTIDE: CPT

## 2022-12-05 PROCEDURE — 80053 COMPREHEN METABOLIC PANEL: CPT

## 2022-12-05 PROCEDURE — 84443 ASSAY THYROID STIM HORMONE: CPT

## 2022-12-05 PROCEDURE — 83605 ASSAY OF LACTIC ACID: CPT

## 2022-12-05 PROCEDURE — 87040 BLOOD CULTURE FOR BACTERIA: CPT

## 2022-12-05 PROCEDURE — 70450 CT HEAD/BRAIN W/O DYE: CPT | Mod: MA

## 2022-12-05 PROCEDURE — 71045 X-RAY EXAM CHEST 1 VIEW: CPT

## 2022-12-05 PROCEDURE — 96375 TX/PRO/DX INJ NEW DRUG ADDON: CPT

## 2022-12-05 PROCEDURE — 80048 BASIC METABOLIC PNL TOTAL CA: CPT

## 2022-12-05 PROCEDURE — 94640 AIRWAY INHALATION TREATMENT: CPT

## 2022-12-05 RX ORDER — HYDROCHLOROTHIAZIDE 25 MG
1 TABLET ORAL
Qty: 0 | Refills: 0 | DISCHARGE

## 2022-12-05 RX ORDER — SPIRONOLACTONE 25 MG/1
1 TABLET, FILM COATED ORAL
Qty: 0 | Refills: 0 | DISCHARGE

## 2022-12-05 RX ORDER — LOSARTAN POTASSIUM 100 MG/1
1 TABLET, FILM COATED ORAL
Qty: 0 | Refills: 0 | DISCHARGE

## 2022-12-05 RX ADMIN — Medication 100 MILLIGRAM(S): at 05:15

## 2022-12-05 RX ADMIN — Medication 2 GRAM(S): at 17:51

## 2022-12-05 RX ADMIN — ALBUTEROL 2 PUFF(S): 90 AEROSOL, METERED ORAL at 08:18

## 2022-12-05 RX ADMIN — ISOSORBIDE DINITRATE 30 MILLIGRAM(S): 5 TABLET ORAL at 05:14

## 2022-12-05 RX ADMIN — CLOPIDOGREL BISULFATE 75 MILLIGRAM(S): 75 TABLET, FILM COATED ORAL at 12:30

## 2022-12-05 RX ADMIN — PIPERACILLIN AND TAZOBACTAM 25 GRAM(S): 4; .5 INJECTION, POWDER, LYOPHILIZED, FOR SOLUTION INTRAVENOUS at 15:29

## 2022-12-05 RX ADMIN — PIPERACILLIN AND TAZOBACTAM 25 GRAM(S): 4; .5 INJECTION, POWDER, LYOPHILIZED, FOR SOLUTION INTRAVENOUS at 05:13

## 2022-12-05 RX ADMIN — Medication 81 MILLIGRAM(S): at 12:30

## 2022-12-05 RX ADMIN — CARVEDILOL PHOSPHATE 25 MILLIGRAM(S): 80 CAPSULE, EXTENDED RELEASE ORAL at 17:52

## 2022-12-05 RX ADMIN — Medication 100 MILLIGRAM(S): at 15:29

## 2022-12-05 RX ADMIN — Medication 2 GRAM(S): at 05:14

## 2022-12-05 RX ADMIN — APIXABAN 5 MILLIGRAM(S): 2.5 TABLET, FILM COATED ORAL at 17:52

## 2022-12-05 RX ADMIN — CARVEDILOL PHOSPHATE 25 MILLIGRAM(S): 80 CAPSULE, EXTENDED RELEASE ORAL at 05:15

## 2022-12-05 RX ADMIN — APIXABAN 5 MILLIGRAM(S): 2.5 TABLET, FILM COATED ORAL at 05:15

## 2022-12-05 RX ADMIN — Medication 100 MILLIGRAM(S): at 15:28

## 2022-12-05 RX ADMIN — LIDOCAINE 1 PATCH: 4 CREAM TOPICAL at 12:31

## 2022-12-05 RX ADMIN — ALBUTEROL 2 PUFF(S): 90 AEROSOL, METERED ORAL at 15:47

## 2022-12-05 RX ADMIN — ISOSORBIDE DINITRATE 30 MILLIGRAM(S): 5 TABLET ORAL at 13:59

## 2022-12-05 NOTE — DISCHARGE NOTE PROVIDER - CARE PROVIDER_API CALL
MIKE LORA  Internal Medicine  Phone: [phone]  Fax: ()-  Follow Up Time:     Pamela Albright)  Cardiovascular Disease; Internal Medicine  [address], [address]  [place] [address]  Phone: [phone]  Fax: ()-  Follow Up Time:

## 2022-12-05 NOTE — DISCHARGE NOTE NURSING/CASE MANAGEMENT/SOCIAL WORK - NSDCPEFALRISK_GEN_ALL_CORE
For information on Fall & Injury Prevention, visit: [email or web address] OR  [email or web address] OR  https://www.cdc.gov/steadi/patient.html

## 2022-12-05 NOTE — DISCHARGE NOTE PROVIDER - HOSPITAL COURSE
Hospital Course  77M with PMH HTN, CKD Stage 3B, diastolic CHF (EF 57% 11/22), CAD s/p stents x3 to RCA on 11/17/22 presents with generalized weakness and cough admitted for metabolic encephalopathy 2/2 hyponatremia, multifocal PNA, and acute diastolic CHF exacerbation. Elevated troponin with recent PCI. Received IV Lasix, Cr worsened, Lasix held Cr improved but not back to baseline.   TTE (11/12/22) EF 57%, Normal LVSF. Stage II DD. Calcified AV    Multifocal PNA: CT Chest No Cont (12.01.22 @ 09:05) >Findings suggestive of multilobar pneumonia in the appropriate clinical setting. Completed course of IV Zosyn.  Repeat CT chest in 6 weeks outpatient    BRUNILDA on CKD IIIb  Baseline creatinine 1.9 (5/2022)  Likely due to Lasix use  Avoid other nephrotoxic medications, continue to hold Lasix  Follow up AM BMP    #Metabolic encephalopathy - resolving, back to baseline per family    PT evaluation - home PT    Discussed with daughter Yoselin [phone] aware and in agreement with above. Would like to take patient home today.  Advised close outpatient follow up.    Discharging Provider:  Jesus Bonilla DO  Contact Info: Cell [phone] Please call with any questions or concerns.    Left message for [phone] awaiting call back.     Time spent: 40 minutes.

## 2022-12-05 NOTE — PROGRESS NOTE ADULT - ASSESSMENT
77M with PMH HTN, CKD Stage 3B, diastolic CHF (EF 57% ), CAD s/p stents x3 to RCA on 22 presents with generalized weakness and cough admitted for metabolic encephalopathy 2/2 hyponatremia, multifocal PNA, and acute diastolic CHF exacerbation.     #Acute diastolic CHF exacerbation   #CAD s/p 3DES to RCA ()  Elevated troponin with recent PCI  -Continue tele monitoring  -Continue to hold Lasix  -Strict I&Os, daily weights  Daily Weight in k.4 (03 Dec 2022 05:48)  Daily Weight in k.7 (04 Dec 2022 05:39)  Daily Weight in k.4 (05 Dec 2022 05:13)  -TTE (22) EF 57%, Normal LVSF. Stage II DD. Calcified AV  -Continue with ASA, Plavix  -Continue Lipitor, Lovaza  -Continue Coreg  -Continue Hydralazine, Isordil  -Chest x-ray personally reviewed  - increased markings bilaterally improved from previous    #Multifocal PNA  -CT Chest No Cont (22 @ 09:05) >Findings suggestive of multilobar pneumonia in the appropriate clinical setting.  -Continue IV Zosyn limit to 5 days  -Blood cultures x 2 - no growth  -Continue Albuterol q 6 hours standing  -Continue Tessalon Perle TID and Robitussin PO Q6hrs PRN for cough  -Repeat CT chest in 6 weeks outpatient    #BRUNILDA on CKD IIIb  -Baseline creatinine 1.9 (2022)  Likely due to Lasix use  -Avoid other nephrotoxic medications, continue to hold Lasix  -Follow up AM BMP    #Leukocytosis  Likely reactive to above, downtrending  -Continue IV abx  -Follow up AM CBC    #Hyponatremia  Could be in setting of volume overload, resolving  -UA, urine lytes, spot urine creatine protein ratio performed  -Follow up AM BMP    #Metabolic encephalopathy - resolving, back to baseline per family  Likely due to above, multifactorial  Risk factors include: age, acute medical illness, recent hospitalization, acute electrolyte imbalances, acute renal dysfunction, underlying cognitive impairment  -CT head performed: chronic ischemic changes in both hemispheres with volume loss  -TSH, B12 within range    #p A fib  -Continue AC with Eliquis  -Continue rate control with Coreg    #Normocytic Anemia  -Likely anemia of chronic disease  -H/H no overt signs of bleeding  -Follow up AM CBC    #HTN  -Continue Coreg, Hydralazine, Isordil  -Monitor vitals    #HLD  -Continue Lipitor, Lovaza    #Right knee pain  -R knee x-ray reviewed, moderate degenerative changes noted  -Lidocaine patch  -Continue pain control    #Prophylactic Measure  -DVT ppx: on Eliquis  -PT evaluation - home PT    daughter Yoselin [phone] 77M with PMH HTN, CKD Stage 3B, diastolic CHF (EF 57% ), CAD s/p stents x3 to RCA on 22 presents with generalized weakness and cough admitted for metabolic encephalopathy 2/2 hyponatremia, multifocal PNA, and acute diastolic CHF exacerbation.     #Acute diastolic CHF exacerbation   #CAD s/p 3DES to RCA ()  Elevated troponin with recent PCI  -Continue tele monitoring  -Continue to hold Lasix  -Strict I&Os, daily weights  Daily Weight in k.4 (03 Dec 2022 05:48)  Daily Weight in k.7 (04 Dec 2022 05:39)  Daily Weight in k.4 (05 Dec 2022 05:13)  -TTE (22) EF 57%, Normal LVSF. Stage II DD. Calcified AV  -Continue with ASA, Plavix  -Continue Lipitor, Lovaza  -Continue Coreg  -Continue Hydralazine, Isordil  -Chest x-ray personally reviewed  - increased markings bilaterally improved from previous    #Multifocal PNA  -CT Chest No Cont (22 @ 09:05) >Findings suggestive of multilobar pneumonia in the appropriate clinical setting.  -Continue IV Zosyn limit to 5 days  -Blood cultures x 2 - no growth  -Continue Albuterol q 6 hours standing  -Continue Tessalon Perle TID and Robitussin PO Q6hrs PRN for cough  -Repeat CT chest in 6 weeks outpatient    #BRUNILDA on CKD IIIb  -Baseline creatinine 1.9 (2022)  Likely due to Lasix use  -Avoid other nephrotoxic medications, continue to hold Lasix  -Follow up AM BMP  -Discussed with renal Dr Adames, avoid nephrotoxic medications follow up outpatient for repeat Cr    #Leukocytosis  Likely reactive to above, downtrending  -Continue IV abx  -Follow up AM CBC    #Hyponatremia  Could be in setting of volume overload, resolving  -UA, urine lytes, spot urine creatine protein ratio performed  -Follow up AM BMP    #Metabolic encephalopathy - resolving, back to baseline per family  Likely due to above, multifactorial  Risk factors include: age, acute medical illness, recent hospitalization, acute electrolyte imbalances, acute renal dysfunction, underlying cognitive impairment  -CT head performed: chronic ischemic changes in both hemispheres with volume loss  -TSH, B12 within range    #p A fib  -Continue AC with Eliquis  -Continue rate control with Coreg    #Normocytic Anemia  -Likely anemia of chronic disease  -H/H no overt signs of bleeding  -Follow up AM CBC    #HTN  -Continue Coreg, Hydralazine, Isordil  -Monitor vitals    #HLD  -Continue Lipitor, Lovaza    #Right knee pain  -R knee x-ray reviewed, moderate degenerative changes noted  -Lidocaine patch  -Continue pain control    #Prophylactic Measure  -DVT ppx: on Eliquis  -PT evaluation - home PT    Discussed with daughter Yoselin [phone] aware and in agreement with above. DC home.

## 2022-12-05 NOTE — DISCHARGE NOTE PROVIDER - NSDCCPCAREPLAN_GEN_ALL_CORE_FT
PRINCIPAL DISCHARGE DIAGNOSIS  Diagnosis: Altered mental status  Assessment and Plan of Treatment: You were admitted to the hospital for a change in mental status likely from low sodium, pneumonia, and acute heart failure.   -You were started on diuretics and your sodium and mental status improved.   -STOP SPIRINOLACTONE and LASIX FOR NOW as your kidney function worsened while on these medications  -Continue Aspirin, Plavix, Coreg, Lipitor, Lovaza, Hydralazine, Isordil  -Follow up with your cardiologist Dr Albright within the week for continued adjustment of your heart medications  -Follow up with your primary care physician within the week for repeat blood work to assess your kidney function  -Take Robitussin as needed for cough  -You will need a repeat CAT SCAN of your chest in 4-6 weeks. Have your primary care physician send you a script for that imaging test  -Check your weight daily. Please notify your provider if you have more than a 2 lb weight gain. Limit your salt intake.

## 2022-12-05 NOTE — PROGRESS NOTE ADULT - ASSESSMENT
Assessment:  David Faye is a 77 year old man, former cigarette smoker with past medical history of Hypertension and Chronic kidney disease with recent abnormal nuclear stress test s/p coronary angiogram and NOMAN to proximal and mid RCA (11/2022) with brief Paroxysmal atrial fibrillation (on Eliquis) now presents with weakness and cough, found to have hyponatremia with progressive CKD and pneumonia.    ECG consistent with sinus rhythm and inferior and lateral T wave abnormalities. Troponins elevated and have peaked, likely demand ischemia from renal disease given recent coronary angiogram with patent stents. Prior echo consistent with normal LV and RV systolic function.     Recommendations:  [] Hyponatremia, progressive CKD: The patient appears intravascularly depleted, please hold further diuretics. Would consider Renal consult  [] CAD s/p recent PCI: Stable, continue Aspirin 81 mg daily, Plavix 75 mg daily, Atorvastatin 40 mg daily, Coreg 25 mg q12h  [] Paroxysmal atrial fibrillation: Currently in sinus rhythm, continue Eliquis for stroke prophylaxis   [] Hypertension: Would decrease Hydralazine/Imdur dosing   [] Pneumonia: Antibiotics per primary team    We will continue to follow along.    Bharati Albright MD  Cardiology

## 2022-12-05 NOTE — DISCHARGE NOTE PROVIDER - NSDCMRMEDTOKEN_GEN_ALL_CORE_FT
apixaban 5 mg oral tablet: 1 tab(s) orally every 12 hours  aspirin 81 mg oral delayed release tablet: 1 tab(s) orally once a day  atorvastatin 40 mg oral tablet: 1 tab(s) orally once a day (at bedtime)  carvedilol 25 mg oral tablet: 1 tab(s) orally every 12 hours  clopidogrel 75 mg oral tablet: 1 tab(s) orally once a day  hydrALAZINE 100 mg oral tablet: 1 tab(s) orally every 8 hours  hydroCHLOROthiazide 25 mg oral tablet: 1 tab(s) orally once a day  isosorbide dinitrate 30 mg oral tablet: 1 tab(s) orally 3 times a day  losartan 100 mg oral tablet: 1 tab(s) orally once a day  Lovaza 1000 mg oral capsule: 2 cap(s) orally 2 times a day    NOTE: LAST FILLED ON OCT 2022 for 30days supply  Physical Therapy :   physical therapy :   spironolactone 25 mg oral tablet: 1 tab(s) orally once a day    NOTE: LAST FILLED ON AUG 2022 for 90 days supply   apixaban 5 mg oral tablet: 1 tab(s) orally every 12 hours  aspirin 81 mg oral delayed release tablet: 1 tab(s) orally once a day  atorvastatin 40 mg oral tablet: 1 tab(s) orally once a day (at bedtime)  clopidogrel 75 mg oral tablet: 1 tab(s) orally once a day  hydrALAZINE 100 mg oral tablet: 1 tab(s) orally every 8 hours  isosorbide dinitrate 30 mg oral tablet: 1 tab(s) orally 3 times a day  Lovaza 1000 mg oral capsule: 2 cap(s) orally 2 times a day    NOTE: LAST FILLED ON OCT 2022 for 30days supply

## 2022-12-05 NOTE — GOALS OF CARE CONVERSATION - ADVANCED CARE PLANNING - CONVERSATION DETAILS
Pt. from home with AMS. Has hx. DM2, CAD, HTN, CHF. Pt. here with AMS. Lives at home with wife. They are both [place] speaking. I tried awakening pt. but he did not wake up. MD says this is normal for him. I spoke to daughters Kaela and Yoselin. Yoselin stated that his wife Jayne makes decisions for him. No HCP designated, no Living Will. I discussed GOC with Yoselin. She and family will discuss. Pt. continues Full Code.

## 2022-12-05 NOTE — PROGRESS NOTE ADULT - SUBJECTIVE AND OBJECTIVE BOX
Follow up for recent pci  SUBJ:    remans quite confused but no cardia complaints per se    PMH  H/O: HTN (hypertension)    CAD (coronary artery disease)    Diabetic vitreous hemorrhage    Hypokalemia    Type 2 diabetes mellitus    Left ventricular hypertrophy    History of alcohol withdrawal delirium        MEDICATIONS  (STANDING):  albuterol    90 MICROgram(s) HFA Inhaler 2 Puff(s) Inhalation every 6 hours  apixaban 5 milliGRAM(s) Oral every 12 hours  aspirin enteric coated 81 milliGRAM(s) Oral daily  atorvastatin 40 milliGRAM(s) Oral at bedtime  benzonatate 100 milliGRAM(s) Oral three times a day  carvedilol 25 milliGRAM(s) Oral every 12 hours  clopidogrel Tablet 75 milliGRAM(s) Oral daily  hydrALAZINE 100 milliGRAM(s) Oral every 8 hours  isosorbide   dinitrate Tablet (ISORDIL) 30 milliGRAM(s) Oral three times a day  lidocaine   4% Patch 1 Patch Transdermal daily  omega-3-Acid Ethyl Esters 2 Gram(s) Oral two times a day  piperacillin/tazobactam IVPB.. 3.375 Gram(s) IV Intermittent every 8 hours    MEDICATIONS  (PRN):  acetaminophen     Tablet .. 650 milliGRAM(s) Oral every 6 hours PRN Temp greater or equal to 38C (100.4F), Mild Pain (1 - 3)  guaiFENesin Oral Liquid (Sugar-Free) 200 milliGRAM(s) Oral every 6 hours PRN Cough  melatonin 3 milliGRAM(s) Oral at bedtime PRN Insomnia        PHYSICAL EXAM:  Vital Signs Last 24 Hrs  T(C): 37 (03 Dec 2022 18:22), Max: 37.2 (03 Dec 2022 05:48)  T(F): 98.6 (03 Dec 2022 18:22), Max: 98.9 (03 Dec 2022 05:48)  HR: 61 (03 Dec 2022 18:22) (61 - 67)  BP: 137/66 (03 Dec 2022 18:22) (115/56 - 150/70)  BP(mean): --  RR: 16 (03 Dec 2022 18:22) (16 - 18)  SpO2: 96% (03 Dec 2022 18:22) (92% - 98%)    Parameters below as of 03 Dec 2022 18:22  Patient On (Oxygen Delivery Method): room air        GENERAL: NAD, unkempt  HEAD:  Atraumatic, Normocephalic  EYES: EOMI, PERRLA, conjunctiva and sclera clear  ENT: Moist mucous membranes,  NECK: Supple, No JVD, no bruits  CHEST/LUNG: Clear to percussion bilaterally; No rales, rhonchi, wheezing, or rubs  HEART: Regular rate and rhythm; No murmurs, rubs, or gallops PMI non displaced.  ABDOMEN: Soft, Nontender, Nondistended; Bowel sounds present  EXTREMITIES:  2+ Peripheral Pulses, No clubbing, cyanosis, or edema  SKIN: No rashes or lesions  NERVOUS SYSTEM:  Cranial Nerves II-XII intact      TELEMETRY:    ECG:    < from: 12 Lead ECG (12.03.22 @ 09:31) >  Diagnosis Line Normal sinus rhythm  Moderate voltage criteria for LVH, may be normal variant  Nonspecific T wave abnormality  Prolonged QT  Abnormal ECG  When compared with ECG of 01-DEC-2022 07:05,  T wave inversion now evident in Lateral leads    Confirmed by MATTHEW HAWTHORNE MD ([id number]) on 12/3/2022 3:26:19 PM    < end of copied text >    ECHO:    < from: TTE with Doppler (w/Cont) (11.12.22 @ 11:04) >  Conclusions:  1. Normal mitral valve. Minimal mitral regurgitation.  2. Calcified aortic valve. There is a focal calcification  seen on the right coronary cusp.  Peak transaortic valve  gradient equals 12 mm Hg, mean transaortic valve gradient  equals 7 mm Hg, aortic valve velocity time integral equals  34 cm, estimated aortic valve area equals 2.1 sqcm. Mild  aortic regurgitation.   ms.  3. Mildly dilated left atrium.  LA volume index = 37 cc/m2.  4. Normal left ventricular systolic function. No segmental  wall motion abnormalities. Endocardial visualization  enhanced with intravenous injection of Ultrasonic Enhancing  Agent (Lumason). No left ventricular thrombus. Septal  motion consistent with conduction defect.  5. Moderate diastolic dysfunction (Stage II).  6. Normal right ventricular size and function.  ------------------------------------------------------------------------  Confirmed on  11/12/2022 - 14:51:23 by Sonido Carr M.D.  ------------------------------------------------------------------------    < end of copied text >      LABS:                        8.8    12.36 )-----------( 230      ( 03 Dec 2022 06:30 )             27.1     12-03    135  |  98  |  44<H>  ----------------------------<  132<H>  3.1<L>   |  29  |  2.56<H>    Ca    7.7<L>      03 Dec 2022 06:30  Mg     2.0     12-03    TPro  6.6  /  Alb  2.5<L>  /  TBili  0.6  /  DBili  x   /  AST  30  /  ALT  29  /  AlkPhos  134<H>  12-02        PT/INR - ( 02 Dec 2022 06:27 )   PT: 26.1 sec;   INR: 2.23 ratio         PTT - ( 02 Dec 2022 06:27 )  PTT:36.4 sec    I&O's Summary    02 Dec 2022 07:01  -  03 Dec 2022 07:00  --------------------------------------------------------  IN: 580 mL / OUT: 2000 mL / NET: -1420 mL    03 Dec 2022 07:01  -  03 Dec 2022 19:48  --------------------------------------------------------  IN: 0 mL / OUT: 600 mL / NET: -600 mL      BNP    RADIOLOGY & ADDITIONAL STUDIES:    ECHO:    impression gentleman s/p staged PCI 2 weeks ago by Dr. Gordon    confusion   Recent sodium 120  now normalized. BUN 44 creatinine 2.56 from 1.8 calcium 7.7 multiple metabolic disturbances    elevation in troponin I  serum troponin 132/134/190/136 which are elevated .  would continue to monitor.   
SUBJ:  Patient is a 77y old  Male who presents with a chief complaint of Hyponatremia (02 Dec 2022 08:38)  patient seen and examined  chart is reviewed  patient sleeping comfortably... aroused and confused   no respiratory distress       PAST MEDICAL & SURGICAL HISTORY:  H/O: HTN (hypertension)      CAD (coronary artery disease)      Diabetic vitreous hemorrhage      Hypokalemia      Type 2 diabetes mellitus      Left ventricular hypertrophy      History of alcohol withdrawal delirium      S/P cardiac catheterization  11/17 3 stents to RCA      MEDICATIONS  (STANDING):  apixaban 5 milliGRAM(s) Oral every 12 hours  aspirin enteric coated 81 milliGRAM(s) Oral daily  atorvastatin 40 milliGRAM(s) Oral at bedtime  benzonatate 100 milliGRAM(s) Oral three times a day  carvedilol 25 milliGRAM(s) Oral every 12 hours  clopidogrel Tablet 75 milliGRAM(s) Oral daily  furosemide   Injectable 40 milliGRAM(s) IV Push daily  hydrALAZINE 100 milliGRAM(s) Oral every 8 hours  isosorbide   dinitrate Tablet (ISORDIL) 30 milliGRAM(s) Oral three times a day  omega-3-Acid Ethyl Esters 2 Gram(s) Oral two times a day  piperacillin/tazobactam IVPB.. 3.375 Gram(s) IV Intermittent every 8 hours    MEDICATIONS  (PRN):  acetaminophen     Tablet .. 650 milliGRAM(s) Oral every 6 hours PRN Temp greater or equal to 38C (100.4F), Mild Pain (1 - 3)  guaiFENesin Oral Liquid (Sugar-Free) 200 milliGRAM(s) Oral every 6 hours PRN Cough  melatonin 3 milliGRAM(s) Oral at bedtime PRN Insomnia          Vital Signs Last 24 Hrs  T(C): 37.2 (02 Dec 2022 06:17), Max: 37.8 (02 Dec 2022 01:39)  T(F): 99 (02 Dec 2022 06:17), Max: 100 (02 Dec 2022 01:39)  HR: 61 (02 Dec 2022 06:17) (61 - 69)  BP: 146/70 (02 Dec 2022 06:17) (146/70 - 167/82)  BP(mean): --  RR: 19 (02 Dec 2022 06:17) (17 - 19)  SpO2: 94% (02 Dec 2022 06:17) (94% - 100%)    Parameters below as of 02 Dec 2022 06:17  Patient On (Oxygen Delivery Method): room air        REVIEW OF SYSTEMS:  CONSTITUTIONAL: confusion   RESPIRATORY: No cough, wheezing, chills or hemoptysis; No shortness of breath  CARDIOVASCULAR: No chest pain or chest pressure.  No shortness of breath or dyspnea on exertion.  No palpitations, dizziness, light headedness, syncope or near syncope.  No edema, no orthopnea.   NEUROLOGICAL: No headaches, memory loss, loss of strength, numbness, or tremors      PHYSICAL EXAM  Constitutional:  WDWN. No acute distress  HEENT: normocephalic, atraumatic.  PERRLA. EOMI  Neck : No JVD. no carotid bruits  Lungs:  decreased breath sounds bilaterally   Heart:  S1 and S2. No S3, S4. I/VI systolic murmur.  Abdomen:  soft, non tender.  Extremities: No clubbing, cyanoisis or edema  Nuerologic:  A+O x 3. No focal deficits  Skin:  no rashes        LABS:                        9.1    9.36  )-----------( 212      ( 02 Dec 2022 06:27 )             27.7     12-02    133<L>  |  98  |  37<H>  ----------------------------<  123<H>  3.8   |  29  |  2.04<H>    Ca    8.1<L>      02 Dec 2022 06:27    TPro  6.6  /  Alb  2.5<L>  /  TBili  0.6  /  DBili  x   /  AST  30  /  ALT  29  /  AlkPhos  134<H>  12-02    I&O's Summary    01 Dec 2022 07:01  -  02 Dec 2022 07:00  --------------------------------------------------------  IN: 175 mL / OUT: 0 mL / NET: 175 mL    < from: 12 Lead ECG (12.01.22 @ 07:05) >  Diagnosis Line Normal sinus rhythm  Consider left ventricular hypertrophy.  When compared with ECG of 18-JUL-2022 09:39,  No significant change was found    < end of copied text >  < from: TTE with Doppler (w/Cont) (11.12.22 @ 11:04) >  1. Normal mitral valve. Minimal mitral regurgitation.  2. Calcified aortic valve. There is a focal calcification  seen on the right coronary cusp.  Peak transaortic valve  gradient equals 12 mm Hg, mean transaortic valve gradient  equals 7 mm Hg, aortic valve velocity time integral equals  34 cm, estimated aortic valve area equals 2.1 sqcm. Mild  aortic regurgitation.   ms.  3. Mildly dilated left atrium.  LA volume index = 37 cc/m2.  4. Normal left ventricular systolic function. No segmental  wall motion abnormalities. Endocardial visualization  enhanced with intravenous injection of Ultrasonic Enhancing  Agent (Lumason). No left ventricular thrombus. Septal  motion consistent with conduction defect.  5. Moderate diastolic dysfunction (Stage II).  6. Normal right ventricular size and function.    < end of copied text >                      
Patient is a 77y old  Male who presents with a chief complaint of Hyponatremia (02 Dec 2022 09:43)      INTERVAL HPI/OVERNIGHT EVENTS: Patient seen and examined at bedside. No overnight events.  Remains tired. A&Ox1-2 unable to accurately obtain ROS due to mental status.     MEDICATIONS  (STANDING):  apixaban 5 milliGRAM(s) Oral every 12 hours  aspirin enteric coated 81 milliGRAM(s) Oral daily  atorvastatin 40 milliGRAM(s) Oral at bedtime  benzonatate 100 milliGRAM(s) Oral three times a day  carvedilol 25 milliGRAM(s) Oral every 12 hours  clopidogrel Tablet 75 milliGRAM(s) Oral daily  furosemide   Injectable 40 milliGRAM(s) IV Push daily  hydrALAZINE 100 milliGRAM(s) Oral every 8 hours  isosorbide   dinitrate Tablet (ISORDIL) 30 milliGRAM(s) Oral three times a day  omega-3-Acid Ethyl Esters 2 Gram(s) Oral two times a day  piperacillin/tazobactam IVPB.. 3.375 Gram(s) IV Intermittent every 8 hours    MEDICATIONS  (PRN):  acetaminophen     Tablet .. 650 milliGRAM(s) Oral every 6 hours PRN Temp greater or equal to 38C (100.4F), Mild Pain (1 - 3)  guaiFENesin Oral Liquid (Sugar-Free) 200 milliGRAM(s) Oral every 6 hours PRN Cough  melatonin 3 milliGRAM(s) Oral at bedtime PRN Insomnia      Allergies    No Known Allergies    Intolerances      Vital Signs Last 24 Hrs  T(C): 37.2 (02 Dec 2022 06:17), Max: 37.8 (02 Dec 2022 01:39)  T(F): 99 (02 Dec 2022 06:17), Max: 100 (02 Dec 2022 01:39)  HR: 61 (02 Dec 2022 06:17) (61 - 69)  BP: 146/70 (02 Dec 2022 06:17) (146/70 - 167/82)  BP(mean): --  RR: 19 (02 Dec 2022 06:17) (17 - 19)  SpO2: 94% (02 Dec 2022 06:17) (94% - 100%)    Parameters below as of 02 Dec 2022 06:17  Patient On (Oxygen Delivery Method): room air      I&O's Summary    01 Dec 2022 07:01  -  02 Dec 2022 07:00  --------------------------------------------------------  IN: 175 mL / OUT: 0 mL / NET: 175 mL    02 Dec 2022 07:01  -  02 Dec 2022 12:27  --------------------------------------------------------  IN: 240 mL / OUT: 0 mL / NET: 240 mL      BMI (kg/m2): 27.6 (22 @ 06:33)    PHYSICAL EXAM:  GENERAL: NAD, elderly male  HEENT:  AT/NC, anicteric, dry mucous membranes, EOMI, PERRL, no lid-lag, conjunctiva and sclera clear  CHEST/LUNG: diminished breath sounds at bases, no wheezing, mild rales, no intercostal retractions  HEART:  RRR, S1, S2, no murmurs; no pitting edema  ABDOMEN:  BS+, soft, nontender, nondistended  MSK/EXTREMITIES: palpable peripheral pulses, no clubbing or cyanosis  NERVOUS SYSTEM: A&Ox1, follows some VERY simple commands, grossly moves all extremities  PSYCH: tired affect, Alert & Awake; poor judgement      LABS: Personally reviewed                        9.1    9.36  )-----------( 212      ( 02 Dec 2022 06:27 )             27.7     12    133  |  98  |  37  ----------------------------<  123  3.8   |  29  |  2.04    Ca    8.1      02 Dec 2022 06:27    TPro  6.6  /  Alb  2.5  /  TBili  0.6  /  DBili  x   /  AST  30  /  ALT  29  /  AlkPhos  134  12-02          PT/INR - ( 02 Dec 2022 06:27 )   PT: 26.1 sec;   INR: 2.23 ratio         PTT - ( 02 Dec 2022 06:27 )  PTT:36.4 sec  Lactate, Blood: 0.7 mmol/L ( @ 06:55)    Procalcitonin, Serum: 0.15 ng/mL (22 @ 06:55)    CARDIAC MARKERS ( 02 Dec 2022 06:27 )  x     / 190.7 ng/L / x     / x     / x      CARDIAC MARKERS ( 01 Dec 2022 11:32 )  x     / 133.8 ng/L / x     / x     / x      CARDIAC MARKERS ( 01 Dec 2022 06:55 )  x     / 131.5 ng/L / x     / x     / x          12- Chol 74 mg/dL LDL -- HDL 20 mg/dL Trig 91 mg/dL  TSH 0.457   TSH with FT4 reflex --  Total T3 --                  Urinalysis Basic - ( 01 Dec 2022 07:30 )    Color: Yellow / Appearance: Clear / S.015 / pH: x  Gluc: x / Ketone: Negative  / Bili: Negative / Urobili: Negative   Blood: x / Protein: 30 mg/dL / Nitrite: Negative   Leuk Esterase: Negative / RBC: Negative /HPF / WBC 3-5 /HPF   Sq Epi: x / Non Sq Epi: Neg.-Few / Bacteria: Negative /HPF        Culture - Blood (collected 01 Dec 2022 06:55)  Source: .Blood Blood-Peripheral  Preliminary Report (02 Dec 2022 12:01):    No growth to date.    Culture - Blood (collected 01 Dec 2022 06:55)  Source: .Blood Blood-Peripheral  Preliminary Report (02 Dec 2022 12:01):    No growth to date.      COVID-19 PCR: NotDetec (22 @ 22:00)        Culture - Blood (collected 22 @ 06:55)  Source: .Blood Blood-Peripheral  Preliminary Report (22 @ 12:01):    No growth to date.    Culture - Blood (collected 22 @ 06:55)  Source: .Blood Blood-Peripheral  Preliminary Report (22 @ 12:01):    No growth to date.        RADIOLOGY & ADDITIONAL TESTS: Personally reviewed.     < from: CT Head No Cont (22 @ 09:05) >    ACC: [id number] EXAM:  CT BRAIN                          PROCEDURE DATE:  2022          INTERPRETATION:  INDICATION:  Altered mental status. Garbled speech.  TECHNIQUE:  A non contrast thin section axial CT study of the brain was   performed from skull base to vertex. Coronal and sagittal reformations   were generated from the axial data.  COMPARISON EXAMINATION:  CT dated 2022    FINDINGS:    HEMISPHERES:  Again noted are chronic ischemic changes in the white   matter of both hemispheres with volume loss. No acute infarct,   hemorrhage, or mass lesion is identified.  VENTRICLES:  Midline with mild ex vacuo enlargement  POSTERIOR FOSSA:  No acute abnormality noted  EXTRACEREBRAL SPACES:  No subdural or epidural collections are noted.  SKULL BASE AND CALVARIUM:  Appears intact.  No fracture or destructive   lesion is identified.  SINUSES AND MASTOIDS:  Clear.  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    IMPRESSION:    1)  chronic ischemic changes in both hemispheres with volume loss. No   acute abnormality suggested.  2)  follow-up MR imaging of the brain may be considered for further   assessment.    --- End of Report ---            CAMILO MARIANO MD; Attending Radiologist  This document has been electronically signed. Dec  1 2022  9:30AM    < end of copied text >    Consultant(s) Notes Reviewed:  [x] YES  [ ] NO - discussed with cardio Dr Evans, will switch to PO Lasix, monitor Cr closely while on Lasix.   Discussed with SW/ANISHA, RN    
Patient is a 77y old  Male who presents with a chief complaint of Hyponatremia (05 Dec 2022 12:49)      INTERVAL HPI/OVERNIGHT EVENTS: Patient seen and examined at bedside. No overnight events.    MEDICATIONS  (STANDING):  albuterol    90 MICROgram(s) HFA Inhaler 2 Puff(s) Inhalation every 6 hours  apixaban 5 milliGRAM(s) Oral every 12 hours  aspirin enteric coated 81 milliGRAM(s) Oral daily  atorvastatin 40 milliGRAM(s) Oral at bedtime  benzonatate 100 milliGRAM(s) Oral three times a day  carvedilol 25 milliGRAM(s) Oral every 12 hours  clopidogrel Tablet 75 milliGRAM(s) Oral daily  hydrALAZINE 100 milliGRAM(s) Oral every 8 hours  isosorbide   dinitrate Tablet (ISORDIL) 30 milliGRAM(s) Oral three times a day  lidocaine   4% Patch 1 Patch Transdermal daily  omega-3-Acid Ethyl Esters 2 Gram(s) Oral two times a day  piperacillin/tazobactam IVPB.. 3.375 Gram(s) IV Intermittent every 8 hours    MEDICATIONS  (PRN):  acetaminophen     Tablet .. 650 milliGRAM(s) Oral every 6 hours PRN Temp greater or equal to 38C (100.4F), Mild Pain (1 - 3)  guaiFENesin Oral Liquid (Sugar-Free) 200 milliGRAM(s) Oral every 6 hours PRN Cough  melatonin 3 milliGRAM(s) Oral at bedtime PRN Insomnia      Allergies    No Known Allergies    Intolerances      REVIEW OF SYSTEMS:  CONSTITUTIONAL: No fever or chills  CARDIOVASCULAR: No chest pain, palpitations    Vital Signs Last 24 Hrs  T(C): 36.5 (05 Dec 2022 05:13), Max: 36.7 (04 Dec 2022 17:20)  T(F): 97.7 (05 Dec 2022 05:13), Max: 98 (04 Dec 2022 17:20)  HR: 66 (05 Dec 2022 08:18) (56 - 66)  BP: 153/76 (05 Dec 2022 05:13) (137/73 - 153/76)  BP(mean): 94 (04 Dec 2022 17:20) (94 - 94)  RR: 16 (05 Dec 2022 05:13) (16 - 16)  SpO2: 99% (05 Dec 2022 08:18) (94% - 99%)    Parameters below as of 05 Dec 2022 08:18  Patient On (Oxygen Delivery Method): room air      I&O's Summary    04 Dec 2022 07:01  -  05 Dec 2022 07:00  --------------------------------------------------------  IN: 0 mL / OUT: 600 mL / NET: -600 mL    05 Dec 2022 07:01  -  05 Dec 2022 13:12  --------------------------------------------------------  IN: 200 mL / OUT: 0 mL / NET: 200 mL      BMI (kg/m2): 27.6 (12-01-22 @ 06:33)    PHYSICAL EXAM:  GENERAL: elderly male in NAD  HEENT:  AT/NC, anicteric, moist mucous membranes, EOMI, PERRL, no lid-lag, conjunctiva and sclera clear  CHEST/LUNG: diminished breath sounds at bases overall CTA b/l, no wheezing,  normal respiratory effort, no intercostal retractions  HEART:  RRR, S1, S2, no murmurs; no pitting edema  ABDOMEN:  BS+, soft, nontender, nondistended; No HSM  MSK/EXTREMITIES: palpable peripheral pulses, no clubbing or cyanosis  NERVOUS SYSTEM: A&Ox3, follows commands, grossly moves all extremities  PSYCH: Appropriate affect, Alert & Awake; fair judgement        LABS: Personally reviewed                        8.4    11.55 )-----------( 255      ( 05 Dec 2022 07:55 )             26.3     12-05    138  |  102  |  62  ----------------------------<  129  3.9   |  27  |  2.88    Ca    8.4      05 Dec 2022 07:55  Mg     2.0     12-03                  CARDIAC MARKERS ( 03 Dec 2022 06:30 )  x     / 136.4 ng/L / x     / x     / x          12-02 Chol 74 mg/dL LDL -- HDL 20 mg/dL Trig 91 mg/dL                      Culture - Urine (collected 01 Dec 2022 07:30)  Source: Clean Catch Clean Catch (Midstream)  Final Report (02 Dec 2022 12:28):    No growth    Culture - Blood (collected 01 Dec 2022 06:55)  Source: .Blood Blood-Peripheral  Preliminary Report (02 Dec 2022 12:01):    No growth to date.    Culture - Blood (collected 01 Dec 2022 06:55)  Source: .Blood Blood-Peripheral  Preliminary Report (02 Dec 2022 12:01):    No growth to date.      COVID-19 PCR: NotDetec (11-11-22 @ 22:00)        Culture - Urine (collected 12-01-22 @ 07:30)  Source: Clean Catch Clean Catch (Midstream)  Final Report (12-02-22 @ 12:28):    No growth    Culture - Blood (collected 12-01-22 @ 06:55)  Source: .Blood Blood-Peripheral  Preliminary Report (12-02-22 @ 12:01):    No growth to date.    Culture - Blood (collected 12-01-22 @ 06:55)  Source: .Blood Blood-Peripheral  Preliminary Report (12-02-22 @ 12:01):    No growth to date.        RADIOLOGY & ADDITIONAL TESTS: Personally reviewed.     Consultant(s) Notes Reviewed:  [x] YES  [ ] NO - discussed with cardio Dr Albright hold off decreasing Hydralazine and Imdur for now will follow up outpatient   Discussed with SW/ANISHA, RN    
SEMAJ ROBERT  [id number]     ID#: [id number]    Chief Complaint: Hyponatremia/CKD/CAD s/p recent PCI    Interval History: The patient denies chest discomfort or shortness of breath this morning, reports leg pain.     Tele: sinus rhythm 60s BPM      Current meds:   acetaminophen     Tablet .. 650 milliGRAM(s) Oral every 6 hours PRN  albuterol    90 MICROgram(s) HFA Inhaler 2 Puff(s) Inhalation every 6 hours  apixaban 5 milliGRAM(s) Oral every 12 hours  aspirin enteric coated 81 milliGRAM(s) Oral daily  atorvastatin 40 milliGRAM(s) Oral at bedtime  benzonatate 100 milliGRAM(s) Oral three times a day  carvedilol 25 milliGRAM(s) Oral every 12 hours  clopidogrel Tablet 75 milliGRAM(s) Oral daily  guaiFENesin Oral Liquid (Sugar-Free) 200 milliGRAM(s) Oral every 6 hours PRN  hydrALAZINE 100 milliGRAM(s) Oral every 8 hours  isosorbide   dinitrate Tablet (ISORDIL) 30 milliGRAM(s) Oral three times a day  lidocaine   4% Patch 1 Patch Transdermal daily  melatonin 3 milliGRAM(s) Oral at bedtime PRN  omega-3-Acid Ethyl Esters 2 Gram(s) Oral two times a day  piperacillin/tazobactam IVPB.. 3.375 Gram(s) IV Intermittent every 8 hours      Objective:     Vital Signs:   T(C): 36.5 (12-05-22 @ 05:13), Max: 36.7 (12-04-22 @ 17:20)  HR: 65 (12-05-22 @ 05:13) (56 - 65)  BP: 153/76 (12-05-22 @ 05:13) (137/73 - 153/76)  RR: 16 (12-05-22 @ 05:13) (16 - 16)  SpO2: 95% (12-05-22 @ 05:13) (94% - 97%)  Wt(kg): --    Physical Exam:   General: elderly man, no acute distress  Neck: supple  CVS: JVP ~ 7 cm H20, RRR, s1, s2  Pulm: unlabored respirations, decreased breath sounds   Ext: no lower extremity edema b/l  Neuro: A&O x3  Psych: Normal affect      Labs:   04 Dec 2022 06:45    136    |  99     |  58     ----------------------------<  156    3.7     |  24     |  3.64     Ca    8.3        04 Dec 2022 06:45                            8.4    11.55 )-----------( 255      ( 05 Dec 2022 07:55 )             26.3           TTE (11/2022):  Normal LV systolic function, septal motion consistent with conduction defect   Normal RV size and function   Calcified aortic valve        Coronary angiogram (11/16/22):  TVD with mRCA lesion appear to be hazy and likely culprit for  patient's presentation and newly reduced EF  Recommendations:   Plan for PCI of the mRCA on 11/17    Continue DAPT and optimal medical care        Coronary angiogram (11/17/22):  LM: minor irregularities  LAD: severe, unchanged disease  LCx: complete occlusion    Conclusions:   Successful PCI of the pRCA and mRCA treated with 2 NOMAN.     Recommendations:   Plan for staged PCI of the LAD 11/18.  Continue DAPT     ECG (12/1/22): sinus rhythm, artifact       
Patient is a 77y old  Male who presents with a chief complaint of Hyponatremia (03 Dec 2022 08:25)      INTERVAL HPI/OVERNIGHT EVENTS: Patient seen and examined at bedside. No overnight events.  Reports right knee pain today difficult to extend  Mental status improving      MEDICATIONS  (STANDING):  albuterol    0.083%. 2.5 milliGRAM(s) Nebulizer once  albuterol    90 MICROgram(s) HFA Inhaler 2 Puff(s) Inhalation every 6 hours  apixaban 5 milliGRAM(s) Oral every 12 hours  aspirin enteric coated 81 milliGRAM(s) Oral daily  atorvastatin 40 milliGRAM(s) Oral at bedtime  benzonatate 100 milliGRAM(s) Oral three times a day  carvedilol 25 milliGRAM(s) Oral every 12 hours  clopidogrel Tablet 75 milliGRAM(s) Oral daily  furosemide   Injectable 40 milliGRAM(s) IV Push daily  hydrALAZINE 100 milliGRAM(s) Oral every 8 hours  isosorbide   dinitrate Tablet (ISORDIL) 30 milliGRAM(s) Oral three times a day  omega-3-Acid Ethyl Esters 2 Gram(s) Oral two times a day  piperacillin/tazobactam IVPB.. 3.375 Gram(s) IV Intermittent every 8 hours  potassium chloride    Tablet ER 40 milliEquivalent(s) Oral every 4 hours    MEDICATIONS  (PRN):  acetaminophen     Tablet .. 650 milliGRAM(s) Oral every 6 hours PRN Temp greater or equal to 38C (100.4F), Mild Pain (1 - 3)  guaiFENesin Oral Liquid (Sugar-Free) 200 milliGRAM(s) Oral every 6 hours PRN Cough  melatonin 3 milliGRAM(s) Oral at bedtime PRN Insomnia      Allergies    No Known Allergies    Intolerances        REVIEW OF SYSTEMS:  CONSTITUTIONAL: No fever or chills  CARDIOVASCULAR: No chest pain, palpitations    Vital Signs Last 24 Hrs  T(C): 36.9 (03 Dec 2022 08:18), Max: 37.2 (03 Dec 2022 05:48)  T(F): 98.4 (03 Dec 2022 08:18), Max: 98.9 (03 Dec 2022 05:48)  HR: 64 (03 Dec 2022 08:18) (61 - 66)  BP: 133/61 (03 Dec 2022 08:18) (127/56 - 150/70)  BP(mean): --  RR: 16 (03 Dec 2022 08:18) (16 - 18)  SpO2: 94% (03 Dec 2022 11:18) (94% - 99%)    Parameters below as of 03 Dec 2022 11:18  Patient On (Oxygen Delivery Method): room air      I&O's Summary    02 Dec 2022 07:01  -  03 Dec 2022 07:00  --------------------------------------------------------  IN: 580 mL / OUT: 2000 mL / NET: -1420 mL      BMI (kg/m2): 27.6 (22 @ 06:33)    PHYSICAL EXAM:  GENERAL: elderly male in NAD  HEENT:  AT/NC, anicteric, moist mucous membranes, EOMI, PERRL, no lid-lag, conjunctiva and sclera clear  CHEST/LUNG: diminished breath sounds at bases, no wheezing,  normal respiratory effort, no intercostal retractions  HEART:  RRR, S1, S2, no murmurs; no pitting edema  ABDOMEN:  BS+, soft, nontender, nondistended; No HSM  MSK/EXTREMITIES: palpable peripheral pulses, no clubbing or cyanosis; R knee mild swelling no erythema   NERVOUS SYSTEM: A&Ox2-3, follows commands, grossly moves all extremities  PSYCH: Appropriate affect, Alert & Awake; poor judgement      LABS: Personally reviewed                        8.8    12.36 )-----------( 230      ( 03 Dec 2022 06:30 )             27.1     12-    135  |  98  |  44  ----------------------------<  132  3.1   |  29  |  2.56    Ca    7.7      03 Dec 2022 06:30  Mg     2.0     12-    TPro  6.6  /  Alb  2.5  /  TBili  0.6  /  DBili  x   /  AST  30  /  ALT  29  /  AlkPhos  134  12-02          PT/INR - ( 02 Dec 2022 06:27 )   PT: 26.1 sec;   INR: 2.23 ratio         PTT - ( 02 Dec 2022 06:27 )  PTT:36.4 sec  Lactate, Blood: 0.7 mmol/L ( @ 06:55)    Procalcitonin, Serum: 0.15 ng/mL (22 @ 06:55)    CARDIAC MARKERS ( 03 Dec 2022 06:30 )  x     / 136.4 ng/L / x     / x     / x      CARDIAC MARKERS ( 02 Dec 2022 06:27 )  x     / 190.7 ng/L / x     / x     / x      CARDIAC MARKERS ( 01 Dec 2022 11:32 )  x     / 133.8 ng/L / x     / x     / x      CARDIAC MARKERS ( 01 Dec 2022 06:55 )  x     / 131.5 ng/L / x     / x     / x          12 Chol 74 mg/dL LDL -- HDL 20 mg/dL Trig 91 mg/dL  TSH 0.457   TSH with FT4 reflex --  Total T3 --                  Urinalysis Basic - ( 01 Dec 2022 07:30 )    Color: Yellow / Appearance: Clear / S.015 / pH: x  Gluc: x / Ketone: Negative  / Bili: Negative / Urobili: Negative   Blood: x / Protein: 30 mg/dL / Nitrite: Negative   Leuk Esterase: Negative / RBC: Negative /HPF / WBC 3-5 /HPF   Sq Epi: x / Non Sq Epi: Neg.-Few / Bacteria: Negative /HPF        Culture - Urine (collected 01 Dec 2022 07:30)  Source: Clean Catch Clean Catch (Midstream)  Final Report (02 Dec 2022 12:28):    No growth    Culture - Blood (collected 01 Dec 2022 06:55)  Source: .Blood Blood-Peripheral  Preliminary Report (02 Dec 2022 12:01):    No growth to date.    Culture - Blood (collected 01 Dec 2022 06:55)  Source: .Blood Blood-Peripheral  Preliminary Report (02 Dec 2022 12:01):    No growth to date.      COVID-19 PCR: NotDetec (22 @ 22:00)        Culture - Urine (collected 22 @ 07:30)  Source: Clean Catch Clean Catch (Midstream)  Final Report (22 @ 12:28):    No growth    Culture - Blood (collected 22 @ 06:55)  Source: .Blood Blood-Peripheral  Preliminary Report (22 @ 12:01):    No growth to date.    Culture - Blood (collected 22 @ 06:55)  Source: .Blood Blood-Peripheral  Preliminary Report (22 @ 12:01):    No growth to date.        RADIOLOGY & ADDITIONAL TESTS: Personally reviewed.     Consultant(s) Notes Reviewed:  [x] YES  [ ] NO - discussed with cardio Dr Prieto will hold Lasix for now, monitor Cr closely.   Discussed with SW/ANISHA, RN    
Patient is a 77y old  Male who presents with a chief complaint of Hyponatremia (04 Dec 2022 07:43)      INTERVAL HPI/OVERNIGHT EVENTS: Patient seen and examined at bedside. No overnight events.  Reports mild improvement in R knee pain with current pain regimen  SpO2 appropriate on RA  Mental status improving, appears back to baseline     MEDICATIONS  (STANDING):  albuterol    90 MICROgram(s) HFA Inhaler 2 Puff(s) Inhalation every 6 hours  apixaban 5 milliGRAM(s) Oral every 12 hours  aspirin enteric coated 81 milliGRAM(s) Oral daily  atorvastatin 40 milliGRAM(s) Oral at bedtime  benzonatate 100 milliGRAM(s) Oral three times a day  carvedilol 25 milliGRAM(s) Oral every 12 hours  clopidogrel Tablet 75 milliGRAM(s) Oral daily  hydrALAZINE 100 milliGRAM(s) Oral every 8 hours  isosorbide   dinitrate Tablet (ISORDIL) 30 milliGRAM(s) Oral three times a day  lidocaine   4% Patch 1 Patch Transdermal daily  omega-3-Acid Ethyl Esters 2 Gram(s) Oral two times a day  piperacillin/tazobactam IVPB.. 3.375 Gram(s) IV Intermittent every 8 hours    MEDICATIONS  (PRN):  acetaminophen     Tablet .. 650 milliGRAM(s) Oral every 6 hours PRN Temp greater or equal to 38C (100.4F), Mild Pain (1 - 3)  guaiFENesin Oral Liquid (Sugar-Free) 200 milliGRAM(s) Oral every 6 hours PRN Cough  melatonin 3 milliGRAM(s) Oral at bedtime PRN Insomnia      Allergies    No Known Allergies    Intolerances        REVIEW OF SYSTEMS:  CONSTITUTIONAL: No fever or chills  CARDIOVASCULAR: No chest pain, palpitations    Vital Signs Last 24 Hrs  T(C): 36.4 (04 Dec 2022 05:39), Max: 37 (03 Dec 2022 18:22)  T(F): 97.5 (04 Dec 2022 05:39), Max: 98.6 (03 Dec 2022 18:22)  HR: 51 (04 Dec 2022 05:39) (51 - 67)  BP: 129/56 (04 Dec 2022 05:39) (115/56 - 137/66)  BP(mean): 80 (04 Dec 2022 05:39) (80 - 80)  RR: 16 (04 Dec 2022 05:39) (16 - 17)  SpO2: 95% (04 Dec 2022 05:39) (92% - 96%)    Parameters below as of 04 Dec 2022 05:39  Patient On (Oxygen Delivery Method): room air      I&O's Summary    03 Dec 2022 07:01  -  04 Dec 2022 07:00  --------------------------------------------------------  IN: 0 mL / OUT: 600 mL / NET: -600 mL      BMI (kg/m2): 27.6 (12-01-22 @ 06:33)    PHYSICAL EXAM:  GENERAL: elderly male in NAD  HEENT:  AT/NC, anicteric, moist mucous membranes, EOMI, PERRL, no lid-lag, conjunctiva and sclera clear  CHEST/LUNG: diminished breath sounds at bases overall CTA b/l, no wheezing,  normal respiratory effort, no intercostal retractions  HEART:  RRR, S1, S2, no murmurs; no pitting edema  ABDOMEN:  BS+, soft, nontender, nondistended; No HSM  MSK/EXTREMITIES: palpable peripheral pulses, no clubbing or cyanosis  NERVOUS SYSTEM: A&Ox3, follows commands, grossly moves all extremities  PSYCH: Appropriate affect, Alert & Awake; fair judgement      LABS: Personally reviewed                        9.0    11.90 )-----------( 237      ( 04 Dec 2022 06:45 )             27.2     12-04    136  |  99  |  58  ----------------------------<  156  3.7   |  24  |  3.64    Ca    8.3      04 Dec 2022 06:45  Mg     2.0     12-03    TPro  6.6  /  Alb  2.5  /  TBili  0.6  /  DBili  x   /  AST  30  /  ALT  29  /  AlkPhos  134  12-02          PT/INR - ( 02 Dec 2022 06:27 )   PT: 26.1 sec;   INR: 2.23 ratio         PTT - ( 02 Dec 2022 06:27 )  PTT:36.4 sec      CARDIAC MARKERS ( 03 Dec 2022 06:30 )  x     / 136.4 ng/L / x     / x     / x      CARDIAC MARKERS ( 02 Dec 2022 06:27 )  x     / 190.7 ng/L / x     / x     / x          12-02 Chol 74 mg/dL LDL -- HDL 20 mg/dL Trig 91 mg/dL  TSH 0.457   TSH with FT4 reflex --  Total T3 --                      Culture - Urine (collected 01 Dec 2022 07:30)  Source: Clean Catch Clean Catch (Midstream)  Final Report (02 Dec 2022 12:28):    No growth    Culture - Blood (collected 01 Dec 2022 06:55)  Source: .Blood Blood-Peripheral  Preliminary Report (02 Dec 2022 12:01):    No growth to date.    Culture - Blood (collected 01 Dec 2022 06:55)  Source: .Blood Blood-Peripheral  Preliminary Report (02 Dec 2022 12:01):    No growth to date.      COVID-19 PCR: NotDetec (11-11-22 @ 22:00)        Culture - Urine (collected 12-01-22 @ 07:30)  Source: Clean Catch Clean Catch (Midstream)  Final Report (12-02-22 @ 12:28):    No growth    Culture - Blood (collected 12-01-22 @ 06:55)  Source: .Blood Blood-Peripheral  Preliminary Report (12-02-22 @ 12:01):    No growth to date.    Culture - Blood (collected 12-01-22 @ 06:55)  Source: .Blood Blood-Peripheral  Preliminary Report (12-02-22 @ 12:01):    No growth to date.        RADIOLOGY & ADDITIONAL TESTS: Personally reviewed.   < from: Xray Knee 1 or 2 Views, Right (12.03.22 @ 17:07) >  ACC: [id number] EXAM:  XR KNEE 1-2 VIEWS RT                          PROCEDURE DATE:  12/03/2022          INTERPRETATION:  History: Knee pain.    FINDINGS: Frontal and lateral projection right knee.    Moderate degenerative changes right knee with mild varus. No significant   joint effusion appreciated. No fracture or dislocation.    IMPRESSION:    Moderate degenerative changes right knee with mild varus.    --- End of Report ---            ANUJA MUJICA MD; Attending Radiologist  This document has been electronically signed. Dec  4 2022 10:21AM    < end of copied text >  personally reviewed - no fracture, degenerative knee changes noted    Consultant(s) Notes Reviewed:  [x] YES  [ ] NO   Discussed with SW/CM, RN

## 2022-12-05 NOTE — CONSULT NOTE ADULT - SUBJECTIVE AND OBJECTIVE BOX
NEPHROLOGY CONSULTATION    CHIEF COMPLAINT: weak    HPI:  Pt is 76 yo man with history of Hypertension, CKD III, CHF, CAD s/p stents x 3 admitted for hyponatremia. Patient was BIBA 12/1/22 for generalized weakness and cough. Patient has been coughing, unable to lie flat, and feeling weak. Noted to have hyponatremia. Developed BRUNILDA/CKD 3.    ROS:  as above    Allergies:  No Known Allergies    PAST MEDICAL & SURGICAL HISTORY:  H/O: HTN (hypertension)  CAD (coronary artery disease)  Diabetic vitreous hemorrhage  Hypokalemia  Type 2 diabetes mellitus  Left ventricular hypertrophy  History of alcohol withdrawal delirium  S/P cardiac catheterization  11/17 3 stents to RCA    SOCIAL HISTORY:  as above    FAMILY HISTORY:  Family history of CVA (Mother)    MEDICATIONS  (STANDING):  albuterol    90 MICROgram(s) HFA Inhaler 2 Puff(s) Inhalation every 6 hours  apixaban 5 milliGRAM(s) Oral every 12 hours  aspirin enteric coated 81 milliGRAM(s) Oral daily  atorvastatin 40 milliGRAM(s) Oral at bedtime  benzonatate 100 milliGRAM(s) Oral three times a day  carvedilol 25 milliGRAM(s) Oral every 12 hours  clopidogrel Tablet 75 milliGRAM(s) Oral daily  hydrALAZINE 100 milliGRAM(s) Oral every 8 hours  isosorbide   dinitrate Tablet (ISORDIL) 30 milliGRAM(s) Oral three times a day  lidocaine   4% Patch 1 Patch Transdermal daily  omega-3-Acid Ethyl Esters 2 Gram(s) Oral two times a day  piperacillin/tazobactam IVPB.. 3.375 Gram(s) IV Intermittent every 8 hours    Vital Signs Last 24 Hrs  T(C): 36.2 (12-05-22 @ 12:22), Max: 36.7 (12-04-22 @ 17:20)  T(F): 97.2 (12-05-22 @ 12:22), Max: 98 (12-04-22 @ 17:20)  HR: 61 (12-05-22 @ 12:22) (56 - 66)  BP: 166/77 (12-05-22 @ 12:22) (137/73 - 166/77)  BP(mean): 94 (12-04-22 @ 17:20) (94 - 94)  RR: 16 (12-05-22 @ 12:22) (16 - 16)  SpO2: 98% (12-05-22 @ 12:22) (94% - 99%)    I&O's Detail    04 Dec 2022 07:01  -  05 Dec 2022 07:00  --------------------------------------------------------  OUT:    Voided (mL): 600 mL  Total OUT: 600 mL    LABS:                        8.4    11.55 )-----------( 255      ( 05 Dec 2022 07:55 )             26.3     12-05    138  |  102  |  62<H>  ----------------------------<  129<H>  3.9   |  27  |  2.88<H>    Ca    8.4      05 Dec 2022 07:55    A/P:    full consult to follow    [phone]     NEPHROLOGY CONSULTATION    CHIEF COMPLAINT: weak    HPI:  Pt is 78 yo man with history of Hypertension, CKD III, CHF, CAD s/p stents x 3 admitted for hyponatremia. Patient was BIBA 12/1/22 for generalized weakness and cough. Patient has been coughing, unable to lie flat, and feeling weak. Noted to have hyponatremia. Developed BRUNILDA/CKD 3. Denies CP, SOB, N/V/D/C/F/C.    ROS:  as above    Allergies:  No Known Allergies    PAST MEDICAL & SURGICAL HISTORY:  H/O: HTN (hypertension)  CAD (coronary artery disease)  Diabetic vitreous hemorrhage  Hypokalemia  Type 2 diabetes mellitus  Left ventricular hypertrophy  History of alcohol withdrawal delirium  S/P cardiac catheterization  11/17 3 stents to RCA    SOCIAL HISTORY:  as above    FAMILY HISTORY:  Family history of CVA (Mother)    MEDICATIONS  (STANDING):  albuterol    90 MICROgram(s) HFA Inhaler 2 Puff(s) Inhalation every 6 hours  apixaban 5 milliGRAM(s) Oral every 12 hours  aspirin enteric coated 81 milliGRAM(s) Oral daily  atorvastatin 40 milliGRAM(s) Oral at bedtime  benzonatate 100 milliGRAM(s) Oral three times a day  carvedilol 25 milliGRAM(s) Oral every 12 hours  clopidogrel Tablet 75 milliGRAM(s) Oral daily  hydrALAZINE 100 milliGRAM(s) Oral every 8 hours  isosorbide   dinitrate Tablet (ISORDIL) 30 milliGRAM(s) Oral three times a day  lidocaine   4% Patch 1 Patch Transdermal daily  omega-3-Acid Ethyl Esters 2 Gram(s) Oral two times a day  piperacillin/tazobactam IVPB.. 3.375 Gram(s) IV Intermittent every 8 hours    Vital Signs Last 24 Hrs  T(C): 36.2 (12-05-22 @ 12:22), Max: 36.7 (12-04-22 @ 17:20)  T(F): 97.2 (12-05-22 @ 12:22), Max: 98 (12-04-22 @ 17:20)  HR: 61 (12-05-22 @ 12:22) (56 - 66)  BP: 166/77 (12-05-22 @ 12:22) (137/73 - 166/77)  BP(mean): 94 (12-04-22 @ 17:20) (94 - 94)  RR: 16 (12-05-22 @ 12:22) (16 - 16)  SpO2: 98% (12-05-22 @ 12:22) (94% - 99%)    I&O's Detail    04 Dec 2022 07:01  -  05 Dec 2022 07:00  --------------------------------------------------------  OUT:    Voided (mL): 600 mL  Total OUT: 600 mL    s1s2  b/l air entry  soft, ND  no edema    LABS:                        8.4    11.55 )-----------( 255      ( 05 Dec 2022 07:55 )             26.3     12-05    138  |  102  |  62<H>  ----------------------------<  129<H>  3.9   |  27  |  2.88<H>    Ca    8.4      05 Dec 2022 07:55    A/P:    Adm w/CHF  Hemodynamic BRUNILDA/CKD 3 in setting of diuresis  Diuretics held  Cr is lower today  UA w/pr 30, otherwise bland  Renal SONO w/o hydro on 11/13/22  Avoid nephrotoxins  No NSAID's  Renal f/u as op to monitor for continued resolution of BRUNILDA/CKD    [phone]

## 2022-12-05 NOTE — DISCHARGE NOTE NURSING/CASE MANAGEMENT/SOCIAL WORK - PATIENT PORTAL LINK FT
You can access the FollowMyHealth Patient Portal offered by [facility] by registering at the following website: [email or web address]. By joining [employer]’s FollowMyHealth portal, you will also be able to view your health information using other applications (apps) compatible with our system.

## 2022-12-06 LAB
CULTURE RESULTS: SIGNIFICANT CHANGE UP
CULTURE RESULTS: SIGNIFICANT CHANGE UP
SPECIMEN SOURCE: SIGNIFICANT CHANGE UP
SPECIMEN SOURCE: SIGNIFICANT CHANGE UP

## 2022-12-07 ENCOUNTER — NON-APPOINTMENT (OUTPATIENT)
Age: 77
End: 2022-12-07

## 2022-12-07 PROBLEM — Z86.59 PERSONAL HISTORY OF OTHER MENTAL AND BEHAVIORAL DISORDERS: Chronic | Status: ACTIVE | Noted: 2022-12-01

## 2022-12-07 PROBLEM — E11.39 TYPE 2 DIABETES MELLITUS WITH OTHER DIABETIC OPHTHALMIC COMPLICATION: Chronic | Status: ACTIVE | Noted: 2022-12-01

## 2022-12-07 PROBLEM — E11.9 TYPE 2 DIABETES MELLITUS WITHOUT COMPLICATIONS: Chronic | Status: ACTIVE | Noted: 2022-12-01

## 2022-12-07 PROBLEM — I51.7 CARDIOMEGALY: Chronic | Status: ACTIVE | Noted: 2022-12-01

## 2022-12-07 PROBLEM — I25.10 ATHEROSCLEROTIC HEART DISEASE OF NATIVE CORONARY ARTERY WITHOUT ANGINA PECTORIS: Chronic | Status: ACTIVE | Noted: 2022-12-01

## 2022-12-07 PROBLEM — E87.6 HYPOKALEMIA: Chronic | Status: ACTIVE | Noted: 2022-12-01

## 2022-12-09 ENCOUNTER — RESULT CHARGE (OUTPATIENT)
Age: 77
End: 2022-12-09

## 2022-12-09 ENCOUNTER — OUTPATIENT (OUTPATIENT)
Dept: OUTPATIENT SERVICES | Facility: HOSPITAL | Age: 77
LOS: 1 days | End: 2022-12-09
Payer: MEDICARE

## 2022-12-09 ENCOUNTER — APPOINTMENT (OUTPATIENT)
Dept: FAMILY MEDICINE | Facility: HOSPITAL | Age: 77
End: 2022-12-09

## 2022-12-09 DIAGNOSIS — Z00.00 ENCOUNTER FOR GENERAL ADULT MEDICAL EXAMINATION WITHOUT ABNORMAL FINDINGS: ICD-10-CM

## 2022-12-09 DIAGNOSIS — Z09 ENCOUNTER FOR FOLLOW-UP EXAMINATION AFTER COMPLETED TREATMENT FOR CONDITIONS OTHER THAN MALIGNANT NEOPLASM: ICD-10-CM

## 2022-12-09 DIAGNOSIS — R31.9 HEMATURIA, UNSPECIFIED: ICD-10-CM

## 2022-12-09 PROCEDURE — 36415 COLL VENOUS BLD VENIPUNCTURE: CPT

## 2022-12-09 PROCEDURE — 80048 BASIC METABOLIC PNL TOTAL CA: CPT

## 2022-12-09 PROCEDURE — G0463: CPT

## 2022-12-12 DIAGNOSIS — R31.9 HEMATURIA, UNSPECIFIED: ICD-10-CM

## 2022-12-12 DIAGNOSIS — Z09 ENCOUNTER FOR FOLLOW-UP EXAMINATION AFTER COMPLETED TREATMENT FOR CONDITIONS OTHER THAN MALIGNANT NEOPLASM: ICD-10-CM

## 2022-12-13 ENCOUNTER — NON-APPOINTMENT (OUTPATIENT)
Age: 77
End: 2022-12-13

## 2022-12-13 PROBLEM — R31.9 HEMATURIA: Status: ACTIVE | Noted: 2022-12-09

## 2022-12-13 PROBLEM — Z09 HOSPITAL DISCHARGE FOLLOW-UP: Status: ACTIVE | Noted: 2022-12-09

## 2022-12-13 LAB
ANION GAP SERPL CALC-SCNC: 14 MMOL/L
BILIRUB UR QL STRIP: NORMAL
BUN SERPL-MCNC: 28 MG/DL
CALCIUM SERPL-MCNC: 8.3 MG/DL
CHLORIDE SERPL-SCNC: 101 MMOL/L
CO2 SERPL-SCNC: 24 MMOL/L
CREAT SERPL-MCNC: 1.82 MG/DL
EGFR: 38 ML/MIN/1.73M2
GLUCOSE SERPL-MCNC: 59 MG/DL
GLUCOSE UR-MCNC: NORMAL
HCG UR QL: 0.2 EU/DL
HGB UR QL STRIP.AUTO: NORMAL
KETONES UR-MCNC: NORMAL
LEUKOCYTE ESTERASE UR QL STRIP: NORMAL
NITRITE UR QL STRIP: NORMAL
PH UR STRIP: 6
POTASSIUM SERPL-SCNC: 4.2 MMOL/L
PROT UR STRIP-MCNC: NORMAL
SODIUM SERPL-SCNC: 139 MMOL/L
SP GR UR STRIP: 1.02

## 2022-12-16 ENCOUNTER — APPOINTMENT (OUTPATIENT)
Dept: CARDIOLOGY | Facility: CLINIC | Age: 77
End: 2022-12-16

## 2022-12-16 VITALS
RESPIRATION RATE: 14 BRPM | HEIGHT: 66 IN | OXYGEN SATURATION: 97 % | WEIGHT: 168 LBS | TEMPERATURE: 96.8 F | HEART RATE: 75 BPM | BODY MASS INDEX: 27 KG/M2 | DIASTOLIC BLOOD PRESSURE: 74 MMHG | SYSTOLIC BLOOD PRESSURE: 200 MMHG

## 2022-12-16 PROCEDURE — 93000 ELECTROCARDIOGRAM COMPLETE: CPT

## 2022-12-16 PROCEDURE — 99214 OFFICE O/P EST MOD 30 MIN: CPT

## 2022-12-23 ENCOUNTER — NON-APPOINTMENT (OUTPATIENT)
Age: 77
End: 2022-12-23

## 2022-12-23 NOTE — CARDIOLOGY SUMMARY
[de-identified] : ECG (9/22/22): normal sinus rhythm, left axis deviation \par ECG (10/6/22): normal sinus rhythm, left axis deviation, nonspecific ST abnormalities \par ECG (11/11/22): normal sinus rhythm, left axis deviation, LVH, IVCD, PVC, nonspecific ST abnormalities \par ECG (12/16/22): normal sinus rhythm, lateral ST depressions  [de-identified] : Nuclear Stress Test (11/10/22): Medium-sized, moderate defects in anterolateral, inferolateral walls that are partially reversible, suggestive of infarction with moderate beatris-infarct ischemia. Post stress LVEF 39%.  [de-identified] : TTE (10/6/22): (Images reviewed): Normal LV systolic function,mild LVH. Normal RV size and function. Mildly dilated aortic root (3.9 cm). Mild-moderate AR. No pericardial effusion\par \par TTE (11/2022): Normal LV systolic function, septal motion c/w conduction defect. Normal RV size and function.  [de-identified] : Coronary angiogram (11/16/22): LAD 70% stenosis. D1 with 80% stenosis. D2 with 40% stenosis. LCx complete occlusion. RCA 80% stenosis.  TVD with mRCA lesion appear to be hazy and likely culprit for patient's presentation and  newly reduced EF. Plan for PCI of mRCA on 11/17. Continue DAPT and optimal medical care. \par \par Coronary angiogram (11/17/22): LM minor irregularities. LAD severe, unchanged disease. LCx complete occlusion. Successful PCI of the pRCA and mRCA treated with 2 NOMAN. Plan for staged PCI of the LAD 11/18. Continue DAPT.\par \par Coronary angiogram (11/17/22) repeated: Unchanged coronary anatomy.  RCA stents both remain open.  Left system is unchanged and not causing resting chest pain. Recommendations: Need to control blood pressure. Plan for staged LAD tomorrow. Continue DAPT therapy.

## 2022-12-23 NOTE — PHYSICAL EXAM
[Normal] : alert and oriented, normal memory [de-identified] : elderly man, no acute distress [de-identified] : chronic left eye injury [de-identified] : supple [de-identified] : JVP ~ 7 cm H20, RRR, s1, s2, no murmurs/rubs [de-identified] : unlabored respirations, clear lung fields [de-identified] : non-distended

## 2022-12-23 NOTE — ASSESSMENT
[FreeTextEntry1] : Assessment:\par David Mai is a 77 year old man, former cigarette smoker with past medical history of Hypertension and Chronic kidney disease with recent abnormal nuclear stress test s/p coronary angiogram and NOMAN to proximal and mid RCA (11/2022) with brief Paroxysmal atrial fibrillation (on Eliquis) and recent hospitalization for pneumonia, hyponatremia and progressive chronic kidney disease presents for follow-up visit.\par \par His daughter is present to provide additional history. The patient reports feeling well. Denies chest pain or shortness of breath. ECG today consistent with sinus rhythm and lateral ST depressions, patient with prior nonspecific ST changes and is hypertensive in office which may be cause of abnormal ECG given recent coronary angiogram with patent stents. Echo from November consistent with normal LV and RV systolic function. Initial coronary angiogram consistent with triple vessel CAD s/p 2 NOMAN to pRCA and mRCA. \par \par Recommendations:\par [] Coronary artery disease: LAD with 70% stenosis, D1 with 80% stenosis, LCx with complete occlusion, RCA 80% stenosis s/p 2 NOMAN. Plan was for PCI of LAD lesion during hospitalization but appears this was put on hold. Continue guideline-directed medical therapy: Aspirin 81 mg daily, Plavix 75 mg daily, Atorvastatin 40 mg daily (LDL 36 mg/dl). Plan to resume beta blocker as per below. \par [] Hypertension: Repeat BP improved to 160/72 in office. Recommended patient to resume Carvedilol 25 mg BID and will decrease Hydralazine from 100 mg TID to 100 mg BID. Will discontinue Isosorbide at this time. \par [] Paroxysmal atrial fibrillation: Currently in sinus rhythm. Due to elevated CHADSVASc score agree with anticoagulation for stroke prophylaxis; continue Apixaban 5 mg BID (will likely discontinue Aspirin after 1 month post PCI). \par [] Chronic kidney disease: Patient recommended to follow up with Nephrologist, will need to avoid nephrotoxic medications and limit further IV contrast\par [] Return to office: 1 month

## 2022-12-23 NOTE — REVIEW OF SYSTEMS
[Headache] : no headache [SOB] : no shortness of breath [Chest Discomfort] : no chest discomfort [Lower Ext Edema] : no extremity edema [Syncope] : no syncope [Cough] : no cough [Abdominal Pain] : no abdominal pain [Urinary Frequency] : no change in urinary frequency [Joint Pain] : no joint pain [Rash] : no rash [Dizziness] : no dizziness [Confusion] : no confusion was observed [Easy Bruising] : no tendency for easy bruising

## 2022-12-23 NOTE — HISTORY OF PRESENT ILLNESS
[FreeTextEntry1] : David Mai is a 77 year old man, former cigarette smoker with past medical history of Hypertension and Chronic kidney disease with recent abnormal nuclear stress test s/p coronary angiogram and NOMAN to proximal and mid RCA (11/2022) with brief Paroxysmal atrial fibrillation (on Eliquis) and recent hospitalization for pneumonia, hyponatremia and progressive chronic kidney disease presents for follow-up visit.\par \par His daughter is present to provide additional history. The patient reports feeling well. Denies chest pain or shortness of breath.

## 2022-12-28 ENCOUNTER — APPOINTMENT (OUTPATIENT)
Dept: FAMILY MEDICINE | Facility: HOSPITAL | Age: 77
End: 2022-12-28

## 2022-12-28 ENCOUNTER — OUTPATIENT (OUTPATIENT)
Dept: OUTPATIENT SERVICES | Facility: HOSPITAL | Age: 77
LOS: 1 days | End: 2022-12-28
Payer: MEDICARE

## 2022-12-28 VITALS
BODY MASS INDEX: 28.15 KG/M2 | RESPIRATION RATE: 17 BRPM | SYSTOLIC BLOOD PRESSURE: 181 MMHG | DIASTOLIC BLOOD PRESSURE: 79 MMHG | WEIGHT: 164 LBS | HEART RATE: 63 BPM | OXYGEN SATURATION: 99 % | TEMPERATURE: 98 F

## 2022-12-28 VITALS — DIASTOLIC BLOOD PRESSURE: 61 MMHG | SYSTOLIC BLOOD PRESSURE: 133 MMHG

## 2022-12-28 VITALS — DIASTOLIC BLOOD PRESSURE: 72 MMHG | SYSTOLIC BLOOD PRESSURE: 161 MMHG

## 2022-12-28 DIAGNOSIS — H54.40 BLINDNESS, ONE EYE, UNSPECIFIED EYE: ICD-10-CM

## 2022-12-28 DIAGNOSIS — I48.91 UNSPECIFIED ATRIAL FIBRILLATION: ICD-10-CM

## 2022-12-28 DIAGNOSIS — Z00.00 ENCOUNTER FOR GENERAL ADULT MEDICAL EXAMINATION WITHOUT ABNORMAL FINDINGS: ICD-10-CM

## 2022-12-28 DIAGNOSIS — E11.9 TYPE 2 DIABETES MELLITUS W/OUT COMPLICATIONS: ICD-10-CM

## 2022-12-28 PROCEDURE — 80053 COMPREHEN METABOLIC PANEL: CPT

## 2022-12-28 PROCEDURE — 85025 COMPLETE CBC W/AUTO DIFF WBC: CPT

## 2022-12-28 PROCEDURE — G0463: CPT

## 2022-12-28 RX ORDER — SPIRONOLACTONE 25 MG/1
25 TABLET ORAL DAILY
Qty: 30 | Refills: 3 | Status: DISCONTINUED | COMMUNITY
Start: 2022-06-15 | End: 2022-12-28

## 2022-12-28 RX ORDER — ISOSORBIDE DINITRATE 30 MG/1
30 TABLET ORAL EVERY 8 HOURS
Qty: 90 | Refills: 0 | Status: DISCONTINUED | COMMUNITY
Start: 2022-11-29 | End: 2022-12-28

## 2023-01-02 NOTE — HISTORY OF PRESENT ILLNESS
[FreeTextEntry1] : f/u [de-identified] : 78 yo male with PMHx of T2DM, CKDIII, HTN and Afib presents today for follow up after seen the cardiologist 2 weeks ago.  Patient is here with his daughter.  They mentioned that the cardiologist told him to restart Carvedilol but to stop taking HCTZ, Spironolactone and Isosorbide Dinitrate.   Patient will have a follow up appointment in 1 month to discuss the possibility of PCI to LAD.  \par Patient has been taking all medications as prescribed and denies abdominal pain, nausea, vomiting, diarrhea, constipation, chest pain, palpitations, headache, shortness of breath, dizziness or lightheadedness.

## 2023-01-02 NOTE — PHYSICAL EXAM
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

## 2023-01-02 NOTE — PLAN
[FreeTextEntry1] : 76 yo male with PMHx of T2DM, CKDIII, HTN and Afib presents today for follow up after seen the cardiologist 2 weeks ago.  Patient is here with his daughter.  They mentioned that the cardiologist told him to restart Carvedilol but to stop taking HCTZ, Spironolactone and Isosorbide Dinitrate.   Patient will have a follow up appointment in 1 month to discuss the possibility of PCI to LAD.\par \par #HTN\par - BP today 181/79 repeated 133/61\par - Continue Hydralazine 100mg TID\par - s/p amlodipine to 5mg qd and Spironolactone 25mg qd\par - Discussed with the patient the importance of a Healthy low salt diet\par - f/u Cardiology recommendations\par \par #Afib\par - Continue Carvedilol 25mg qd\par - Due to elevated CHADVasc score agree with AC for stroke prophylaxis: Continue apixiban 5mg BID\par - Patient understands the benefits and risks of AC and will like to continue taking it.\par - f/u Cardiology recommendations\par \par #CKD3\par - Last Cr while inpatient was 1.9, unchanged since June\par - Will repeat Kidney function today\par - Nephrology referral given to the patient\par \par #CAD\par - Patient has LAD with 70% stenosis, D1 with 80% stenosis and RCA 80% stenosis s/p 2 NOMAN.  \par - Unable to get PCI while inpatient due to pneumonia\par - Has Cardiology appointment on 1/20 to discuss possible PCI\par - Continue ASA 81mg qd, Plavix 75mg qd and Atorvastatin 40 mg qhs as recommended by Cardio\par - Patient will stop ASA on 1/20\par - ED precatuions given to the patient\par \par *Case discussed with Dr. Ricks\par

## 2023-01-03 DIAGNOSIS — N18.30 CHRONIC KIDNEY DISEASE, STAGE 3 UNSPECIFIED: ICD-10-CM

## 2023-01-03 DIAGNOSIS — E11.9 TYPE 2 DIABETES MELLITUS WITHOUT COMPLICATIONS: ICD-10-CM

## 2023-01-03 DIAGNOSIS — I48.91 UNSPECIFIED ATRIAL FIBRILLATION: ICD-10-CM

## 2023-01-03 DIAGNOSIS — I10 ESSENTIAL (PRIMARY) HYPERTENSION: ICD-10-CM

## 2023-01-06 LAB
ALBUMIN SERPL ELPH-MCNC: 3.8 G/DL
ALP BLD-CCNC: 128 U/L
ALT SERPL-CCNC: 9 U/L
ANION GAP SERPL CALC-SCNC: 11 MMOL/L
AST SERPL-CCNC: 11 U/L
BASOPHILS # BLD AUTO: 0.04 K/UL
BASOPHILS NFR BLD AUTO: 0.5 %
BILIRUB SERPL-MCNC: 0.4 MG/DL
BUN SERPL-MCNC: 23 MG/DL
CALCIUM SERPL-MCNC: 8.5 MG/DL
CHLORIDE SERPL-SCNC: 103 MMOL/L
CO2 SERPL-SCNC: 27 MMOL/L
CREAT SERPL-MCNC: 1.58 MG/DL
EGFR: 45 ML/MIN/1.73M2
EOSINOPHIL # BLD AUTO: 1.02 K/UL
EOSINOPHIL NFR BLD AUTO: 12.5 %
GLUCOSE SERPL-MCNC: 93 MG/DL
HCT VFR BLD CALC: 32.4 %
HGB BLD-MCNC: 10.1 G/DL
IMM GRANULOCYTES NFR BLD AUTO: 0.2 %
LYMPHOCYTES # BLD AUTO: 1.79 K/UL
LYMPHOCYTES NFR BLD AUTO: 21.9 %
MAN DIFF?: NORMAL
MCHC RBC-ENTMCNC: 28.7 PG
MCHC RBC-ENTMCNC: 31.2 GM/DL
MCV RBC AUTO: 92 FL
MONOCYTES # BLD AUTO: 0.7 K/UL
MONOCYTES NFR BLD AUTO: 8.6 %
NEUTROPHILS # BLD AUTO: 4.59 K/UL
NEUTROPHILS NFR BLD AUTO: 56.3 %
PLATELET # BLD AUTO: 207 K/UL
POTASSIUM SERPL-SCNC: 3.8 MMOL/L
PROT SERPL-MCNC: 6.7 G/DL
RBC # BLD: 3.52 M/UL
RBC # FLD: 15.9 %
SODIUM SERPL-SCNC: 141 MMOL/L
WBC # FLD AUTO: 8.16 K/UL

## 2023-01-13 ENCOUNTER — APPOINTMENT (OUTPATIENT)
Dept: UROLOGY | Facility: CLINIC | Age: 78
End: 2023-01-13
Payer: MEDICARE

## 2023-01-13 VITALS
DIASTOLIC BLOOD PRESSURE: 74 MMHG | TEMPERATURE: 97.7 F | RESPIRATION RATE: 16 BRPM | WEIGHT: 168 LBS | HEIGHT: 66 IN | OXYGEN SATURATION: 96 % | SYSTOLIC BLOOD PRESSURE: 182 MMHG | HEART RATE: 62 BPM | BODY MASS INDEX: 27 KG/M2

## 2023-01-13 DIAGNOSIS — N40.1 BENIGN PROSTATIC HYPERPLASIA WITH LOWER URINARY TRACT SYMPMS: ICD-10-CM

## 2023-01-13 DIAGNOSIS — R31.9 HEMATURIA, UNSPECIFIED: ICD-10-CM

## 2023-01-13 DIAGNOSIS — N13.8 BENIGN PROSTATIC HYPERPLASIA WITH LOWER URINARY TRACT SYMPMS: ICD-10-CM

## 2023-01-13 PROCEDURE — 99204 OFFICE O/P NEW MOD 45 MIN: CPT

## 2023-01-13 NOTE — REVIEW OF SYSTEMS
[Dry Eyes] : dryness of the eyes [Shortness Of Breath] : shortness of breath [Cough] : cough [Urine Infection (bladder/kidney)] : bladder/kidney infection [Pain during urination] : pain during urination [Blood in urine that you can see] : blood visible in urine [Wake up at night to urinate  How many times?  ___] : wakes up to urinate [unfilled] times during the night [Difficulty Walking] : difficulty walking [Negative] : Heme/Lymph

## 2023-01-20 ENCOUNTER — APPOINTMENT (OUTPATIENT)
Dept: CARDIOLOGY | Facility: CLINIC | Age: 78
End: 2023-01-20
Payer: MEDICARE

## 2023-01-20 VITALS
RESPIRATION RATE: 18 BRPM | DIASTOLIC BLOOD PRESSURE: 75 MMHG | SYSTOLIC BLOOD PRESSURE: 184 MMHG | WEIGHT: 162 LBS | BODY MASS INDEX: 26.03 KG/M2 | HEIGHT: 66 IN | TEMPERATURE: 96.5 F | OXYGEN SATURATION: 98 % | HEART RATE: 56 BPM

## 2023-01-20 PROCEDURE — 93000 ELECTROCARDIOGRAM COMPLETE: CPT

## 2023-01-20 PROCEDURE — 99214 OFFICE O/P EST MOD 30 MIN: CPT

## 2023-01-21 ENCOUNTER — NON-APPOINTMENT (OUTPATIENT)
Age: 78
End: 2023-01-21

## 2023-01-21 RX ORDER — HYDROCHLOROTHIAZIDE 25 MG/1
25 TABLET ORAL DAILY
Qty: 30 | Refills: 2 | Status: DISCONTINUED | COMMUNITY
Start: 2022-09-22 | End: 2023-01-21

## 2023-01-21 NOTE — HISTORY OF PRESENT ILLNESS
[FreeTextEntry1] : David Mai is a 77 year old man, former cigarette smoker with past medical history of Hypertension and Chronic kidney disease with recent abnormal nuclear stress test s/p coronary angiogram and NOMAN to proximal and mid RCA (11/2022) with brief Paroxysmal atrial fibrillation (on Eliquis) presents for follow-up visit.\par \par The patient's daughter is present to help with translation. The patient reports having occasional shortness of breath when getting up in the morning. Denies exertional chest pain. Denies leg swelling. Denies palpitations. Reported home -180s despite medication compliance.

## 2023-01-21 NOTE — REVIEW OF SYSTEMS
[Headache] : no headache [SOB] : shortness of breath [Chest Discomfort] : no chest discomfort [Lower Ext Edema] : no extremity edema [Syncope] : no syncope [Cough] : no cough [Abdominal Pain] : no abdominal pain [Urinary Frequency] : no change in urinary frequency [Joint Pain] : no joint pain [Rash] : no rash [Dizziness] : no dizziness [Confusion] : no confusion was observed [Easy Bruising] : no tendency for easy bruising

## 2023-01-21 NOTE — ASSESSMENT
[FreeTextEntry1] : Assessment:\par David Mai is a 77 year old man, former cigarette smoker with past medical history of Hypertension and Chronic kidney disease with recent abnormal nuclear stress test s/p coronary angiogram and NOMAN to proximal and mid RCA (11/2022) with brief Paroxysmal atrial fibrillation (on Eliquis) presents for follow-up visit.\par \par His daughter is present to provide additional history. The patient has had episodes of mild dyspnea at home and BP is uncontrolled today, in addition home SBP reported to be 170-180s and likely contributing to dyspneic episodes. ECG today consistent with sinus bradycardia, LVH and nonspecific ST abnormalities. Echocardiogram (11/2022) consistent with normal LV and RV systolic function. Coronary angiogram (11/2022) consistent with triple vessel CAD s/p 2 NOMAN to pRCA and mRCA. \par \par Recommendations:\par [] Coronary artery disease: LAD with 70% stenosis, D1 with 80% stenosis, LCx with complete occlusion, RCA 80% stenosis s/p 2 NOMAN. Plan was for PCI of LAD lesion during hospitalization but appears this was put on hold. Continue guideline-directed medical therapy: Plavix 75 mg daily, Atorvastatin 40 mg daily (LDL 36 mg/dl), Carvedilol 25 mg BID. Will discontinue Aspirin as patient has completed > 2 months of triple therapy anticoagulation and there is risk for increased bleeding on triple therapy. Continue Eliquis for AF. \par [] Hypertension: Uncontrolled, increase Hydralazine to 100 mg PO TID. Continue Carvedilol 25 mg BID. Recommended DASH diet plan and to monitor BP daily at home.\par [] Paroxysmal atrial fibrillation: Currently in sinus rhythm. Due to elevated CHADSVASc score agree with anticoagulation for stroke prophylaxis; continue Eliquis 5 mg BID.\par [] Chronic kidney disease: Patient recommended to follow up with Nephrologist, will need to avoid nephrotoxic medications and limit further IV contrast\par [] Return to office: 2 months or sooner if needed

## 2023-01-21 NOTE — PHYSICAL EXAM
[Normal] : alert and oriented, normal memory [de-identified] : elderly man, no acute distress [de-identified] : supple [de-identified] : chronic left eye injury [de-identified] : JVP ~ 7 cm H20, RRR, s1, s2, no murmurs/rubs [de-identified] : unlabored respirations, clear lung fields [de-identified] : non-distended

## 2023-01-21 NOTE — CARDIOLOGY SUMMARY
[de-identified] : ECG (9/22/22): normal sinus rhythm, left axis deviation \par ECG (10/6/22): normal sinus rhythm, left axis deviation, nonspecific ST abnormalities \par ECG (11/11/22): normal sinus rhythm, left axis deviation, LVH, IVCD, PVC, nonspecific ST abnormalities \par ECG (12/16/22): normal sinus rhythm, lateral ST depressions \par ECG (1/20/23): sinus bradycardia, LVH, nonspecific ST abnormalities [de-identified] : Nuclear Stress Test (11/10/22): Medium-sized, moderate defects in anterolateral, inferolateral walls that are partially reversible, suggestive of infarction with moderate beatris-infarct ischemia. Post stress LVEF 39%.  [de-identified] : TTE (10/6/22): (Images reviewed): Normal LV systolic function,mild LVH. Normal RV size and function. Mildly dilated aortic root (3.9 cm). Mild-moderate AR. No pericardial effusion\par \par TTE (11/2022): Normal LV systolic function, septal motion c/w conduction defect. Normal RV size and function.  [de-identified] : Coronary angiogram (11/16/22): LAD 70% stenosis. D1 with 80% stenosis. D2 with 40% stenosis. LCx complete occlusion. RCA 80% stenosis.  TVD with mRCA lesion appear to be hazy and likely culprit for patient's presentation and  newly reduced EF. Plan for PCI of mRCA on 11/17. Continue DAPT and optimal medical care. \par \par Coronary angiogram (11/17/22): LM minor irregularities. LAD severe, unchanged disease. LCx complete occlusion. Successful PCI of the pRCA and mRCA treated with 2 NOMAN. Plan for staged PCI of the LAD 11/18. Continue DAPT.\par \par Coronary angiogram (11/17/22) repeated: Unchanged coronary anatomy.  RCA stents both remain open.  Left system is unchanged and not causing resting chest pain. Recommendations: Need to control blood pressure. Plan for staged LAD tomorrow. Continue DAPT therapy.

## 2023-01-29 LAB
APPEARANCE: CLEAR
BACTERIA UR CULT: NORMAL
BACTERIA: NEGATIVE
BILIRUBIN URINE: NEGATIVE
BLOOD URINE: NEGATIVE
COLOR: NORMAL
GLUCOSE QUALITATIVE U: NEGATIVE
HYALINE CASTS: 0 /LPF
KETONES URINE: NEGATIVE
LEUKOCYTE ESTERASE URINE: NEGATIVE
MICROSCOPIC-UA: NORMAL
NITRITE URINE: NEGATIVE
PH URINE: 6.5
PROTEIN URINE: NEGATIVE
RED BLOOD CELLS URINE: 1 /HPF
SPECIFIC GRAVITY URINE: 1.01
SQUAMOUS EPITHELIAL CELLS: 1 /HPF
URINE CYTOLOGY: NORMAL
UROBILINOGEN URINE: NORMAL
WHITE BLOOD CELLS URINE: 1 /HPF

## 2023-01-29 NOTE — ASSESSMENT
[FreeTextEntry1] : 76 yo M with BPH, possible hematuria\par \par - PVR = 50ml\par - Reviewed records from hospitalization. Multiple UAs showed no hematuria. Also reviewed renal US through PACS and confirmed findings as written above\par - UA, culture cytology. If UA shows persistent hematuria, will plan for workup\par - Discussed possible etiologies for LUTS. Discussed ways to manage them including behavioral modifications such as adequate hydration, controlling constipation, restricting fluids in the evening\par - Natural history of BPH and bladder outlet obstruction reviewed, risks of progression, risks of detrussor myopathy/areflexic bladder, bladder stones, UTI, worsening symptoms, risk of retention and other issues. \par All treatment options were reviewed. This included surveillance, all medical therapeutic options, all outlet procedures including office based, TURP, bipolar TURP, button vaporization, thulium/holmium, suprapubic/retropubic simple (open, robotic) prostatectomy. \par - tamsulosin Rx transmitted

## 2023-01-29 NOTE — ASSESSMENT
[FreeTextEntry1] : 78 yo M with BPH, possible hematuria\par \par - PVR = 50ml\par - Reviewed records from hospitalization. Multiple UAs showed no hematuria. Also reviewed renal US through PACS and confirmed findings as written above\par - UA, culture cytology. If UA shows persistent hematuria, will plan for workup\par - Discussed possible etiologies for LUTS. Discussed ways to manage them including behavioral modifications such as adequate hydration, controlling constipation, restricting fluids in the evening\par - Natural history of BPH and bladder outlet obstruction reviewed, risks of progression, risks of detrussor myopathy/areflexic bladder, bladder stones, UTI, worsening symptoms, risk of retention and other issues. \par All treatment options were reviewed. This included surveillance, all medical therapeutic options, all outlet procedures including office based, TURP, bipolar TURP, button vaporization, thulium/holmium, suprapubic/retropubic simple (open, robotic) prostatectomy. \par - tamsulosin Rx transmitted

## 2023-01-29 NOTE — HISTORY OF PRESENT ILLNESS
[FreeTextEntry1] : 76 yo Hungarian speaking M\par gross hematuria x1 about 1 month ago\par was at the hospital at that time for cardiac stent\par Voiding every 2-3 hours, nocturia 7/night\par no straining\par Drinks 2-3 bottles of water, 1-2 cups of coffee\par Renal US during hospitalization - PVR 15ml, medical renal disease\par Cr elevated and pending nephrology workup

## 2023-01-29 NOTE — HISTORY OF PRESENT ILLNESS
[FreeTextEntry1] : 76 yo Tamaz[place] speaking M\par gross hematuria x1 about 1 month ago\par was at the hospital at that time for cardiac stent\par Voiding every 2-3 hours, nocturia 7/night\par no straining\par Drinks 2-3 bottles of water, 1-2 cups of coffee\par Renal US during hospitalization - PVR 15ml, medical renal disease\par Cr elevated and pending nephrology workup

## 2023-01-30 ENCOUNTER — NON-APPOINTMENT (OUTPATIENT)
Age: 78
End: 2023-01-30

## 2023-02-22 NOTE — REVIEW OF SYSTEMS
[Hematuria] : hematuria [Fever] : no fever [Chills] : no chills [Nasal Discharge] : no nasal discharge [Sore Throat] : no sore throat [Chest Pain] : no chest pain [Palpitations] : no palpitations [Shortness Of Breath] : no shortness of breath [Wheezing] : no wheezing

## 2023-02-22 NOTE — PHYSICAL EXAM
[No Acute Distress] : no acute distress [Well Nourished] : well nourished [No JVD] : no jugular venous distention [No Respiratory Distress] : no respiratory distress  [No Accessory Muscle Use] : no accessory muscle use [Clear to Auscultation] : lungs were clear to auscultation bilaterally [Normal Rate] : normal rate  [Regular Rhythm] : with a regular rhythm [Normal S1, S2] : normal S1 and S2 [de-identified] : patient in wheel chair

## 2023-02-22 NOTE — HISTORY OF PRESENT ILLNESS
[FreeTextEntry1] : hospital discharge follow-up  [de-identified] :  ID: [id number] ([id number])\par Patient is a 78 y/o M with a PMH of T2DM, CKD, HTN, and aortic regurgitation presenting for follow-up after recent discharge from hospital. Was admitted from 12/1/22 to 12/5/22 at [facility] with metabolic encephalopathy secondary to hyponatremia, multifocal PNA, and acute diastolic CHF exacerbation. During his hospitalization he missed his scheduled cardiologist appointment with Dr. Albright. Patient is present with daughter and wife. Family reports mentation is back to normal. Patient does not report any shortness of breath, wheezing, or excessive fluid in the lower extremities. Family was slightly concerned because they reported that twice this week patient had bloody urine which resolved on its own. Patient's daughter reports father had a significant smoking history but stopped 10 year ago. She does not remember when he started smoking but thinks it was 1 ppd.

## 2023-03-16 ENCOUNTER — APPOINTMENT (OUTPATIENT)
Dept: CARDIOLOGY | Facility: CLINIC | Age: 78
End: 2023-03-16
Payer: MEDICARE

## 2023-03-16 ENCOUNTER — NON-APPOINTMENT (OUTPATIENT)
Age: 78
End: 2023-03-16

## 2023-03-16 VITALS
DIASTOLIC BLOOD PRESSURE: 78 MMHG | HEIGHT: 66 IN | WEIGHT: 162 LBS | OXYGEN SATURATION: 98 % | SYSTOLIC BLOOD PRESSURE: 190 MMHG | HEART RATE: 60 BPM | TEMPERATURE: 96.8 F | BODY MASS INDEX: 26.03 KG/M2

## 2023-03-16 DIAGNOSIS — I10 ESSENTIAL (PRIMARY) HYPERTENSION: ICD-10-CM

## 2023-03-16 PROCEDURE — 99214 OFFICE O/P EST MOD 30 MIN: CPT

## 2023-03-16 PROCEDURE — 93000 ELECTROCARDIOGRAM COMPLETE: CPT

## 2023-03-16 RX ORDER — CARVEDILOL 25 MG/1
25 TABLET, FILM COATED ORAL
Refills: 0 | Status: DISCONTINUED | COMMUNITY
Start: 2022-11-29 | End: 2023-03-16

## 2023-03-16 RX ORDER — CARVEDILOL 25 MG/1
25 TABLET, FILM COATED ORAL TWICE DAILY
Qty: 180 | Refills: 1 | Status: DISCONTINUED | COMMUNITY
Start: 2023-01-05 | End: 2023-03-16

## 2023-03-18 RX ORDER — ASPIRIN ENTERIC COATED TABLETS 81 MG 81 MG/1
81 TABLET, DELAYED RELEASE ORAL DAILY
Qty: 90 | Refills: 0 | Status: DISCONTINUED | COMMUNITY
Start: 2022-11-29 | End: 2023-03-18

## 2023-03-18 NOTE — CARDIOLOGY SUMMARY
[de-identified] : ECG (9/22/22): normal sinus rhythm, left axis deviation \par ECG (10/6/22): normal sinus rhythm, left axis deviation, nonspecific ST abnormalities \par ECG (11/11/22): normal sinus rhythm, left axis deviation, LVH, IVCD, PVC, nonspecific ST abnormalities \par ECG (12/16/22): normal sinus rhythm, lateral ST depressions \par ECG (1/20/23): sinus bradycardia, LVH, nonspecific ST abnormalities\par ECG (3/16/23): normal sinus rhythm, left axis deviation  [de-identified] : Nuclear Stress Test (11/10/22): Medium-sized, moderate defects in anterolateral, inferolateral walls that are partially reversible, suggestive of infarction with moderate beatris-infarct ischemia. Post stress LVEF 39%.  [de-identified] : TTE (10/6/22): (Images reviewed): Normal LV systolic function,mild LVH. Normal RV size and function. Mildly dilated aortic root (3.9 cm). Mild-moderate AR. No pericardial effusion\par \par TTE (11/2022): Normal LV systolic function, septal motion c/w conduction defect. Normal RV size and function.  [de-identified] : Coronary angiogram (11/16/22): LAD 70% stenosis. D1 with 80% stenosis. D2 with 40% stenosis. LCx complete occlusion. RCA 80% stenosis.  TVD with mRCA lesion appear to be hazy and likely culprit for patient's presentation and  newly reduced EF. Plan for PCI of mRCA on 11/17. Continue DAPT and optimal medical care. \par \par Coronary angiogram (11/17/22): LM minor irregularities. LAD severe, unchanged disease. LCx complete occlusion. Successful PCI of the pRCA and mRCA treated with 2 NOMAN. Plan for staged PCI of the LAD 11/18. Continue DAPT.\par \par Coronary angiogram (11/17/22) repeated: Unchanged coronary anatomy.  RCA stents both remain open.  Left system is unchanged and not causing resting chest pain. Recommendations: Need to control blood pressure. Plan for staged LAD tomorrow. Continue DAPT therapy.

## 2023-03-18 NOTE — HISTORY OF PRESENT ILLNESS
[FreeTextEntry1] : David Mai ([facility]) is a 78 year old man, former cigarette smoker with past medical history of Hypertension and Chronic kidney disease with recent abnormal nuclear stress test s/p coronary angiogram and NOMAN to proximal and mid RCA (11/2022) with brief Paroxysmal atrial fibrillation (on Eliquis) presents for follow-up visit.\par \par  used for this encounter. Since his last visit he reports that he has been feeling relatively well. Denies chest pain or shortness of breath. Denies headache. Has not been checking BP at home, but reports compliance with medications. no deformity, pain or tenderness. no restriction of movement

## 2023-03-18 NOTE — PHYSICAL EXAM
[Normal] : alert and oriented, normal memory [de-identified] : elderly man, no acute distress [de-identified] : chronic left eye injury [de-identified] : supple [de-identified] : JVP ~ 7 cm H20, RRR, s1, s2, no murmurs/rubs [de-identified] : unlabored respirations, clear lung fields [de-identified] : non-distended

## 2023-03-18 NOTE — REVIEW OF SYSTEMS
[Headache] : no headache [SOB] : no shortness of breath [Chest Discomfort] : no chest discomfort [Lower Ext Edema] : no extremity edema [Syncope] : no syncope [Cough] : no cough [Abdominal Pain] : no abdominal pain [Joint Pain] : no joint pain [Rash] : no rash [Dizziness] : no dizziness [Confusion] : no confusion was observed [Easy Bruising] : no tendency for easy bruising

## 2023-03-23 ENCOUNTER — NON-APPOINTMENT (OUTPATIENT)
Age: 78
End: 2023-03-23

## 2023-03-23 ENCOUNTER — APPOINTMENT (OUTPATIENT)
Dept: CARDIOLOGY | Facility: CLINIC | Age: 78
End: 2023-03-23
Payer: MEDICARE

## 2023-03-23 VITALS
OXYGEN SATURATION: 97 % | HEART RATE: 66 BPM | HEIGHT: 66 IN | BODY MASS INDEX: 26.03 KG/M2 | SYSTOLIC BLOOD PRESSURE: 180 MMHG | WEIGHT: 162 LBS | RESPIRATION RATE: 19 BRPM | DIASTOLIC BLOOD PRESSURE: 76 MMHG | TEMPERATURE: 95.1 F

## 2023-03-23 PROCEDURE — 99213 OFFICE O/P EST LOW 20 MIN: CPT | Mod: 25

## 2023-03-23 PROCEDURE — 93000 ELECTROCARDIOGRAM COMPLETE: CPT

## 2023-03-25 NOTE — ASSESSMENT
[FreeTextEntry1] : Assessment:\par David Mai ([facility]) is a 78 year old man, former cigarette smoker with past medical history of Hypertension and Chronic kidney disease with recent abnormal nuclear stress test s/p coronary angiogram and NOMAN to proximal and mid RCA (11/2022) with brief Paroxysmal atrial fibrillation (on Eliquis) presents for management of hypertension.\par \par  used for this encounter. Since his last visit he reports that he has been feeling relatively well, has home BP log with -180s, mostly in 150s range and yesterday reports BP was 127/67 at home. Denies angina or dyspnea. ECG today consistent with sinus rhythm and left axis deviation. BP elevated in office but appears somewhat better controlled at home. Echocardiogram (11/2022) consistent with normal LV and RV systolic function. Coronary angiogram (11/2022) consistent with triple vessel CAD s/p 2 NOMAN to pRCA and mRCA. \par \par Recommendations:\par [] Coronary artery disease: LAD with 70% stenosis, D1 with 80% stenosis, LCx with complete occlusion, RCA 80% stenosis s/p 2 NOMAN. Plan was for PCI of LAD lesion during hospitalization but appears this was put on hold. Continue guideline-directed medical therapy: Plavix 75 mg daily, Atorvastatin 40 mg daily (LDL 36 mg/dl). Due to uncontrolled hypertension patient was switched from Carvedilol to Labetalol 300 mg PO BID and tolerating this well. S/p 2 months of triple therapy anticoagulation, Aspirin has been discontinued (deemed to be at increased bleeding risk on triple therapy)  Continue Eliquis for AF. \par [] Hypertension: Elevated in office, but appears somewhat better controlled at home, may continue Labetalol 300 mg PO BID for now however if SBP remains > 130-140 will need to increase Labetalol dosing. Continue Hydralazine 100 mg PO TID. Recommended DASH diet plan and to monitor BP daily at home.\par [] Paroxysmal atrial fibrillation: Currently in sinus rhythm. Due to elevated CHADSVASc score agree with anticoagulation for stroke prophylaxis; continue Eliquis 5 mg BID.\par [] Chronic kidney disease: Patient recommended to follow up with Nephrologist, daughter reports that he has an upcoming appointment in April, will need to avoid nephrotoxic medications and limit further IV contrast\par [] Return to office: 1 month or sooner if needed

## 2023-03-25 NOTE — PHYSICAL EXAM
[Normal] : alert and oriented, normal memory [de-identified] : elderly man, no acute distress [de-identified] : chronic left eye injury [de-identified] : supple [de-identified] : JVP ~ 7 cm H20, RRR, s1, s2, no murmurs/rubs [de-identified] : unlabored respirations, clear lung fields [de-identified] : non-distended

## 2023-03-25 NOTE — HISTORY OF PRESENT ILLNESS
[FreeTextEntry1] : David Mai ([facility]) is a 78 year old man, former cigarette smoker with past medical history of Hypertension and Chronic kidney disease with recent abnormal nuclear stress test s/p coronary angiogram and NOMAN to proximal and mid RCA (11/2022) with brief Paroxysmal atrial fibrillation (on Eliquis) presents for management of hypertension.\par \par  used for this encounter. Since his last visit the patient reports feeling well, they have home BP log with SBP range from 140-180s, mostly in 150s range and yesterday reports BP was 127/67 at home. He continues to deny chest pain or shortness of breath. Patient reports compliance with medications.

## 2023-03-25 NOTE — CARDIOLOGY SUMMARY
[de-identified] : ECG (9/22/22): normal sinus rhythm, left axis deviation \par ECG (10/6/22): normal sinus rhythm, left axis deviation, nonspecific ST abnormalities \par ECG (3/23/23): normal sinus rhythm, left axis deviation  [de-identified] : Nuclear Stress Test (11/10/22): Medium-sized, moderate defects in anterolateral, inferolateral walls that are partially reversible, suggestive of infarction with moderate beatris-infarct ischemia. Post stress LVEF 39%.  [de-identified] : TTE (10/6/22): (Images reviewed): Normal LV systolic function,mild LVH. Normal RV size and function. Mildly dilated aortic root (3.9 cm). Mild-moderate AR. No pericardial effusion\par \par TTE (11/2022): Normal LV systolic function, septal motion c/w conduction defect. Normal RV size and function.  [de-identified] : Coronary angiogram (11/16/22): LAD 70% stenosis. D1 with 80% stenosis. D2 with 40% stenosis. LCx complete occlusion. RCA 80% stenosis.  TVD with mRCA lesion appear to be hazy and likely culprit for patient's presentation and  newly reduced EF. Plan for PCI of mRCA on 11/17. Continue DAPT and optimal medical care. \par \par Coronary angiogram (11/17/22): LM minor irregularities. LAD severe, unchanged disease. LCx complete occlusion. Successful PCI of the pRCA and mRCA treated with 2 NOMAN. Plan for staged PCI of the LAD 11/18. Continue DAPT.\par \par Coronary angiogram (11/17/22) repeated: Unchanged coronary anatomy.  RCA stents both remain open.  Left system is unchanged and not causing resting chest pain. Recommendations: Need to control blood pressure. Plan for staged LAD tomorrow. Continue DAPT therapy. [see HPI] : see HPI [Negative] : Heme/Lymph

## 2023-03-28 ENCOUNTER — APPOINTMENT (OUTPATIENT)
Dept: FAMILY MEDICINE | Facility: HOSPITAL | Age: 78
End: 2023-03-28

## 2023-03-28 ENCOUNTER — OUTPATIENT (OUTPATIENT)
Dept: OUTPATIENT SERVICES | Facility: HOSPITAL | Age: 78
LOS: 1 days | End: 2023-03-28
Payer: MEDICARE

## 2023-03-28 VITALS
OXYGEN SATURATION: 98 % | SYSTOLIC BLOOD PRESSURE: 164 MMHG | HEART RATE: 63 BPM | BODY MASS INDEX: 27.12 KG/M2 | WEIGHT: 168 LBS | DIASTOLIC BLOOD PRESSURE: 78 MMHG | TEMPERATURE: 98.7 F | RESPIRATION RATE: 17 BRPM

## 2023-03-28 VITALS — DIASTOLIC BLOOD PRESSURE: 79 MMHG | SYSTOLIC BLOOD PRESSURE: 168 MMHG | HEART RATE: 83 BPM

## 2023-03-28 DIAGNOSIS — I21.4 NON-ST ELEVATION (NSTEMI) MYOCARDIAL INFARCTION: ICD-10-CM

## 2023-03-28 DIAGNOSIS — N18.30 CHRONIC KIDNEY DISEASE, STAGE 3 UNSPECIFIED: ICD-10-CM

## 2023-03-28 DIAGNOSIS — Z00.00 ENCOUNTER FOR GENERAL ADULT MEDICAL EXAMINATION WITHOUT ABNORMAL FINDINGS: ICD-10-CM

## 2023-03-28 DIAGNOSIS — I10 ESSENTIAL (PRIMARY) HYPERTENSION: ICD-10-CM

## 2023-03-28 PROCEDURE — G0463: CPT

## 2023-03-29 DIAGNOSIS — N18.30 CHRONIC KIDNEY DISEASE, STAGE 3 UNSPECIFIED: ICD-10-CM

## 2023-03-29 DIAGNOSIS — I10 ESSENTIAL (PRIMARY) HYPERTENSION: ICD-10-CM

## 2023-03-29 DIAGNOSIS — I21.4 NON-ST ELEVATION (NSTEMI) MYOCARDIAL INFARCTION: ICD-10-CM

## 2023-03-29 NOTE — HISTORY OF PRESENT ILLNESS
[FreeTextEntry1] : HTN [de-identified] : David Mai ([facility]) is a 78 year old man, former cigarette smoker with past medical history of Hypertension and Chronic kidney disease with recent abnormal nuclear stress test s/p coronary angiogram and NOMAN to proximal and mid RCA (11/2022) with brief Paroxysmal atrial fibrillation (on Eliquis) presents for HTN f/u. pt states he recently saw cardio a week ago. has blood pressure readings from home which measure 150-180/90s. denies headaches, chest pain, shortness of breath, nausea/vomiting, vision changes. pt feels well otherwise. pt has neuro and cardio appointment in 1 month.

## 2023-03-29 NOTE — ASSESSMENT
[FreeTextEntry1] : David Mai ([facility]) is a 78 year old man, former cigarette smoker with past medical history of Hypertension and Chronic kidney disease with recent abnormal nuclear stress test s/p coronary angiogram and NOMAN to proximal and mid RCA (11/2022) with brief Paroxysmal atrial fibrillation (on Eliquis) presents for\par \par #HTN \par -uncontrolled\par -on labetalol 300mg BID\par -on hydralazine 100mg TID\par -will start amlodipine with goal BP <140/90s\par -RTC 2 weeks for f/u\par \par #CKD\par -has nephro appointment in 1 month \par -advoid nephrotoxic agents \par \par Case discusse with Dr Jose

## 2023-04-11 ENCOUNTER — OUTPATIENT (OUTPATIENT)
Dept: OUTPATIENT SERVICES | Facility: HOSPITAL | Age: 78
LOS: 1 days | End: 2023-04-11
Payer: MEDICARE

## 2023-04-11 ENCOUNTER — APPOINTMENT (OUTPATIENT)
Dept: FAMILY MEDICINE | Facility: HOSPITAL | Age: 78
End: 2023-04-11

## 2023-04-11 VITALS
TEMPERATURE: 98.2 F | HEART RATE: 68 BPM | BODY MASS INDEX: 27.92 KG/M2 | RESPIRATION RATE: 14 BRPM | WEIGHT: 173 LBS | DIASTOLIC BLOOD PRESSURE: 66 MMHG | SYSTOLIC BLOOD PRESSURE: 143 MMHG | OXYGEN SATURATION: 95 %

## 2023-04-11 DIAGNOSIS — Z00.00 ENCOUNTER FOR GENERAL ADULT MEDICAL EXAMINATION WITHOUT ABNORMAL FINDINGS: ICD-10-CM

## 2023-04-11 PROCEDURE — G0463: CPT

## 2023-04-11 RX ORDER — HYDRALAZINE HYDROCHLORIDE 100 MG/1
100 TABLET ORAL 3 TIMES DAILY
Qty: 180 | Refills: 1 | Status: ACTIVE | COMMUNITY
Start: 2022-11-29 | End: 1900-01-01

## 2023-04-11 RX ORDER — APIXABAN 5 MG/1
5 TABLET, FILM COATED ORAL
Qty: 180 | Refills: 1 | Status: ACTIVE | COMMUNITY
Start: 2022-11-29 | End: 1900-01-01

## 2023-04-12 DIAGNOSIS — I10 ESSENTIAL (PRIMARY) HYPERTENSION: ICD-10-CM

## 2023-04-12 NOTE — PHYSICAL EXAM
[No Acute Distress] : no acute distress [Well-Appearing] : well-appearing [Normal Voice/Communication] : normal voice/communication [Normal Sclera/Conjunctiva] : normal sclera/conjunctiva [Normal Outer Ear/Nose] : the outer ears and nose were normal in appearance [Supple] : supple [No Respiratory Distress] : no respiratory distress  [No Accessory Muscle Use] : no accessory muscle use [Clear to Auscultation] : lungs were clear to auscultation bilaterally [Normal Rate] : normal rate  [Regular Rhythm] : with a regular rhythm [Normal S1, S2] : normal S1 and S2 [Soft] : abdomen soft [Non Tender] : non-tender [Non-distended] : non-distended

## 2023-04-13 ENCOUNTER — APPOINTMENT (OUTPATIENT)
Dept: CARDIOLOGY | Facility: CLINIC | Age: 78
End: 2023-04-13
Payer: MEDICARE

## 2023-04-13 VITALS
RESPIRATION RATE: 20 BRPM | BODY MASS INDEX: 28.12 KG/M2 | SYSTOLIC BLOOD PRESSURE: 151 MMHG | HEIGHT: 66 IN | HEART RATE: 71 BPM | OXYGEN SATURATION: 96 % | DIASTOLIC BLOOD PRESSURE: 67 MMHG | WEIGHT: 175 LBS | TEMPERATURE: 95.2 F

## 2023-04-13 PROCEDURE — 99214 OFFICE O/P EST MOD 30 MIN: CPT | Mod: 25

## 2023-04-13 PROCEDURE — 93000 ELECTROCARDIOGRAM COMPLETE: CPT

## 2023-04-13 RX ORDER — AMLODIPINE BESYLATE 5 MG/1
5 TABLET ORAL DAILY
Qty: 90 | Refills: 3 | Status: DISCONTINUED | COMMUNITY
Start: 2023-03-28 | End: 2023-04-13

## 2023-04-14 ENCOUNTER — NON-APPOINTMENT (OUTPATIENT)
Age: 78
End: 2023-04-14

## 2023-04-14 ENCOUNTER — APPOINTMENT (OUTPATIENT)
Age: 78
End: 2023-04-14

## 2023-04-14 NOTE — ASSESSMENT
[FreeTextEntry1] : Assessment:\par David Mai ([facility]) is a 78 year old man, former cigarette smoker with past medical history of Hypertension and Chronic kidney disease with recent abnormal nuclear stress test s/p coronary angiogram and NOMAN to proximal and mid RCA (11/2022) with brief Paroxysmal atrial fibrillation (on Eliquis) presents for management of hypertension.\par \par  used for this encounter. Since his last visit he reports that he has been feeling relatively well, has home BP log with -160s which is improved compared to prior visit. Denies angina or dyspnea. ECG today consistent with sinus rhythm and left axis deviation. BP elevated in office but appears somewhat better controlled at home. Echocardiogram (11/2022) consistent with normal LV and RV systolic function. Coronary angiogram (11/2022) consistent with triple vessel CAD s/p 2 NOMAN to pRCA and mRCA. \par \par Recommendations:\par [] Coronary artery disease: LAD with 70% stenosis, D1 with 80% stenosis, LCx with complete occlusion, RCA 80% stenosis s/p 2 NOMAN. Plan was for PCI of LAD lesion during hospitalization but appears this was put on hold. Continue guideline-directed medical therapy: Plavix 75 mg daily, Atorvastatin 40 mg daily (LDL 36 mg/dl). Due to uncontrolled hypertension patient was switched from Carvedilol to Labetalol 300 mg PO BID and tolerating this well. S/p 2 months of triple therapy anticoagulation, Aspirin has been discontinued (deemed to be at increased bleeding risk on triple therapy). Continue Eliquis for AF. \par [] Hypertension: Elevated in office, but appears somewhat better controlled at home, may continue Labetalol 300 mg PO BID. Continue Hydralazine 100 mg PO TID. Recently placed on Amlodipine 5 mg daily, will increase to 10 mg daily. Recommended DASH diet plan and to monitor BP daily at home.\par [] Paroxysmal atrial fibrillation: Currently in sinus rhythm. Due to elevated [id number] score agree with anticoagulation for stroke prophylaxis; continue Eliquis 5 mg BID.\par [] Chronic kidney disease: Patient recommended to follow up with nephrologist, will need to avoid nephrotoxic medications and limit further IV contrast\par [] Return to office: 2 months

## 2023-04-14 NOTE — PHYSICAL EXAM
[Normal] : alert and oriented, normal memory [de-identified] : elderly man, no acute distress [de-identified] : chronic left eye injury [de-identified] : supple [de-identified] : JVP ~ 7 cm H20, RRR, s1, s2, no murmurs/rubs [de-identified] : unlabored respirations, clear lung fields [de-identified] : non-distended

## 2023-04-14 NOTE — HISTORY OF PRESENT ILLNESS
[FreeTextEntry1] : David Mai ([facility]) is a 78 year old man, former cigarette smoker with past medical history of Hypertension and Chronic kidney disease with recent abnormal nuclear stress test s/p coronary angiogram and NOMAN to proximal and mid RCA (11/2022) with brief Paroxysmal atrial fibrillation (on Eliquis) presents for management of hypertension.\par \par  used for this encounter. Since his last visit the patient reports feeling well, they have home BP log with SBP range from 140-160s. He continues to deny chest pain or shortness of breath. Patient reports compliance with medications.

## 2023-04-14 NOTE — CARDIOLOGY SUMMARY
[de-identified] : ECG (9/22/22): normal sinus rhythm, left axis deviation \par ECG (10/6/22): normal sinus rhythm, left axis deviation, nonspecific ST abnormalities \par ECG (3/23/23): normal sinus rhythm, left axis deviation \par ECG (4/13/23): normal sinus rhythm, left axis deviation  [de-identified] : Nuclear Stress Test (11/10/22): Medium-sized, moderate defects in anterolateral, inferolateral walls that are partially reversible, suggestive of infarction with moderate beatris-infarct ischemia. Post stress LVEF 39%.  [de-identified] : TTE (10/6/22): (Images reviewed): Normal LV systolic function,mild LVH. Normal RV size and function. Mildly dilated aortic root (3.9 cm). Mild-moderate AR. No pericardial effusion\par \par TTE (11/2022): Normal LV systolic function, septal motion c/w conduction defect. Normal RV size and function.  [de-identified] : Coronary angiogram (11/16/22): LAD 70% stenosis. D1 with 80% stenosis. D2 with 40% stenosis. LCx complete occlusion. RCA 80% stenosis.  TVD with mRCA lesion appear to be hazy and likely culprit for patient's presentation and  newly reduced EF. Plan for PCI of mRCA on 11/17. Continue DAPT and optimal medical care. \par \par Coronary angiogram (11/17/22): LM minor irregularities. LAD severe, unchanged disease. LCx complete occlusion. Successful PCI of the pRCA and mRCA treated with 2 NOMAN. Plan for staged PCI of the LAD 11/18. Continue DAPT.\par \par Coronary angiogram (11/17/22) repeated: Unchanged coronary anatomy.  RCA stents both remain open.  Left system is unchanged and not causing resting chest pain. Recommendations: Need to control blood pressure. Plan for staged LAD tomorrow. Continue DAPT therapy.

## 2023-04-23 NOTE — HISTORY OF PRESENT ILLNESS
[FreeTextEntry1] : F/u htn, medication refill [de-identified] : 76 YO M with PMH Of DM2, HTN, CKD 3, here for f/u HTN. Pt notes that he will visit [place] two [place], and would like bp check and medication refills before he goes, as he is out of meds.  Patient is feeling good today with no concerns, doing well on amlodipine, hydralazine, and labetelol and reports no AE or hypertensive sxs, w/ BP today measured at 143/68. Pt denies ha, dizziness, eye blurriness, cp, sob, palpitations, n/v/ab pain.No other complaints or symptoms reported.\par

## 2023-07-03 ENCOUNTER — OFFICE (OUTPATIENT)
Dept: URBAN - METROPOLITAN AREA CLINIC 35 | Facility: CLINIC | Age: 78
Setting detail: OPHTHALMOLOGY
End: 2023-07-03
Payer: MEDICARE

## 2023-07-03 DIAGNOSIS — H02.132: ICD-10-CM

## 2023-07-03 DIAGNOSIS — H44.522: ICD-10-CM

## 2023-07-03 DIAGNOSIS — H02.831: ICD-10-CM

## 2023-07-03 DIAGNOSIS — H02.135: ICD-10-CM

## 2023-07-03 PROCEDURE — 92004 COMPRE OPH EXAM NEW PT 1/>: CPT | Performed by: OPHTHALMOLOGY

## 2023-07-03 ASSESSMENT — CONFRONTATIONAL VISUAL FIELD TEST (CVF)
OS_FINDINGS: FULL
OD_FINDINGS: FULL

## 2023-07-03 ASSESSMENT — REFRACTION_MANIFEST
OD_CYLINDER: +0.25
OD_SPHERE: PLANO
OD_AXIS: 000
OD_VA1: 20/25+2

## 2023-07-03 ASSESSMENT — LID EXAM ASSESSMENTS: OD_BLEPHARITIS: RUL 1+

## 2023-07-03 ASSESSMENT — SPHEQUIV_DERIVED: OD_SPHEQUIV: 0.25

## 2023-07-03 ASSESSMENT — KERATOMETRY
OD_K1POWER_DIOPTERS: 44.00
OD_AXISANGLE_DEGREES: 090
OD_K2POWER_DIOPTERS: 44.00

## 2023-07-03 ASSESSMENT — AXIALLENGTH_DERIVED: OD_AL: 23.3142

## 2023-07-03 ASSESSMENT — TONOMETRY: OD_IOP_MMHG: 18

## 2023-07-03 ASSESSMENT — LID POSITION - ECTROPION: OD_ECTROPION: RLL T

## 2023-07-03 ASSESSMENT — REFRACTION_AUTOREFRACTION
OD_SPHERE: +0.50
OD_CYLINDER: -0.50
OD_AXIS: 091

## 2023-07-03 ASSESSMENT — VISUAL ACUITY
OD_BCVA: NLP
OS_BCVA: 20/30

## 2023-07-03 ASSESSMENT — LID POSITION - DERMATOCHALASIS: OD_DERMATOCHALASIS: RUL 1+

## 2023-07-13 ENCOUNTER — APPOINTMENT (OUTPATIENT)
Dept: CARDIOLOGY | Facility: CLINIC | Age: 78
End: 2023-07-13
Payer: MEDICARE

## 2023-07-13 VITALS
BODY MASS INDEX: 26.03 KG/M2 | HEART RATE: 60 BPM | WEIGHT: 162 LBS | OXYGEN SATURATION: 97 % | RESPIRATION RATE: 17 BRPM | DIASTOLIC BLOOD PRESSURE: 76 MMHG | SYSTOLIC BLOOD PRESSURE: 165 MMHG | HEIGHT: 66 IN

## 2023-07-13 PROCEDURE — 99214 OFFICE O/P EST MOD 30 MIN: CPT | Mod: 25

## 2023-07-13 PROCEDURE — 93000 ELECTROCARDIOGRAM COMPLETE: CPT

## 2023-07-15 ENCOUNTER — NON-APPOINTMENT (OUTPATIENT)
Age: 78
End: 2023-07-15

## 2023-07-15 NOTE — HISTORY OF PRESENT ILLNESS
[FreeTextEntry1] : David Mai ([facility]) is a 78 year old man, former cigarette smoker with past medical history of Hypertension and Chronic kidney disease with recent abnormal nuclear stress test s/p coronary angiogram and NOMAN to proximal and mid RCA (11/2022) with brief Paroxysmal atrial fibrillation (on Eliquis) presents for follow up visit.\par \par The patient is present with his daughter. He reports feeling well, has not experienced chest pain or shortness of breath. BP not checked regularly at home, on occasion SBP 140s.

## 2023-07-15 NOTE — PHYSICAL EXAM
[Normal] : alert and oriented, normal memory [de-identified] : elderly man, no acute distress [de-identified] : chronic left eye injury [de-identified] : supple [de-identified] : JVP ~ 7 cm H20, RRR, s1, s2, no murmurs/rubs [de-identified] : unlabored respirations, clear lung fields [de-identified] : non-distended

## 2023-07-15 NOTE — ASSESSMENT
[FreeTextEntry1] : Assessment:\par David Mai (Neyda) is a 78 year old man, former cigarette smoker with past medical history of Hypertension and Chronic kidney disease with recent abnormal nuclear stress test s/p coronary angiogram and NOMAN to proximal and mid RCA (11/2022) with brief Paroxysmal atrial fibrillation (on Eliquis) presents for follow up visit.\par \par The patient is present with his daughter and reports feeling well, denies exertional angina or dyspnea. ECG consistent with sinus rhythm, possible old inferior infarct, similar to prior ECGs. BP elevated in office and on repeat as well, daughter concerned if patient is actually compliant with his medication. Prior echocardiogram (11/2022) consistent with normal LV and RV systolic function. Coronary angiogram (11/2022) consistent with triple vessel CAD s/p 2 NOMAN to pRCA and mRCA. \par \par Recommendations:\par [] Coronary artery disease: LAD with 70% stenosis, D1 with 80% stenosis, LCx with complete occlusion, RCA 80% stenosis s/p 2 NOMAN. Plan was for PCI of LAD lesion during hospitalization but appears this was put on hold. Continue guideline-directed medical therapy: Plavix 75 mg daily, Atorvastatin 40 mg daily (LDL well controlled at 51 mg/dl). Continue Labetalol 300 mg PO BID. S/p 2 months of triple therapy anticoagulation, Aspirin has been discontinued (deemed to be at increased bleeding risk on triple therapy). Continue Eliquis for AF. \par [] Hypertension: Elevated in office, daughter will review if patient compliant with medications at home, if patient is compliant and if SBP > 140, will need to consider switching Amlodipine to Nifedipine, not clear if patient candidate for ACE/ARB due to renal disease. Continue Labetalol 300 mg PO BID, Hydralazine 100 mg PO TID & Amlodipine 10 mg daily. Recommended DASH diet plan and to monitor BP daily at home.\par [] Paroxysmal atrial fibrillation: Currently in sinus rhythm. Due to elevated [id number] score agree with anticoagulation for stroke prophylaxis; continue Eliquis 5 mg BID.\par [] Chronic kidney disease: Cr and GFR mildly improved on recent labs this month. Patient recommended to follow up with nephrologist, would like to know if patient candidate for ACE-I/ARB, would avoid further IV contrast at this time\par [] Return to office: November 2023

## 2023-07-15 NOTE — CARDIOLOGY SUMMARY
[de-identified] : ECG (9/22/22): normal sinus rhythm, left axis deviation \par ECG (7/13/23): normal sinus rhythm, left axis deviation, possible old inferior infarct, nonspecific ST abnormalities  [de-identified] : Nuclear Stress Test (11/10/22): Medium-sized, moderate defects in anterolateral, inferolateral walls that are partially reversible, suggestive of infarction with moderate beatris-infarct ischemia. Post stress LVEF 39%.  [de-identified] : TTE (10/6/22): (Images reviewed): Normal LV systolic function,mild LVH. Normal RV size and function. Mildly dilated aortic root (3.9 cm). Mild-moderate AR. No pericardial effusion\par \par TTE (11/2022): Normal LV systolic function, septal motion c/w conduction defect. Normal RV size and function.  [de-identified] : Coronary angiogram (11/16/22): LAD 70% stenosis. D1 with 80% stenosis. D2 with 40% stenosis. LCx complete occlusion. RCA 80% stenosis.  TVD with mRCA lesion appear to be hazy and likely culprit for patient's presentation and  newly reduced EF. Plan for PCI of mRCA on 11/17. Continue DAPT and optimal medical care. \par \par Coronary angiogram (11/17/22): LM minor irregularities. LAD severe, unchanged disease. LCx complete occlusion. Successful PCI of the pRCA and mRCA treated with 2 NOMAN. Plan for staged PCI of the LAD 11/18. Continue DAPT.\par \par Coronary angiogram (11/17/22) repeated: Unchanged coronary anatomy.  RCA stents both remain open.  Left system is unchanged and not causing resting chest pain. Recommendations: Need to control blood pressure. Plan for staged LAD tomorrow. Continue DAPT therapy.

## 2023-07-28 ENCOUNTER — OFFICE (OUTPATIENT)
Dept: URBAN - METROPOLITAN AREA CLINIC 35 | Facility: CLINIC | Age: 78
Setting detail: OPHTHALMOLOGY
End: 2023-07-28
Payer: MEDICARE

## 2023-07-28 DIAGNOSIS — H04.522: ICD-10-CM

## 2023-07-28 DIAGNOSIS — H01.004: ICD-10-CM

## 2023-07-28 DIAGNOSIS — H44.522: ICD-10-CM

## 2023-07-28 DIAGNOSIS — H02.135: ICD-10-CM

## 2023-07-28 DIAGNOSIS — H02.831: ICD-10-CM

## 2023-07-28 DIAGNOSIS — H01.001: ICD-10-CM

## 2023-07-28 PROBLEM — Z96.1 PSEUDOPHAKIA: Status: ACTIVE | Noted: 2023-07-03

## 2023-07-28 PROCEDURE — 92285 EXTERNAL OCULAR PHOTOGRAPHY: CPT | Performed by: OPHTHALMOLOGY

## 2023-07-28 PROCEDURE — 99214 OFFICE O/P EST MOD 30 MIN: CPT | Performed by: OPHTHALMOLOGY

## 2023-07-28 ASSESSMENT — CONFRONTATIONAL VISUAL FIELD TEST (CVF)
OS_FINDINGS: FULL
OD_FINDINGS: FULL

## 2023-08-02 ASSESSMENT — LID EXAM ASSESSMENTS
OD_COMMENTS: 1+ LAXIT
OD_BLEPHARITIS: RUL 1+

## 2023-08-02 ASSESSMENT — SPHEQUIV_DERIVED: OD_SPHEQUIV: 0.25

## 2023-08-02 ASSESSMENT — KERATOMETRY
OD_K2POWER_DIOPTERS: 44.00
OD_AXISANGLE_DEGREES: 090
OD_K1POWER_DIOPTERS: 44.00

## 2023-08-02 ASSESSMENT — REFRACTION_AUTOREFRACTION
OD_AXIS: 091
OD_SPHERE: +0.50
OD_CYLINDER: -0.50

## 2023-08-02 ASSESSMENT — AXIALLENGTH_DERIVED: OD_AL: 23.3142

## 2023-08-02 ASSESSMENT — VISUAL ACUITY
OS_BCVA: 20/25-2+3
OD_BCVA: NLP

## 2023-08-02 ASSESSMENT — LID POSITION - DERMATOCHALASIS: OD_DERMATOCHALASIS: RUL 1+

## 2023-09-06 NOTE — ED ADULT NURSE NOTE - NSIMPLEMENTINTERV_GEN_ALL_ED
Rituxan Counseling:  I discussed with the patient the risks of Rituxan infusions. Side effects can include infusion reactions, severe drug rashes including mucocutaneous reactions, reactivation of latent hepatitis and other infections and rarely progressive multifocal leukoencephalopathy.  All of the patient's questions and concerns were addressed. Implemented All Fall Risk Interventions:  [place] to call system. Call bell, personal items and telephone within reach. Instruct patient to call for assistance. Room bathroom lighting operational. Non-slip footwear when patient is off stretcher. Physically safe environment: no spills, clutter or unnecessary equipment. Stretcher in lowest position, wheels locked, appropriate side rails in place. Provide visual cue, wrist band, yellow gown, etc. Monitor gait and stability. Monitor for mental status changes and reorient to person, place, and time. Review medications for side effects contributing to fall risk. Reinforce activity limits and safety measures with patient and family.

## 2023-09-21 ENCOUNTER — APPOINTMENT (OUTPATIENT)
Dept: CARDIOLOGY | Facility: CLINIC | Age: 78
End: 2023-09-21
Payer: MEDICARE

## 2023-09-21 ENCOUNTER — NON-APPOINTMENT (OUTPATIENT)
Age: 78
End: 2023-09-21

## 2023-09-21 VITALS
DIASTOLIC BLOOD PRESSURE: 81 MMHG | SYSTOLIC BLOOD PRESSURE: 184 MMHG | WEIGHT: 161 LBS | BODY MASS INDEX: 25.88 KG/M2 | OXYGEN SATURATION: 99 % | HEIGHT: 66 IN | HEART RATE: 62 BPM

## 2023-09-21 DIAGNOSIS — I48.0 PAROXYSMAL ATRIAL FIBRILLATION: ICD-10-CM

## 2023-09-21 DIAGNOSIS — N18.9 CHRONIC KIDNEY DISEASE, UNSPECIFIED: ICD-10-CM

## 2023-09-21 DIAGNOSIS — I25.10 ATHEROSCLEROTIC HEART DISEASE OF NATIVE CORONARY ARTERY W/OUT ANGINA PECTORIS: ICD-10-CM

## 2023-09-21 DIAGNOSIS — I10 ESSENTIAL (PRIMARY) HYPERTENSION: ICD-10-CM

## 2023-09-21 PROCEDURE — 99214 OFFICE O/P EST MOD 30 MIN: CPT | Mod: 25

## 2023-09-21 PROCEDURE — 93000 ELECTROCARDIOGRAM COMPLETE: CPT

## 2023-09-21 RX ORDER — ATORVASTATIN CALCIUM 40 MG/1
40 TABLET, FILM COATED ORAL
Qty: 90 | Refills: 1 | Status: ACTIVE | COMMUNITY
Start: 2022-11-29 | End: 1900-01-01

## 2023-09-21 RX ORDER — CLOPIDOGREL BISULFATE 75 MG/1
75 TABLET, FILM COATED ORAL
Qty: 90 | Refills: 1 | Status: ACTIVE | COMMUNITY
Start: 2022-11-29 | End: 1900-01-01

## 2023-09-22 PROBLEM — I25.10 3-VESSEL CAD: Status: ACTIVE | Noted: 2022-12-23

## 2023-09-22 PROBLEM — I10 HYPERTENSION: Status: ACTIVE | Noted: 2022-09-22

## 2023-09-22 PROBLEM — I48.0 PAROXYSMAL ATRIAL FIBRILLATION: Status: ACTIVE | Noted: 2022-12-23

## 2023-09-22 PROBLEM — N18.9 CKD (CHRONIC KIDNEY DISEASE): Status: ACTIVE | Noted: 2022-09-24

## 2024-01-23 RX ORDER — TAMSULOSIN HYDROCHLORIDE 0.4 MG/1
0.4 CAPSULE ORAL
Qty: 90 | Refills: 3 | Status: ACTIVE | COMMUNITY
Start: 2023-01-13 | End: 1900-01-01

## 2024-01-25 ENCOUNTER — RX RENEWAL (OUTPATIENT)
Age: 79
End: 2024-01-25

## 2024-01-25 RX ORDER — LABETALOL HYDROCHLORIDE 300 MG/1
300 TABLET, FILM COATED ORAL
Qty: 120 | Refills: 1 | Status: ACTIVE | COMMUNITY
Start: 2023-03-16 | End: 1900-01-01

## 2024-03-26 NOTE — ED ADULT TRIAGE NOTE - HEIGHT IN INCHES
5
PAST SURGICAL HISTORY:  H/O medial meniscus repair of left knee     H/O microdiscectomy     H/O repair of left rotator cuff     H/O right inguinal hernia repair     History of circumcision     History of removal of cyst

## 2024-04-24 ENCOUNTER — RX RENEWAL (OUTPATIENT)
Age: 79
End: 2024-04-24

## 2024-04-24 RX ORDER — AMLODIPINE BESYLATE 10 MG/1
10 TABLET ORAL DAILY
Qty: 90 | Refills: 1 | Status: ACTIVE | COMMUNITY
Start: 2023-04-13 | End: 1900-01-01

## 2024-07-06 ENCOUNTER — RX RENEWAL (OUTPATIENT)
Age: 79
End: 2024-07-06

## 2024-09-04 ENCOUNTER — APPOINTMENT (OUTPATIENT)
Dept: NEUROLOGY | Facility: CLINIC | Age: 79
End: 2024-09-04

## 2024-09-05 ENCOUNTER — APPOINTMENT (OUTPATIENT)
Dept: CARDIOLOGY | Facility: CLINIC | Age: 79
End: 2024-09-05
Payer: MEDICARE

## 2024-09-05 ENCOUNTER — NON-APPOINTMENT (OUTPATIENT)
Age: 79
End: 2024-09-05

## 2024-09-05 VITALS
HEART RATE: 60 BPM | HEIGHT: 66 IN | WEIGHT: 160 LBS | OXYGEN SATURATION: 96 % | SYSTOLIC BLOOD PRESSURE: 144 MMHG | RESPIRATION RATE: 19 BRPM | BODY MASS INDEX: 25.71 KG/M2 | DIASTOLIC BLOOD PRESSURE: 69 MMHG

## 2024-09-05 DIAGNOSIS — I10 ESSENTIAL (PRIMARY) HYPERTENSION: ICD-10-CM

## 2024-09-05 DIAGNOSIS — I48.0 PAROXYSMAL ATRIAL FIBRILLATION: ICD-10-CM

## 2024-09-05 DIAGNOSIS — I25.10 ATHEROSCLEROTIC HEART DISEASE OF NATIVE CORONARY ARTERY W/OUT ANGINA PECTORIS: ICD-10-CM

## 2024-09-05 DIAGNOSIS — N18.9 CHRONIC KIDNEY DISEASE, UNSPECIFIED: ICD-10-CM

## 2024-09-05 PROCEDURE — 93000 ELECTROCARDIOGRAM COMPLETE: CPT

## 2024-09-05 PROCEDURE — 99214 OFFICE O/P EST MOD 30 MIN: CPT | Mod: 25

## 2024-09-05 NOTE — PHYSICAL EXAM
[Normal] : alert and oriented, normal memory [de-identified] : elderly man, no acute distress [de-identified] : chronic left eye injury [de-identified] : supple [de-identified] : JVP ~ 7 cm H20, RRR, s1, s2, no murmurs/rubs [de-identified] : unlabored respirations, clear lung fields [de-identified] : non-distended

## 2024-09-05 NOTE — ASSESSMENT
[FreeTextEntry1] : Assessment: David Mai ([facility]) is a 79 year old man, former cigarette smoker with past medical history of Hypertension and Chronic kidney disease with history of abnormal nuclear stress test s/p coronary angiogram and NOMAN to proximal and mid RCA (11/2022) with brief Paroxysmal atrial fibrillation (on Eliquis) presents for follow up visit.  The patient is present with his wife. He denies exertional chest pain or shortness of breath. ECG today consistent with sinus bradycardia at 59 BPM. Prior echocardiogram (11/2022) consistent with normal LV and RV systolic function. Coronary angiogram (11/2022) consistent with triple vessel CAD s/p 2 NOMAN to pRCA and mRCA.   Recommendations: [] Coronary artery disease: LAD with 70% stenosis, D1 with 80% stenosis, LCx with complete occlusion, RCA 80% stenosis s/p 2 NOMAN. Plan was for PCI of LAD lesion during hospitalization but appears this was put on hold. Continue guideline-directed medical therapy: Plavix 75 mg daily, Atorvastatin 40 mg daily (LDL well controlled at 51 mg/dl). Continue Labetalol 300 mg PO BID. S/p 2 months of triple therapy anticoagulation, Aspirin has been discontinued (deemed to be at increased bleeding risk on triple therapy). Continue Eliquis for AF.  [] Hypertension: BP stable. Continue Labetalol 300 mg PO BID & Hydralazine 100 mg PO TID. Recommended DASH diet plan and to monitor BP daily at home. [] Paroxysmal atrial fibrillation: Currently in sinus rhythm. Due to elevated [id number] score agree with anticoagulation for stroke prophylaxis; continue Eliquis 5 mg BID. [] Chronic kidney disease: Patient recommended to follow up with nephrologist [] Return to office: 3 months

## 2024-09-05 NOTE — CARDIOLOGY SUMMARY
[de-identified] : ECG (9/22/22): normal sinus rhythm, left axis deviation  ECG (9/21/23): normal sinus rhythm, left axis deviation, nonspecific ST abnormalities  ECG (9/5/24): sinus bradycardia [de-identified] : Nuclear Stress Test (11/10/22): Medium-sized, moderate defects in anterolateral, inferolateral walls that are partially reversible, suggestive of infarction with moderate beatris-infarct ischemia. Post stress LVEF 39%.  [de-identified] : TTE (10/6/22): (Images reviewed): Normal LV systolic function,mild LVH. Normal RV size and function. Mildly dilated aortic root (3.9 cm). Mild-moderate AR. No pericardial effusion  TTE (11/2022): Normal LV systolic function, septal motion c/w conduction defect. Normal RV size and function.  [de-identified] : Coronary angiogram (11/16/22): LAD 70% stenosis. D1 with 80% stenosis. D2 with 40% stenosis. LCx complete occlusion. RCA 80% stenosis.  TVD with mRCA lesion appear to be hazy and likely culprit for patient's presentation and  newly reduced EF. Plan for PCI of mRCA on 11/17. Continue DAPT and optimal medical care.   Coronary angiogram (11/17/22): LM minor irregularities. LAD severe, unchanged disease. LCx complete occlusion. Successful PCI of the pRCA and mRCA treated with 2 NOMAN. Plan for staged PCI of the LAD 11/18. Continue DAPT.  Coronary angiogram (11/17/22) repeated: Unchanged coronary anatomy.  RCA stents both remain open.  Left system is unchanged and not causing resting chest pain. Recommendations: Need to control blood pressure. Plan for staged LAD tomorrow. Continue DAPT therapy.

## 2024-09-05 NOTE — HISTORY OF PRESENT ILLNESS
[FreeTextEntry1] : Language Line  ID#: [id number]  David Mai ([facility]) is a 79 year old man, former cigarette smoker with past medical history of Hypertension and Chronic kidney disease with history of abnormal nuclear stress test s/p coronary angiogram and NOMAN to proximal and mid RCA (11/2022) with brief Paroxysmal atrial fibrillation (on Eliquis) presents for follow up visit.  The patient is present with his wife. He denies exertional chest pain or shortness of breath. Reports fatigue at home. Denies leg swelling. Reports BP stable at home.

## 2024-09-07 ENCOUNTER — OUTPATIENT (OUTPATIENT)
Dept: OUTPATIENT SERVICES | Facility: HOSPITAL | Age: 79
LOS: 1 days | End: 2024-09-07
Payer: MEDICARE

## 2024-09-07 ENCOUNTER — APPOINTMENT (OUTPATIENT)
Dept: RADIOLOGY | Facility: HOSPITAL | Age: 79
End: 2024-09-07
Payer: MEDICARE

## 2024-09-07 DIAGNOSIS — Z00.00 ENCOUNTER FOR GENERAL ADULT MEDICAL EXAMINATION WITHOUT ABNORMAL FINDINGS: ICD-10-CM

## 2024-09-07 PROCEDURE — 77080 DXA BONE DENSITY AXIAL: CPT | Mod: 26

## 2024-09-07 PROCEDURE — 77080 DXA BONE DENSITY AXIAL: CPT

## 2024-12-02 ENCOUNTER — RX RENEWAL (OUTPATIENT)
Age: 79
End: 2024-12-02

## 2025-01-03 ENCOUNTER — APPOINTMENT (OUTPATIENT)
Dept: CARDIOLOGY | Facility: CLINIC | Age: 80
End: 2025-01-03
Payer: MEDICARE

## 2025-01-03 ENCOUNTER — NON-APPOINTMENT (OUTPATIENT)
Age: 80
End: 2025-01-03

## 2025-01-03 VITALS
BODY MASS INDEX: 25.71 KG/M2 | HEIGHT: 66 IN | DIASTOLIC BLOOD PRESSURE: 69 MMHG | OXYGEN SATURATION: 94 % | HEART RATE: 70 BPM | WEIGHT: 160 LBS | SYSTOLIC BLOOD PRESSURE: 134 MMHG | RESPIRATION RATE: 16 BRPM

## 2025-01-03 DIAGNOSIS — R07.89 OTHER CHEST PAIN: ICD-10-CM

## 2025-01-03 DIAGNOSIS — I10 ESSENTIAL (PRIMARY) HYPERTENSION: ICD-10-CM

## 2025-01-03 DIAGNOSIS — N18.9 CHRONIC KIDNEY DISEASE, UNSPECIFIED: ICD-10-CM

## 2025-01-03 DIAGNOSIS — I25.10 ATHEROSCLEROTIC HEART DISEASE OF NATIVE CORONARY ARTERY W/OUT ANGINA PECTORIS: ICD-10-CM

## 2025-01-03 DIAGNOSIS — I48.0 PAROXYSMAL ATRIAL FIBRILLATION: ICD-10-CM

## 2025-01-03 PROCEDURE — 93000 ELECTROCARDIOGRAM COMPLETE: CPT

## 2025-01-03 PROCEDURE — 99214 OFFICE O/P EST MOD 30 MIN: CPT | Mod: 25

## 2025-01-04 ENCOUNTER — INPATIENT (INPATIENT)
Facility: HOSPITAL | Age: 80
LOS: 4 days | Discharge: HOME | DRG: 311 | End: 2025-01-09
Attending: INTERNAL MEDICINE | Admitting: STUDENT IN AN ORGANIZED HEALTH CARE EDUCATION/TRAINING PROGRAM
Payer: MEDICARE

## 2025-01-04 ENCOUNTER — NON-APPOINTMENT (OUTPATIENT)
Age: 80
End: 2025-01-04

## 2025-01-04 VITALS
HEART RATE: 62 BPM | DIASTOLIC BLOOD PRESSURE: 63 MMHG | WEIGHT: 164.91 LBS | SYSTOLIC BLOOD PRESSURE: 131 MMHG | TEMPERATURE: 98 F | OXYGEN SATURATION: 89 % | RESPIRATION RATE: 18 BRPM

## 2025-01-04 DIAGNOSIS — I24.9 ACUTE ISCHEMIC HEART DISEASE, UNSPECIFIED: ICD-10-CM

## 2025-01-04 LAB
ALBUMIN SERPL ELPH-MCNC: 2.5 G/DL — LOW (ref 3.3–5)
ALBUMIN SERPL ELPH-MCNC: 3.5 G/DL
ALP BLD-CCNC: 148 U/L
ALP SERPL-CCNC: 183 U/L — HIGH (ref 40–120)
ALT FLD-CCNC: 16 U/L — SIGNIFICANT CHANGE UP (ref 10–45)
ALT SERPL-CCNC: 8 U/L
ANION GAP SERPL CALC-SCNC: 17 MMOL/L
ANION GAP SERPL CALC-SCNC: 8 MMOL/L — SIGNIFICANT CHANGE UP (ref 5–17)
APTT BLD: 38.3 SEC — HIGH (ref 24.5–35.6)
AST SERPL-CCNC: 14 U/L
AST SERPL-CCNC: 21 U/L — SIGNIFICANT CHANGE UP (ref 10–40)
BASOPHILS # BLD AUTO: 0.05 K/UL — SIGNIFICANT CHANGE UP (ref 0–0.2)
BASOPHILS NFR BLD AUTO: 0.5 % — SIGNIFICANT CHANGE UP (ref 0–2)
BILIRUB SERPL-MCNC: 0.9 MG/DL — SIGNIFICANT CHANGE UP (ref 0.2–1.2)
BILIRUB SERPL-MCNC: 1 MG/DL
BUN SERPL-MCNC: 30 MG/DL
BUN SERPL-MCNC: 33 MG/DL — HIGH (ref 7–23)
CALCIUM SERPL-MCNC: 8.3 MG/DL
CALCIUM SERPL-MCNC: 8.4 MG/DL — SIGNIFICANT CHANGE UP (ref 8.4–10.5)
CHLORIDE SERPL-SCNC: 102 MMOL/L
CHLORIDE SERPL-SCNC: 99 MMOL/L — SIGNIFICANT CHANGE UP (ref 96–108)
CHOLEST SERPL-MCNC: 130 MG/DL
CO2 SERPL-SCNC: 22 MMOL/L
CO2 SERPL-SCNC: 26 MMOL/L — SIGNIFICANT CHANGE UP (ref 22–31)
CREAT SERPL-MCNC: 2.04 MG/DL
CREAT SERPL-MCNC: 2.15 MG/DL — HIGH (ref 0.5–1.3)
EGFR: 31 ML/MIN/1.73M2 — LOW
EGFR: 33 ML/MIN/1.73M2
EOSINOPHIL # BLD AUTO: 0.47 K/UL — SIGNIFICANT CHANGE UP (ref 0–0.5)
EOSINOPHIL NFR BLD AUTO: 4.3 % — SIGNIFICANT CHANGE UP (ref 0–6)
ESTIMATED AVERAGE GLUCOSE: 131 MG/DL
GLUCOSE SERPL-MCNC: 112 MG/DL — HIGH (ref 70–99)
GLUCOSE SERPL-MCNC: 113 MG/DL
HBA1C MFR BLD HPLC: 6.2 %
HCT VFR BLD CALC: 32.4 % — LOW (ref 39–50)
HCT VFR BLD CALC: 35.1 %
HDLC SERPL-MCNC: 21 MG/DL
HGB BLD-MCNC: 10.7 G/DL — LOW (ref 13–17)
HGB BLD-MCNC: 11.1 G/DL
IMM GRANULOCYTES NFR BLD AUTO: 0.3 % — SIGNIFICANT CHANGE UP (ref 0–0.9)
INR BLD: 1.82 RATIO — HIGH (ref 0.85–1.16)
LDLC SERPL CALC-MCNC: 84 MG/DL
LYMPHOCYTES # BLD AUTO: 1.76 K/UL — SIGNIFICANT CHANGE UP (ref 1–3.3)
LYMPHOCYTES # BLD AUTO: 16.1 % — SIGNIFICANT CHANGE UP (ref 13–44)
MAGNESIUM SERPL-MCNC: 2 MG/DL
MAGNESIUM SERPL-MCNC: 2 MG/DL — SIGNIFICANT CHANGE UP (ref 1.6–2.6)
MCHC RBC-ENTMCNC: 26.4 PG
MCHC RBC-ENTMCNC: 26.6 PG — LOW (ref 27–34)
MCHC RBC-ENTMCNC: 31.6 G/DL
MCHC RBC-ENTMCNC: 33 G/DL — SIGNIFICANT CHANGE UP (ref 32–36)
MCV RBC AUTO: 80.6 FL — SIGNIFICANT CHANGE UP (ref 80–100)
MCV RBC AUTO: 83.4 FL
MONOCYTES # BLD AUTO: 1.12 K/UL — HIGH (ref 0–0.9)
MONOCYTES NFR BLD AUTO: 10.3 % — SIGNIFICANT CHANGE UP (ref 2–14)
NEUTROPHILS # BLD AUTO: 7.47 K/UL — HIGH (ref 1.8–7.4)
NEUTROPHILS NFR BLD AUTO: 68.5 % — SIGNIFICANT CHANGE UP (ref 43–77)
NONHDLC SERPL-MCNC: 109 MG/DL
NRBC # BLD: 0 /100 WBCS — SIGNIFICANT CHANGE UP (ref 0–0)
PLATELET # BLD AUTO: 199 K/UL — SIGNIFICANT CHANGE UP (ref 150–400)
PLATELET # BLD AUTO: 229 K/UL
POTASSIUM SERPL-MCNC: 3.6 MMOL/L — SIGNIFICANT CHANGE UP (ref 3.5–5.3)
POTASSIUM SERPL-SCNC: 3.6 MMOL/L — SIGNIFICANT CHANGE UP (ref 3.5–5.3)
POTASSIUM SERPL-SCNC: 4 MMOL/L
PROT SERPL-MCNC: 6.2 G/DL
PROT SERPL-MCNC: 6.7 G/DL — SIGNIFICANT CHANGE UP (ref 6–8.3)
PROTHROM AB SERPL-ACNC: 21.4 SEC — HIGH (ref 9.9–13.4)
RBC # BLD: 4.02 M/UL — LOW (ref 4.2–5.8)
RBC # BLD: 4.21 M/UL
RBC # FLD: 16.1 % — HIGH (ref 10.3–14.5)
RBC # FLD: 16.6 %
SODIUM SERPL-SCNC: 133 MMOL/L — LOW (ref 135–145)
SODIUM SERPL-SCNC: 141 MMOL/L
TRIGL SERPL-MCNC: 136 MG/DL
TROPONIN I SERPL-MCNC: 0.18 NG/ML
TROPONIN I, HIGH SENSITIVITY RESULT: 125.2 NG/L — HIGH
TROPONIN I, HIGH SENSITIVITY RESULT: 134.6 NG/L — HIGH
TSH SERPL-ACNC: 1.52 UIU/ML
WBC # BLD: 10.9 K/UL — HIGH (ref 3.8–10.5)
WBC # FLD AUTO: 10.9 K/UL — HIGH (ref 3.8–10.5)
WBC # FLD AUTO: 11.77 K/UL

## 2025-01-04 PROCEDURE — 99291 CRITICAL CARE FIRST HOUR: CPT

## 2025-01-04 PROCEDURE — 99223 1ST HOSP IP/OBS HIGH 75: CPT | Mod: GC

## 2025-01-04 PROCEDURE — 93010 ELECTROCARDIOGRAM REPORT: CPT

## 2025-01-04 PROCEDURE — 71045 X-RAY EXAM CHEST 1 VIEW: CPT | Mod: 26

## 2025-01-04 RX ORDER — ACETAMINOPHEN 80 MG/.8ML
650 SOLUTION/ DROPS ORAL EVERY 6 HOURS
Refills: 0 | Status: DISCONTINUED | OUTPATIENT
Start: 2025-01-04 | End: 2025-01-09

## 2025-01-04 RX ORDER — GINKGO BILOBA 40 MG
3 CAPSULE ORAL AT BEDTIME
Refills: 0 | Status: DISCONTINUED | OUTPATIENT
Start: 2025-01-04 | End: 2025-01-09

## 2025-01-04 RX ORDER — ASPIRIN 81 MG
325 TABLET, DELAYED RELEASE (ENTERIC COATED) ORAL ONCE
Refills: 0 | Status: COMPLETED | OUTPATIENT
Start: 2025-01-04 | End: 2025-01-04

## 2025-01-04 RX ADMIN — Medication 325 MILLIGRAM(S): at 23:48

## 2025-01-04 NOTE — ED ADULT NURSE NOTE - NSFALLUNIVINTERV_ED_ALL_ED
Bed/Stretcher in lowest position, wheels locked, appropriate side rails in place/Call bell, personal items and telephone in reach/Instruct patient to call for assistance before getting out of bed/chair/stretcher/Non-slip footwear applied when patient is off stretcher/[place] to call system/Physically safe environment - no spills, clutter or unnecessary equipment/Purposeful proactive rounding/Room/bathroom lighting operational, light cord in reach

## 2025-01-04 NOTE — ED PROVIDER NOTE - CRITICAL CARE ATTENDING CONTRIBUTION TO CARE
Upon my evaluation, this patient had a high probability of imminent or lifethreatening deterioration due to rule out ACS with elevated troponin, which required my direct attention, intervention, and personal management.     I have personally provided 31 minutes of critical care time exclusive of time spent on separately billable procedures. Time includes review of laboratory data, radiology results, discussion with consultants, and monitoring for potential decompensation. Interventions were performed as documented above.    - Naveen Coleman MD, Emergency Medicine and Medical Toxicology Attending.

## 2025-01-04 NOTE — ED PROVIDER NOTE - OBJECTIVE STATEMENT
of motion and neck supple.   Skin:     General: Skin is warm.   Neurological:      General: No focal deficit present.      Mental Status: She is alert and oriented to person, place, and time. Mental status is at baseline.   Psychiatric:         Mood and Affect: Mood normal.         Behavior: Behavior normal.         Thought Content: Thought content normal.         Judgment: Judgment normal.      --Delfino Gamboa MD    79M with history of Hypertension, CKD III, CHF, CAD s/p stents x 3 presenting with elevated troponin outpatient from cardiologist. Had outpatient labs done for intermittent chest pain, shortness of breath, and dyspnea on exertion, over the past few weeks. Unable to ly flat while sleeping without getting short of breath. Denies any current chest pain or shortness of breath. Denies abdominal pain, nausea/vomiting, headaches, fevers, chills,  weakness, syncope, hematuria, dysuria, urinary symptoms, subjective neurological deficits.

## 2025-01-04 NOTE — ED PROVIDER NOTE - EKG/XRAY ADDITIONAL INFORMATION
Normal sinus rhythm at 65 bpm,  ms, QRS at 104 ms, QTc of 515 ms, prolonged QTc.   No ST elevations or ST depressions.

## 2025-01-04 NOTE — ED PROVIDER NOTE - IV ALTEPLASE EXCL ABS HIDDEN
show Detail Level: Zone Photo Preface (Leave Blank If You Do Not Want): Photographs were obtained today Details (Free Text): 4/11/23

## 2025-01-04 NOTE — ED ADULT NURSE NOTE - OBJECTIVE STATEMENT
Patient A&Ox4 snet in by MD due to elevated troponin found by Cardiologist. Pt c/o intermittent chest pain and SOB when ambulating. Pt unable to lay flat when sleeping. Denies abdominal pain, nausea/vomiting, headaches, fevers, chills,  weakness, syncope.

## 2025-01-04 NOTE — ED PROVIDER NOTE - CLINICAL SUMMARY MEDICAL DECISION MAKING FREE TEXT BOX
79M with history of Hypertension, CKD III, CHF, CAD s/p stents x 3 presenting with elevated troponin outpatient from cardiologist. Had outpatient labs done for intermittent chest pain, shortness of breath, and dyspnea on exertion, over the past few weeks. Unable to ly flat while sleeping without getting short of breath. Denies any current chest pain or shortness of breath. Denies abdominal pain, nausea/vomiting, headaches, fevers, chills,  weakness, syncope, hematuria, dysuria, urinary symptoms, subjective neurological deficits.    AP: Patient presenting with chest pain, with a history and exam suggestive of rule out ACS; No evidence of volume overload or shock on exam. EKG without signs of active ischemia. EKG without evidence of acute STEMI.   - DDx includes but is not limited to: acute DVT/pulmonary embolism   pneumothorax,  thoracic aortic dissection,  cardiac effusion or tamponade,  pneumonia, XXX intra-abdominal pathology,  traumatic chest pathology; but not consistent with presentation and unlikely.  PLAN:   - CBC, CMP, Troponin, PT/PTT/INR, COVID-19 rule out.  - EKG, CXR  - Cardiac monitoring  - Pain control, 325 mg aspirin, serial re-assessment  - HEART SCORE calculation    Overall, ACS/NSTEMI is being considered given the patient's high-risk features: elevated troponin, and chest pain, exertional, history and physical exam.

## 2025-01-05 LAB
ALBUMIN SERPL ELPH-MCNC: 2.7 G/DL — LOW (ref 3.3–5)
ALP SERPL-CCNC: 179 U/L — HIGH (ref 40–120)
ALT FLD-CCNC: 15 U/L — SIGNIFICANT CHANGE UP (ref 10–45)
ANION GAP SERPL CALC-SCNC: 13 MMOL/L — SIGNIFICANT CHANGE UP (ref 5–17)
AST SERPL-CCNC: 18 U/L — SIGNIFICANT CHANGE UP (ref 10–40)
BILIRUB SERPL-MCNC: 1.2 MG/DL — SIGNIFICANT CHANGE UP (ref 0.2–1.2)
BUN SERPL-MCNC: 31 MG/DL — HIGH (ref 7–23)
CALCIUM SERPL-MCNC: 8.7 MG/DL — SIGNIFICANT CHANGE UP (ref 8.4–10.5)
CHLORIDE SERPL-SCNC: 98 MMOL/L — SIGNIFICANT CHANGE UP (ref 96–108)
CO2 SERPL-SCNC: 27 MMOL/L — SIGNIFICANT CHANGE UP (ref 22–31)
CREAT SERPL-MCNC: 2.04 MG/DL — HIGH (ref 0.5–1.3)
D DIMER BLD IA.RAPID-MCNC: 491 NG/ML DDU — HIGH
EGFR: 33 ML/MIN/1.73M2 — LOW
FLUAV AG NPH QL: SIGNIFICANT CHANGE UP
FLUBV AG NPH QL: SIGNIFICANT CHANGE UP
GLUCOSE SERPL-MCNC: 107 MG/DL — HIGH (ref 70–99)
HCT VFR BLD CALC: 34.1 % — LOW (ref 39–50)
HGB BLD-MCNC: 11.4 G/DL — LOW (ref 13–17)
MAGNESIUM SERPL-MCNC: 2 MG/DL — SIGNIFICANT CHANGE UP (ref 1.6–2.6)
MCHC RBC-ENTMCNC: 26.8 PG — LOW (ref 27–34)
MCHC RBC-ENTMCNC: 33.4 G/DL — SIGNIFICANT CHANGE UP (ref 32–36)
MCV RBC AUTO: 80 FL — SIGNIFICANT CHANGE UP (ref 80–100)
NRBC # BLD: 0 /100 WBCS — SIGNIFICANT CHANGE UP (ref 0–0)
NT-PROBNP SERPL-SCNC: HIGH PG/ML (ref 0–300)
PLATELET # BLD AUTO: 215 K/UL — SIGNIFICANT CHANGE UP (ref 150–400)
POTASSIUM SERPL-MCNC: 3.9 MMOL/L — SIGNIFICANT CHANGE UP (ref 3.5–5.3)
POTASSIUM SERPL-SCNC: 3.9 MMOL/L — SIGNIFICANT CHANGE UP (ref 3.5–5.3)
PROCALCITONIN SERPL-MCNC: 0.34 NG/ML — HIGH
PROT SERPL-MCNC: 7.2 G/DL — SIGNIFICANT CHANGE UP (ref 6–8.3)
RAPID RVP RESULT: SIGNIFICANT CHANGE UP
RBC # BLD: 4.26 M/UL — SIGNIFICANT CHANGE UP (ref 4.2–5.8)
RBC # FLD: 16 % — HIGH (ref 10.3–14.5)
RSV RNA NPH QL NAA+NON-PROBE: SIGNIFICANT CHANGE UP
SARS-COV-2 RNA SPEC QL NAA+PROBE: SIGNIFICANT CHANGE UP
SARS-COV-2 RNA SPEC QL NAA+PROBE: SIGNIFICANT CHANGE UP
SODIUM SERPL-SCNC: 138 MMOL/L — SIGNIFICANT CHANGE UP (ref 135–145)
TROPONIN I, HIGH SENSITIVITY RESULT: 112.7 NG/L — HIGH
WBC # BLD: 11.96 K/UL — HIGH (ref 3.8–10.5)
WBC # FLD AUTO: 11.96 K/UL — HIGH (ref 3.8–10.5)

## 2025-01-05 PROCEDURE — 93010 ELECTROCARDIOGRAM REPORT: CPT | Mod: 76

## 2025-01-05 PROCEDURE — 93306 TTE W/DOPPLER COMPLETE: CPT | Mod: 26

## 2025-01-05 PROCEDURE — 71250 CT THORAX DX C-: CPT | Mod: 26

## 2025-01-05 PROCEDURE — 99222 1ST HOSP IP/OBS MODERATE 55: CPT

## 2025-01-05 PROCEDURE — 93880 EXTRACRANIAL BILAT STUDY: CPT | Mod: 26

## 2025-01-05 PROCEDURE — 93970 EXTREMITY STUDY: CPT | Mod: 26

## 2025-01-05 RX ORDER — LABETALOL HCL 300 MG/1
300 TABLET, FILM COATED ORAL
Refills: 0 | Status: DISCONTINUED | OUTPATIENT
Start: 2025-01-05 | End: 2025-01-09

## 2025-01-05 RX ORDER — DOXYCYCLINE MONOHYDRATE 100 MG
100 TABLET ORAL ONCE
Refills: 0 | Status: COMPLETED | OUTPATIENT
Start: 2025-01-05 | End: 2025-01-05

## 2025-01-05 RX ORDER — CEFTRIAXONE SODIUM 1 G/1
1000 INJECTION, POWDER, FOR SOLUTION INTRAMUSCULAR; INTRAVENOUS EVERY 24 HOURS
Refills: 0 | Status: COMPLETED | OUTPATIENT
Start: 2025-01-05 | End: 2025-01-09

## 2025-01-05 RX ORDER — DOXYCYCLINE MONOHYDRATE 100 MG
100 TABLET ORAL EVERY 12 HOURS
Refills: 0 | Status: DISCONTINUED | OUTPATIENT
Start: 2025-01-05 | End: 2025-01-09

## 2025-01-05 RX ORDER — CLOPIDOGREL BISULFATE 75 MG/1
75 TABLET, FILM COATED ORAL DAILY
Refills: 0 | Status: DISCONTINUED | OUTPATIENT
Start: 2025-01-05 | End: 2025-01-09

## 2025-01-05 RX ORDER — ATORVASTATIN CALCIUM 40 MG/1
40 TABLET, FILM COATED ORAL AT BEDTIME
Refills: 0 | Status: DISCONTINUED | OUTPATIENT
Start: 2025-01-05 | End: 2025-01-09

## 2025-01-05 RX ORDER — LABETALOL HCL 300 MG/1
10 TABLET, FILM COATED ORAL ONCE
Refills: 0 | Status: COMPLETED | OUTPATIENT
Start: 2025-01-05 | End: 2025-01-05

## 2025-01-05 RX ORDER — APIXABAN 5 MG/1
5 TABLET, FILM COATED ORAL EVERY 12 HOURS
Refills: 0 | Status: DISCONTINUED | OUTPATIENT
Start: 2025-01-05 | End: 2025-01-05

## 2025-01-05 RX ORDER — FUROSEMIDE 20 MG
40 TABLET ORAL DAILY
Refills: 0 | Status: DISCONTINUED | OUTPATIENT
Start: 2025-01-05 | End: 2025-01-06

## 2025-01-05 RX ORDER — DOXYCYCLINE MONOHYDRATE 100 MG
TABLET ORAL
Refills: 0 | Status: DISCONTINUED | OUTPATIENT
Start: 2025-01-05 | End: 2025-01-09

## 2025-01-05 RX ORDER — IPRATROPIUM BROMIDE AND ALBUTEROL SULFATE .5; 2.5 MG/3ML; MG/3ML
3 SOLUTION RESPIRATORY (INHALATION) EVERY 6 HOURS
Refills: 0 | Status: DISCONTINUED | OUTPATIENT
Start: 2025-01-05 | End: 2025-01-05

## 2025-01-05 RX ORDER — HYDRALAZINE HYDROCHLORIDE 10 MG/1
100 TABLET ORAL EVERY 8 HOURS
Refills: 0 | Status: DISCONTINUED | OUTPATIENT
Start: 2025-01-05 | End: 2025-01-09

## 2025-01-05 RX ORDER — FUROSEMIDE 20 MG
40 TABLET ORAL ONCE
Refills: 0 | Status: COMPLETED | OUTPATIENT
Start: 2025-01-05 | End: 2025-01-05

## 2025-01-05 RX ADMIN — APIXABAN 5 MILLIGRAM(S): 5 TABLET, FILM COATED ORAL at 09:53

## 2025-01-05 RX ADMIN — HYDRALAZINE HYDROCHLORIDE 100 MILLIGRAM(S): 10 TABLET ORAL at 06:17

## 2025-01-05 RX ADMIN — Medication 40 MILLIGRAM(S): at 01:28

## 2025-01-05 RX ADMIN — LABETALOL HCL 10 MILLIGRAM(S): 300 TABLET, FILM COATED ORAL at 06:06

## 2025-01-05 RX ADMIN — CEFTRIAXONE SODIUM 100 MILLIGRAM(S): 1 INJECTION, POWDER, FOR SOLUTION INTRAMUSCULAR; INTRAVENOUS at 12:54

## 2025-01-05 RX ADMIN — Medication 40 MILLIGRAM(S): at 06:17

## 2025-01-05 RX ADMIN — ATORVASTATIN CALCIUM 40 MILLIGRAM(S): 40 TABLET, FILM COATED ORAL at 21:04

## 2025-01-05 RX ADMIN — HYDRALAZINE HYDROCHLORIDE 100 MILLIGRAM(S): 10 TABLET ORAL at 15:18

## 2025-01-05 RX ADMIN — CLOPIDOGREL BISULFATE 75 MILLIGRAM(S): 75 TABLET, FILM COATED ORAL at 12:54

## 2025-01-05 RX ADMIN — IPRATROPIUM BROMIDE AND ALBUTEROL SULFATE 3 MILLILITER(S): .5; 2.5 SOLUTION RESPIRATORY (INHALATION) at 05:32

## 2025-01-05 RX ADMIN — Medication 100 MILLIGRAM(S): at 18:20

## 2025-01-05 RX ADMIN — LABETALOL HCL 300 MILLIGRAM(S): 300 TABLET, FILM COATED ORAL at 18:20

## 2025-01-05 RX ADMIN — LABETALOL HCL 300 MILLIGRAM(S): 300 TABLET, FILM COATED ORAL at 06:18

## 2025-01-05 RX ADMIN — ACETAMINOPHEN 650 MILLIGRAM(S): 80 SOLUTION/ DROPS ORAL at 02:43

## 2025-01-05 RX ADMIN — HYDRALAZINE HYDROCHLORIDE 100 MILLIGRAM(S): 10 TABLET ORAL at 21:04

## 2025-01-05 RX ADMIN — Medication 100 MILLIGRAM(S): at 13:02

## 2025-01-05 NOTE — H&P ADULT - ATTENDING COMMENTS
#Chest pain acs r/o  #Concern for new onset heart failure  #Wheeze  - lasix 40IV once   - duoneb q6hr  - CT chest  - continue home meds  - trend trops/ekg  - telemetry  - BNP, D-dimer  - echocardiogram

## 2025-01-05 NOTE — CONSULT NOTE ADULT - SUBJECTIVE AND OBJECTIVE BOX
PULMONARY CONSULT  Location of Patient : [facility] ([facility])  Attending requesting Consult:Kaitlin Ann   line [phone] used for this visit      Initial HPI on admission:  HPI:  79 year old male, originally from [facility], former cigarette smoker w/ PMHx of HTN, CKD, paroxysmal afib on eliquis, w/ hx of abnormal nuclear stress test s/p coronary angiogram and NOMAN to proximal and mid RCA (11/2022) presenting to [facility] for SOB, wheezing over the past 3 weeks with associated exertional dizziness, b/l neck pain and ear fullness when walking long distance and walking upstairs, symptoms resolve with a couple minutes of rest. Denies chest pain w/ radiation into the arm or back. Denies increase in LE swelling, urination, orthopnea, PND. Denies fever, recent sick contacts, states no missed doses of his medications. Does not use oxygen at home.   aslo associated muscle aches with head and neck fullness/stuffiness     (05 Jan 2025 00:18)      BRIEF HOSPITAL COURSE:   pulmonary consult called today  states he is feeling better at rest  not rquiring supplemental o2  no acute complaints  discussed risks/benefits/alterantives to thoracentesis    discussed possible cardiogenic, infective or malignant causes of abnormal ct  agree for thoracentesis  as on eliquis, will plan for mirna.     PAST MEDICAL & SURGICAL HISTORY:  H/O: HTN (hypertension)  CAD (coronary artery disease)  Diabetic vitreous hemorrhage  Hypokalemia  Type 2 diabetes mellitus  Left ventricular hypertrophy  History of alcohol withdrawal delirium  S/P cardiac catheterization 11/17 3 stents to RCA        Allergies    No Known Allergies    Intolerances      FAMILY HISTORY:  Family history of CVA (Mother)      Social history: Social History:  , lives at home with family, retired (05 Jan 2025 00:18)     Smoking: Ex smoker     Review of Systems: as stated above    CONSTITUTIONAL: No fever, No chills, +fatigue  EYES: No eye pain, No visual disturbances, No discharge  ENMT:  No difficulty hearing, No tinnitus, No vertigo; No sinus or throat pain  NECK: No pain, No stiffness  RESPIRATORY: NO Cough, + SOB, No Secretions  CARDIOVASCULAR: +chest pain, No palpitations, No dizziness, or +leg swelling  GASTROINTESTINAL: No abdominal or epigastric pain. No nausea, No vomiting, No hematemesis; No diarrhea, No constipation. No melena, No hematochezia.  GENITOURINARY: No dysuria, No frequency, No hematuria, No incontinence  NEUROLOGICAL: No headaches, No memory loss, No loss of strength, No numbness, No tremors  SKIN: No itching, No burning, No rashes, No lesions   MUSCULOSKELETAL: No joint pain or swelling; No muscle, back, No extremity pain  PSYCHIATRIC: No depression, No anxiety, No mood swings, No difficulty sleeping      Medications:  MEDICATIONS  (STANDING):  amLODIPine   Tablet 5 milliGRAM(s) Oral daily  apixaban 5 milliGRAM(s) Oral every 12 hours  atorvastatin 40 milliGRAM(s) Oral at bedtime  cefTRIAXone   IVPB 1000 milliGRAM(s) IV Intermittent every 24 hours  clopidogrel Tablet 75 milliGRAM(s) Oral daily  doxycycline IVPB      doxycycline IVPB 100 milliGRAM(s) IV Intermittent every 12 hours  furosemide   Injectable 40 milliGRAM(s) IV Push daily  hydrALAZINE 100 milliGRAM(s) Oral every 8 hours  labetalol 300 milliGRAM(s) Oral two times a day    MEDICATIONS  (PRN):  acetaminophen     Tablet .. 650 milliGRAM(s) Oral every 6 hours PRN Temp greater or equal to 38C (100.4F), Mild Pain (1 - 3)  melatonin 3 milliGRAM(s) Oral at bedtime PRN Insomnia      Antibiotics History  cefTRIAXone   IVPB 1000 milliGRAM(s) IV Intermittent every 24 hours, 01-05-25 @ 11:17, Stop order after: 5 Days  doxycycline IVPB    , 01-05-25 @ 13:02  doxycycline IVPB 100 milliGRAM(s) IV Intermittent once, 01-05-25 @ 11:21  doxycycline IVPB 100 milliGRAM(s) IV Intermittent every 12 hours, 01-05-25 @ 18:00      Heme Medications   apixaban 5 milliGRAM(s) Oral every 12 hours, 01-05-25 @ 01:26  clopidogrel Tablet 75 milliGRAM(s) Oral daily, 01-05-25 @ 01:26      GI Medications        Home Medications:  Last Order Reconciliation Date: 01-05-25 @ 01:31 (Admission Reconciliation)  apixaban 5 mg oral tablet: 1 tab(s) orally every 12 hours (01-05-25 @ 01:25)  atorvastatin 40 mg oral tablet: 1 tab(s) orally once a day (at bedtime) (01-05-25 @ 01:25)  clopidogrel 75 mg oral tablet: 1 tab(s) orally once a day (01-05-25 @ 01:25)  hydrALAZINE 100 mg oral tablet: 1 tab(s) orally every 8 hours (01-05-25 @ 01:25)  labetalol 300 mg oral tablet: 1 tab(s) orally 2 times a day (01-05-25 @ 01:24)      LABS:                        11.4   11.96 )-----------( 215      ( 05 Jan 2025 05:58 )             34.1     01-05    138  |  98  |  31[H]  ----------------------------<  107[H]  3.9   |  27  |  2.04[H]    Ca    8.7      05 Jan 2025 05:58  Mg     2.0     01-05    TPro  7.2  /  Alb  2.7[L]  /  TBili  1.2  /  DBili  x   /  AST  18  /  ALT  15  /  AlkPhos  179[H]  01-05    HIT ab -- 01-05 @ 00:00  HIT ab EIA --  D Dimer -491    Trend Cardiac Enzymes  01-05-25 @ 05:58  UQB-XVSOO-BJMLW-CPKMM/Trop I - -- - --  - --  -  --  /  112.7[H]  01-04-25 @ 21:45  TZN-PJXNI-ZDJWX-CPKMM/Trop I - -- - --  - --  -  --  /  125.2[H]  01-04-25 @ 20:10  GMQ-BJUFE-OSEDM-CPKMM/Trop I - -- - --  - --  -  --  /  134.6[H]    Trend BNP  01-04-25 @ 20:10   -  53036[H]  12-01-22 @ 06:55   -  9693[H]  11-11-22 @ 22:00   -  1778[H]  05-24-22 @ 20:52   -  794[H]    Trend Bun/Cr  01-05-25 @ 05:58  BUN/CR -  31[H] / 2.04[H]  01-04-25 @ 20:10  BUN/CR -  33[H] / 2.15[H]  12-05-22 @ 07:55  BUN/CR -  62[H] / 2.88[H]  12-04-22 @ 06:45  BUN/CR -  58[H] / 3.64[H]  12-03-22 @ 06:30  BUN/CR -  44[H] / 2.56[H]  12-02-22 @ 18:25  BUN/CR -  45[H] / 2.65[H]  12-02-22 @ 06:27  BUN/CR -  37[H] / 2.04[H]  12-02-22 @ 01:45  BUN/CR -  35[H] / 1.93[H]  12-01-22 @ 19:05  BUN/CR -  31[H] / 1.75[H]  12-01-22 @ 11:32  BUN/CR -  31[H] / 1.74[H]  12-01-22 @ 06:55  BUN/CR -  30[H] / 1.88[H]  11-19-22 @ 08:43  BUN/CR -  32[H] / 1.87[H]        PT/INR - ( 04 Jan 2025 20:10 )   PT: 21.4 sec;   INR: 1.82 ratio         PTT - ( 04 Jan 2025 20:10 )  PTT:38.3 sec  Urinalysis Basic - ( 05 Jan 2025 05:58 )    Color: x / Appearance: x / SG: x / pH: x  Gluc: 107 mg/dL / Ketone: x  / Bili: x / Urobili: x   Blood: x / Protein: x / Nitrite: x   Leuk Esterase: x / RBC: x / WBC x   Sq Epi: x / Non Sq Epi: x / Bacteria: x      Procalcitonin: 0.34 ng/mL (01-05-25 @ 11:05)          CULTURES: (if applicable)    Rapid RVP Result: NotDetec (01-05-25 @ 00:51)       c    RADIOLOGY  CXR:      CT:    ACC: [id number] EXAM:  CT CHEST   ORDERED BY:  KAITLIN BEAVERS DATE:  01/05/2025          INTERPRETATION:  EXAMINATION: CT CHEST    CLINICAL INDICATION: Dyspnea.    TECHNIQUE: Noncontrast CT of the chest was obtained.    COMPARISON: 12/1/2020.    FINDINGS:    AIRWAYS AND LUNGS: The central tracheobronchial tree is patent.    Extensive bilateral lung opacities    MEDIASTINUM AND PLEURA: Mildly enlarged mediastinal lymph nodes. The   visualized portion of the thyroid gland is unremarkable. There are small   bilateral pleural effusions.  There is no pneumothorax.    HEART AND VESSELS: There is cardiomegaly.  There are atherosclerotic   calcifications of the aorta and coronary arteries.  There is no   pericardial effusion.  Aortic valve calcification.    UPPER ABDOMEN: Images of the upper abdomen demonstrate right renal cyst.    BONES AND SOFT TISSUES: There are mild degenerative changes of the spine.    The soft tissues are unremarkable.        IMPRESSION:  Extensive bilateral lung opacities with small bilateral pleural   effusions. Short-term follow-up CT recommended for complete evaluation.    --- End of Report ---      ECHO:    < from: TTE with Doppler (w/Cont) (11.12.22 @ 11:04) >  Conclusions:  1. Normal mitral valve. Minimal mitral regurgitation.  2. Calcified aortic valve. There is a focal calcification  seen on the right coronary cusp.  Peak transaortic valve  gradient equals 12 mm Hg, mean transaortic valve gradient  equals 7 mm Hg, aortic valve velocity time integral equals  34 cm, estimated aortic valve area equals 2.1 sqcm. Mild  aortic regurgitation.   ms.  3. Mildly dilated left atrium.  LA volume index = 37 cc/m2.  4. Normal left ventricular systolic function. No segmental  wall motion abnormalities. Endocardial visualization  enhanced with intravenous injection of Ultrasonic Enhancing  Agent (Lumason). No left ventricular thrombus. Septal  motion consistent with conduction defect.  5. Moderate diastolic dysfunction (Stage II).  6. Normal right ventricular size and function.    < end of copied text >    VITALS:  T(C): 36.5 (01-05-25 @ 05:47), Max: 36.7 (01-05-25 @ 01:23)  T(F): 97.7 (01-05-25 @ 05:47), Max: 98 (01-05-25 @ 01:23)  HR: 100 (01-05-25 @ 05:47) (59 - 100)  BP: 133/80 (01-05-25 @ 07:26) (131/63 - 176/118)  BP(mean): --  ABP: --  ABP(mean): --  RR: 18 (01-05-25 @ 05:47) (18 - 20)  SpO2: 96% (01-05-25 @ 05:47) (89% - 96%)  CVP(mm Hg): --  CVP(cm H2O): --    Ins and Outs       Height (cm): 167.6 (01-05-25 @ 05:47)  Weight (kg): 74.8 (01-04-25 @ 19:26)  BMI (kg/m2): 26.6 (01-05-25 @ 05:47)    Physical Examination:  GENERAL:               Alert, Oriented, No acute distress.    HEENT:                     No JVD, Dry MM  PULM:                     Bilateral air entry, Clear to auscultation bilaterally, no significant sputum production, No Rales, No Rhonchi, No Wheezing  CVS:                         S1, S2,  +Murmur  ABD:                        Soft, nondistended, nontender, normoactive bowel sounds,   EXT:                         No edema, nontender, No Cyanosis or Clubbing    NEURO:                  Alert, oriented, interactive, nonfocal, follows commands  PSYC:                      Calm, + Insight.

## 2025-01-05 NOTE — H&P ADULT - NSHPREVIEWOFSYSTEMS_GEN_ALL_CORE
REVIEW OF SYSTEMS:    CONSTITUTIONAL:  No weakness, fevers or chills  EYES/ENT:  No visual changes;  No vertigo or throat pain   NECK:  +b/l neck pain, ear fullness  RESPIRATORY:  +SOB/wheezing, No cough, hemoptysis  CARDIOVASCULAR:  No chest pain or palpitations  GASTROINTESTINAL:  No abdominal or epigastric pain. No nausea, vomiting, or hematemesis; No diarrhea or constipation. No melena or hematochezia.  GENITOURINARY:  No dysuria, frequency or hematuria  NEUROLOGICAL:  No numbness or weakness  SKIN:  No itching, rashes

## 2025-01-05 NOTE — CONSULT NOTE ADULT - NSCONSULTADDITIONALINFOA_GEN_ALL_CORE
< from: Cardiac Catheterization (11.16.22 @ 17:30) >    Patient: SEMAJ ROBERT               MRN: [id number]  Study Date: 11/16/2022   05:30 PM      Page 1 of 4    DiagnosticFindings:     Coronary Angiography   The coronary circulation is right dominant. Cardiac catheterization  was performed electively.    LM   Left main artery: Angiography shows minor irregularities.      LAD   Proximal left anterior descending: Angiography shows severe  atherosclerosis. There is a 70 % stenosis. Mid left anterior  descending: Angiography shows moderate atherosclerosis. There is a 60  % stenosis. First diagonal: Angiography shows  severe atherosclerosis. There is an 80 % stenosis. Second diagonal:  Angiography shows moderate atherosclerosis. There is a  40 % stenosis.      CX   Circumflex: Angiography shows complete occlusion.  of the Cx past  the proximal segment .    RCA   Mid right coronary artery: Angiography shows severe atherosclerosis.  There is an 80 % stenosis. Right posterior descending  artery: The vessel contour is hazy. There is an 80 % stenosis.      < end of copied text >

## 2025-01-05 NOTE — CHART NOTE - NSCHARTNOTEFT_GEN_A_CORE
examined pt today  Said chest pain is improving  SK noted on sternum about 2-3 cm  added amlodipine 5mg QD-appears present on last clinic note examined pt today  Said chest pain is improving  SK noted on sternum about 2-3 cm  added amlodipine 5mg QD-appears present on last clinic note    of note, extensive infiltrates and pleural effusions-out of proportion to physical exam. examined pt today  Said chest pain is improving  SK noted on sternum about 2-3 cm  added amlodipine 5mg QD-appears present on last clinic note    of note, extensive infiltrates and pleural effusions-out of proportion to physical exam.  kaitlin (on phone) reports he just returned from [facility] a few weeks ago.  He did not report any cough or URTI/ no sick contacts. CT a few months ago was normal  Will treat empirically for pna. procalcitonin pending examined pt today  Said chest pain is improving  Keratinized, darkly pigmented lesion noted on sternum about 2-3 cm    added amlodipine 5mg QD-appears present on last clinic note  will need derm FU for lesion    of note, extensive infiltrates and pleural effusions-out of proportion to physical exam.  kaitlin (on phone) reports he just returned from [facility] a few weeks ago.  He did not report any cough or URTI/ no sick contacts. CT a few months ago was normal  Will treat empirically for pna. procalcitonin pending

## 2025-01-05 NOTE — PATIENT PROFILE ADULT - NSPRESCRALCFREQ_GEN_A_NUR
From: Elaine Ortiz  To: Michi Mccain MD  Sent: 7/31/2020 2:37 PM CDT  Subject: Other    Dear Dr. Mccain,    I will be having the TAVR procedure done sometime in the future at [facility]. Dr. Bauer will be my new cardiologist. Thank You for you services over the years.     Elaine Ortiz   Never negative

## 2025-01-05 NOTE — PATIENT PROFILE ADULT - NSFALLSECTIONLABEL_GEN_A_CORE
[facility] Psychiatric Consult





- Data


Date of interview: 08/07/20


Admission source: [facility]


Identifying data: Revisit to [facility] and admission to [address] [place] [address] for this 44 y/o 

AA female, [place] [facility] [place]. DEBBY 

issues : alcohol, nicotine, cannabis (sporadic use). Patient is single without 

children, homeless (resides in a shelter), unemployed and supported on 

undisclosed means.


Substance Abuse History: Discussed with the patient. DEBBY profile as follows : 

Smoking history: Current every day smoker.  Have you smoked in the past 12 

months: Yes.  Aproximately how many cigarettes per day: 10.  Hx Chewing Tobacco 

Use: No.  Initiated information on smoking cessation: Yes.  'Breaking Loose' 

booklet given: 08/07/20.  - Substances abused.  ** Alcohol.  Substance route: 

Oral.  Frequency: Daily.  Amount used: 1 PINT OF VODKA.  Age of first use: 19.  

Date of last use: 08/06/20.  ** Marijuana/Hashish.  Substance route: Smoking.  

Frequency: 1-3 times last 30 days.  Amount used: $10.  Age of first use: 26.  

Date of last use: 07/31/20.  Alcohol.  Substance amount: 1 pint vodka.  

Frequency of use: Daily.  Substance route: Oral.  Date of Last Use: 08/06/20.  

Admits to having a black out just 1 month ago and has had many blackouts in the 

past.  also endorses an eye opener daily.  ** Marijuana/Hashish.  Substance 

amount: $10.  Frequency of use: Once a month.  Substance route: Smoking.  Date 

of Last Use: 07/31/20.  ** Nicotine.  Substance amount: 1/2 PACK.  Frequency of 

use: Daily.  Substance route: Smoking.  Date of Last Use: 08/07/20.  PMH:  

Anemia, Asthma.  Psurg:  None.  Psych:  Depression on no meds.  She is homeless 

in a [facility] in the [place].  She has no legal problems.  CIWA=18.  

YANDY=0.014.  Urine Tox:  BZO.  She meets criteria for detox as she has a poor 

recovery environment because she is homeless and has multiple medical and 

psychiatric co-morbidities.  History Source: Patient.  Limitations to Obtaining 

History: No Limitations


Medical History: Medical profile is remarkable for anemia, GERD and bronchial 

asthma.


Psychiatric History: Patient continues to endorse history of two psychiatric 

hospitalizations ([facility] in July 2019 + [facility] 

in February 2020). Onset of psychiatric disturbances (mood dysregulation) : age 

42. Ms Bryant was reportedly diagnosed with MDD and Anxiety Disorder (in the 

past). She states that she got prescribed " something for depression but never 

took the pill." Patient is not able to recall name of the drug. She indicates 

that she has been lost to follow-up for several weeks (appointments not kept). 

Patient reports a history of two suicide attempts via choking (made an attempt 

yesterday while in ED at [facility]). She was evaluated by a psychiatrist 

and discharged with referral to [facility] for detoxification treatment.


Physical/Sexual Abuse/Trauma History: Patient denies.


Additional Comment: Urine drug screen results: BZO-Benzodiazepines. Noted.





Mental Status Exam





- Mental Status Exam


Alert and Oriented to: Time, Place, Person


Cognitive Function: Good


Patient Appearance: Well Groomed


Mood: Hopeful


Affect: Appropriate, Normal Range


Patient Behavior: Fatigued, Appropriate, Cooperative


Speech Pattern: Clear, Appropriate


Voice Loudness: Normal


Thought Process: Intact, Goal Oriented


Thought Disorder: Not Present


Hallucinations: Denies


Suicidal Ideation: Denies


Homicidal Ideation: Denies


Insight/Judgement: Poor


Sleep: Poorly, Difficulty falling asleep


Appetite: Good


Gait/Station: Normal





Psychiatric Findings





- Problem List (Axis 1, 2,3)


(1) Alcohol use disorder


Current Visit: Yes   Status: Chronic   





(2) Cannabis dependence


Current Visit: Yes   Status: Chronic   





(3) Nicotine dependence


Current Visit: Yes   Status: Chronic   


Qualifiers: 


   Nicotine product type: cigarettes   Substance use status: uncomplicated   

Qualified Code(s): F17.210 - Nicotine dependence, cigarettes, uncomplicated   





(4) Substance induced mood disorder


Current Visit: Yes   Status: Chronic   





(5) History of depression


Current Visit: Yes   Status: Chronic   





(6) Insomnia


Current Visit: Yes   Status: Chronic   





(7) Non-compliance


Current Visit: Yes   Status: Chronic   





- Initial Treatment Plan


Initial Treatment Plan: Case discussed with referring source, Dr Soria. Consult 

conducted, with medical students in attendance (with patient's consent) at [facility]. 

Patient is NOT suicidal or homicidal. Mental status is stable. Ms Bryant is 

agreeable with admission to the detoxification unit. " I am OK with detox but I 

will not stay for rehab. I prefer outpatient instead after detox. " Support and 

reassurance provided in this session. Psychoeducation. MAT-ETOH services offered

(patient used to be on vivitrol). Sleep hygiene. Detoxification. Insomnia is 

addressed with melatonin (on consent). Motivational counseling. Observation.
.

## 2025-01-05 NOTE — PATIENT PROFILE ADULT - FALL HARM RISK - HARM RISK INTERVENTIONS
Assistance with ambulation/Assistance OOB with selected safe patient handling equipment/Communicate Risk of Fall with Harm to all staff/Discuss with provider need for PT consult/Monitor gait and stability/Reinforce activity limits and safety measures with patient and family/Tailored Fall Risk Interventions/Visual Cue: Yellow wristband and red socks/Bed in lowest position, wheels locked, appropriate side rails in place/Call bell, personal items and telephone in reach/Instruct patient to call for assistance before getting out of bed or chair/Non-slip footwear when patient is out of bed/[place] to call system/Physically safe environment - no spills, clutter or unnecessary equipment/Purposeful Proactive Rounding/Room/bathroom lighting operational, light cord in reach

## 2025-01-05 NOTE — H&P ADULT - ASSESSMENT
Patient is a 80 yo M, originally from [facility], former cigarette smoker w/ PMHx of HTN, CKD, paroxysmal afib on eliquis, w/ hx of abnormal nuclear stress test s/p coronary angiogram and NOMAN to proximal and mid RCA (11/2022) presenting to [facility] for SOB, wheezing over the past 3 weeks with associated exertional dizziness, b/l neck pain and ear fullness, admitted to r/o ACS.       #R/O ACS   #3 vessel CAD s/p NOMAN   #Elevated Ddimer   #Suspect new onset CHF  - Trop peaked 134 > 125, BNP 15k, age adjusted ddimer 385   - EKG w/o acute ST/T changes, prolonged Qtc at 515  - Last TTE 2022 EF 50-55%   - S/p 325mg asa  - CXR w/ pulm vascular congestion  - Supplement O2 to maintain sats > 95%  - Monitor on tele   - Repeat EKG in AM for Qtc monitoring   - Start  lasix 40mg IV QD  - Strict I&Os, daily weights   - C/w home meds: atorvastatin 40mg QHS, lovaza 2000mg BID, plavix 75mg QD   - F/U RVP  - F/U TTE, carotid/LE dopplers, CT chest, VQ scan in the AM   - Cardiology consult    #Paroxysmal afib  - Sinus on exam   - Monitor electrolytes, maintain Mg > 2, K > 4   - C/w home meds: eliquis 5mg BID   - Monitor on tele    #HTN   - Chronic  - C/w home meds: hydralazine 100mg Q8h, labetalol 300mg   - Monitor vitals     #CKD 3b  - Baseline Cr w/ gradual uptrend since 2022   - Monitor and replete electrolytes PRN    #Type 2 Diabetes  - Chronic, dx 2022, A1c 1/4 6.2%  - Not on any medications for this, diet controlled   - Consistent carb diet     #GOC  - Full code     #DVT ppx   - C/w eliquis 5mg BID       Discussed with attending, Dr Ann  Patient is a 78 yo M, originally from [facility], former cigarette smoker w/ PMHx of HTN, CKD, paroxysmal afib on eliquis, w/ hx of abnormal nuclear stress test s/p coronary angiogram and NOMAN to proximal and mid RCA (11/2022) presenting to [facility] for SOB, wheezing over the past 3 weeks with associated exertional dizziness, b/l neck pain and ear fullness, admitted to r/o ACS.       #R/O ACS   #3 vessel CAD s/p NOMAN   #Elevated Ddimer   #Suspect new onset CHF  #Hyponatremia   - Trop peaked 134 > 125, BNP 15k, age adjusted ddimer 385   - EKG w/o acute ST/T changes, prolonged Qtc at 515  - Last TTE 2022 EF 50-55%   - S/p 325mg asa  - CXR w/ pulm vascular congestion  - Supplement O2 to maintain sats > 95%  - Monitor on tele   - Repeat EKG in AM for Qtc monitoring   - Start  lasix 40mg IV QD  - Strict I&Os, daily weights   - C/w home meds: atorvastatin 40mg QHS, lovaza 2000mg BID, plavix 75mg QD   - F/U RVP  - F/U TTE, carotid/LE dopplers, CT chest, VQ scan in the AM   - Cardiology consult    #Paroxysmal afib  - Sinus on exam   - Monitor electrolytes, maintain Mg > 2, K > 4   - C/w home meds: eliquis 5mg BID   - Monitor on tele    #HTN   - Chronic  - C/w home meds: hydralazine 100mg Q8h, labetalol 300mg   - Monitor vitals     #CKD 3b  - Baseline Cr w/ gradual uptrend since 2022   - Monitor and replete electrolytes PRN    #Type 2 Diabetes  - Chronic, dx 2022, A1c 1/4 6.2%  - Not on any medications for this, diet controlled   - Consistent carb diet     #GOC  - Full code     #DVT ppx   - C/w eliquis 5mg BID       Discussed with attending, Dr Ann

## 2025-01-05 NOTE — CONSULT NOTE ADULT - SUBJECTIVE AND OBJECTIVE BOX
CHIEF COMPLAINT:  Patient is a 79y old  Male who presents with a chief complaint of r/o ACS (05 Jan 2025 00:18)    HPI:  Patient is a 78 yo M, originally from [facility], former cigarette smoker w/ PMHx of HTN, CKD, paroxysmal afib on eliquis, w/ hx of abnormal nuclear stress test s/p coronary angiogram and NOMAN to proximal and mid RCA (11/2022) presenting to [facility] for SOB, wheezing over the past 3 weeks with associated exertional dizziness, b/l neck pain and ear fullness when walking long distance and walking upstairs, symptoms resolve with a couple minutes of rest. Denies chest pain w/ radiation into the arm or back. Denies increase in LE swelling, urination, orthopnea, PND. Denies fever, recent sick contacts, states no missed doses of his medications. Does not use oxygen at home.     ED Course:   T 97.8 HR 59 RR 20 /81 SpO2 96% on 2L NC  Trop 134.6 > 125.2   Na 133, K 3.6, Mg2   Cr 2.15  WBC 10.90  Hgb 10.7, Hct 32.4  EKG: sinus, rate 65, Qtc prolonged 515   CXR: cardiomegaly, pulmonary vascular congestive changes   S/p aspirin 325mg     Svetlana MILES personally reviewed.   ? (05 Jan 2025 00:18)    As above, was seen by Dr. Albright , labs ordered and elevated Tn noted, patient home called and advised ER evaluation.  Currently denies c/p, sob or palpitations. Had AF rvr last night on monitor.      PMH:   H/O: HTN (hypertension)    CAD (coronary artery disease)    Diabetic vitreous hemorrhage    Hypokalemia    Type 2 diabetes mellitus    Left ventricular hypertrophy    History of alcohol withdrawal delirium        PSH:   No significant past surgical history    S/P cardiac catheterization        FAMILY HISTORY:  FAMILY HISTORY:  No pertinent family history in first degree relatives    Family history of CVA (Mother)        SOCIAL HISTORY:  Smoking:  +  Alcohol:  +  Drugs:    ALLERGIES:  No Known Allergies      Home Medications:  labetalol 300 mg oral tablet: 1 tab(s) orally 2 times a day (05 Jan 2025 01:24)      MEDICATIONS:  acetaminophen     Tablet .. 650 milliGRAM(s) Oral every 6 hours PRN  amLODIPine   Tablet 5 milliGRAM(s) Oral daily  apixaban 5 milliGRAM(s) Oral every 12 hours  atorvastatin 40 milliGRAM(s) Oral at bedtime  cefTRIAXone   IVPB 1000 milliGRAM(s) IV Intermittent every 24 hours  clopidogrel Tablet 75 milliGRAM(s) Oral daily  doxycycline IVPB      doxycycline IVPB 100 milliGRAM(s) IV Intermittent once  doxycycline IVPB 100 milliGRAM(s) IV Intermittent every 12 hours  furosemide   Injectable 40 milliGRAM(s) IV Push daily  hydrALAZINE 100 milliGRAM(s) Oral every 8 hours  labetalol 300 milliGRAM(s) Oral two times a day  melatonin 3 milliGRAM(s) Oral at bedtime PRN      REVIEW OF SYSTEMS:  per H and P    PHYSICAL EXAM:  T(C): 36.5 (01-05-25 @ 05:47), Max: 36.7 (01-05-25 @ 01:23)  HR: 100 (01-05-25 @ 05:47) (59 - 100)  BP: 133/80 (01-05-25 @ 07:26) (131/63 - 176/118)  RR: 18 (01-05-25 @ 05:47) (18 - 20)  SpO2: 96% (01-05-25 @ 05:47) (89% - 96%)  Wt(kg): --    GENERAL: NAD, well-groomed, well-developed  lying flat comfortably  HEAD:  Atraumatic, Normocephalic  EYES: EOMI, conjunctiva and sclera clear  ENT: Moist mucous membranes,  NECK: Supple, No JVD, no bruits  CHEST/LUNG: Clear to ausculation and percussion bilaterally; No rales, rhonchi, wheezing, or rubs  HEART: Regular rate and rhythm; No murmurs, rubs, or gallops PMI non displaced.  ABDOMEN: Soft, Nontender, Nondistended; Bowel sounds present  EXTREMITIES:   No clubbing, cyanosis, or edema  SKIN: No rashes or lesions  NERVOUS SYSTEM:  Alert  No focal deficits    Cardiovascular Diagnostic Testing:  ECG:  < from: 12 Lead ECG (01.05.25 @ 00:42) >    Ventricular Rate 65 BPM    Atrial Rate 65 BPM    P-R Interval 146 ms    QRS Duration 98 ms    Q-T Interval 468 ms    QTC Calculation(Bazett) 486 ms    P Axis 12 degrees    R Axis 0 degrees    T Axis 5 degrees    Diagnosis Line Normal sinus rhythm  Moderate voltage criteria for LVH, may be normal variant  Nonspecific ST abnormality  Prolonged QT  Abnormal ECG  When compared with ECG of 04-JAN-2025 20:15,  No significant change was found  Confirmed by CHULA MAGANA MD ([id number]) on 1/5/2025 9:32:31 AM    < end of copied text >  < from: 12 Lead ECG (01.05.25 @ 05:47) >    Ventricular Rate 128 BPM    QRS Duration 136 ms    Q-T Interval 310 ms    QTC Calculation(Bazett) 452 ms    R Axis -23 degrees    T Axis 143 degrees    Diagnosis Line Atrial fibrillation with rapid ventricular response  Left bundle branch block  Abnormal ECG  When compared with ECG of 05-JAN-2025 00:42,  Atrial fibrillation has replaced Sinus rhythm  Vent. rate has increased BY  63 BPM  Left bundle branch block is now present  Confirmed by CHULA MAGANA MD ([id number]) on 1/5/2025 9:32:48 AM    < end of copied text >      LABS:                        11.4   11.96 )-----------( 215      ( 05 Jan 2025 05:58 )             34.1     01-05    138  |  98  |  31[H]  ----------------------------<  107[H]  3.9   |  27  |  2.04[H]    Ca    8.7      05 Jan 2025 05:58  Mg     2.0     01-05    TPro  7.2  /  Alb  2.7[L]  /  TBili  1.2  /  DBili  x   /  AST  18  /  ALT  15  /  AlkPhos  179[H]  01-05    PT/INR - ( 04 Jan 2025 20:10 )   PT: 21.4 sec;   INR: 1.82 ratio         PTT - ( 04 Jan 2025 20:10 )  PTT:38.3 sec    Troponin I, High Sensitivity Result: 134.6 ng/L (01-04-25 @ 20:10)  Troponin I, High Sensitivity Result: 125.2 ng/L (01-04-25 @ 21:45)  Troponin I, High Sensitivity Result: 112.7 ng/L (01-05-25 @ 05:58)      Telemetry:   sinus, paf overnight, now sinus    IMAGING:    < from: CT Chest No Cont (01.05.25 @ 02:18) >    ACC: [id number] EXAM:  CT CHEST   ORDERED BY:  DAVID BEST     PROCEDURE DATE:  01/05/2025          INTERPRETATION:  EXAMINATION: CT CHEST    CLINICAL INDICATION: Dyspnea.    TECHNIQUE: Noncontrast CT of the chest was obtained.    COMPARISON: 12/1/2020.    FINDINGS:    AIRWAYS AND LUNGS: The central tracheobronchial tree is patent.    Extensive bilateral lung opacities    MEDIASTINUM AND PLEURA: Mildly enlarged mediastinal lymph nodes. The   visualized portion of the thyroid gland is unremarkable. There are small   bilateral pleural effusions.  There is no pneumothorax.    HEART AND VESSELS: There is cardiomegaly.  There are atherosclerotic   calcifications of the aorta and coronary arteries.  There is no   pericardial effusion.  Aortic valve calcification.    UPPER ABDOMEN: Images of the upper abdomen demonstrate right renal cyst.    BONES AND SOFT TISSUES: There are mild degenerative changes of the spine.    The soft tissues are unremarkable.    IMPRESSION:  Extensive bilateral lung opacities with small bilateral pleural   effusions. Short-term follow-up CT recommended for complete evaluation.    --- End of Report ---    DARIO REYES MD; Attending Radiologist  This document has been electronically signed. Jan 5 2025  9:53AM    < end of copied text >     CHIEF COMPLAINT:  Patient is a 79y old  Male who presents with a chief complaint of r/o ACS (05 Jan 2025 00:18)    HPI:  Patient is a 80 yo M, originally from [facility], former cigarette smoker w/ PMHx of HTN, CKD, paroxysmal afib on eliquis, w/ hx of abnormal nuclear stress test s/p coronary angiogram and NOMAN to proximal and mid RCA (11/2022) presenting to [facility] for SOB, wheezing over the past 3 weeks with associated exertional dizziness, b/l neck pain and ear fullness when walking long distance and walking upstairs, symptoms resolve with a couple minutes of rest. Denies chest pain w/ radiation into the arm or back. Denies increase in LE swelling, urination, orthopnea, PND. Denies fever, recent sick contacts, states no missed doses of his medications. Does not use oxygen at home.     ED Course:   T 97.8 HR 59 RR 20 /81 SpO2 96% on 2L NC  Trop 134.6 > 125.2   Na 133, K 3.6, Mg2   Cr 2.15  WBC 10.90  Hgb 10.7, Hct 32.4  EKG: sinus, rate 65, Qtc prolonged 515   CXR: cardiomegaly, pulmonary vascular congestive changes   S/p aspirin 325mg     Svetlana MILES personally reviewed.   ? (05 Jan 2025 00:18)    As above, was seen by Dr. Albright , labs ordered and elevated Tn noted, patient home called and advised ER evaluation.  Currently denies c/p, sob or palpitations. Had AF rvr last night on monitor.      PMH:   H/O: HTN (hypertension)    CAD (coronary artery disease)    Diabetic vitreous hemorrhage    Hypokalemia    Type 2 diabetes mellitus    Left ventricular hypertrophy    History of alcohol withdrawal delirium        PSH:   No significant past surgical history    S/P cardiac catheterization        FAMILY HISTORY:  FAMILY HISTORY:  No pertinent family history in first degree relatives    Family history of CVA (Mother)        SOCIAL HISTORY:  Smoking:  +  Alcohol:  +  Drugs:    ALLERGIES:  No Known Allergies      Home Medications:  labetalol 300 mg oral tablet: 1 tab(s) orally 2 times a day (05 Jan 2025 01:24)      MEDICATIONS:  acetaminophen     Tablet .. 650 milliGRAM(s) Oral every 6 hours PRN  amLODIPine   Tablet 5 milliGRAM(s) Oral daily  apixaban 5 milliGRAM(s) Oral every 12 hours  atorvastatin 40 milliGRAM(s) Oral at bedtime  cefTRIAXone   IVPB 1000 milliGRAM(s) IV Intermittent every 24 hours  clopidogrel Tablet 75 milliGRAM(s) Oral daily  doxycycline IVPB      doxycycline IVPB 100 milliGRAM(s) IV Intermittent once  doxycycline IVPB 100 milliGRAM(s) IV Intermittent every 12 hours  furosemide   Injectable 40 milliGRAM(s) IV Push daily  hydrALAZINE 100 milliGRAM(s) Oral every 8 hours  labetalol 300 milliGRAM(s) Oral two times a day  melatonin 3 milliGRAM(s) Oral at bedtime PRN      REVIEW OF SYSTEMS:  per H and P    PHYSICAL EXAM:  T(C): 36.5 (01-05-25 @ 05:47), Max: 36.7 (01-05-25 @ 01:23)  HR: 100 (01-05-25 @ 05:47) (59 - 100)  BP: 133/80 (01-05-25 @ 07:26) (131/63 - 176/118)  RR: 18 (01-05-25 @ 05:47) (18 - 20)  SpO2: 96% (01-05-25 @ 05:47) (89% - 96%)  Wt(kg): --    GENERAL: NAD, well-groomed, well-developed  lying flat comfortably  HEAD:  Atraumatic, Normocephalic  EYES: EOMI, conjunctiva and sclera clear  ENT: Moist mucous membranes,  NECK: Supple, No JVD, no bruits  CHEST/LUNG: Clear to ausculation and percussion bilaterally; No rales, rhonchi, wheezing, or rubs  HEART: Regular rate and rhythm; No murmurs, rubs, or gallops PMI non displaced.  ABDOMEN: Soft, Nontender, Nondistended; Bowel sounds present  EXTREMITIES:   No clubbing, cyanosis, or edema  SKIN: No rashes or lesions  NERVOUS SYSTEM:  Alert  No focal deficits    Cardiovascular Diagnostic Testing:  ECG:  < from: 12 Lead ECG (01.05.25 @ 00:42) >    Ventricular Rate 65 BPM    Atrial Rate 65 BPM    P-R Interval 146 ms    QRS Duration 98 ms    Q-T Interval 468 ms    QTC Calculation(Bazett) 486 ms    P Axis 12 degrees    R Axis 0 degrees    T Axis 5 degrees    Diagnosis Line Normal sinus rhythm  Moderate voltage criteria for LVH, may be normal variant  Nonspecific ST abnormality  Prolonged QT  Abnormal ECG  When compared with ECG of 04-JAN-2025 20:15,  No significant change was found  Confirmed by CHULA MAGANA MD ([id number]) on 1/5/2025 9:32:31 AM    < end of copied text >  < from: 12 Lead ECG (01.05.25 @ 05:47) >    Ventricular Rate 128 BPM    QRS Duration 136 ms    Q-T Interval 310 ms    QTC Calculation(Bazett) 452 ms    R Axis -23 degrees    T Axis 143 degrees    Diagnosis Line Atrial fibrillation with rapid ventricular response  Left bundle branch block  Abnormal ECG  When compared with ECG of 05-JAN-2025 00:42,  Atrial fibrillation has replaced Sinus rhythm  Vent. rate has increased BY  63 BPM  Left bundle branch block is now present  Confirmed by CHULA MAGANA MD ([id number]) on 1/5/2025 9:32:48 AM    < end of copied text >      LABS:                        11.4   11.96 )-----------( 215      ( 05 Jan 2025 05:58 )             34.1     01-05    138  |  98  |  31[H]  ----------------------------<  107[H]  3.9   |  27  |  2.04[H]    Ca    8.7      05 Jan 2025 05:58  Mg     2.0     01-05    TPro  7.2  /  Alb  2.7[L]  /  TBili  1.2  /  DBili  x   /  AST  18  /  ALT  15  /  AlkPhos  179[H]  01-05    PT/INR - ( 04 Jan 2025 20:10 )   PT: 21.4 sec;   INR: 1.82 ratio         PTT - ( 04 Jan 2025 20:10 )  PTT:38.3 sec    Troponin I, High Sensitivity Result: 134.6 ng/L (01-04-25 @ 20:10)  Troponin I, High Sensitivity Result: 125.2 ng/L (01-04-25 @ 21:45)  Troponin I, High Sensitivity Result: 112.7 ng/L (01-05-25 @ 05:58)      Telemetry:   sinus, paf overnight, now sinus    IMAGING:    < from: CT Chest No Cont (01.05.25 @ 02:18) >    ACC: [id number] EXAM:  CT CHEST   ORDERED BY:  DAVID BEST     PROCEDURE DATE:  01/05/2025          INTERPRETATION:  EXAMINATION: CT CHEST    CLINICAL INDICATION: Dyspnea.    TECHNIQUE: Noncontrast CT of the chest was obtained.    COMPARISON: 12/1/2020.    FINDINGS:    AIRWAYS AND LUNGS: The central tracheobronchial tree is patent.    Extensive bilateral lung opacities    MEDIASTINUM AND PLEURA: Mildly enlarged mediastinal lymph nodes. The   visualized portion of the thyroid gland is unremarkable. There are small   bilateral pleural effusions.  There is no pneumothorax.    HEART AND VESSELS: There is cardiomegaly.  There are atherosclerotic   calcifications of the aorta and coronary arteries.  There is no   pericardial effusion.  Aortic valve calcification.    UPPER ABDOMEN: Images of the upper abdomen demonstrate right renal cyst.    BONES AND SOFT TISSUES: There are mild degenerative changes of the spine.    The soft tissues are unremarkable.    IMPRESSION:  Extensive bilateral lung opacities with small bilateral pleural   effusions. Short-term follow-up CT recommended for complete evaluation.    --- End of Report ---    DARIO REYES MD; Attending Radiologist  This document has been electronically signed. Jan 5 2025  9:53AM    < end of copied text >      < from: Cardiac Catheterization (11.17.22 @ 17:05) >    Cath Lab Report    Interventional Cardiologist:   Chris Gordon MD   Fellow:                        Izabella Jauregui MD   Referring Physician:           Sean Ferrera MD     Procedures Performed   Procedures:              1.    Arterial Access - Left Femoral     2.    Ultrasound Guided Access   3.    PCI: NOMAN   4.    AngioSeal   5.    PCI: NOMAN     Indications:               Staged Procedures   Positive Stress Test     PCI Status:               elective     Conclusions:   Successful PCI of the pRCA and mRCA treated with 2DES.     Recommendations:     Plan for staged PCI of the LAD 11/18.  Continue DAPT     < end of copied text >

## 2025-01-05 NOTE — CONSULT NOTE ADULT - ASSESSMENT
Assessment  1. Chest pain and MARTINEZ  with elevated BNP/Trop r/o cardiogenic causes  2. Abnormal CT with b/l infitlrates and b/l small pl effusions unsure of etiology      Cardiogenic causes possible, superimposed infectious causes like pneumonia or Malignant causes or inflamatory causes not ruled out  3. Underlying h/o AFib on eliquis, HTN, CKD    Plan  Hold eliquis today  plan for thoracentesis in AM  IV antibiotics for possible pneumonia   Check Echo  RVP neg  check Pro william and inflammatory markers in am  continue tele/pulse ox monitoring  ddx as abnormal ct findings wide, but suspect echo may help rule in/rule out cardiac causes  will follow

## 2025-01-05 NOTE — H&P ADULT - HISTORY OF PRESENT ILLNESS
Patient is a 78 yo M, originally from [facility], former cigarette smoker w/ PMHx of HTN, CKD, paroxysmal afib on eliquis, w/ hx of abnormal nuclear stress test s/p coronary angiogram and NOMAN to proximal and mid RCA (11/2022) presenting to [facility] for SOB, wheezing over the past 3 weeks with associated exertional dizziness, b/l neck pain and ear fullness when walking long distance and walking upstairs, symptoms resolve with a couple minutes of rest. Denies chest pain w/ radiation into the arm or back. Denies increase in LE swelling, urination, orthopnea, PND. Denies fever, recent sick contacts, states no missed doses of his medications. Does not use oxygen at home.     ED Course:   T 97.8 HR 59 RR 20 /81 SpO2 96% on 2L NC  Trop 134.6 > 125.2   Na 133, K 3.6, Mg2   Cr 2.15  WBC 10.90  Hgb 10.7, Hct 32.4  EKG: sinus, rate 65, Qtc prolonged 515   CXR: cardiomegaly, pulmonary vascular congestive changes   S/p aspirin 325mg     Svetlana MILES personally reviewed.   ?

## 2025-01-05 NOTE — CONSULT NOTE ADULT - ASSESSMENT
Chronic cad, prior stenting  PAF  HBP  CKD  Abnormal CT chest, to be assessed   Elevated Tn, doubt ACS , in setting of possible lung infection and elevated creat      Suggest  oral a/c and plavix  statin  cardiac monitor  b blocker for hbp/;cad/paf  echo pending  repeat ekg, trend troponins  telemetry  pulmonary consult pending  p BNP Chronic cad, prior stenting lad and rca. Has Total occlusion of lCX at cath  PAF  HBP  CKD  Abnormal CT chest, to be assessed   Elevated Tn, doubt ACS , in setting of possible lung infection and elevated creat      Suggest  oral a/c and plavix  statin  cardiac monitor  b blocker for hbp/;cad/paf  echo pending  repeat ekg, trend troponins  telemetry  pulmonary consult pending  p BNP

## 2025-01-05 NOTE — H&P ADULT - NSHPPHYSICALEXAM_GEN_ALL_CORE
GENERAL: NAD, lying in bed comfortably  HEAD:  Atraumatic, normocephalic  NECK: Supple, trachea midline, no JVD  HEART: Regular rate and rhythm, no murmurs, rubs, or gallops  LUNGS: Expiratory wheezing throughout  ABDOMEN: Soft, nontender, nondistended, +BS  EXTREMITIES: Trace b/l LE edema, 2+ peripheral pulses bilaterally. No clubbing, cyanosis  NERVOUS SYSTEM:  A&Ox3, moving all extremities, no focal deficits   SKIN: No rashes or lesions

## 2025-01-06 LAB
ANION GAP SERPL CALC-SCNC: 9 MMOL/L — SIGNIFICANT CHANGE UP (ref 5–17)
BUN SERPL-MCNC: 54 MG/DL — HIGH (ref 7–23)
CALCIUM SERPL-MCNC: 8.3 MG/DL — LOW (ref 8.4–10.5)
CHLORIDE SERPL-SCNC: 97 MMOL/L — SIGNIFICANT CHANGE UP (ref 96–108)
CO2 SERPL-SCNC: 28 MMOL/L — SIGNIFICANT CHANGE UP (ref 22–31)
CREAT SERPL-MCNC: 2.64 MG/DL — HIGH (ref 0.5–1.3)
EGFR: 24 ML/MIN/1.73M2 — LOW
GLUCOSE BLDC GLUCOMTR-MCNC: 109 MG/DL — HIGH (ref 70–99)
GLUCOSE BLDC GLUCOMTR-MCNC: 118 MG/DL — HIGH (ref 70–99)
GLUCOSE BLDC GLUCOMTR-MCNC: 118 MG/DL — HIGH (ref 70–99)
GLUCOSE BLDC GLUCOMTR-MCNC: 152 MG/DL — HIGH (ref 70–99)
GLUCOSE BLDC GLUCOMTR-MCNC: 175 MG/DL — HIGH (ref 70–99)
GLUCOSE SERPL-MCNC: 124 MG/DL — HIGH (ref 70–99)
HCT VFR BLD CALC: 34.6 % — LOW (ref 39–50)
HGB BLD-MCNC: 11.2 G/DL — LOW (ref 13–17)
MAGNESIUM SERPL-MCNC: 2 MG/DL — SIGNIFICANT CHANGE UP (ref 1.6–2.6)
MCHC RBC-ENTMCNC: 25.8 PG — LOW (ref 27–34)
MCHC RBC-ENTMCNC: 32.4 G/DL — SIGNIFICANT CHANGE UP (ref 32–36)
MCV RBC AUTO: 79.7 FL — LOW (ref 80–100)
NRBC # BLD: 0 /100 WBCS — SIGNIFICANT CHANGE UP (ref 0–0)
PLATELET # BLD AUTO: 268 K/UL — SIGNIFICANT CHANGE UP (ref 150–400)
POTASSIUM SERPL-MCNC: 3 MMOL/L — LOW (ref 3.5–5.3)
POTASSIUM SERPL-SCNC: 3 MMOL/L — LOW (ref 3.5–5.3)
RBC # BLD: 4.34 M/UL — SIGNIFICANT CHANGE UP (ref 4.2–5.8)
RBC # FLD: 16.4 % — HIGH (ref 10.3–14.5)
SODIUM SERPL-SCNC: 134 MMOL/L — LOW (ref 135–145)
WBC # BLD: 12.49 K/UL — HIGH (ref 3.8–10.5)
WBC # FLD AUTO: 12.49 K/UL — HIGH (ref 3.8–10.5)

## 2025-01-06 PROCEDURE — 99233 SBSQ HOSP IP/OBS HIGH 50: CPT

## 2025-01-06 PROCEDURE — 71045 X-RAY EXAM CHEST 1 VIEW: CPT | Mod: 26

## 2025-01-06 PROCEDURE — 78580 LUNG PERFUSION IMAGING: CPT | Mod: 26

## 2025-01-06 PROCEDURE — 99232 SBSQ HOSP IP/OBS MODERATE 35: CPT

## 2025-01-06 RX ORDER — DEXTROSE MONOHYDRATE 25 G/50ML
12.5 INJECTION, SOLUTION INTRAVENOUS ONCE
Refills: 0 | Status: DISCONTINUED | OUTPATIENT
Start: 2025-01-06 | End: 2025-01-09

## 2025-01-06 RX ORDER — INSULIN LISPRO 100/ML
VIAL (ML) SUBCUTANEOUS
Refills: 0 | Status: DISCONTINUED | OUTPATIENT
Start: 2025-01-06 | End: 2025-01-09

## 2025-01-06 RX ORDER — POTASSIUM CHLORIDE 600 MG/1
40 TABLET, FILM COATED, EXTENDED RELEASE ORAL EVERY 4 HOURS
Refills: 0 | Status: COMPLETED | OUTPATIENT
Start: 2025-01-06 | End: 2025-01-06

## 2025-01-06 RX ORDER — DEXTROSE MONOHYDRATE 25 G/50ML
25 INJECTION, SOLUTION INTRAVENOUS ONCE
Refills: 0 | Status: DISCONTINUED | OUTPATIENT
Start: 2025-01-06 | End: 2025-01-09

## 2025-01-06 RX ORDER — SODIUM CHLORIDE 9 MG/ML
1000 INJECTION, SOLUTION INTRAVENOUS
Refills: 0 | Status: DISCONTINUED | OUTPATIENT
Start: 2025-01-06 | End: 2025-01-09

## 2025-01-06 RX ORDER — INSULIN LISPRO 100/ML
VIAL (ML) SUBCUTANEOUS AT BEDTIME
Refills: 0 | Status: DISCONTINUED | OUTPATIENT
Start: 2025-01-06 | End: 2025-01-09

## 2025-01-06 RX ORDER — APIXABAN 5 MG/1
5 TABLET, FILM COATED ORAL EVERY 12 HOURS
Refills: 0 | Status: DISCONTINUED | OUTPATIENT
Start: 2025-01-06 | End: 2025-01-09

## 2025-01-06 RX ORDER — ACETAMINOPHEN 80 MG/.8ML
1000 SOLUTION/ DROPS ORAL ONCE
Refills: 0 | Status: COMPLETED | OUTPATIENT
Start: 2025-01-06 | End: 2025-01-06

## 2025-01-06 RX ORDER — DEXTROSE MONOHYDRATE 25 G/50ML
15 INJECTION, SOLUTION INTRAVENOUS ONCE
Refills: 0 | Status: DISCONTINUED | OUTPATIENT
Start: 2025-01-06 | End: 2025-01-09

## 2025-01-06 RX ORDER — GLUCAGON INJECTION, SOLUTION 0.5 MG/.1ML
1 INJECTION, SOLUTION SUBCUTANEOUS ONCE
Refills: 0 | Status: DISCONTINUED | OUTPATIENT
Start: 2025-01-06 | End: 2025-01-09

## 2025-01-06 RX ORDER — TRAMADOL HYDROCHLORIDE 50 MG/1
25 TABLET ORAL ONCE
Refills: 0 | Status: DISCONTINUED | OUTPATIENT
Start: 2025-01-06 | End: 2025-01-06

## 2025-01-06 RX ADMIN — APIXABAN 5 MILLIGRAM(S): 5 TABLET, FILM COATED ORAL at 17:19

## 2025-01-06 RX ADMIN — LABETALOL HCL 300 MILLIGRAM(S): 300 TABLET, FILM COATED ORAL at 17:19

## 2025-01-06 RX ADMIN — HYDRALAZINE HYDROCHLORIDE 100 MILLIGRAM(S): 10 TABLET ORAL at 21:44

## 2025-01-06 RX ADMIN — ACETAMINOPHEN 400 MILLIGRAM(S): 80 SOLUTION/ DROPS ORAL at 18:57

## 2025-01-06 RX ADMIN — Medication 100 MILLIGRAM(S): at 17:19

## 2025-01-06 RX ADMIN — CEFTRIAXONE SODIUM 100 MILLIGRAM(S): 1 INJECTION, POWDER, FOR SOLUTION INTRAMUSCULAR; INTRAVENOUS at 11:16

## 2025-01-06 RX ADMIN — ACETAMINOPHEN 650 MILLIGRAM(S): 80 SOLUTION/ DROPS ORAL at 17:18

## 2025-01-06 RX ADMIN — POTASSIUM CHLORIDE 40 MILLIEQUIVALENT(S): 600 TABLET, FILM COATED, EXTENDED RELEASE ORAL at 21:45

## 2025-01-06 RX ADMIN — CLOPIDOGREL BISULFATE 75 MILLIGRAM(S): 75 TABLET, FILM COATED ORAL at 11:11

## 2025-01-06 RX ADMIN — POTASSIUM CHLORIDE 40 MILLIEQUIVALENT(S): 600 TABLET, FILM COATED, EXTENDED RELEASE ORAL at 18:55

## 2025-01-06 RX ADMIN — TRAMADOL HYDROCHLORIDE 25 MILLIGRAM(S): 50 TABLET ORAL at 22:00

## 2025-01-06 RX ADMIN — HYDRALAZINE HYDROCHLORIDE 100 MILLIGRAM(S): 10 TABLET ORAL at 13:50

## 2025-01-06 RX ADMIN — TRAMADOL HYDROCHLORIDE 25 MILLIGRAM(S): 50 TABLET ORAL at 21:46

## 2025-01-06 RX ADMIN — Medication 100 MILLIGRAM(S): at 06:32

## 2025-01-06 RX ADMIN — ATORVASTATIN CALCIUM 40 MILLIGRAM(S): 40 TABLET, FILM COATED ORAL at 21:46

## 2025-01-06 RX ADMIN — ACETAMINOPHEN 650 MILLIGRAM(S): 80 SOLUTION/ DROPS ORAL at 17:48

## 2025-01-06 NOTE — OCCUPATIONAL THERAPY INITIAL EVALUATION ADULT - ADDITIONAL COMMENTS
Pt resides in apartment with spouse, daughter, son-in-law - pt reporting 20 stairs to answer.   PTA pt reports walking without device, independence with ADL/IADL, does not drive. Pt has stall shower with grab bars.  Pt with decreased orientation / command following throughout evaluation, may not be reliable historian.

## 2025-01-06 NOTE — OCCUPATIONAL THERAPY INITIAL EVALUATION ADULT - PERTINENT HX OF CURRENT PROBLEM, REHAB EVAL
Patient is a 78 yo M, originally from [facility], former cigarette smoker w/ PMHx of HTN, CKD, paroxysmal afib on eliquis, w/ hx of abnormal nuclear stress test s/p coronary angiogram and NOMAN to proximal and mid RCA (11/2022) presenting to [facility] for SOB, wheezing over the past 3 weeks with associated exertional dizziness, b/l neck pain and ear fullness, admitted to r/o ACS.

## 2025-01-06 NOTE — OCCUPATIONAL THERAPY INITIAL EVALUATION ADULT - NSACTIVITYREC_GEN_A_OT
The patient would benefit from skilled occupational therapy services inpatient prior to discharge to enhance independence/safety with ADLs and functional transfers.   Recommending assist x1 +RW out of bed. Use of chair alarm.

## 2025-01-06 NOTE — PHYSICAL THERAPY INITIAL EVALUATION ADULT - ADDITIONAL COMMENTS
Used Chinese phone    Pt lives with wife, daughter and son-in-law in an apt with 20 steps to enter. At baseline pt is independent with ambulation using SAC.

## 2025-01-06 NOTE — OCCUPATIONAL THERAPY INITIAL EVALUATION ADULT - NS ASR FOLLOW COMMAND OT EVAL
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  Known to the vascular service.  In the emergency department screening exam, imaging, consults by vascular service, the plan is for outpatient management.  Plan discussed with patient and family bedside who expressed understanding of the medical decision making and the return or follow-up instructions.  Wound supplies provided for Ongoing wound care.  Will discharge.

## 2025-01-06 NOTE — PROGRESS NOTE ADULT - ASSESSMENT
Patient is a 80 yo M, originally from [facility], former cigarette smoker w/ PMHx of HTN, CKD, paroxysmal afib on eliquis, w/ hx of abnormal nuclear stress test s/p coronary angiogram and NOMAN to proximal and mid RCA (11/2022) presenting to [facility] for SOB, wheezing over the past 3 weeks with associated exertional dizziness, b/l neck pain and ear fullness, admitted to r/o ACS.       #R/O ACS   #3 vessel CAD s/p NOMAN   #Elevated Ddimer   #Suspect new onset CHF  #Hyponatremia   - Trop peaked 134 > 125, BNP 15k, age adjusted ddimer 385   - EKG w/o acute ST/T changes, prolonged Qtc at 515 on admission  - Last TTE 2022 EF 50-55%   - S/p 325mg asa  - CXR w/ pulm vascular congestion  - Supplement O2 to maintain sats > 95%, currently on 2L NC O2 sat 93%, wean down as tolerated  - Monitor on tele   - Continue  lasix 40mg IV daily, monitor renal function   - Strict I&Os, daily weights   - C/w home meds: atorvastatin 40mg QHS, plavix 75mg QD   -  RVP negative  - TTE reviewed LVEF 55-60%, grade 2 diastolic dysfunction   -carotid doppler negative for hemodynamic significant stenosis, though external carotid artery stenosis present   - b/l LE Doppler negative for DVT  -CT chest with bilateral opacities, procal positive  -Continue IV cxt and doxycycline   -F/U VQ scan   -Pulm following-thoracentesis planned for today  -Cardiology consult appreciated  -Renal consulted     #Paroxysmal afib  - Sinus on exam   - Monitor electrolytes, maintain Mg > 2, K > 4   - C/w home meds: restart eliquis 5mg BID after thoracentesis (was held for procedure)  - Monitor on tele    #HTN   - Chronic  - C/w home meds: hydralazine 100mg Q8h, labetalol 300mg BID  -Was started on amlodipine yesterday due to elevated BP, per cardio can discontinue due to lowering bp  - Monitor vitals     #CKD 3b  - Baseline Cr w/ gradual uptrend since 2022   - Monitor and replete electrolytes PRN  -Renal consult placed    #Type 2 Diabetes  - Chronic, dx 2022, A1c 1/4 6.2%  - Not on any medications for this, diet controlled   - Consistent carb diet   -ISS for now     #GOC  - Full code     #DVT ppx   - C/w eliquis 5mg BID after thoracentesis     Family to be updated by Dr. Landa      Patient is a 80 yo M, originally from [facility], former cigarette smoker w/ PMHx of HTN, CKD, paroxysmal afib on eliquis, w/ hx of abnormal nuclear stress test s/p coronary angiogram and NOMAN to proximal and mid RCA (11/2022) presenting to [facility] for SOB, wheezing over the past 3 weeks with associated exertional dizziness, b/l neck pain and ear fullness, admitted to r/o ACS.       #R/O ACS   #3 vessel CAD s/p NOMAN   #Elevated Ddimer   #Suspect new onset CHF  #Hyponatremia   - Trop peaked 134 > 125, BNP 15k, age adjusted ddimer 385   - EKG w/o acute ST/T changes, prolonged Qtc at 515 on admission  - Last TTE 2022 EF 50-55%   - S/p 325mg asa  - CXR w/ pulm vascular congestion  - Supplement O2 to maintain sats > 95%, currently on 2L NC O2 sat 93%, wean down as tolerated  - Monitor on tele   - Continue  lasix 40mg IV daily, monitor renal function   - Strict I&Os, daily weights   - C/w home meds: atorvastatin 40mg QHS, plavix 75mg QD   -  RVP negative  - TTE reviewed LVEF 55-60%, grade 2 diastolic dysfunction   -carotid doppler negative for hemodynamic significant stenosis, though external carotid artery stenosis present   - b/l LE Doppler negative for DVT  -CT chest with bilateral opacities, procal positive  -Continue IV cxt and doxycycline   -F/U VQ scan   -Pulm following-thoracentesis planned for today  -Cardiology consult appreciated  -Renal consulted     #Paroxysmal afib  - Sinus on exam   - Monitor electrolytes, maintain Mg > 2, K > 4   - C/w home meds: restart eliquis 5mg BID after thoracentesis (was held for procedure)  - Monitor on tele    #HTN   - Chronic  - C/w home meds: hydralazine 100mg Q8h, labetalol 300mg BID  -Was started on amlodipine yesterday due to elevated BP, per cardio can discontinue due to lowering bp  - Monitor vitals     #CKD 3b  - Baseline Cr w/ gradual uptrend since 2022   - Monitor and replete electrolytes PRN  -Renal consult placed    #Type 2 Diabetes  - Chronic, dx 2022, A1c 1/4 6.2%  - Not on any medications for this, diet controlled   - Consistent carb diet   -ISS for now     #GOC  - Full code     #DVT ppx   - C/w eliquis 5mg BID after thoracentesis     Updated patient's daughter Yoselin      Patient is a 80 yo M, originally from [facility], former cigarette smoker w/ PMHx of HTN, CKD, paroxysmal afib on eliquis, w/ hx of abnormal nuclear stress test s/p coronary angiogram and NOMAN to proximal and mid RCA (11/2022) presenting to [facility] for SOB, wheezing over the past 3 weeks with associated exertional dizziness, b/l neck pain and ear fullness, admitted to r/o ACS.       #R/O ACS   #3 vessel CAD s/p NOMAN   #Elevated Ddimer   #Suspect new onset CHF  #Hyponatremia   - Trop peaked 134 > 125, BNP 15k, age adjusted ddimer 385   - EKG w/o acute ST/T changes, prolonged Qtc at 515 on admission  - Last TTE 2022 EF 50-55%   - S/p 325mg asa  - CXR w/ pulm vascular congestion  - Supplement O2 to maintain sats > 95%, currently on 2L NC O2 sat 93%, wean down as tolerated  - Monitor on tele   - Continue  lasix 40mg IV daily, monitor renal function   - Strict I&Os, daily weights   - C/w home meds: atorvastatin 40mg QHS, plavix 75mg QD   -  RVP negative  - TTE reviewed LVEF 55-60%, grade 2 diastolic dysfunction   -carotid doppler negative for hemodynamic significant stenosis, though external carotid artery stenosis present   - b/l LE Doppler negative for DVT  -CT chest with bilateral opacities, procal positive  -Continue IV cxt and doxycycline   -F/U VQ scan   -Pulm following-thoracentesis planned for today  -Cardiology consult appreciated  -Renal consulted     #Paroxysmal afib  - Sinus on exam   - Monitor electrolytes, maintain Mg > 2, K > 4   - C/w home meds: restart eliquis 5mg BID after thoracentesis (was held for procedure)  - Monitor on tele    #HTN   - Chronic  - C/w home meds: hydralazine 100mg Q8h, labetalol 300mg BID  -Was started on amlodipine yesterday due to elevated BP, per cardio can discontinue due to lowering bp  - Monitor vitals     #CKD 3b  - Baseline Cr w/ gradual uptrend since 2022   - Monitor and replete electrolytes PRN  -Renal consult placed    #Type 2 Diabetes  - Chronic, dx 2022, A1c 1/4 6.2%  - Not on any medications for this, diet controlled   - Consistent carb diet   -ISS for now     #GOC  - Full code     #DVT ppx   - C/w eliquis 5mg BID after thoracentesis     Updated patient's daughter Yoselin     Addendum____   -Replete patient's potassium  -IV tylenol for pain, 1 x dose prn tramadol 25mg ordered if headache not relieved by Ofirmev. Patient denies vision changes, nausea, vomiting, dizziness, numbness, weakness

## 2025-01-06 NOTE — OCCUPATIONAL THERAPY INITIAL EVALUATION ADULT - LEVEL OF INDEPENDENCE: DRESS LOWER BODY, OT EVAL
pt don/doff bilateral socks seated edge of bed via figure-four method/contact guard/minimum assist (75% patients effort)

## 2025-01-06 NOTE — PHYSICAL THERAPY INITIAL EVALUATION ADULT - GAIT TRAINING, PT EVAL
In 1-3 therapy sessions pt will be able to ambulate 50 ft with supervision and with appropriate assistive device.

## 2025-01-06 NOTE — PROGRESS NOTE ADULT - ASSESSMENT
Assessment:  David Faye is a 79 year old man with past medical history of Coronary artery disease (s/p NOMAN to proximal and mid RCA in 2022) with brief Paroxysmal atrial fibrillation (on Eliquis), Hypertension and Chronic kidney disease who was sent in from cardiology office due to elevated troponins, found to have signs of congestive heart failure and pneumonia.    ECG on admission consistent with sinus rhythm with LVH, repeat ECG with atrial fibrillation with RVR and rate related LBBB. Prior coronary angiogram in 11/2022 consistent with triple vessel CAD s/p 2 NOMAN to pRCA and mRCA. Troponins elevated and have peaked, likely demand ischemia from CHF and renal dysfunction. Echo report with normal LVEF 55-60%, moderate LVH and mild-moderate valvular disease. CT chest consistent with extensive bilateral lung opacities with small bilateral pleural effusions. Pro BNP markedly elevated and pro calcitonin elevated. D-dimer elevated, leg US negative for DVT.     Recommendations:  [] Pleural effusions, concerning for HFpEF: Cautious diuresis given advanced CKD, patient follows in CV office and family has been informed on previous visits to follow up with nephrologist which he has not done. Recommend Nephrology inpatient consult. Will benefit from amyloid scan. Follow up Pulmonary if concern for pneumonia, patient receiving antibiotics and plan for thoracentesis today. Follow up VQ scan  [] CAD: Known multivessel CAD, has residual LAD, D1 and chronic occlusion of LCx that was medically managed by interventional cardiology. He is s/p NOMAN of RCA. Continue Plavix 75 mg daily, Atorvastatin 40 mg daily.  [] Hypertension: Continue Labetalol 300 mg BID, Hydralazine 100 mg q8h. May discontinue CCB as BP dropping.   [] Paroxysmal atrial fibrillation, rate related LBBB: Now back in sinus rhythm. Continue Eliquis for stroke prophylaxis   [] Left external carotid artery stenosis: Recommend Neuro/Vascular evaluation     We will continue to follow along.    Bharati Albright MD  Cardiology

## 2025-01-06 NOTE — PROGRESS NOTE ADULT - ASSESSMENT
Physical Examination:  GENERAL:               Alert, Oriented, No acute distress.    HEENT:                     No JVD, Dry MM  PULM:                     Bilateral air entry, Clear to auscultation bilaterally, no significant sputum production, No Rales, No Rhonchi, No Wheezing  CVS:                         S1, S2,  +Murmur  ABD:                        Soft, nondistended, nontender, normoactive bowel sounds,    NEURO:                  Alert, oriented, interactive, nonfocal, follows commands  PSYC:                      Calm, + Insight.         Assessment  1. Chest pain and MARTINEZ  with elevated BNP/Trop r/o cardiogenic causes  2. Abnormal CT with b/l infitlrates and b/l small pl effusions unsure of etiology      Cardiogenic causes possible, superimposed infectious causes like pneumonia or Malignant causes or inflamatory causes not ruled out  3. Underlying h/o AFib on eliquis, HTN, CKD    Plan  Pt seen and examined  bedside US done - small pl effusion too small to drain  And CXR showing improving haziness  Restart Eliquis today    IV antibiotics for possible pneumonia   RVP neg  check Pro william and inflammatory markers in am  continue tele/pulse ox monitoring    ddx as abnormal ct findings wide, but suspect echo may help rule in/rule out cardiac causes  will follow

## 2025-01-06 NOTE — PHYSICAL THERAPY INITIAL EVALUATION ADULT - PERTINENT HX OF CURRENT PROBLEM, REHAB EVAL
Patient is a 78 yo M, originally from [facility], former cigarette smoker w/ PMHx of HTN, CKD, paroxysmal afib on eliquis, w/ hx of abnormal nuclear stress test s/p coronary angiogram and NOMAN to proximal and mid RCA (11/2022) presenting to [facility] for SOB, wheezing over the past 3 weeks with associated exertional dizziness, b/l neck pain and ear fullness, admitted to r/o ACS.

## 2025-01-06 NOTE — OCCUPATIONAL THERAPY INITIAL EVALUATION ADULT - GENERAL OBSERVATIONS, REHAB EVAL
Pt received supine in bed with PCA, A&Ox2 to self & place, 2L O2 via NC, agreeable to OT IE without complaints. Telephonic  to Cambodian utilized. Pt tolerated OT IE fairly, increased time for processing/ following commands inconsistently. Pt left seated in bedside chair with chair alarm, call bell in reach, all lines intact, direct handoff to LADAN Epstein regarding pt performance and positoning.

## 2025-01-06 NOTE — OCCUPATIONAL THERAPY INITIAL EVALUATION ADULT - MD ORDER
MD orders received, chart reviewed, contents noted. Pt cleared for OT initial evaluation by LADAN Epstein. Refer below for findings.

## 2025-01-06 NOTE — CONSULT NOTE ADULT - SUBJECTIVE AND OBJECTIVE BOX
NEPHROLOGY CONSULTATION    CHIEF COMPLAINT: SOB    HPI:  Pt is 80 yo M originally from [facility], former cigarette smoker w/PMH of HTN, CKD, paroxysmal afib on eliquis, w/hx of abnormal nuclear stress test s/p coronary angiogram and NOMAN to proximal and mid RCA (11/2022) presenting to  ED for SOB, weber, some pain w/swallowing. No chest pain, no N/V/D/C/F/C. Poor historian. Denies NSAID's use.    ROS:  as above    Allergies:  No Known Allergies    PAST MEDICAL & SURGICAL HISTORY:  HTN (hypertension)  CAD (coronary artery disease)  Diabetic vitreous hemorrhage  CKD 3  Type 2 diabetes mellitus  Left ventricular hypertrophy  History of alcohol withdrawal delirium  S/P cardiac catheterization  11/17 3 stents to RCA    SOCIAL HISTORY:  ex Tob, ETOH    FAMILY HISTORY:  CVA (Mother)    MEDICATIONS  (STANDING):  apixaban 5 milliGRAM(s) Oral every 12 hours  atorvastatin 40 milliGRAM(s) Oral at bedtime  cefTRIAXone   IVPB 1000 milliGRAM(s) IV Intermittent every 24 hours  clopidogrel Tablet 75 milliGRAM(s) Oral daily  dextrose 5%. 1000 milliLiter(s) (100 mL/Hr) IV Continuous <Continuous>  dextrose 5%. 1000 milliLiter(s) (50 mL/Hr) IV Continuous <Continuous>  dextrose 50% Injectable 25 Gram(s) IV Push once  dextrose 50% Injectable 12.5 Gram(s) IV Push once  dextrose 50% Injectable 25 Gram(s) IV Push once  doxycycline IVPB 100 milliGRAM(s) IV Intermittent every 12 hours  doxycycline IVPB      furosemide   Injectable 40 milliGRAM(s) IV Push daily  glucagon  Injectable 1 milliGRAM(s) IntraMuscular once  hydrALAZINE 100 milliGRAM(s) Oral every 8 hours  insulin lispro (ADMELOG) corrective regimen sliding scale   SubCutaneous three times a day before meals  insulin lispro (ADMELOG) corrective regimen sliding scale   SubCutaneous at bedtime  labetalol 300 milliGRAM(s) Oral two times a day    Vital Signs Last 24 Hrs  T(C): 36.4 (01-06-25 @ 18:59), Max: 36.8 (01-06-25 @ 13:23)  T(F): 97.6 (01-06-25 @ 18:59), Max: 98.2 (01-06-25 @ 13:23)  HR: 122 (01-06-25 @ 18:59) (55 - 122)  BP: 138/69 (01-06-25 @ 18:59) (92/46 - 157/65)  RR: 16 (01-06-25 @ 18:59) (16 - 18)  SpO2: 94% (01-06-25 @ 18:59) (93% - 94%)    on NC O2  s1s2  b/l air entry  soft, ND  no edema    LABS:                        11.2   12.49 )-----------( 268      ( 06 Jan 2025 11:14 )             34.6     01-06    134[L]  |  97  |  54[H]  ----------------------------<  124[H]  3.0[L]   |  28  |  2.64[H]    Ca    8.3[L]      06 Jan 2025 11:14  Mg     2.0     01-06    TPro  7.2  /  Alb  2.7[L]  /  TBili  1.2  /  DBili  x   /  AST  18  /  ALT  15  /  AlkPhos  179[H]  01-05    LIVER FUNCTIONS - ( 05 Jan 2025 05:58 )  Alb: 2.7 g/dL / Pro: 7.2 g/dL / ALK PHOS: 179 U/L / ALT: 15 U/L / AST: 18 U/L / GGT: x           A/P:    CM, Afib, adm w/weber  Concern for CHF, PNA  Suspect hemodynamic BRUNILDA/CKD 3 iso diuresis (baseline cr ~ 2.)  Would hold further Lasix and dose as needed  Will order renal serologies  Will check UA, renal SONO  Avoid nephrotoxins  Suppl K  F/u BMP, Mg, UO    [phone]

## 2025-01-07 LAB
A1C WITH ESTIMATED AVERAGE GLUCOSE RESULT: 6.2 % — HIGH (ref 4–5.6)
ANION GAP SERPL CALC-SCNC: 7 MMOL/L — SIGNIFICANT CHANGE UP (ref 5–17)
APPEARANCE UR: CLEAR — SIGNIFICANT CHANGE UP
BILIRUB UR-MCNC: NEGATIVE — SIGNIFICANT CHANGE UP
BUN SERPL-MCNC: 54 MG/DL — HIGH (ref 7–23)
CALCIUM SERPL-MCNC: 8.6 MG/DL — SIGNIFICANT CHANGE UP (ref 8.4–10.5)
CHLORIDE SERPL-SCNC: 101 MMOL/L — SIGNIFICANT CHANGE UP (ref 96–108)
CO2 SERPL-SCNC: 28 MMOL/L — SIGNIFICANT CHANGE UP (ref 22–31)
COLOR SPEC: YELLOW — SIGNIFICANT CHANGE UP
CREAT SERPL-MCNC: 2.29 MG/DL — HIGH (ref 0.5–1.3)
DIFF PNL FLD: NEGATIVE — SIGNIFICANT CHANGE UP
EGFR: 29 ML/MIN/1.73M2 — LOW
ESTIMATED AVERAGE GLUCOSE: 131 MG/DL — HIGH (ref 68–114)
GLUCOSE BLDC GLUCOMTR-MCNC: 108 MG/DL — HIGH (ref 70–99)
GLUCOSE BLDC GLUCOMTR-MCNC: 122 MG/DL — HIGH (ref 70–99)
GLUCOSE BLDC GLUCOMTR-MCNC: 128 MG/DL — HIGH (ref 70–99)
GLUCOSE BLDC GLUCOMTR-MCNC: 186 MG/DL — HIGH (ref 70–99)
GLUCOSE SERPL-MCNC: 101 MG/DL — HIGH (ref 70–99)
GLUCOSE UR QL: NEGATIVE MG/DL — SIGNIFICANT CHANGE UP
HCT VFR BLD CALC: 32.4 % — LOW (ref 39–50)
HGB BLD-MCNC: 10.8 G/DL — LOW (ref 13–17)
KETONES UR-MCNC: NEGATIVE MG/DL — SIGNIFICANT CHANGE UP
LEUKOCYTE ESTERASE UR-ACNC: NEGATIVE — SIGNIFICANT CHANGE UP
MAGNESIUM SERPL-MCNC: 2.2 MG/DL — SIGNIFICANT CHANGE UP (ref 1.6–2.6)
MCHC RBC-ENTMCNC: 26.2 PG — LOW (ref 27–34)
MCHC RBC-ENTMCNC: 33.3 G/DL — SIGNIFICANT CHANGE UP (ref 32–36)
MCV RBC AUTO: 78.5 FL — LOW (ref 80–100)
NITRITE UR-MCNC: NEGATIVE — SIGNIFICANT CHANGE UP
NRBC # BLD: 0 /100 WBCS — SIGNIFICANT CHANGE UP (ref 0–0)
PH UR: 5 — SIGNIFICANT CHANGE UP (ref 5–8)
PLATELET # BLD AUTO: 287 K/UL — SIGNIFICANT CHANGE UP (ref 150–400)
POTASSIUM SERPL-MCNC: 3.9 MMOL/L — SIGNIFICANT CHANGE UP (ref 3.5–5.3)
POTASSIUM SERPL-SCNC: 3.9 MMOL/L — SIGNIFICANT CHANGE UP (ref 3.5–5.3)
PROT SERPL-MCNC: 5.8 G/DL — LOW (ref 6–8.3)
PROT UR-MCNC: NEGATIVE MG/DL — SIGNIFICANT CHANGE UP
RBC # BLD: 4.13 M/UL — LOW (ref 4.2–5.8)
RBC # FLD: 16.3 % — HIGH (ref 10.3–14.5)
SODIUM SERPL-SCNC: 136 MMOL/L — SIGNIFICANT CHANGE UP (ref 135–145)
SP GR SPEC: 1.01 — SIGNIFICANT CHANGE UP (ref 1–1.03)
UROBILINOGEN FLD QL: 0.2 MG/DL — SIGNIFICANT CHANGE UP (ref 0.2–1)
WBC # BLD: 11.75 K/UL — HIGH (ref 3.8–10.5)
WBC # FLD AUTO: 11.75 K/UL — HIGH (ref 3.8–10.5)

## 2025-01-07 PROCEDURE — 99233 SBSQ HOSP IP/OBS HIGH 50: CPT

## 2025-01-07 PROCEDURE — 76775 US EXAM ABDO BACK WALL LIM: CPT | Mod: 26

## 2025-01-07 PROCEDURE — 99232 SBSQ HOSP IP/OBS MODERATE 35: CPT

## 2025-01-07 RX ADMIN — HYDRALAZINE HYDROCHLORIDE 100 MILLIGRAM(S): 10 TABLET ORAL at 06:16

## 2025-01-07 RX ADMIN — CLOPIDOGREL BISULFATE 75 MILLIGRAM(S): 75 TABLET, FILM COATED ORAL at 11:49

## 2025-01-07 RX ADMIN — CEFTRIAXONE SODIUM 100 MILLIGRAM(S): 1 INJECTION, POWDER, FOR SOLUTION INTRAMUSCULAR; INTRAVENOUS at 11:50

## 2025-01-07 RX ADMIN — HYDRALAZINE HYDROCHLORIDE 100 MILLIGRAM(S): 10 TABLET ORAL at 13:37

## 2025-01-07 RX ADMIN — APIXABAN 5 MILLIGRAM(S): 5 TABLET, FILM COATED ORAL at 17:34

## 2025-01-07 RX ADMIN — Medication 2: at 13:11

## 2025-01-07 RX ADMIN — LABETALOL HCL 300 MILLIGRAM(S): 300 TABLET, FILM COATED ORAL at 20:21

## 2025-01-07 RX ADMIN — APIXABAN 5 MILLIGRAM(S): 5 TABLET, FILM COATED ORAL at 06:16

## 2025-01-07 RX ADMIN — Medication 100 MILLIGRAM(S): at 17:35

## 2025-01-07 RX ADMIN — HYDRALAZINE HYDROCHLORIDE 100 MILLIGRAM(S): 10 TABLET ORAL at 21:31

## 2025-01-07 RX ADMIN — ACETAMINOPHEN 650 MILLIGRAM(S): 80 SOLUTION/ DROPS ORAL at 07:11

## 2025-01-07 RX ADMIN — ACETAMINOPHEN 650 MILLIGRAM(S): 80 SOLUTION/ DROPS ORAL at 06:23

## 2025-01-07 RX ADMIN — Medication 100 MILLIGRAM(S): at 06:15

## 2025-01-07 RX ADMIN — ATORVASTATIN CALCIUM 40 MILLIGRAM(S): 40 TABLET, FILM COATED ORAL at 21:31

## 2025-01-07 NOTE — PROGRESS NOTE ADULT - ASSESSMENT
Assessment:  David Faye is a 79 year old man with past medical history of Coronary artery disease (s/p NOMAN to proximal and mid RCA in 2022) with brief Paroxysmal atrial fibrillation (on Eliquis), Hypertension and Chronic kidney disease who was sent in from cardiology office due to elevated troponins, found to have signs of congestive heart failure and pneumonia.    ECG on admission consistent with sinus rhythm with LVH, repeat ECG with atrial fibrillation with RVR and rate related LBBB. Prior coronary angiogram in 11/2022 consistent with triple vessel CAD s/p 2 NOMAN to pRCA and mRCA. Troponins elevated and have peaked, likely demand ischemia from CHF and renal dysfunction. Echo report with normal LVEF 55-60%, moderate LVH and mild-moderate valvular disease. CT chest consistent with extensive bilateral lung opacities with small bilateral pleural effusions. Pro BNP markedly elevated and pro calcitonin elevated. D-dimer elevated, leg US negative for DVT and VQ scan with low probability of pulmonary embolism.     Recommendations:  [] Pleural effusions, concerning for HFpEF: Cautious diuresis given advanced CKD, patient follows in CV office and family has been informed on previous visits to follow up with nephrologist which he has not done. Follow up Nephrology regarding diuresis. Will benefit from amyloid scan. Follow up Pulmonary, appears pleural effusion too small to drain, continue antibiotics for pneumonia. Wean oxygen as tolerated.  [] CAD: Known multivessel CAD, has residual LAD, D1 and chronic occlusion of LCx that was medically managed by interventional cardiology. He is s/p NOMAN of RCA. Continue Plavix 75 mg daily, Atorvastatin 40 mg daily. Plan for outpatient nuclear stress test when recovers from pneumonia.   [] Hypertension: BP stable. Continue Labetalol 300 mg BID, Hydralazine 100 mg q8h.   [] Paroxysmal atrial fibrillation, rate related LBBB: Now back in sinus rhythm. Continue Eliquis for stroke prophylaxis   [] Left external carotid artery stenosis: Recommend Neuro/Vascular evaluation     Will sign out this case to cardiologist to follow along tomorrow.     Bharati Albright MD  Cardiology

## 2025-01-07 NOTE — PROGRESS NOTE ADULT - ASSESSMENT
Patient is a 78 yo M, originally from [facility], former cigarette smoker w/ PMHx of HTN, CKD, paroxysmal afib on eliquis, w/ hx of abnormal nuclear stress test s/p coronary angiogram and NOMAN to proximal and mid RCA (11/2022) presenting to [facility] for SOB, wheezing over the past 3 weeks with associated exertional dizziness, b/l neck pain and ear fullness, admitted to r/o ACS.       #R/O ACS   #3 vessel CAD s/p NOMAN   #Suspect new onset CHF vs pneumonia  #Hyponatremia   - Trop peaked 134 > 125, BNP 15k, age adjusted ddimer 385   - EKG w/o acute ST/T changes, prolonged Qtc at 515 on admission  - Last TTE 2022 EF 50-55%   - S/p 325mg asa  - CXR w/ pulm vascular congestion  - Supplement O2 to maintain sats > 95%, currently on 2L NC O2 sat 93%, wean down as tolerated  - Monitor on tele   - Patient was treated with  lasix 40mg IV daily, now will hold per nephro recs, monitor renal function   - Strict I&Os, daily weights   - C/w home meds: atorvastatin 40mg QHS, plavix 75mg QD   -  RVP negative  - TTE reviewed LVEF 55-60%, grade 2 diastolic dysfunction   -carotid doppler negative for hemodynamic significant stenosis, though external carotid artery stenosis present   - b/l LE Doppler negative for DVT  -CT chest with bilateral opacities, procal positive  -Continue IV cxt and doxycycline   -VQ scan low probability of PE  -Pulm following-too little fluid for thoracentesis   -Cardiology consult appreciated  -Renal consult appreciated, frenal US reviewed    #Keratinized lesion on chest  -Reached out to dermatology Dr. Lan, patient can f/u outpatient for biopsy and treatment     #Paroxysmal afib  - Sinus on exam   - Monitor electrolytes, maintain Mg > 2, K > 4   - C/w home meds: restarted eliquis 5mg BID  - Monitor on tele    #HTN   - Chronic  - C/w home meds: hydralazine 100mg Q8h, labetalol 300mg BID  -Was started on amlodipine yesterday due to elevated BP, per cardio can discontinue due to lowering bp  - Monitor vitals     #CKD 3b  - Baseline Cr w/ gradual uptrend since 2022   - Monitor and replete electrolytes PRN  -Renal consult appreciated, renal US reviewed    #Type 2 Diabetes  - Chronic, dx 2022, A1c 1/4 6.2%  - Not on any medications for this, diet controlled   - Consistent carb diet   -ISS for now     #GOC  - Full code     #DVT ppx   - C/w eliquis 5mg BID     Updated patient's daughter Yoselin

## 2025-01-07 NOTE — PROGRESS NOTE ADULT - ASSESSMENT
Physical Examination:  GENERAL:               Alert, Oriented, No acute distress.    HEENT:                     No JVD, Dry MM  PULM:                     Bilateral air entry, Clear to auscultation bilaterally, no significant sputum production, No Rales, No Rhonchi, No Wheezing  CVS:                         S1, S2,  +Murmur  ABD:                        Soft, nondistended, nontender, normoactive bowel sounds,    NEURO:                  Alert, oriented, interactive, nonfocal, follows commands  PSYC:                      Calm, + Insight.         Assessment  1. Chest pain and MARTINEZ  with elevated BNP/Trop r/o cardiogenic causes  2. Abnormal CT with b/l infitlrates and b/l small pl effusions unsure of etiology      Cardiogenic causes possible, superimposed infectious causes like pneumonia or Malignant causes or inflamatory causes not ruled out  3. Underlying h/o AFib on eliquis, HTN, CKD    Plan  today on room air  feeling better  less sob.  will repeat CXR in am  continue current care   Restart Eliquis today  IV antibiotics for possible pneumonia   RVP neg, pro william < 0.5   continue tele/pulse ox monitoring     will follow

## 2025-01-08 ENCOUNTER — TRANSCRIPTION ENCOUNTER (OUTPATIENT)
Age: 80
End: 2025-01-08

## 2025-01-08 LAB
ANA PAT FLD IF-IMP: ABNORMAL
ANA TITR SER: ABNORMAL
ANION GAP SERPL CALC-SCNC: 11 MMOL/L — SIGNIFICANT CHANGE UP (ref 5–17)
AUTO DIFF PNL BLD: ABNORMAL
BUN SERPL-MCNC: 42 MG/DL — HIGH (ref 7–23)
C-ANCA SER-ACNC: NEGATIVE — SIGNIFICANT CHANGE UP
CALCIUM SERPL-MCNC: 8.5 MG/DL — SIGNIFICANT CHANGE UP (ref 8.4–10.5)
CHLORIDE SERPL-SCNC: 100 MMOL/L — SIGNIFICANT CHANGE UP (ref 96–108)
CO2 SERPL-SCNC: 24 MMOL/L — SIGNIFICANT CHANGE UP (ref 22–31)
CREAT SERPL-MCNC: 1.83 MG/DL — HIGH (ref 0.5–1.3)
EGFR: 38 ML/MIN/1.73M2 — LOW
GAMMA INTERFERON BACKGROUND BLD IA-ACNC: 0.02 IU/ML — SIGNIFICANT CHANGE UP
GBM IGG SER-ACNC: <0.2 — SIGNIFICANT CHANGE UP (ref 0–0.9)
GLUCOSE BLDC GLUCOMTR-MCNC: 108 MG/DL — HIGH (ref 70–99)
GLUCOSE BLDC GLUCOMTR-MCNC: 120 MG/DL — HIGH (ref 70–99)
GLUCOSE BLDC GLUCOMTR-MCNC: 126 MG/DL — HIGH (ref 70–99)
GLUCOSE BLDC GLUCOMTR-MCNC: 127 MG/DL — HIGH (ref 70–99)
GLUCOSE SERPL-MCNC: 122 MG/DL — HIGH (ref 70–99)
HCT VFR BLD CALC: 32.2 % — LOW (ref 39–50)
HGB BLD-MCNC: 10.6 G/DL — LOW (ref 13–17)
M TB IFN-G BLD-IMP: NEGATIVE — SIGNIFICANT CHANGE UP
M TB IFN-G CD4+ BCKGRND COR BLD-ACNC: 0 IU/ML — SIGNIFICANT CHANGE UP
M TB IFN-G CD4+CD8+ BCKGRND COR BLD-ACNC: 0 IU/ML — SIGNIFICANT CHANGE UP
MCHC RBC-ENTMCNC: 26 PG — LOW (ref 27–34)
MCHC RBC-ENTMCNC: 32.9 G/DL — SIGNIFICANT CHANGE UP (ref 32–36)
MCV RBC AUTO: 79.1 FL — LOW (ref 80–100)
NRBC # BLD: 0 /100 WBCS — SIGNIFICANT CHANGE UP (ref 0–0)
P-ANCA SER-ACNC: NEGATIVE — SIGNIFICANT CHANGE UP
PLATELET # BLD AUTO: 265 K/UL — SIGNIFICANT CHANGE UP (ref 150–400)
POTASSIUM SERPL-MCNC: 3.7 MMOL/L — SIGNIFICANT CHANGE UP (ref 3.5–5.3)
POTASSIUM SERPL-SCNC: 3.7 MMOL/L — SIGNIFICANT CHANGE UP (ref 3.5–5.3)
QUANT TB PLUS MITOGEN MINUS NIL: >10 IU/ML — SIGNIFICANT CHANGE UP
RBC # BLD: 4.07 M/UL — LOW (ref 4.2–5.8)
RBC # FLD: 16.3 % — HIGH (ref 10.3–14.5)
SODIUM SERPL-SCNC: 135 MMOL/L — SIGNIFICANT CHANGE UP (ref 135–145)
WBC # BLD: 11 K/UL — HIGH (ref 3.8–10.5)
WBC # FLD AUTO: 11 K/UL — HIGH (ref 3.8–10.5)

## 2025-01-08 PROCEDURE — 99232 SBSQ HOSP IP/OBS MODERATE 35: CPT

## 2025-01-08 PROCEDURE — 71250 CT THORAX DX C-: CPT | Mod: 26

## 2025-01-08 PROCEDURE — 71045 X-RAY EXAM CHEST 1 VIEW: CPT | Mod: 26

## 2025-01-08 RX ORDER — FUROSEMIDE 20 MG
40 TABLET ORAL DAILY
Refills: 0 | Status: DISCONTINUED | OUTPATIENT
Start: 2025-01-08 | End: 2025-01-09

## 2025-01-08 RX ADMIN — Medication 100 MILLIGRAM(S): at 18:02

## 2025-01-08 RX ADMIN — ATORVASTATIN CALCIUM 40 MILLIGRAM(S): 40 TABLET, FILM COATED ORAL at 21:52

## 2025-01-08 RX ADMIN — ACETAMINOPHEN 650 MILLIGRAM(S): 80 SOLUTION/ DROPS ORAL at 21:53

## 2025-01-08 RX ADMIN — HYDRALAZINE HYDROCHLORIDE 100 MILLIGRAM(S): 10 TABLET ORAL at 05:20

## 2025-01-08 RX ADMIN — CLOPIDOGREL BISULFATE 75 MILLIGRAM(S): 75 TABLET, FILM COATED ORAL at 13:11

## 2025-01-08 RX ADMIN — ACETAMINOPHEN 650 MILLIGRAM(S): 80 SOLUTION/ DROPS ORAL at 22:35

## 2025-01-08 RX ADMIN — HYDRALAZINE HYDROCHLORIDE 100 MILLIGRAM(S): 10 TABLET ORAL at 21:53

## 2025-01-08 RX ADMIN — HYDRALAZINE HYDROCHLORIDE 100 MILLIGRAM(S): 10 TABLET ORAL at 13:10

## 2025-01-08 RX ADMIN — APIXABAN 5 MILLIGRAM(S): 5 TABLET, FILM COATED ORAL at 18:01

## 2025-01-08 RX ADMIN — Medication 100 MILLIGRAM(S): at 05:21

## 2025-01-08 RX ADMIN — CEFTRIAXONE SODIUM 100 MILLIGRAM(S): 1 INJECTION, POWDER, FOR SOLUTION INTRAMUSCULAR; INTRAVENOUS at 13:11

## 2025-01-08 RX ADMIN — APIXABAN 5 MILLIGRAM(S): 5 TABLET, FILM COATED ORAL at 05:21

## 2025-01-08 NOTE — DISCHARGE NOTE PROVIDER - NSDCFUSCHEDAPPT_GEN_ALL_CORE_FT
[facility]  CARDIOLOGY [address]  Scheduled Appointment: 01/15/2025    [facility]  CARDIOLOGY [address]  Scheduled Appointment: 01/23/2025    Bharati Albright  [facility]  CARDIOLOGY [address]  Scheduled Appointment: 01/23/2025    Juan Bhatti  [facility]  NEUROLOGY [address]  Scheduled Appointment: 02/04/2025

## 2025-01-08 NOTE — DISCHARGE NOTE PROVIDER - HOSPITAL COURSE
Hospital Course  Patient is a 78 yo M, originally from [facility], former cigarette smoker w/ PMHx of HTN, CKD, paroxysmal afib on eliquis, w/ hx of abnormal nuclear stress test s/p coronary angiogram and NOMAN to proximal and mid RCA (11/2022) presenting to [facility] for SOB, wheezing over the past 3 weeks with associated exertional dizziness, b/l neck pain and ear fullness when walking long distance and walking upstairs, symptoms resolve with a couple minutes of rest. Denies chest pain w/ radiation into the arm or back. Denies increase in LE swelling, urination, orthopnea, PND. Denies fever, recent sick contacts, states no missed doses of his medications. Does not use oxygen at home.     ED Course:   T 97.8 HR 59 RR 20 /81 SpO2 96% on 2L NC  Trop 134.6 > 125.2   Na 133, K 3.6, Mg2   Cr 2.15  WBC 10.90  Hgb 10.7, Hct 32.4  EKG: sinus, rate 65, Qtc prolonged 515   CXR: cardiomegaly, pulmonary vascular congestive changes   S/p aspirin 325mg     [facility] personally reviewed.     Patient with underling CAD, CKD and Paroxysmal atrial fibrillation presented with SOB admitted with probable pneumonia, CT chest showed extensive bilateral alung opacities with small bilateral pleural effusions.  Cardiology and Pulmonology followed during admission.  Managed with supplemental oxygen as needed, completed empiric antibiotics for pneumonia.  Cautiously diuresed given CKD.  Pt clinically improved, renal indices also improved.  Now with stable respiratory status, post ambulatory sats reviewed.  PT evaluated, recommend home with PT.  Medically cleared.       Source of Infection:  Antibiotic / Last Day: completed    Palliative Care / Advanced Care Planning  Code Status: full code  Patient/Family agreeable to Hospice/Palliative (Y/N)?  Summary of Goals of Care Conversation:    Discharging Provider:  Rony Hernandez NP  Contact Info: Cell [phone] - Please call with any questions or concerns.    Outpatient Provider: PCP Dr Rockwell           Hospital Course  Patient is a 80 yo M, originally from [facility], former cigarette smoker w/ PMHx of HTN, CKD, paroxysmal afib on eliquis, w/ hx of abnormal nuclear stress test s/p coronary angiogram and NOMAN to proximal and mid RCA (11/2022) presenting to [facility] for SOB, wheezing over the past 3 weeks with associated exertional dizziness, b/l neck pain and ear fullness when walking long distance and walking upstairs, symptoms resolve with a couple minutes of rest. Denies chest pain w/ radiation into the arm or back. Denies increase in LE swelling, urination, orthopnea, PND. Denies fever, recent sick contacts, states no missed doses of his medications. Does not use oxygen at home.     ED Course:   T 97.8 HR 59 RR 20 /81 SpO2 96% on 2L NC  Trop 134.6 > 125.2   Na 133, K 3.6, Mg2   Cr 2.15  WBC 10.90  Hgb 10.7, Hct 32.4  EKG: sinus, rate 65, Qtc prolonged 515   CXR: cardiomegaly, pulmonary vascular congestive changes   S/p aspirin 325mg     [facility] personally reviewed.     Patient with underling CAD, CKD and Paroxysmal atrial fibrillation presented with SOB admitted with probable pneumonia, CT chest showed extensive bilateral lung opacities with small bilateral pleural effusions.  Cardiology and Pulmonology followed during admission.  Managed with supplemental oxygen as needed, completed empiric antibiotics for pneumonia.  Cautiously diuresed given CKD.  Pt clinically improved, renal indices also improved.  Now with stable respiratory status, post ambulatory sats reviewed.  PT evaluated, recommend home with PT.  Pulmonology cleared for discharge home, recommend continuing to diurese to maintain negative balance.  Outpatient CT after antibiotics and diuresis, if no improvement may need bronch with TB biopsy.  Medically cleared.       Source of Infection:  Antibiotic / Last Day: completed five days of doxy and rocephin, will discharge on two more days of doxy    Palliative Care / Advanced Care Planning  Code Status: full code  Patient/Family agreeable to Hospice/Palliative (Y/N)?  Summary of Goals of Care Conversation:    Discharging Provider:  Rony Hernandez NP  Contact Info: Cell [phone] - Please call with any questions or concerns.    Outpatient Provider: PCP Dr Rockwell           Hospital Course  Patient is a 80 yo M, originally from [facility], former cigarette smoker w/ PMHx of HTN, CKD, paroxysmal afib on eliquis, w/ hx of abnormal nuclear stress test s/p coronary angiogram and NOMAN to proximal and mid RCA (11/2022) presenting to [facility] for SOB, wheezing over the past 3 weeks with associated exertional dizziness, b/l neck pain and ear fullness when walking long distance and walking upstairs, symptoms resolve with a couple minutes of rest. Denies chest pain w/ radiation into the arm or back. Denies increase in LE swelling, urination, orthopnea, PND. Denies fever, recent sick contacts, states no missed doses of his medications. Does not use oxygen at home.     ED Course:   T 97.8 HR 59 RR 20 /81 SpO2 96% on 2L NC  Trop 134.6 > 125.2   Na 133, K 3.6, Mg2   Cr 2.15  WBC 10.90  Hgb 10.7, Hct 32.4  EKG: sinus, rate 65, Qtc prolonged 515   CXR: cardiomegaly, pulmonary vascular congestive changes   S/p aspirin 325mg     [facility] personally reviewed.     Patient with underling CAD, CKD and Paroxysmal atrial fibrillation presented with SOB admitted with probable pneumonia, CT chest showed extensive bilateral lung opacities with small bilateral pleural effusions.  Cardiology and Pulmonology followed during admission.  Managed with supplemental oxygen as needed, completed empiric antibiotics for pneumonia.  Cautiously diuresed given CKD.  Pt clinically improved, renal indices also improved.  Now with stable respiratory status, post ambulatory sats reviewed.  PT evaluated, recommend home with PT.  Pulmonology cleared for discharge home, recommend continuing to diurese to maintain negative balance.  Outpatient CT after antibiotics and diuresis, if no improvement may need bronch with TB biopsy.  Medically cleared.       Source of Infection:  Antibiotic / Last Day: completed five days of doxy and rocephin, will discharge on two more days of doxy    Palliative Care / Advanced Care Planning  Code Status: full code  Patient/Family agreeable to Hospice/Palliative (Y/N)? N  Summary of Goals of Care Conversation: going back home feeling better    CHF Core measures:     CHF Type:  [  ]  Acute    [  ]Chronic    [  ]Acute on Chronic                       [  ] Systolic  [  ] Diastolic  [  ] Combined    EF: 55-60%  ECHO Date: 1/5/25    ARNI [ preferred]/ACEI/ARBs for EF 40% or less  [   ] ACE or ARB OR ARNI ordered:  [   ] ACE or ARB or ARNI contraindicated or not ordered due to : BRUNILDA/CKD  [   ] ACE  ARB or ARNI discretionary: HFpEF    LONG ACTING BETA BLOCKER  for EF 40 % or less  [   ] Beta blocker ordered: Coreg/Toprol  [   ] Beta blocker contraindicated or not ordered due to :  [   ] Beta blocker discretionary: HFpEF    ALDOSTERONE ANTAGONIST for EF 40% or less  [   ] Aldosterone Antagonist ordered: aldactone/Eplerenon  [   ] Aldosterone Antagonist contraindicated or not ordered due to:    [   ] Aldosterone Antagonist is discretionary: HFpEF    SGLT2i for all CHF Patient:  [   ] SGLT2i ordered: Farxiga/Jardiance  [   ] SGLT2i contraindicated or not ordered due to : BRUNILDA/CKD. Renal suggested to hols off Farxiga at this time. resume OP    IF THE PATIENT HAS AFIB:  [  ] Anticoagulation prescribed: Eliquis  [  ] Anticoagulation contraindicated or not ordered due to:    DVT PROPHYLAXIS:   [   ] DVT-P prescribed: Eliquis  [   ] DVT-P not prescribed:    GOC:  [  ] GOC Discussed  [  ] GOC Not diacussed due to:  [   ] Palliaitve consulted  [   ]Palliative not consulted due to:     DIETARY/ NUTRITION CONSULT   [ * ] 2 gram low sodium diet  CHF education provided: yes    On DISCHARGE:  [  ] & days appoinment provided for Primary care provider/Cardiologist?:         if not: why?    [  ] 7 day appointment appointment with:                Date/Time:   [   ] 7 day appointment not provided due to: Transferred to acute care or acute rehab or SNF.      Discharging Provider:  Rony Hernandez NP  Contact Info: Cell [phone] - Please call with any questions or concerns.    Outpatient Provider: PCP Dr Rockwell           Hospital Course  Patient is a 80 yo M, originally from [facility], former cigarette smoker w/ PMHx of HTN, CKD, paroxysmal afib on eliquis, w/ hx of abnormal nuclear stress test s/p coronary angiogram and NOMAN to proximal and mid RCA (11/2022) presenting to [facility] for SOB, wheezing over the past 3 weeks with associated exertional dizziness, b/l neck pain and ear fullness when walking long distance and walking upstairs, symptoms resolve with a couple minutes of rest. Denies chest pain w/ radiation into the arm or back. Denies increase in LE swelling, urination, orthopnea, PND. Denies fever, recent sick contacts, states no missed doses of his medications. Does not use oxygen at home.     ED Course:   T 97.8 HR 59 RR 20 /81 SpO2 96% on 2L NC  Trop 134.6 > 125.2   Na 133, K 3.6, Mg2   Cr 2.15  WBC 10.90  Hgb 10.7, Hct 32.4  EKG: sinus, rate 65, Qtc prolonged 515   CXR: cardiomegaly, pulmonary vascular congestive changes   S/p aspirin 325mg     [facility] personally reviewed.     Patient with underling CAD, CKD and Paroxysmal atrial fibrillation presented with SOB admitted with probable pneumonia, CT chest showed extensive bilateral lung opacities with small bilateral pleural effusions.  Cardiology and Pulmonology followed during admission.  Managed with supplemental oxygen as needed, completed empiric antibiotics for pneumonia.  Cautiously diuresed given CKD.  Pt clinically improved, renal indices also improved.  Now with stable respiratory status, post ambulatory sats reviewed.  PT evaluated, recommend home with PT.  Pulmonology cleared for discharge home, recommend continuing to diurese to maintain negative balance.  Outpatient CT after antibiotics and diuresis, if no improvement may need bronch with TB biopsy.  Medically cleared.       Source of Infection:  Antibiotic / Last Day: completed five days of doxy and rocephin, will discharge on two more days of doxy    Palliative Care / Advanced Care Planning  Code Status: full code  Patient/Family agreeable to Hospice/Palliative (Y/N)? N  Summary of Goals of Care Conversation: going back home feeling better    CHF Core measures:     CHF Type:  [  ]  Acute    [  ]Chronic    [  ]Acute on Chronic                       [  ] Systolic  [  ] Diastolic  [  ] Combined    EF: 55-60%  ECHO Date: 1/5/25    ARNI [ preferred]/ACEI/ARBs for EF 40% or less  [   ] ACE or ARB OR ARNI ordered:  [   ] ACE or ARB or ARNI contraindicated or not ordered due to : BRUNILDA/CKD  [   ] ACE  ARB or ARNI discretionary: HFpEF    LONG ACTING BETA BLOCKER  for EF 40 % or less  [   ] Beta blocker ordered: Coreg/Toprol  [   ] Beta blocker contraindicated or not ordered due to :  [   ] Beta blocker discretionary: HFpEF    ALDOSTERONE ANTAGONIST for EF 40% or less  [   ] Aldosterone Antagonist ordered: aldactone/Eplerenon  [   ] Aldosterone Antagonist contraindicated or not ordered due to:    [   ] Aldosterone Antagonist is discretionary: HFpEF    SGLT2i for all CHF Patient:  [   ] SGLT2i ordered: Farxiga/Jardiance  [   ] SGLT2i contraindicated or not ordered due to : BRUNILDA/CKD. Renal suggested to hols off Farxiga at this time. resume OP    IF THE PATIENT HAS AFIB:  [  ] Anticoagulation prescribed: Eliquis  [  ] Anticoagulation contraindicated or not ordered due to:    DVT PROPHYLAXIS:   [   ] DVT-P prescribed: Eliquis  [   ] DVT-P not prescribed:    GOC:  [  ] GOC Discussed  [  ] GOC Not diacussed due to:  [   ] Palliaitve consulted  [   ]Palliative not consulted due to:     DIETARY/ NUTRITION CONSULT   [ * ] 2 gram low sodium diet  CHF education provided: yes    On DISCHARGE:  [  ] 7 day appointment appointment with:     cardiology         Date/Time: 1/15/2025  [   ] 7 day appointment not provided due to:     Discharging Provider:  Rony Hernandez NP  Contact Info: Cell [phone] - Please call with any questions or concerns.    Outpatient Provider: PCP Dr Rockwell

## 2025-01-08 NOTE — SWALLOW BEDSIDE ASSESSMENT ADULT - NS SPL SWALLOW CLINIC TRIAL FT
Increased work of breathing after trials via straw and consecutive sips. Rec pacing and single cup sips only

## 2025-01-08 NOTE — DISCHARGE NOTE PROVIDER - CARE PROVIDERS DIRECT ADDRESSES
[email or web address]

## 2025-01-08 NOTE — DISCHARGE NOTE PROVIDER - DETAILS OF MALNUTRITION DIAGNOSIS/DIAGNOSES
This patient has been assessed with a concern for Malnutrition and was treated during this hospitalization for the following Nutrition diagnosis/diagnoses:     -  01/09/2025: Moderate protein-calorie malnutrition

## 2025-01-08 NOTE — PROGRESS NOTE ADULT - ASSESSMENT
Physical Examination:  GENERAL:               Alert, Oriented, No acute distress.    HEENT:                     No JVD, Dry MM  PULM:                     Bilateral air entry, Clear to auscultation bilaterally, no significant sputum production, No Rales, No Rhonchi, No Wheezing  CVS:                         S1, S2,  +Murmur  ABD:                        Soft, nondistended, nontender, normoactive bowel sounds,    NEURO:                  Alert, oriented, interactive, nonfocal, follows commands  PSYC:                      Calm, + Insight.         Assessment  1. Chest pain and MARTINEZ  with elevated BNP/Trop r/o cardiogenic causes  2. Abnormal CT with b/l infitlrates and b/l small pl effusions unsure of etiology      Cardiogenic causes possible, superimposed infectious causes like pneumonia or Malignant causes or inflamatory causes not ruled out  3. Underlying h/o AFib on eliquis, HTN, CKD    Plan  today on room air  feeling better, less sob.  CT unchanged, but overall cxr improving  continue current care   Restart Eliquis today  IV antibiotics for possible pneumonia   RVP neg, pro william < 0.5   diurese and keep negative balance   continue tele/pulse ox monitoring  recommend out patient f/u ct after antibiotics and continued diuresis     if continues may need bronch with TB biopsy    will follow        Physical Examination:  GENERAL:               Alert, Oriented, No acute distress.    HEENT:                     No JVD, Dry MM  PULM:                     Bilateral air entry, Clear to auscultation bilaterally, no significant sputum production, No Rales, No Rhonchi, No Wheezing  CVS:                         S1, S2,  +Murmur  ABD:                        Soft, nondistended, nontender, normoactive bowel sounds,    NEURO:                  Alert, oriented, interactive, nonfocal, follows commands  PSYC:                      Calm, + Insight.         Assessment  1. Chest pain and MARTINEZ  with elevated BNP/Trop r/o cardiogenic causes  2. Abnormal CT with b/l infitlrates and b/l small pl effusions unsure of etiology      Cardiogenic causes possible, superimposed infectious causes like pneumonia or Malignant causes or inflamatory causes not ruled out  3. Underlying h/o AFib on eliquis, HTN, CKD    Plan  today on room air  feeling better, less sob.  CT unchanged, but overall cxr improving  continue current care   Restart Eliquis today  IV antibiotics for possible pneumonia   RVP neg, pro william < 0.5   diurese and keep negative balance   continue tele/pulse ox monitoring  recommend out patient f/u ct after antibiotics and continued diuresis     if continues may need bronch with TB biopsy  cardio f/u when improved with stress test.     will follow

## 2025-01-08 NOTE — DISCHARGE NOTE PROVIDER - NSDCMRMEDTOKEN_GEN_ALL_CORE_FT
apixaban 5 mg oral tablet: 1 tab(s) orally every 12 hours  atorvastatin 40 mg oral tablet: 1 tab(s) orally once a day (at bedtime)  clopidogrel 75 mg oral tablet: 1 tab(s) orally once a day  hydrALAZINE 100 mg oral tablet: 1 tab(s) orally every 8 hours  labetalol 300 mg oral tablet: 1 tab(s) orally 2 times a day   apixaban 5 mg oral tablet: 1 tab(s) orally every 12 hours  atorvastatin 40 mg oral tablet: 1 tab(s) orally once a day (at bedtime)  clopidogrel 75 mg oral tablet: 1 tab(s) orally once a day  doxycycline hyclate 100 mg oral capsule: 1 cap(s) orally 2 times a day  hydrALAZINE 100 mg oral tablet: 1 tab(s) orally every 8 hours  labetalol 300 mg oral tablet: 1 tab(s) orally 2 times a day  Lasix 40 mg oral tablet: 1 tab(s) orally once a day   apixaban 5 mg oral tablet: 1 tab(s) orally every 12 hours  atorvastatin 40 mg oral tablet: 1 tab(s) orally once a day (at bedtime)  clopidogrel 75 mg oral tablet: 1 tab(s) orally once a day  doxycycline hyclate 100 mg oral capsule: 1 cap(s) orally 2 times a day  hydrALAZINE 100 mg oral tablet: 1 tab(s) orally every 8 hours  labetalol 300 mg oral tablet: 1 tab(s) orally 2 times a day  Lasix 20 mg oral tablet: 1 tab(s) orally once a day

## 2025-01-08 NOTE — SWALLOW BEDSIDE ASSESSMENT ADULT - SWALLOW EVAL: DIAGNOSIS
Pt presents with clinical signs of oropharyngeal dysphagia exacerbated by dentures not fitting well. Mastication is prolonged and disorganized. Multiple swallows to clear oral cavity with functional oral clearance. Pt is impulsive with self feeding but befits from cues to reduce rate of intake. Improved mastication, bolus formation and oral clearance with soft and bite sized compared to regular solids. No overt signs of aspiration. Pt with increased work of breathing when consuming rapid consecutive sips therefore suggested reduced rate, small single cup sips.

## 2025-01-08 NOTE — DISCHARGE NOTE NURSING/CASE MANAGEMENT/SOCIAL WORK - FINANCIAL ASSISTANCE
[facility] provides services at a reduced cost to those who are determined to be eligible through [facility]’s financial assistance program. Information regarding [facility]’s financial assistance program can be found by going to [email or web address] or by calling [phone].

## 2025-01-08 NOTE — SWALLOW BEDSIDE ASSESSMENT ADULT - CONSISTENCIES ADMINISTERED
normal gait and station , no tenderness or deformities present thin liquid soft & bite-sized regular solid

## 2025-01-08 NOTE — SWALLOW BEDSIDE ASSESSMENT ADULT - COMMENTS
Problem: Patient Care Overview  Goal: Plan of Care Review  Outcome: Ongoing (interventions implemented as appropriate)   08/15/19 1677   Coping/Psychosocial   Plan of Care Reviewed With patient   Plan of Care Review   Progress no change       Problem: Fall Risk (Adult)  Goal: Absence of Fall  Outcome: Ongoing (interventions implemented as appropriate)      Problem: Pain, Chronic (Adult)  Goal: Acceptable Pain/Comfort Level and Functional Ability  Outcome: Ongoing (interventions implemented as appropriate)      Problem: Breathing Pattern Ineffective (Adult)  Goal: Effective Oxygenation/Ventilation  Outcome: Ongoing (interventions implemented as appropriate)      Problem: Chronic Obstructive Pulmonary Disease (Adult)  Goal: Signs and Symptoms of Listed Potential Problems Will be Absent, Minimized or Managed (Chronic Obstructive Pulmonary Disease)  Outcome: Ongoing (interventions implemented as appropriate)      Problem: Fluid Volume Excess (Adult)  Goal: Optimal Fluid Balance  Outcome: Ongoing (interventions implemented as appropriate)      Problem: Arrhythmia/Dysrhythmia (Symptomatic) (Adult)  Goal: Signs and Symptoms of Listed Potential Problems Will be Absent, Minimized or Managed (Arrhythmia/Dysrhythmia)  Outcome: Ongoing (interventions implemented as appropriate)      Problem: Hypertensive Disease/Crisis (Arterial) (Adult)  Goal: Signs and Symptoms of Listed Potential Problems Will be Absent, Minimized or Managed (Hypertensive Disease/Crisis)  Outcome: Ongoing (interventions implemented as appropriate)         WBC: 11.0  CT Chest 1/8: IMPRESSION:  Stable bilateral pneumonia and bilateral pleural effusions.    Cardiomegaly. Ectatic ascending aorta at 4.2 cm.    Known to SLP services at [facility] in 11/20/2022 at that time noted confusion forgetful at home

## 2025-01-08 NOTE — DISCHARGE NOTE PROVIDER - CARE PROVIDER_API CALL
Abraham Guerrero  Critical Care Medicine  [address], [address]  [place] [address]  Phone: [phone]  Fax: [phone]  Follow Up Time:    Abraham Guerrero  Critical Care Medicine  [address], [address]  [place] [address]  Phone: [phone]  Fax: [phone]  Follow Up Time: 1 week    Bharati Albright  Cardiovascular Disease  [address], [address]  [place] [address]  Phone: [phone]  Fax: [phone]  Follow Up Time: 2 weeks    Sadaf Rockwell  [facility]  [address] [place] [address], [address]  [place] [address]  Phone: [phone]  Fax: [phone]  Follow Up Time: 1-3 days    Noreen Adames  Nephrology  [address], [address]  [place] [address]  Phone: [phone]  Fax: [phone]  Follow Up Time: 2 weeks

## 2025-01-08 NOTE — PROGRESS NOTE ADULT - ASSESSMENT
79 year old man with past medical history of Coronary artery disease (s/p NOMAN to proximal and mid RCA in 2022) with brief Paroxysmal atrial fibrillation (on Eliquis), Hypertension and Chronic kidney disease who was sent in from cardiology office due to elevated troponins, found to have signs of congestive heart failure and pneumonia.    ECG on admission consistent with sinus rhythm with LVH, repeat ECG with atrial fibrillation with RVR and rate related LBBB. Prior coronary angiogram in 11/2022 consistent with triple vessel CAD s/p 2 NOMAN to pRCA and mRCA. Troponins elevated and have peaked, likely demand ischemia from CHF and renal dysfunction. Echo report with normal LVEF 55-60%, moderate LVH and mild-moderate valvular disease. CT chest consistent with extensive bilateral lung opacities with small bilateral pleural effusions. Pro BNP markedly elevated and pro calcitonin elevated. D-dimer elevated, leg US negative for DVT and VQ scan with low probability of pulmonary embolism.     Recommendations:  s/p diuresis, improvement in respiratory status and Cr. Follow up Nephrology regarding diuresis.   antibx for pna as per primary team  pt with known multivessel CAD, has residual LAD, D1 and chronic occlusion of LCx that was medically managed by interventional cardiology. He is s/p NOMAN of RCA. Continue Plavix 75 mg daily, Atorvastatin 40 mg daily.   Plan for outpatient nuclear stress test when recovers from pneumonia.   continue current cv meds and eliquis for stroke ppx

## 2025-01-08 NOTE — CDI QUERY NOTE - NSCDIOTHERTXTBX_GEN_ALL_CORE_HH
Clinical documentation and/or evidence of the patient’s presentation, evaluation, and medical management, as evidenced below, may support a diagnosis that is not documented in the medical record.  In order to ensure accurate coding and accuracy of the clinical record, the documentation in this patient’s medical record requires additional clarification.      If you think the supporting documentation and/or clinical evidence supports a more specific diagnosis, please include more specific documentation of a diagnosis associated with these findings in your progress note and/or discharge summary.      Please clarify if the patient was found to have one of the following:      •	Acute respiratory failure with hypoxia, resolved  •	Acute respiratory distress  •	Other (specify)    Link to the underlying cause if known (e.g., respiratory failure secondary to _______).        Supporting documentation and/or clinical evidence:     VS: 1/4 SpO2 89% on RA, SpO2 96% on 2L NC  1/5 SpO2 93%-95% on 2L NC,  1/6 SpO2 93-94%  on 2L NC    H&P: “presenting to [facility] for SOB, wheezing over the past 3 weeks with associated exertional dizziness, b/l neck pain and ear fullness when walking long distance and walking upstairs, symptoms resolve with a couple minutes of rest.”   “Does not use oxygen at home.”    Progress Note: 1/8 “resenting to [facility] for SOB, wheezing over the past 3 weeks with associated exertional dizziness, b/l neck pain and ear fullness, admitted to r/o ACS.”  “Hypoxia likely due to Pneumonia - now on room air”    Treatment: Supplemental oxygen via NC

## 2025-01-08 NOTE — DISCHARGE NOTE PROVIDER - PROVIDER TOKENS
PROVIDER:[TOKEN:[[id number]]] PROVIDER:[TOKEN:[[id number]],FOLLOWUP:[1 week]],PROVIDER:[TOKEN:[[id number]],FOLLOWUP:[2 weeks]],PROVIDER:[TOKEN:[[id number]],FOLLOWUP:[1-3 days]],PROVIDER:[TOKEN:[[id number]],FOLLOWUP:[2 weeks]]

## 2025-01-08 NOTE — PROGRESS NOTE ADULT - ASSESSMENT
Patient is a 78 yo M, originally from [facility], former cigarette smoker w/ PMHx of HTN, CKD, paroxysmal afib on eliquis, w/ hx of abnormal nuclear stress test s/p coronary angiogram and NOMAN to proximal and mid RCA (11/2022) presenting to [facility] for SOB, wheezing over the past 3 weeks with associated exertional dizziness, b/l neck pain and ear fullness, admitted to r/o ACS.    Hypoxia likely due to Pneumonia - now on room air  Pleural Effusions, likely Chronic Diastolic CHF  CAD s/p NOMAN, rule out ACS   Paroxysmal Atrial Fibrillation - now back in sinus  Cardiology following, Tn were elevated peaked, now downtrending likely due to demand from CHF  ECHO normal EF, mod LVH, mild to mod VHD  CT chest c/w extensive bilateral lung opacities with small bilateral pleural effusions, pro BNP elevated  LE SONO negative for DVT, VQ scan low probability PE  Carotid dopplers negative for hemodynamic significant stenosis, though external carotid artery stenosis present  plan for cautious diuresis given CKD, continue rocephin and doxy for pneumonia  Continue to wean oxygen requirements as tolerated, plan to get ambulatory sats today  Continue plavix and statin, plan for outpatient stress  test when recovered from pneumonia  Continue eliquis for stroke ppx  Pulm appreciated, no plans for thoracentesis    Hemodynamic BRUNILDA/CKD3 in setting of diuresis  renal following, baseline Cr ~ 2  would dose lasix as needed, follow renal serologies, renal sono without hydro  avoid nephrotoxins, continue to monitor    Hyponatremia  resolved, monitor    HTN  chronic, continue home meds hydralazine 100 q 8, labetalol 300 BID    Type 2 DM  chronic dx 2022, A1c 6.2  diet controlled, continue consistent carb diet  maintain ISS for now, monitor blood sugar    Goals of Care  full code    Prophylactic Measure  eliquis    updated daughter Yoselin         Patient is a 78 yo M, originally from [facility], former cigarette smoker w/ PMHx of HTN, CKD, paroxysmal afib on eliquis, w/ hx of abnormal nuclear stress test s/p coronary angiogram and NOMAN to proximal and mid RCA (11/2022) presenting to [facility] for SOB, wheezing over the past 3 weeks with associated exertional dizziness, b/l neck pain and ear fullness, admitted to r/o ACS.    Acute Respiratory Failure with Hypoxia likely due to Pneumonia (resolved)- now on room air  Pleural Effusions, likely Chronic Diastolic CHF  CAD s/p NOMAN, rule out ACS   Paroxysmal Atrial Fibrillation - now back in sinus  Cardiology following, Tn were elevated peaked, now downtrending likely due to demand from CHF  ECHO normal EF, mod LVH, mild to mod VHD  CT chest c/w extensive bilateral lung opacities with small bilateral pleural effusions, pro BNP elevated  LE SONO negative for DVT, VQ scan low probability PE, Repeat CXR done without much improvement  Carotid dopplers negative for hemodynamic significant stenosis, though external carotid artery stenosis present  plan for cautious diuresis given CKD, continue rocephin and doxy for pneumonia  Continue to wean oxygen requirements as tolerated, plan to get ambulatory sats today  Continue plavix and statin, plan for outpatient stress  test when recovered from pneumonia  Continue eliquis for stroke ppx, PT evaluation, Speech evaluation  Pulm appreciated, no plans for thoracentesis    Hemodynamic BRUNILDA/CKD3 in setting of diuresis  renal following, baseline Cr ~ 2  would dose lasix as needed, follow renal serologies, renal sono without hydro  avoid nephrotoxins, continue to monitor    Hyponatremia  resolved, monitor    HTN  chronic, continue home meds hydralazine 100 q 8, labetalol 300 BID    Type 2 DM  chronic dx 2022, A1c 6.2  diet controlled, continue consistent carb diet  maintain ISS for now, monitor blood sugar    Goals of Care  full code    Prophylactic Measure  eliquis    updated daughter Yoselin

## 2025-01-08 NOTE — DISCHARGE NOTE PROVIDER - NSDCCPCAREPLAN_GEN_ALL_CORE_FT
PRINCIPAL DISCHARGE DIAGNOSIS  Diagnosis: Gram-negative pneumonia  Assessment and Plan of Treatment: please complete your antibiotics and follow up with pulmonology Dr. Guerrero      SECONDARY DISCHARGE DIAGNOSES  Diagnosis: Bilateral pleural effusion  Assessment and Plan of Treatment: please continue lasix and repeat a CT of your chest as per pulmonology     PRINCIPAL DISCHARGE DIAGNOSIS  Diagnosis: Gram-negative pneumonia  Assessment and Plan of Treatment: please complete your antibiotics and follow up with pulmonology Dr. Guerrero in 1 week      SECONDARY DISCHARGE DIAGNOSES  Diagnosis: Bilateral pleural effusion  Assessment and Plan of Treatment: please continue lasix and repeat a CT of your chest as per pulmonology    Diagnosis: Chronic diastolic congestive heart failure  Assessment and Plan of Treatment: continue meds. see heart doctor in 2 weeks. return to ER if chest pain, shortness of breath, new or worsening leg swelling ro any other cincerning symptoms.    Diagnosis: Stage 3 chronic kidney disease  Assessment and Plan of Treatment: your kidney finciton was worsened during admission. now back to baseline. see kidney dcotor in 2 weeks. avoid NSIAIDs     PRINCIPAL DISCHARGE DIAGNOSIS  Diagnosis: Gram-negative pneumonia  Assessment and Plan of Treatment: please complete your antibiotics and follow up with pulmonology Dr. Guerrero in 1 week      SECONDARY DISCHARGE DIAGNOSES  Diagnosis: Bilateral pleural effusion  Assessment and Plan of Treatment: please continue lasix and repeat a CT of your chest as per pulmonology    Diagnosis: Chronic diastolic congestive heart failure  Assessment and Plan of Treatment: continue meds. see heart doctor in 2 weeks. return to ER if chest pain, shortness of breath, new or worsening leg swelling ro any other cincerning symptoms.    Diagnosis: Stage 3 chronic kidney disease  Assessment and Plan of Treatment: your kidney finciton was worsened during admission. now back to baseline. see kidney dcotor in 2 weeks. avoid NSIAIDs    Diagnosis: Dysphagia  Assessment and Plan of Treatment: you had some difficulty taking pan cake. swallow specialist suggested you take soft and bite sized diet and chew completely before swallow. eat sitting upright. aspiration precautions

## 2025-01-08 NOTE — SWALLOW BEDSIDE ASSESSMENT ADULT - SLP PERTINENT HISTORY OF CURRENT PROBLEM
Patient is a 78 yo M, originally from [facility], former cigarette smoker w/ PMHx of HTN, CKD, paroxysmal afib on eliquis, w/ hx of abnormal nuclear stress test s/p coronary angiogram and NOMAN to proximal and mid RCA (11/2022) presenting to [facility] for SOB, wheezing over the past 3 weeks with associated exertional dizziness, b/l neck pain and ear fullness when walking long distance and walking upstairs, symptoms resolve with a couple minutes of rest. Denies chest pain w/ radiation into the arm or back. Denies increase in LE swelling, urination, orthopnea, PND. Denies fever, recent sick contacts, states no missed doses of his medications. Does not use oxygen at home. The patient is a 58y Female complaining of shortness of breath.

## 2025-01-08 NOTE — SWALLOW BEDSIDE ASSESSMENT ADULT - SWALLOW EVAL: CURRENT DIET
NOC SHIFT 5/19/21 LATE ENTRY
 PT A/O X4. INDEPENDENT TO BCS. CONTINUES TO BE ON 5L O2 VIA NC SATTING IN THE
MID 90'S. DENIES SOB, PAIN, NAUSEA. SLEPT WELL. NO ACUTE CHANGES. CALL LIGHT
WITHIN REACH. USES CALL LIGHT APPROPRIATELY. Regular and thin liquids

## 2025-01-08 NOTE — DISCHARGE NOTE PROVIDER - ATTENDING DISCHARGE PHYSICAL EXAMINATION:
GENERAL: Not in distress. Alert    HEENT: AT/NC. clear conjuctiva, MMM.   no pallor or icterus. left corneal haziness. left eye blind  CARDIOVASCULAR: RRR S1, S2. No murmur/rubs/gallop  LUNGS: BLAE+, no rales, no wheezing, no rhonchi.    ABDOMEN: ND. Soft,  NT, no guarding / rebound / rigidity. BS normoactive. No CVA tenderness.    BACK: No spine tenderness.  EXTREMITIES: no edema. no leg or calf TP.  SKIN: no rash.   NEUROLOGIC: AAO*3. pleasantly confused. strength is symmetric, sensation intact, speech fluent.    PSYCHIATRIC: Calm.  No agitation.

## 2025-01-08 NOTE — DISCHARGE NOTE NURSING/CASE MANAGEMENT/SOCIAL WORK - PATIENT PORTAL LINK FT
You can access the FollowMyHealth Patient Portal offered by [facility] by registering at the following website: [email or web address]. By joining [employer]’s FollowMyHealth portal, you will also be able to view your health information using other applications (apps) compatible with our system.

## 2025-01-08 NOTE — SWALLOW BEDSIDE ASSESSMENT ADULT - SLP GENERAL OBSERVATIONS
Received in bed on room air. Utilized [employer]  ID [id number] and ID # [id number] for Greek translation. Follows some commands with cues, requires repetitions during assessment, oriented to self, place, and reported year as 2000 required binary choices to orient to month.

## 2025-01-09 VITALS — WEIGHT: 164.91 LBS

## 2025-01-09 LAB
ALBUMIN SERPL ELPH-MCNC: 2.4 G/DL — LOW (ref 3.3–5)
ALP SERPL-CCNC: 129 U/L — HIGH (ref 40–120)
ALT FLD-CCNC: 16 U/L — SIGNIFICANT CHANGE UP (ref 10–45)
ANION GAP SERPL CALC-SCNC: 10 MMOL/L — SIGNIFICANT CHANGE UP (ref 5–17)
AST SERPL-CCNC: 20 U/L — SIGNIFICANT CHANGE UP (ref 10–40)
BILIRUB SERPL-MCNC: 0.6 MG/DL — SIGNIFICANT CHANGE UP (ref 0.2–1.2)
BUN SERPL-MCNC: 42 MG/DL — HIGH (ref 7–23)
C3 SERPL-MCNC: 121 MG/DL — SIGNIFICANT CHANGE UP (ref 81–157)
C4 SERPL-MCNC: 31 MG/DL — SIGNIFICANT CHANGE UP (ref 13–39)
CALCIUM SERPL-MCNC: 9 MG/DL — SIGNIFICANT CHANGE UP (ref 8.4–10.5)
CHLORIDE SERPL-SCNC: 101 MMOL/L — SIGNIFICANT CHANGE UP (ref 96–108)
CO2 SERPL-SCNC: 25 MMOL/L — SIGNIFICANT CHANGE UP (ref 22–31)
CREAT SERPL-MCNC: 2.03 MG/DL — HIGH (ref 0.5–1.3)
CRP SERPL-MCNC: 84 MG/L — HIGH
DSDNA AB SER-ACNC: 2 IU/ML — SIGNIFICANT CHANGE UP
EGFR: 33 ML/MIN/1.73M2 — LOW
GLUCOSE BLDC GLUCOMTR-MCNC: 122 MG/DL — HIGH (ref 70–99)
GLUCOSE BLDC GLUCOMTR-MCNC: 140 MG/DL — HIGH (ref 70–99)
GLUCOSE SERPL-MCNC: 102 MG/DL — HIGH (ref 70–99)
HCT VFR BLD CALC: 30.7 % — LOW (ref 39–50)
HGB BLD-MCNC: 10.2 G/DL — LOW (ref 13–17)
MCHC RBC-ENTMCNC: 26.4 PG — LOW (ref 27–34)
MCHC RBC-ENTMCNC: 33.2 G/DL — SIGNIFICANT CHANGE UP (ref 32–36)
MCV RBC AUTO: 79.3 FL — LOW (ref 80–100)
NRBC # BLD: 0 /100 WBCS — SIGNIFICANT CHANGE UP (ref 0–0)
NT-PROBNP SERPL-SCNC: HIGH PG/ML (ref 0–300)
PLATELET # BLD AUTO: 291 K/UL — SIGNIFICANT CHANGE UP (ref 150–400)
POTASSIUM SERPL-MCNC: 4.3 MMOL/L — SIGNIFICANT CHANGE UP (ref 3.5–5.3)
POTASSIUM SERPL-SCNC: 4.3 MMOL/L — SIGNIFICANT CHANGE UP (ref 3.5–5.3)
PROT SERPL-MCNC: 6.5 G/DL — SIGNIFICANT CHANGE UP (ref 6–8.3)
RBC # BLD: 3.87 M/UL — LOW (ref 4.2–5.8)
RBC # FLD: 16.4 % — HIGH (ref 10.3–14.5)
SODIUM SERPL-SCNC: 136 MMOL/L — SIGNIFICANT CHANGE UP (ref 135–145)
WBC # BLD: 11.18 K/UL — HIGH (ref 3.8–10.5)
WBC # FLD AUTO: 11.18 K/UL — HIGH (ref 3.8–10.5)

## 2025-01-09 PROCEDURE — 86160 COMPLEMENT ANTIGEN: CPT

## 2025-01-09 PROCEDURE — 83735 ASSAY OF MAGNESIUM: CPT

## 2025-01-09 PROCEDURE — 78580 LUNG PERFUSION IMAGING: CPT | Mod: MC

## 2025-01-09 PROCEDURE — 86038 ANTINUCLEAR ANTIBODIES: CPT

## 2025-01-09 PROCEDURE — 93005 ELECTROCARDIOGRAM TRACING: CPT

## 2025-01-09 PROCEDURE — 85379 FIBRIN DEGRADATION QUANT: CPT

## 2025-01-09 PROCEDURE — 84145 PROCALCITONIN (PCT): CPT

## 2025-01-09 PROCEDURE — 97166 OT EVAL MOD COMPLEX 45 MIN: CPT

## 2025-01-09 PROCEDURE — A9540: CPT

## 2025-01-09 PROCEDURE — 0225U NFCT DS DNA&RNA 21 SARSCOV2: CPT

## 2025-01-09 PROCEDURE — 97162 PT EVAL MOD COMPLEX 30 MIN: CPT

## 2025-01-09 PROCEDURE — 85025 COMPLETE CBC W/AUTO DIFF WBC: CPT

## 2025-01-09 PROCEDURE — 85027 COMPLETE CBC AUTOMATED: CPT

## 2025-01-09 PROCEDURE — 93306 TTE W/DOPPLER COMPLETE: CPT

## 2025-01-09 PROCEDURE — 82962 GLUCOSE BLOOD TEST: CPT

## 2025-01-09 PROCEDURE — 83880 ASSAY OF NATRIURETIC PEPTIDE: CPT

## 2025-01-09 PROCEDURE — 80048 BASIC METABOLIC PNL TOTAL CA: CPT

## 2025-01-09 PROCEDURE — 81003 URINALYSIS AUTO W/O SCOPE: CPT

## 2025-01-09 PROCEDURE — 84484 ASSAY OF TROPONIN QUANT: CPT

## 2025-01-09 PROCEDURE — 97535 SELF CARE MNGMENT TRAINING: CPT

## 2025-01-09 PROCEDURE — 99239 HOSP IP/OBS DSCHRG MGMT >30: CPT

## 2025-01-09 PROCEDURE — 86480 TB TEST CELL IMMUN MEASURE: CPT

## 2025-01-09 PROCEDURE — 86225 DNA ANTIBODY NATIVE: CPT

## 2025-01-09 PROCEDURE — 36415 COLL VENOUS BLD VENIPUNCTURE: CPT

## 2025-01-09 PROCEDURE — 97116 GAIT TRAINING THERAPY: CPT

## 2025-01-09 PROCEDURE — 85730 THROMBOPLASTIN TIME PARTIAL: CPT

## 2025-01-09 PROCEDURE — 86140 C-REACTIVE PROTEIN: CPT

## 2025-01-09 PROCEDURE — 94640 AIRWAY INHALATION TREATMENT: CPT

## 2025-01-09 PROCEDURE — 76775 US EXAM ABDO BACK WALL LIM: CPT

## 2025-01-09 PROCEDURE — 83036 HEMOGLOBIN GLYCOSYLATED A1C: CPT

## 2025-01-09 PROCEDURE — 86334 IMMUNOFIX E-PHORESIS SERUM: CPT

## 2025-01-09 PROCEDURE — 99291 CRITICAL CARE FIRST HOUR: CPT

## 2025-01-09 PROCEDURE — 71045 X-RAY EXAM CHEST 1 VIEW: CPT

## 2025-01-09 PROCEDURE — 87637 SARSCOV2&INF A&B&RSV AMP PRB: CPT

## 2025-01-09 PROCEDURE — 83516 IMMUNOASSAY NONANTIBODY: CPT

## 2025-01-09 PROCEDURE — 86036 ANCA SCREEN EACH ANTIBODY: CPT

## 2025-01-09 PROCEDURE — 93880 EXTRACRANIAL BILAT STUDY: CPT

## 2025-01-09 PROCEDURE — 84155 ASSAY OF PROTEIN SERUM: CPT

## 2025-01-09 PROCEDURE — 93970 EXTREMITY STUDY: CPT

## 2025-01-09 PROCEDURE — 71250 CT THORAX DX C-: CPT | Mod: MC

## 2025-01-09 PROCEDURE — 92610 EVALUATE SWALLOWING FUNCTION: CPT

## 2025-01-09 PROCEDURE — 84165 PROTEIN E-PHORESIS SERUM: CPT

## 2025-01-09 PROCEDURE — 85610 PROTHROMBIN TIME: CPT

## 2025-01-09 PROCEDURE — 80053 COMPREHEN METABOLIC PANEL: CPT

## 2025-01-09 RX ORDER — FUROSEMIDE 20 MG
1 TABLET ORAL
Qty: 30 | Refills: 0
Start: 2025-01-09 | End: 2025-02-07

## 2025-01-09 RX ORDER — FUROSEMIDE 20 MG
20 TABLET ORAL DAILY
Refills: 0 | Status: DISCONTINUED | OUTPATIENT
Start: 2025-01-10 | End: 2025-01-09

## 2025-01-09 RX ADMIN — Medication 100 MILLIGRAM(S): at 06:48

## 2025-01-09 RX ADMIN — HYDRALAZINE HYDROCHLORIDE 100 MILLIGRAM(S): 10 TABLET ORAL at 06:47

## 2025-01-09 RX ADMIN — CLOPIDOGREL BISULFATE 75 MILLIGRAM(S): 75 TABLET, FILM COATED ORAL at 11:05

## 2025-01-09 RX ADMIN — APIXABAN 5 MILLIGRAM(S): 5 TABLET, FILM COATED ORAL at 06:47

## 2025-01-09 RX ADMIN — CEFTRIAXONE SODIUM 100 MILLIGRAM(S): 1 INJECTION, POWDER, FOR SOLUTION INTRAMUSCULAR; INTRAVENOUS at 13:06

## 2025-01-09 RX ADMIN — Medication 40 MILLIGRAM(S): at 06:47

## 2025-01-09 RX ADMIN — HYDRALAZINE HYDROCHLORIDE 100 MILLIGRAM(S): 10 TABLET ORAL at 13:42

## 2025-01-09 RX ADMIN — LABETALOL HCL 300 MILLIGRAM(S): 300 TABLET, FILM COATED ORAL at 06:47

## 2025-01-09 NOTE — PROGRESS NOTE ADULT - REASON FOR ADMISSION
r/o ACS

## 2025-01-09 NOTE — DIETITIAN INITIAL EVALUATION ADULT - ADD RECOMMEND
1. Glucerna 8oz PO Daily (Provides 220kcal-10grams of Protein)   2. Soft & Bite Sized diet w/ thin liquids per SLP recommendations  3. Provide ongoing diet education as needed   4. Monitor daily PO intakes, GI tolerance, labs, weights, skin integrity, & BM regularity

## 2025-01-09 NOTE — CHART NOTE - NSCHARTNOTEFT_GEN_A_CORE
Notified by RN, patient noted to have pink urine and blood on gown and urethral meatus. Patient had a condom catheter placed yesterday.  Ordered Hb: 10.2, on significant change from previous CBC. Likely trauma from catheter.

## 2025-01-09 NOTE — DIETITIAN INITIAL EVALUATION ADULT - PHYSICAL ASSESSMENT TRICEPS
Pt having breast augmentation and scar revision done in [facility]  Needs fit for surgery letter  Do you need to see her?     Fax to [phone] Dr Coretha Halsted moderate

## 2025-01-09 NOTE — PROGRESS NOTE ADULT - SUBJECTIVE AND OBJECTIVE BOX
No CP, SOB    Vital Signs Last 24 Hrs  T(C): 36.4 (01-07-25 @ 19:00), Max: 36.8 (01-07-25 @ 12:58)  T(F): 97.6 (01-07-25 @ 19:00), Max: 98.2 (01-07-25 @ 12:58)  HR: 60 (01-07-25 @ 19:00) (57 - 62)  BP: 153/70 (01-07-25 @ 19:00) (125/67 - 153/70)  RR: 17 (01-07-25 @ 19:00) (16 - 18)  SpO2: 93% (01-07-25 @ 19:00) (92% - 95%)    I&O's Detail    06 Jan 2025 07:01  -  07 Jan 2025 07:00  --------------------------------------------------------  OUT:    Voided (mL): 900 mL  Total OUT: 900 mL    07 Jan 2025 07:01  -  07 Jan 2025 20:37  --------------------------------------------------------  IN:    Oral Fluid: 600 mL  Total IN: 600 mL    OUT:    Voided (mL): 1000 mL  Total OUT: 1000 mL    on NC O2  s1s2  b/l air entry  soft, ND  no edema                        10.8   11.75 )-----------( 287      ( 07 Jan 2025 06:42 )             32.4     07 Jan 2025 06:42    136    |  101    |  54     ----------------------------<  101    3.9     |  28     |  2.29     Ca    8.6        07 Jan 2025 06:42  Mg     2.2       07 Jan 2025 06:42    TPro  5.8    /  Alb  x      /  TBili  x      /  DBili  x      /  AST  x      /  ALT  x      /  AlkPhos  x      07 Jan 2025 06:42    LIVER FUNCTIONS - ( 07 Jan 2025 06:42 )  Alb: x     / Pro: 5.8 g/dL / ALK PHOS: x     / ALT: x     / AST: x     / GGT: x           acetaminophen     Tablet .. 650 milliGRAM(s) Oral every 6 hours PRN  apixaban 5 milliGRAM(s) Oral every 12 hours  atorvastatin 40 milliGRAM(s) Oral at bedtime  cefTRIAXone   IVPB 1000 milliGRAM(s) IV Intermittent every 24 hours  clopidogrel Tablet 75 milliGRAM(s) Oral daily  dextrose 5%. 1000 milliLiter(s) IV Continuous <Continuous>  dextrose 5%. 1000 milliLiter(s) IV Continuous <Continuous>  dextrose 50% Injectable 25 Gram(s) IV Push once  dextrose 50% Injectable 12.5 Gram(s) IV Push once  dextrose 50% Injectable 25 Gram(s) IV Push once  dextrose Oral Gel 15 Gram(s) Oral once PRN  doxycycline IVPB      doxycycline IVPB 100 milliGRAM(s) IV Intermittent every 12 hours  glucagon  Injectable 1 milliGRAM(s) IntraMuscular once  hydrALAZINE 100 milliGRAM(s) Oral every 8 hours  insulin lispro (ADMELOG) corrective regimen sliding scale   SubCutaneous three times a day before meals  insulin lispro (ADMELOG) corrective regimen sliding scale   SubCutaneous at bedtime  labetalol 300 milliGRAM(s) Oral two times a day  melatonin 3 milliGRAM(s) Oral at bedtime PRN    A/P:    CM, Afib, adm w/weber  Concern for CHF, PNA  Suspect hemodynamic BRUNILDA/CKD 3 iso diuresis (baseline cr ~ 2.)  Cr is better today   Would dose Lasix as needed  Will f/u renal serologies  UA negative  Renal SONO w/o hydro   Avoid nephrotoxins  F/u BMP, Mg, UO    [phone]  
No distress    Vital Signs Last 24 Hrs  T(C): 36.6 (01-08-25 @ 19:04), Max: 36.6 (01-08-25 @ 11:59)  T(F): 97.8 (01-08-25 @ 19:04), Max: 97.8 (01-08-25 @ 11:59)  HR: 66 (01-08-25 @ 22:10) (60 - 66)  BP: 154/66 (01-08-25 @ 22:10) (121/60 - 154/66)  RR: 18 (01-08-25 @ 22:10) (18 - 18)  SpO2: 91% (01-08-25 @ 22:10) (91% - 94%)    I&O's Detail    07 Jan 2025 07:01  -  08 Jan 2025 07:00  --------------------------------------------------------  IN:    Oral Fluid: 600 mL  Total IN: 600 mL    OUT:    Voided (mL): 2700 mL  Total OUT: 2700 mL    08 Jan 2025 07:01  -  08 Jan 2025 23:09  --------------------------------------------------------  IN:    Oral Fluid: 640 mL  Total IN: 640 mL    OUT:    Voided (mL): 900 mL  Total OUT: 900 mL    s1s2  b/l air entry  soft, ND  no edema                                10.6   11.00 )-----------( 265      ( 08 Jan 2025 06:40 )             32.2     08 Jan 2025 06:40    135    |  100    |  42     ----------------------------<  122    3.7     |  24     |  1.83     Ca    8.5        08 Jan 2025 06:40  Mg     2.2       07 Jan 2025 06:42    TPro  5.8    /  Alb  x      /  TBili  x      /  DBili  x      /  AST  x      /  ALT  x      /  AlkPhos  x      07 Jan 2025 06:42    LIVER FUNCTIONS - ( 07 Jan 2025 06:42 )  Alb: x     / Pro: 5.8 g/dL / ALK PHOS: x     / ALT: x     / AST: x     / GGT: x           acetaminophen     Tablet .. 650 milliGRAM(s) Oral every 6 hours PRN  apixaban 5 milliGRAM(s) Oral every 12 hours  atorvastatin 40 milliGRAM(s) Oral at bedtime  cefTRIAXone   IVPB 1000 milliGRAM(s) IV Intermittent every 24 hours  clopidogrel Tablet 75 milliGRAM(s) Oral daily  dextrose 5%. 1000 milliLiter(s) IV Continuous <Continuous>  dextrose 5%. 1000 milliLiter(s) IV Continuous <Continuous>  dextrose 50% Injectable 25 Gram(s) IV Push once  dextrose 50% Injectable 12.5 Gram(s) IV Push once  dextrose 50% Injectable 25 Gram(s) IV Push once  dextrose Oral Gel 15 Gram(s) Oral once PRN  doxycycline IVPB      doxycycline IVPB 100 milliGRAM(s) IV Intermittent every 12 hours  furosemide    Tablet 40 milliGRAM(s) Oral daily  glucagon  Injectable 1 milliGRAM(s) IntraMuscular once  hydrALAZINE 100 milliGRAM(s) Oral every 8 hours  insulin lispro (ADMELOG) corrective regimen sliding scale   SubCutaneous three times a day before meals  insulin lispro (ADMELOG) corrective regimen sliding scale   SubCutaneous at bedtime  labetalol 300 milliGRAM(s) Oral two times a day  melatonin 3 milliGRAM(s) Oral at bedtime PRN    A/P:    CM, Afib, adm w/weber  Concern for CHF, PNA  Suspect hemodynamic BRUNILDA/CKD 3 iso diuresis   Improved  No objection to trial of Lasix   UA negative  Renal SONO w/o hydro   Avoid nephrotoxins, no NSAID's  F/u BMP, Mg, UO  Renal f/u as op    [phone]  
Follow-up Pulmonary Progress Note  Chief Complaint : Acute ischemic heart disease    seen with  line [id number]    complain of sore throat and h/a, ear fullness  states breathing much better  on room air  no hypoxia on exertion       Allergies :No Known Allergies      PAST MEDICAL & SURGICAL HISTORY:  H/O: HTN (hypertension)    CAD (coronary artery disease)    Diabetic vitreous hemorrhage    Hypokalemia    Type 2 diabetes mellitus    Left ventricular hypertrophy    History of alcohol withdrawal delirium    S/P cardiac catheterization  11/17 3 stents to RCA        Medications:  MEDICATIONS  (STANDING):  apixaban 5 milliGRAM(s) Oral every 12 hours  atorvastatin 40 milliGRAM(s) Oral at bedtime  cefTRIAXone   IVPB 1000 milliGRAM(s) IV Intermittent every 24 hours  clopidogrel Tablet 75 milliGRAM(s) Oral daily  dextrose 5%. 1000 milliLiter(s) (100 mL/Hr) IV Continuous <Continuous>  dextrose 5%. 1000 milliLiter(s) (50 mL/Hr) IV Continuous <Continuous>  dextrose 50% Injectable 25 Gram(s) IV Push once  dextrose 50% Injectable 12.5 Gram(s) IV Push once  dextrose 50% Injectable 25 Gram(s) IV Push once  doxycycline IVPB 100 milliGRAM(s) IV Intermittent every 12 hours  doxycycline IVPB      glucagon  Injectable 1 milliGRAM(s) IntraMuscular once  hydrALAZINE 100 milliGRAM(s) Oral every 8 hours  insulin lispro (ADMELOG) corrective regimen sliding scale   SubCutaneous three times a day before meals  insulin lispro (ADMELOG) corrective regimen sliding scale   SubCutaneous at bedtime  labetalol 300 milliGRAM(s) Oral two times a day    MEDICATIONS  (PRN):  acetaminophen     Tablet .. 650 milliGRAM(s) Oral every 6 hours PRN Temp greater or equal to 38C (100.4F), Mild Pain (1 - 3)  dextrose Oral Gel 15 Gram(s) Oral once PRN Blood Glucose LESS THAN 70 milliGRAM(s)/deciliter  melatonin 3 milliGRAM(s) Oral at bedtime PRN Insomnia      Antibiotics History  cefTRIAXone   IVPB 1000 milliGRAM(s) IV Intermittent every 24 hours, 01-05-25 @ 11:17, Stop order after: 5 Days  doxycycline IVPB 100 milliGRAM(s) IV Intermittent once, 01-05-25 @ 11:21  doxycycline IVPB 100 milliGRAM(s) IV Intermittent every 12 hours, 01-05-25 @ 18:00  doxycycline IVPB    , 01-05-25 @ 13:02      Heme Medications   apixaban 5 milliGRAM(s) Oral every 12 hours, 01-06-25 @ 14:35  clopidogrel Tablet 75 milliGRAM(s) Oral daily, 01-05-25 @ 01:26      GI Medications        LABS:                        10.6   11.00 )-----------( 265      ( 08 Jan 2025 06:40 )             32.2     01-08    135  |  100  |  42[H]  ----------------------------<  122[H]  3.7   |  24  |  1.83[H]    Ca    8.5      08 Jan 2025 06:40  Mg     2.2     01-07    TPro  5.8[L]  /  Alb  x   /  TBili  x   /  DBili  x   /  AST  x   /  ALT  x   /  AlkPhos  x   01-07    SHANEKA 1:160 01-07 @ 06:42  Anti SS-1 --  Anti SS-2 --  Anti RNP --  RF -- 01-07 @ 06:42    Atypical ANCA -- 01-07 @ 06:42  c-ANCA titer -- 01-07 @ 06:42  c-ANCA -- 01-07 @ 06:42  p-ANCA -- 01-07 @ 06:42  HIT ab -- 01-05 @ 00:00  HIT ab EIA --  D Dimer -[id number]    Trend Bun/Cr  01-08-25 @ 06:40  BUN/CR -  42[H] / 1.83[H]  01-07-25 @ 06:42  BUN/CR -  54[H] / 2.29[H]  01-06-25 @ 11:14  BUN/CR -  54[H] / 2.64[H]  01-05-25 @ 05:58  BUN/CR -  31[H] / 2.04[H]  01-04-25 @ 20:10  BUN/CR -  33[H] / 2.15[H]  12-05-22 @ 07:55  BUN/CR -  62[H] / 2.88[H]  12-04-22 @ 06:45  BUN/CR -  58[H] / 3.64[H]  12-03-22 @ 06:30  BUN/CR -  44[H] / 2.56[H]  12-02-22 @ 18:25  BUN/CR -  45[H] / 2.65[H]  12-02-22 @ 06:27  BUN/CR -  37[H] / 2.04[H]  12-02-22 @ 01:45  BUN/CR -  35[H] / 1.93[H]  12-01-22 @ 19:05  BUN/CR -  31[H] / 1.75[H]      Trend BNP  01-04-25 @ 20:10   -  48282[H]  12-01-22 @ 06:55   -  9693[H]  11-11-22 @ 22:00   -  1778[H]  05-24-22 @ 20:52   -  794[H]      Urinalysis Basic - ( 08 Jan 2025 06:40 )    Color: x / Appearance: x / SG: x / pH: x  Gluc: 122 mg/dL / Ketone: x  / Bili: x / Urobili: x   Blood: x / Protein: x / Nitrite: x   Leuk Esterase: x / RBC: x / WBC x   Sq Epi: x / Non Sq Epi: x / Bacteria: x              CULTURES: (if applicable)    Rapid RVP Result: NotDetec (01-05-25 @ 00:51)        CAPILLARY BLOOD GLUCOSE      POCT Blood Glucose.: 120 mg/dL (08 Jan 2025 16:32)      RADIOLOGY       < from: CT Chest No Cont (01.08.25 @ 10:44) >    FINDINGS:    LUNGS AND LARGE AIRWAYS: Patent central airways. No significant change in   patchy bilateral infiltrates.  PLEURA: Moderate right and small left pleural effusion unchanged.  VESSELS: Ectatic ascending aorta measuring 4.2 cm. Mild atherosclerosis.   Coronary atherosclerosis.  HEART: Area megaly. No pericardial effusion.  MEDIASTINUM AND ELENA: No lymphadenopathy.  CHEST WALL AND LOWER NECK: Within normal limits.  VISUALIZED UPPER ABDOMEN: Right renal cyst.  BONES: Degenerative changes.    IMPRESSION:  Stable bilateral pneumonia and bilateral pleural effusions.    Cardiomegaly. Ectatic ascending aorta at 4.2 cm.        --- End of Report ---    < end of copied text >  VITALS:  T(C): 36.6 (01-08-25 @ 11:59), Max: 36.6 (01-08-25 @ 11:59)  T(F): 97.8 (01-08-25 @ 11:59), Max: 97.8 (01-08-25 @ 11:59)  HR: 60 (01-08-25 @ 11:59) (60 - 67)  BP: 136/68 (01-08-25 @ 11:59) (121/60 - 160/77)  BP(mean): --  ABP: --  ABP(mean): --  RR: 18 (01-08-25 @ 11:59) (17 - 18)  SpO2: 94% (01-08-25 @ 11:59) (92% - 94%)  CVP(mm Hg): --  CVP(cm H2O): --    Ins and Outs     01-07-25 @ 07:01  -  01-08-25 @ 07:00  --------------------------------------------------------  IN: 600 mL / OUT: 2700 mL / NET: -2100 mL    01-08-25 @ 07:01  -  01-08-25 @ 17:08  --------------------------------------------------------  IN: 400 mL / OUT: 300 mL / NET: 100 mL                I&O's Detail    07 Jan 2025 07:01  -  08 Jan 2025 07:00  --------------------------------------------------------  IN:    Oral Fluid: 600 mL  Total IN: 600 mL    OUT:    Voided (mL): 2700 mL  Total OUT: 2700 mL    Total NET: -2100 mL      08 Jan 2025 07:01  -  08 Jan 2025 17:08  --------------------------------------------------------  IN:    Oral Fluid: 400 mL  Total IN: 400 mL    OUT:    Voided (mL): 300 mL  Total OUT: 300 mL    Total NET: 100 mL   
Patient is a 79y old Male who presents with a chief complaint of r/o ACS (06 Jan 2025 09:32)      Patient seen and examined at bedside. No overnight events reported.  ID [id number] used to translate conversation. Patient states he is feeling well. Denies chest pain, sob, nausea, vomiting.     ALLERGIES:  No Known Allergies    MEDICATIONS  (STANDING):  amLODIPine   Tablet 5 milliGRAM(s) Oral daily  atorvastatin 40 milliGRAM(s) Oral at bedtime  cefTRIAXone   IVPB 1000 milliGRAM(s) IV Intermittent every 24 hours  clopidogrel Tablet 75 milliGRAM(s) Oral daily  dextrose 5%. 1000 milliLiter(s) (100 mL/Hr) IV Continuous <Continuous>  dextrose 5%. 1000 milliLiter(s) (50 mL/Hr) IV Continuous <Continuous>  dextrose 50% Injectable 25 Gram(s) IV Push once  dextrose 50% Injectable 12.5 Gram(s) IV Push once  dextrose 50% Injectable 25 Gram(s) IV Push once  doxycycline IVPB 100 milliGRAM(s) IV Intermittent every 12 hours  doxycycline IVPB      furosemide   Injectable 40 milliGRAM(s) IV Push daily  glucagon  Injectable 1 milliGRAM(s) IntraMuscular once  hydrALAZINE 100 milliGRAM(s) Oral every 8 hours  insulin lispro (ADMELOG) corrective regimen sliding scale   SubCutaneous three times a day before meals  insulin lispro (ADMELOG) corrective regimen sliding scale   SubCutaneous at bedtime  labetalol 300 milliGRAM(s) Oral two times a day    MEDICATIONS  (PRN):  acetaminophen     Tablet .. 650 milliGRAM(s) Oral every 6 hours PRN Temp greater or equal to 38C (100.4F), Mild Pain (1 - 3)  dextrose Oral Gel 15 Gram(s) Oral once PRN Blood Glucose LESS THAN 70 milliGRAM(s)/deciliter  melatonin 3 milliGRAM(s) Oral at bedtime PRN Insomnia    Vital Signs Last 24 Hrs  T(F): 98.1 (06 Jan 2025 05:22), Max: 98.6 (05 Jan 2025 18:55)  HR: 60 (06 Jan 2025 05:22) (60 - 67)  BP: 96/52 (06 Jan 2025 05:30) (92/46 - 120/63)  RR: 17 (06 Jan 2025 05:22) (16 - 17)  SpO2: 93% (06 Jan 2025 05:22) (93% - 95%)  I&O's Summary    PHYSICAL EXAM:  General: NAD, Awake, alert  ENT: No gross hearing impairment, Moist mucous membranes, no thrush  Neck: Supple, No JVD  Lungs: Clear to auscultation bilaterally, good air entry, non-labored breathing  Cardio: RRR, S1/S2, + murmur  Abdomen: Soft, Nontender, Nondistended; Bowel sounds present  Extremities: No calf tenderness, No cyanosis, No pitting edema  Psych: Appropriate mood and affect    LABS:                        11.4   11.96 )-----------( 215      ( 05 Jan 2025 05:58 )             34.1     01-05    138  |  98  |  31  ----------------------------<  107  3.9   |  27  |  2.04    Ca    8.7      05 Jan 2025 05:58  Mg     2.0     01-05    TPro  7.2  /  Alb  2.7  /  TBili  1.2  /  DBili  x   /  AST  18  /  ALT  15  /  AlkPhos  179  01-05          PT/INR - ( 04 Jan 2025 20:10 )   PT: 21.4 sec;   INR: 1.82 ratio         PTT - ( 04 Jan 2025 20:10 )  PTT:38.3 sec    Procalcitonin: 0.34 ng/mL (01-05-25 @ 11:05)    CARDIAC MARKERS ( 05 Jan 2025 05:58 )  x     / 112.7 ng/L / x     / x     / x      CARDIAC MARKERS ( 04 Jan 2025 21:45 )  x     / 125.2 ng/L / x     / x     / x      CARDIAC MARKERS ( 04 Jan 2025 20:10 )  x     / 134.6 ng/L / x     / x     / x                        POCT Blood Glucose.: 109 mg/dL (06 Jan 2025 08:06)      Urinalysis Basic - ( 05 Jan 2025 05:58 )    Color: x / Appearance: x / SG: x / pH: x  Gluc: 107 mg/dL / Ketone: x  / Bili: x / Urobili: x   Blood: x / Protein: x / Nitrite: x   Leuk Esterase: x / RBC: x / WBC x   Sq Epi: x / Non Sq Epi: x / Bacteria: x          RADIOLOGY & ADDITIONAL TESTS:    Care Discussed with Consultants/Other Providers:   
Follow-up Pulmonary Progress Note  Chief Complaint : Acute ischemic heart disease   line [id number] used for this visit    pt seen and examined  feeling better today  on less oxygen  less sob  states normal      Allergies :No Known Allergies      PAST MEDICAL & SURGICAL HISTORY:  H/O: HTN (hypertension)  CAD (coronary artery disease)  Diabetic vitreous hemorrhage  Hypokalemia  Type 2 diabetes mellitus  Left ventricular hypertrophy  History of alcohol withdrawal delirium  S/P cardiac catheterization 11/17 3 stents to RCA        Medications:  MEDICATIONS  (STANDING):  apixaban 5 milliGRAM(s) Oral every 12 hours  atorvastatin 40 milliGRAM(s) Oral at bedtime  cefTRIAXone   IVPB 1000 milliGRAM(s) IV Intermittent every 24 hours  clopidogrel Tablet 75 milliGRAM(s) Oral daily  dextrose 5%. 1000 milliLiter(s) (100 mL/Hr) IV Continuous <Continuous>  dextrose 5%. 1000 milliLiter(s) (50 mL/Hr) IV Continuous <Continuous>  dextrose 50% Injectable 25 Gram(s) IV Push once  dextrose 50% Injectable 12.5 Gram(s) IV Push once  dextrose 50% Injectable 25 Gram(s) IV Push once  doxycycline IVPB 100 milliGRAM(s) IV Intermittent every 12 hours  doxycycline IVPB      furosemide   Injectable 40 milliGRAM(s) IV Push daily  glucagon  Injectable 1 milliGRAM(s) IntraMuscular once  hydrALAZINE 100 milliGRAM(s) Oral every 8 hours  insulin lispro (ADMELOG) corrective regimen sliding scale   SubCutaneous three times a day before meals  insulin lispro (ADMELOG) corrective regimen sliding scale   SubCutaneous at bedtime  labetalol 300 milliGRAM(s) Oral two times a day    MEDICATIONS  (PRN):  acetaminophen     Tablet .. 650 milliGRAM(s) Oral every 6 hours PRN Temp greater or equal to 38C (100.4F), Mild Pain (1 - 3)  dextrose Oral Gel 15 Gram(s) Oral once PRN Blood Glucose LESS THAN 70 milliGRAM(s)/deciliter  melatonin 3 milliGRAM(s) Oral at bedtime PRN Insomnia      Antibiotics History  cefTRIAXone   IVPB 1000 milliGRAM(s) IV Intermittent every 24 hours, 01-05-25 @ 11:17, Stop order after: 5 Days  doxycycline IVPB 100 milliGRAM(s) IV Intermittent once, 01-05-25 @ 11:21  doxycycline IVPB 100 milliGRAM(s) IV Intermittent every 12 hours, 01-05-25 @ 18:00  doxycycline IVPB    , 01-05-25 @ 13:02      Heme Medications   apixaban 5 milliGRAM(s) Oral every 12 hours, 01-06-25 @ 14:35  clopidogrel Tablet 75 milliGRAM(s) Oral daily, 01-05-25 @ 01:26     LABS:                        11.2   12.49 )-----------( 268      ( 06 Jan 2025 11:14 )             34.6     01-06    134[L]  |  97  |  54[H]  ----------------------------<  124[H]  3.0[L]   |  28  |  2.64[H]    Ca    8.3[L]      06 Jan 2025 11:14  Mg     2.0     01-06    TPro  7.2  /  Alb  2.7[L]  /  TBili  1.2  /  DBili  x   /  AST  18  /  ALT  15  /  AlkPhos  179[H]  01-05    HIT ab -- 01-05 @ 00:00  HIT ab EIA --  D Dimer -491    Trend Bun/Cr  01-06-25 @ 11:14  BUN/CR -  54[H] / 2.64[H]  01-05-25 @ 05:58  BUN/CR -  31[H] / 2.04[H]  01-04-25 @ 20:10  BUN/CR -  33[H] / 2.15[H]  12-05-22 @ 07:55  BUN/CR -  62[H] / 2.88[H]  12-04-22 @ 06:45  BUN/CR -  58[H] / 3.64[H]  12-03-22 @ 06:30  BUN/CR -  44[H] / 2.56[H]  12-02-22 @ 18:25  BUN/CR -  45[H] / 2.65[H]  12-02-22 @ 06:27  BUN/CR -  37[H] / 2.04[H]  12-02-22 @ 01:45  BUN/CR -  35[H] / 1.93[H]  12-01-22 @ 19:05  BUN/CR -  31[H] / 1.75[H]  12-01-22 @ 11:32  BUN/CR -  31[H] / 1.74[H]  12-01-22 @ 06:55  BUN/CR -  30[H] / 1.88[H]    Trend Cardiac Enzymes  01-05-25 @ 05:58  XQN-IOFSS-BJZNB-CPKMM/Trop I - -- - --  - --  -  --  /  112.7[H]  01-04-25 @ 21:45  KWB-FZHPI-BAEVB-CPKMM/Trop I - -- - --  - --  -  --  /  125.2[H]  01-04-25 @ 20:10  KOG-AKXZQ-PKERD-CPKMM/Trop I - -- - --  - --  -  --  /  134.6[H]    Trend BNP  01-04-25 @ 20:10   -  95889[H]  12-01-22 @ 06:55   -  9693[H]  11-11-22 @ 22:00   -  1778[H]  05-24-22 @ 20:52   -  794[H]      PT/INR - ( 04 Jan 2025 20:10 )   PT: 21.4 sec;   INR: 1.82 ratio         PTT - ( 04 Jan 2025 20:10 )  PTT:38.3 sec  Urinalysis Basic - ( 06 Jan 2025 11:14 )    Color: x / Appearance: x / SG: x / pH: x  Gluc: 124 mg/dL / Ketone: x  / Bili: x / Urobili: x   Blood: x / Protein: x / Nitrite: x   Leuk Esterase: x / RBC: x / WBC x   Sq Epi: x / Non Sq Epi: x / Bacteria: x      Procalcitonin: 0.34 ng/mL (01-05-25 @ 11:05)          CULTURES: (if applicable)    Rapid RVP Result: NotDetec (01-05-25 @ 00:51)       RADIOLOGY  CXR:    < from: Xray Chest 1 View- PORTABLE-Urgent (Xray Chest 1 View- PORTABLE-Urgent .) (01.06.25 @ 09:15) >  IMPRESSION:    Diffuse bilateral infiltrates, improved from prior. Trace bilateral   pleural effusions.    Cardiomegaly.    --- End of Report ---      < end of copied text >    CT:    ECHO:  < from: TTE Echo Complete w/o Contrast w/ Doppler (01.05.25 @ 11:00) >  CONCLUSIONS:     1. Left ventricular cavity is normal in size. Left ventricular wall thickness is moderately increased. Left ventricular systolic function is normal with an ejection fraction visually estimated at 55 to 60 %.   2. There is moderate (grade 2) left ventricular diastolic dysfunction.   3. Normal right ventricular cavity size and normal right ventricular systolic function. Tricuspid annular plane systolic excursion (TAPSE) is 3.4 cm (normal >=1.7 cm). Tricuspid annular tissue Doppler S' is 1.3 cm/s (normal >10 cm/s).   4. Left atrium is mildly dilated.   5. The right atrium is normal in size.   6. Mild mitral regurgitation.   7. Pulmonic valve was not well visualized.   8. Structurally normal mitral valve with normal leaflet excursion.   9. No pericardial effusion seen.  10. Structurally normal tricuspid valve with normal leaflet excursion. Mild tricuspid regurgitation.  11. Aortic root at the sinuses of Valsalva is normal in size, measuring 2.70 cm (indexed 1.49 cm/m²). Ascending aorta is normal in size, measuring 3.50 cm (indexed 1.93 cm/m²).  12. Estimated pulmonary artery systolic pressure is 21 mmHg.  13. Mild to moderate aortic regurgitation.  14. Moderate to severe left ventricular hypertrophy.  15. The inferior vena cava is normal in size measuring 1.60 cm in diameter, (normal <2.1cm) with normal inspiratory collapse (normal >50%) consistent with normal right atrial pressure (3, range 0-5mmHg).  16. The interatrial septum appears intact.  17. Trileaflet aortic valve. Fibrocalcific aortic valve sclerosis without stenosis.    < end of copied text >      < from: NM Pulmonary Perfusion Scan (01.06.25 @ 10:40) >  IMPRESSION:    Perfusion only images show very low probability of pulmonary embolus.    --- End of Report ---        < end of copied text >  VITALS:  T(C): 36.8 (01-06-25 @ 13:23), Max: 37 (01-05-25 @ 18:55)  T(F): 98.2 (01-06-25 @ 13:23), Max: 98.6 (01-05-25 @ 18:55)  HR: 55 (01-06-25 @ 13:23) (55 - 67)  BP: 141/74 (01-06-25 @ 13:23) (92/46 - 141/74)  BP(mean): --  ABP: --  ABP(mean): --  RR: 17 (01-06-25 @ 13:23) (16 - 17)  SpO2: 93% (01-06-25 @ 13:23) (93% - 95%)  CVP(mm Hg): --  CVP(cm H2O): --    Ins and Outs       Height (cm): 167.6 (01-05-25 @ 05:47)  Weight (kg): 74.8 (01-04-25 @ 19:26)  BMI (kg/m2): 26.6 (01-05-25 @ 05:47)               
No distress    Vital Signs Last 24 Hrs  T(C): 36.7 (01-09-25 @ 05:00), Max: 36.7 (01-09-25 @ 05:00)  T(F): 98 (01-09-25 @ 05:00), Max: 98 (01-09-25 @ 05:00)  HR: 66 (01-09-25 @ 05:00) (61 - 66)  BP: 149/73 (01-09-25 @ 05:00) (149/73 - 154/66)  RR: 17 (01-09-25 @ 05:00) (17 - 18)  SpO2: 97% (01-09-25 @ 05:00) (91% - 97%)    I&O's Detail    08 Jan 2025 07:01  -  09 Jan 2025 07:00  --------------------------------------------------------  IN:    Oral Fluid: 640 mL  Total IN: 640 mL    OUT:    Voided (mL): 900 mL  Total OUT: 900 mL    s1s2  b/l air entry  soft, ND  no edema                                      10.2   11.18 )-----------( 291      ( 09 Jan 2025 01:37 )             30.7     09 Jan 2025 05:22    136    |  101    |  42     ----------------------------<  102    4.3     |  25     |  2.03     Ca    9.0        09 Jan 2025 05:22    TPro  6.5    /  Alb  2.4    /  TBili  0.6    /  DBili  x      /  AST  20     /  ALT  16     /  AlkPhos  129    09 Jan 2025 05:22    LIVER FUNCTIONS - ( 09 Jan 2025 05:22 )  Alb: 2.4 g/dL / Pro: 6.5 g/dL / ALK PHOS: 129 U/L / ALT: 16 U/L / AST: 20 U/L / GGT: x           acetaminophen     Tablet .. 650 milliGRAM(s) Oral every 6 hours PRN  apixaban 5 milliGRAM(s) Oral every 12 hours  atorvastatin 40 milliGRAM(s) Oral at bedtime  clopidogrel Tablet 75 milliGRAM(s) Oral daily  dextrose 5%. 1000 milliLiter(s) IV Continuous <Continuous>  dextrose 5%. 1000 milliLiter(s) IV Continuous <Continuous>  dextrose 50% Injectable 25 Gram(s) IV Push once  dextrose 50% Injectable 12.5 Gram(s) IV Push once  dextrose 50% Injectable 25 Gram(s) IV Push once  dextrose Oral Gel 15 Gram(s) Oral once PRN  doxycycline IVPB 100 milliGRAM(s) IV Intermittent every 12 hours  doxycycline IVPB      glucagon  Injectable 1 milliGRAM(s) IntraMuscular once  hydrALAZINE 100 milliGRAM(s) Oral every 8 hours  insulin lispro (ADMELOG) corrective regimen sliding scale   SubCutaneous three times a day before meals  insulin lispro (ADMELOG) corrective regimen sliding scale   SubCutaneous at bedtime  labetalol 300 milliGRAM(s) Oral two times a day  melatonin 3 milliGRAM(s) Oral at bedtime PRN    A/P:    CM, Afib, adm w/weber  Concern for CHF, PNA  Suspect hemodynamic BRUNILDA/CKD 3 iso diuresis   Improved w/Cr near baseline   No objection to Lasix 20mg daily   UA negative  Renal SONO w/o hydro   Avoid nephrotoxins, no NSAID's  Renal f/u as op    [phone]  
Patient is a 79y old  Male who presents with a chief complaint of r/o ACS (07 Jan 2025 20:36)    Patient seen and examined at bedside.  no acute overnight events    ALLERGIES:  No Known Allergies        Vital Signs Last 24 Hrs  T(F): 97.3 (08 Jan 2025 05:00), Max: 98.2 (07 Jan 2025 12:58)  HR: 64 (08 Jan 2025 05:00) (60 - 67)  BP: 121/60 (08 Jan 2025 05:00) (121/60 - 160/77)  RR: 18 (08 Jan 2025 05:00) (17 - 18)  SpO2: 94% (08 Jan 2025 05:00) (92% - 94%)  I&O's Summary    07 Jan 2025 07:01  -  08 Jan 2025 07:00  --------------------------------------------------------  IN: 600 mL / OUT: 2700 mL / NET: -2100 mL      MEDICATIONS:  acetaminophen     Tablet .. 650 milliGRAM(s) Oral every 6 hours PRN  apixaban 5 milliGRAM(s) Oral every 12 hours  atorvastatin 40 milliGRAM(s) Oral at bedtime  cefTRIAXone   IVPB 1000 milliGRAM(s) IV Intermittent every 24 hours  clopidogrel Tablet 75 milliGRAM(s) Oral daily  dextrose 5%. 1000 milliLiter(s) IV Continuous <Continuous>  dextrose 5%. 1000 milliLiter(s) IV Continuous <Continuous>  dextrose 50% Injectable 25 Gram(s) IV Push once  dextrose 50% Injectable 12.5 Gram(s) IV Push once  dextrose 50% Injectable 25 Gram(s) IV Push once  dextrose Oral Gel 15 Gram(s) Oral once PRN  doxycycline IVPB 100 milliGRAM(s) IV Intermittent every 12 hours  doxycycline IVPB      glucagon  Injectable 1 milliGRAM(s) IntraMuscular once  hydrALAZINE 100 milliGRAM(s) Oral every 8 hours  insulin lispro (ADMELOG) corrective regimen sliding scale   SubCutaneous three times a day before meals  insulin lispro (ADMELOG) corrective regimen sliding scale   SubCutaneous at bedtime  labetalol 300 milliGRAM(s) Oral two times a day  melatonin 3 milliGRAM(s) Oral at bedtime PRN      PHYSICAL EXAM:  General: NAD, Alert  ENT: MMM, no thrush  Neck: Supple, No JVD  Lungs: Clear to auscultation bilaterally, non labored, good air entry  Cardio: RRR, S1/S2, No murmurs  Abdomen: Soft, Nontender, Nondistended; Bowel sounds present  Extremities: No cyanosis, No edema    LABS:                        10.6   11.00 )-----------( 265      ( 08 Jan 2025 06:40 )             32.2     01-07    136  |  101  |  54  ----------------------------<  101  3.9   |  28  |  2.29    Ca    8.6      07 Jan 2025 06:42  Mg     2.2     01-07    TPro  5.8  /  Alb  x   /  TBili  x   /  DBili  x   /  AST  x   /  ALT  x   /  AlkPhos  x   01-07                            POCT Blood Glucose.: 128 mg/dL (07 Jan 2025 21:28)  POCT Blood Glucose.: 122 mg/dL (07 Jan 2025 16:47)  POCT Blood Glucose.: 186 mg/dL (07 Jan 2025 12:25)  POCT Blood Glucose.: 108 mg/dL (07 Jan 2025 07:55)      Urinalysis Basic - ( 07 Jan 2025 06:42 )    Color: x / Appearance: x / SG: x / pH: x  Gluc: 101 mg/dL / Ketone: x  / Bili: x / Urobili: x   Blood: x / Protein: x / Nitrite: x   Leuk Esterase: x / RBC: x / WBC x   Sq Epi: x / Non Sq Epi: x / Bacteria: x            RADIOLOGY & ADDITIONAL TESTS:    Care Discussed with Consultants/Other Providers:   
Patient is a 79y old Male who presents with a chief complaint of r/o ACS (07 Jan 2025 09:34)      Patient seen and examined at bedside. No overnight events reported.  ID [id number] used to translate conversation. Patient states he is feeling better and more calm today. He had a headache yesterday, but that has improved.     ALLERGIES:  No Known Allergies    MEDICATIONS  (STANDING):  apixaban 5 milliGRAM(s) Oral every 12 hours  atorvastatin 40 milliGRAM(s) Oral at bedtime  cefTRIAXone   IVPB 1000 milliGRAM(s) IV Intermittent every 24 hours  clopidogrel Tablet 75 milliGRAM(s) Oral daily  dextrose 5%. 1000 milliLiter(s) (100 mL/Hr) IV Continuous <Continuous>  dextrose 5%. 1000 milliLiter(s) (50 mL/Hr) IV Continuous <Continuous>  dextrose 50% Injectable 25 Gram(s) IV Push once  dextrose 50% Injectable 12.5 Gram(s) IV Push once  dextrose 50% Injectable 25 Gram(s) IV Push once  doxycycline IVPB 100 milliGRAM(s) IV Intermittent every 12 hours  doxycycline IVPB      glucagon  Injectable 1 milliGRAM(s) IntraMuscular once  hydrALAZINE 100 milliGRAM(s) Oral every 8 hours  insulin lispro (ADMELOG) corrective regimen sliding scale   SubCutaneous three times a day before meals  insulin lispro (ADMELOG) corrective regimen sliding scale   SubCutaneous at bedtime  labetalol 300 milliGRAM(s) Oral two times a day    MEDICATIONS  (PRN):  acetaminophen     Tablet .. 650 milliGRAM(s) Oral every 6 hours PRN Temp greater or equal to 38C (100.4F), Mild Pain (1 - 3)  dextrose Oral Gel 15 Gram(s) Oral once PRN Blood Glucose LESS THAN 70 milliGRAM(s)/deciliter  melatonin 3 milliGRAM(s) Oral at bedtime PRN Insomnia    Vital Signs Last 24 Hrs  T(F): 97.6 (07 Jan 2025 05:19), Max: 98.2 (06 Jan 2025 13:23)  HR: 57 (07 Jan 2025 05:19) (55 - 122)  BP: 125/67 (07 Jan 2025 05:19) (125/67 - 157/65)  RR: 16 (07 Jan 2025 05:19) (16 - 18)  SpO2: 95% (07 Jan 2025 05:19) (93% - 95%)  I&O's Summary    06 Jan 2025 07:01  -  07 Jan 2025 07:00  --------------------------------------------------------  IN: 0 mL / OUT: 900 mL / NET: -900 mL      PHYSICAL EXAM:  General: NAD, Awake, alert  ENT: No gross hearing impairment, Moist mucous membranes, no thrush  Neck: Supple, No JVD  Lungs: Clear to auscultation bilaterally, good air entry, non-labored breathing  Cardio: RRR, S1/S2, No murmur  Abdomen: Soft, Nontender, Nondistended; Bowel sounds present  Extremities: No calf tenderness, No cyanosis, No pitting edema  Psych: Appropriate mood and affect    LABS:                        10.8   11.75 )-----------( 287      ( 07 Jan 2025 06:42 )             32.4     01-07    136  |  101  |  54  ----------------------------<  101  3.9   |  28  |  2.29    Ca    8.6      07 Jan 2025 06:42  Mg     2.2     01-07    TPro  7.2  /  Alb  2.7  /  TBili  1.2  /  DBili  x   /  AST  18  /  ALT  15  /  AlkPhos  179  01-05          PT/INR - ( 04 Jan 2025 20:10 )   PT: 21.4 sec;   INR: 1.82 ratio         PTT - ( 04 Jan 2025 20:10 )  PTT:38.3 sec    Procalcitonin: 0.34 ng/mL (01-05-25 @ 11:05)    CARDIAC MARKERS ( 05 Jan 2025 05:58 )  x     / 112.7 ng/L / x     / x     / x      CARDIAC MARKERS ( 04 Jan 2025 21:45 )  x     / 125.2 ng/L / x     / x     / x      CARDIAC MARKERS ( 04 Jan 2025 20:10 )  x     / 134.6 ng/L / x     / x     / x                        POCT Blood Glucose.: 108 mg/dL (07 Jan 2025 07:55)  POCT Blood Glucose.: 175 mg/dL (06 Jan 2025 21:41)  POCT Blood Glucose.: 118 mg/dL (06 Jan 2025 16:34)  POCT Blood Glucose.: 118 mg/dL (06 Jan 2025 13:47)  POCT Blood Glucose.: 152 mg/dL (06 Jan 2025 11:39)      Urinalysis Basic - ( 07 Jan 2025 06:42 )    Color: x / Appearance: x / SG: x / pH: x  Gluc: 101 mg/dL / Ketone: x  / Bili: x / Urobili: x   Blood: x / Protein: x / Nitrite: x   Leuk Esterase: x / RBC: x / WBC x   Sq Epi: x / Non Sq Epi: x / Bacteria: x          RADIOLOGY & ADDITIONAL TESTS:    Care Discussed with Consultants/Other Providers:   
SEMAJ ROBERT  [id number]    Language Line  ID#: [id number]    Chief Complaint: Chest pain/Dyspnea/Elevated troponin/Pleural effusions/Pneumonia    Interval History: The patient reports that his breathing is improving. Denies recurrent chest pain.     Tele: sinus 50s BPM, brief AF yesterday      Current meds:   acetaminophen     Tablet .. 650 milliGRAM(s) Oral every 6 hours PRN  apixaban 5 milliGRAM(s) Oral every 12 hours  atorvastatin 40 milliGRAM(s) Oral at bedtime  cefTRIAXone   IVPB 1000 milliGRAM(s) IV Intermittent every 24 hours  clopidogrel Tablet 75 milliGRAM(s) Oral daily  dextrose 5%. 1000 milliLiter(s) IV Continuous <Continuous>  dextrose 5%. 1000 milliLiter(s) IV Continuous <Continuous>  dextrose 50% Injectable 25 Gram(s) IV Push once  dextrose 50% Injectable 12.5 Gram(s) IV Push once  dextrose 50% Injectable 25 Gram(s) IV Push once  dextrose Oral Gel 15 Gram(s) Oral once PRN  doxycycline IVPB 100 milliGRAM(s) IV Intermittent every 12 hours  doxycycline IVPB      glucagon  Injectable 1 milliGRAM(s) IntraMuscular once  hydrALAZINE 100 milliGRAM(s) Oral every 8 hours  insulin lispro (ADMELOG) corrective regimen sliding scale   SubCutaneous three times a day before meals  insulin lispro (ADMELOG) corrective regimen sliding scale   SubCutaneous at bedtime  labetalol 300 milliGRAM(s) Oral two times a day  melatonin 3 milliGRAM(s) Oral at bedtime PRN      Objective:     Vital Signs:   T(C): 36.4 (01-07-25 @ 05:19), Max: 36.8 (01-06-25 @ 13:23)  HR: 57 (01-07-25 @ 05:19) (55 - 122)  BP: 125/67 (01-07-25 @ 05:19) (125/67 - 157/65)  RR: 16 (01-07-25 @ 05:19) (16 - 18)  SpO2: 95% (01-07-25 @ 05:19) (93% - 95%)  Wt(kg): --        Physical Exam:   General: no acute distress  Neck: supple   CVS: JVP ~ 7 cm H20, RRR, s1, s2  Pulm: decreased breath sounds  Ext: no lower extremity edema b/l   Neuro: awake, alert and oriented  Psych: Normal affect      Labs:   07 Jan 2025 06:42    136    |  101    |  54     ----------------------------<  101    3.9     |  28     |  2.29     Ca    8.6        07 Jan 2025 06:42  Mg     2.2       07 Jan 2025 06:42                            10.8   11.75 )-----------( 287      ( 07 Jan 2025 06:42 )             32.4         Coronary angiogram (11/2022):  LM: minor irregularities  LAD: 70% stenosis  D1: 80% stenosis   LCx: complete occlusion  RCA: 80% stenosis s/p NOMAN      ECG (1/4/25): normal sinus rhythm, LVH  Repeat ECG with atrial fibrillation with RVR with LBBB    TTE (1/5/25):  LVEF 55-60%, moderate LVH  Normal RV size and function   Mild MR, Mild TR, Mild-moderate AR        
Follow-up Pulmonary Progress Note  Chief Complaint : Acute ischemic heart disease    pt states less sob  now on room air  feels better  no cp, sob, palp, n/v      Allergies :No Known Allergies      PAST MEDICAL & SURGICAL HISTORY:  H/O: HTN (hypertension)    CAD (coronary artery disease)    Diabetic vitreous hemorrhage    Hypokalemia    Type 2 diabetes mellitus    Left ventricular hypertrophy    History of alcohol withdrawal delirium    S/P cardiac catheterization  11/17 3 stents to RCA        Medications:  MEDICATIONS  (STANDING):  apixaban 5 milliGRAM(s) Oral every 12 hours  atorvastatin 40 milliGRAM(s) Oral at bedtime  cefTRIAXone   IVPB 1000 milliGRAM(s) IV Intermittent every 24 hours  clopidogrel Tablet 75 milliGRAM(s) Oral daily  dextrose 5%. 1000 milliLiter(s) (100 mL/Hr) IV Continuous <Continuous>  dextrose 5%. 1000 milliLiter(s) (50 mL/Hr) IV Continuous <Continuous>  dextrose 50% Injectable 25 Gram(s) IV Push once  dextrose 50% Injectable 12.5 Gram(s) IV Push once  dextrose 50% Injectable 25 Gram(s) IV Push once  doxycycline IVPB      doxycycline IVPB 100 milliGRAM(s) IV Intermittent every 12 hours  glucagon  Injectable 1 milliGRAM(s) IntraMuscular once  hydrALAZINE 100 milliGRAM(s) Oral every 8 hours  insulin lispro (ADMELOG) corrective regimen sliding scale   SubCutaneous three times a day before meals  insulin lispro (ADMELOG) corrective regimen sliding scale   SubCutaneous at bedtime  labetalol 300 milliGRAM(s) Oral two times a day    MEDICATIONS  (PRN):  acetaminophen     Tablet .. 650 milliGRAM(s) Oral every 6 hours PRN Temp greater or equal to 38C (100.4F), Mild Pain (1 - 3)  dextrose Oral Gel 15 Gram(s) Oral once PRN Blood Glucose LESS THAN 70 milliGRAM(s)/deciliter  melatonin 3 milliGRAM(s) Oral at bedtime PRN Insomnia      Antibiotics History  cefTRIAXone   IVPB 1000 milliGRAM(s) IV Intermittent every 24 hours, 01-05-25 @ 11:17, Stop order after: 5 Days  doxycycline IVPB    , 01-05-25 @ 13:02  doxycycline IVPB 100 milliGRAM(s) IV Intermittent once, 01-05-25 @ 11:21  doxycycline IVPB 100 milliGRAM(s) IV Intermittent every 12 hours, 01-05-25 @ 18:00      Heme Medications   apixaban 5 milliGRAM(s) Oral every 12 hours, 01-06-25 @ 14:35  clopidogrel Tablet 75 milliGRAM(s) Oral daily, 01-05-25 @ 01:26         LABS:                        10.8   11.75 )-----------( 287      ( 07 Jan 2025 06:42 )             32.4     01-07    136  |  101  |  54[H]  ----------------------------<  101[H]  3.9   |  28  |  2.29[H]    Ca    8.6      07 Jan 2025 06:42  Mg     2.2     01-07      HIT ab -- 01-05 @ 00:00  HIT ab EIA --  D Dimer -491           VITALS:  T(C): 36.8 (01-07-25 @ 12:58), Max: 36.8 (01-07-25 @ 12:58)  T(F): 98.2 (01-07-25 @ 12:58), Max: 98.2 (01-07-25 @ 12:58)  HR: 62 (01-07-25 @ 12:58) (57 - 122)  BP: 132/64 (01-07-25 @ 12:58) (125/67 - 157/65)  BP(mean): --  ABP: --  ABP(mean): --  RR: 18 (01-07-25 @ 12:58) (16 - 18)  SpO2: 92% (01-07-25 @ 12:58) (92% - 95%)  CVP(mm Hg): --  CVP(cm H2O): --    Ins and Outs     01-06-25 @ 07:01  -  01-07-25 @ 07:00  --------------------------------------------------------  IN: 0 mL / OUT: 900 mL / NET: -900 mL    01-07-25 @ 07:01  -  01-07-25 @ 14:29  --------------------------------------------------------  IN: 600 mL / OUT: 1000 mL / NET: -400 mL        Height (cm): 167.6 (01-05-25 @ 05:47)  Weight (kg): 74.8 (01-04-25 @ 19:26)  BMI (kg/m2): 26.6 (01-05-25 @ 05:47)        I&O's Detail    06 Jan 2025 07:01  -  07 Jan 2025 07:00  --------------------------------------------------------  IN:  Total IN: 0 mL    OUT:    Voided (mL): 900 mL  Total OUT: 900 mL    Total NET: -900 mL      07 Jan 2025 07:01  -  07 Jan 2025 14:29  --------------------------------------------------------  IN:    Oral Fluid: 600 mL  Total IN: 600 mL    OUT:    Voided (mL): 1000 mL  Total OUT: 1000 mL    Total NET: -400 mL               
Patient is a 79y old  Male who presents with a chief complaint of r/o ACS (08 Jan 2025 23:08)    Patient seen and examined at bedside.  no acute overnight events    ALLERGIES:  No Known Allergies        Vital Signs Last 24 Hrs  T(F): 98 (09 Jan 2025 05:00), Max: 98 (09 Jan 2025 05:00)  HR: 66 (09 Jan 2025 05:00) (60 - 66)  BP: 149/73 (09 Jan 2025 05:00) (136/68 - 154/66)  RR: 17 (09 Jan 2025 05:00) (17 - 18)  SpO2: 97% (09 Jan 2025 05:00) (91% - 97%)  I&O's Summary    08 Jan 2025 07:01  -  09 Jan 2025 07:00  --------------------------------------------------------  IN: 640 mL / OUT: 900 mL / NET: -260 mL      MEDICATIONS:  acetaminophen     Tablet .. 650 milliGRAM(s) Oral every 6 hours PRN  apixaban 5 milliGRAM(s) Oral every 12 hours  atorvastatin 40 milliGRAM(s) Oral at bedtime  cefTRIAXone   IVPB 1000 milliGRAM(s) IV Intermittent every 24 hours  clopidogrel Tablet 75 milliGRAM(s) Oral daily  dextrose 5%. 1000 milliLiter(s) IV Continuous <Continuous>  dextrose 5%. 1000 milliLiter(s) IV Continuous <Continuous>  dextrose 50% Injectable 25 Gram(s) IV Push once  dextrose 50% Injectable 12.5 Gram(s) IV Push once  dextrose 50% Injectable 25 Gram(s) IV Push once  dextrose Oral Gel 15 Gram(s) Oral once PRN  doxycycline IVPB 100 milliGRAM(s) IV Intermittent every 12 hours  doxycycline IVPB      furosemide    Tablet 40 milliGRAM(s) Oral daily  glucagon  Injectable 1 milliGRAM(s) IntraMuscular once  hydrALAZINE 100 milliGRAM(s) Oral every 8 hours  insulin lispro (ADMELOG) corrective regimen sliding scale   SubCutaneous three times a day before meals  insulin lispro (ADMELOG) corrective regimen sliding scale   SubCutaneous at bedtime  labetalol 300 milliGRAM(s) Oral two times a day  melatonin 3 milliGRAM(s) Oral at bedtime PRN      PHYSICAL EXAM:  General: NAD, Alert  ENT: MMM, no thrush  Neck: Supple, No JVD  Lungs: Clear to auscultation bilaterally, non labored, good air entry  Cardio: RRR, S1/S2, No murmurs  Abdomen: Soft, Nontender, Nondistended; Bowel sounds present  Extremities: No cyanosis, No edema    LABS:                        10.2   11.18 )-----------( 291      ( 09 Jan 2025 01:37 )             30.7     01-08    135  |  100  |  42  ----------------------------<  122  3.7   |  24  |  1.83    Ca    8.5      08 Jan 2025 06:40  Mg     2.2     01-07    TPro  5.8  /  Alb  x   /  TBili  x   /  DBili  x   /  AST  x   /  ALT  x   /  AlkPhos  x   01-07                            POCT Blood Glucose.: 126 mg/dL (08 Jan 2025 21:50)  POCT Blood Glucose.: 120 mg/dL (08 Jan 2025 16:32)  POCT Blood Glucose.: 127 mg/dL (08 Jan 2025 11:20)  POCT Blood Glucose.: 108 mg/dL (08 Jan 2025 07:54)      Urinalysis Basic - ( 08 Jan 2025 06:40 )    Color: x / Appearance: x / SG: x / pH: x  Gluc: 122 mg/dL / Ketone: x  / Bili: x / Urobili: x   Blood: x / Protein: x / Nitrite: x   Leuk Esterase: x / RBC: x / WBC x   Sq Epi: x / Non Sq Epi: x / Bacteria: x            RADIOLOGY & ADDITIONAL TESTS:    Care Discussed with Consultants/Other Providers:   
SEMAJ ROBERT  [id number]    Language Line  ID#: [id number]    Chief Complaint: Chest pain/Dyspnea/Elevated troponin/Pleural effusions/Pneumonia    Interval History: The patient reports that his breathing is improving. Denies recurrent chest pain.     Tele: sinus 50s BPM, NSVT      Current meds:   acetaminophen     Tablet .. 650 milliGRAM(s) Oral every 6 hours PRN  amLODIPine   Tablet 5 milliGRAM(s) Oral daily  atorvastatin 40 milliGRAM(s) Oral at bedtime  cefTRIAXone   IVPB 1000 milliGRAM(s) IV Intermittent every 24 hours  clopidogrel Tablet 75 milliGRAM(s) Oral daily  dextrose 5%. 1000 milliLiter(s) IV Continuous <Continuous>  dextrose 5%. 1000 milliLiter(s) IV Continuous <Continuous>  dextrose 50% Injectable 25 Gram(s) IV Push once  dextrose 50% Injectable 12.5 Gram(s) IV Push once  dextrose 50% Injectable 25 Gram(s) IV Push once  dextrose Oral Gel 15 Gram(s) Oral once PRN  doxycycline IVPB 100 milliGRAM(s) IV Intermittent every 12 hours  doxycycline IVPB      furosemide   Injectable 40 milliGRAM(s) IV Push daily  glucagon  Injectable 1 milliGRAM(s) IntraMuscular once  hydrALAZINE 100 milliGRAM(s) Oral every 8 hours  insulin lispro (ADMELOG) corrective regimen sliding scale   SubCutaneous three times a day before meals  insulin lispro (ADMELOG) corrective regimen sliding scale   SubCutaneous at bedtime  labetalol 300 milliGRAM(s) Oral two times a day  melatonin 3 milliGRAM(s) Oral at bedtime PRN    Objective:       Vital Signs:   T(C): 36.7 (01-06-25 @ 05:22), Max: 37 (01-05-25 @ 18:55)  HR: 60 (01-06-25 @ 05:22) (60 - 67)  BP: 96/52 (01-06-25 @ 05:30) (92/46 - 120/63)  RR: 17 (01-06-25 @ 05:22) (16 - 17)  SpO2: 93% (01-06-25 @ 05:22) (93% - 95%)  Wt(kg): --    Physical Exam:   General: no acute distress  Neck: supple   CVS: JVP ~ 7 cm H20, RRR, s1, s2  Pulm: decreased breath sounds  Ext: no lower extremity edema b/l   Neuro: awake, alert and oriented  Psych: Normal affect      Labs:   05 Jan 2025 05:58    138    |  98     |  31     ----------------------------<  107    3.9     |  27     |  2.04     Ca    8.7        05 Jan 2025 05:58  Mg     2.0       05 Jan 2025 05:58    TPro  7.2    /  Alb  2.7    /  TBili  1.2    /  DBili  x      /  AST  18     /  ALT  15     /  AlkPhos  179    05 Jan 2025 05:58                          11.4   11.96 )-----------( 215      ( 05 Jan 2025 05:58 )             34.1     PT/INR - ( 04 Jan 2025 20:10 )   PT: 21.4 sec;   INR: 1.82 ratio         PTT - ( 04 Jan 2025 20:10 )  PTT:38.3 sec      Coronary angiogram (11/2022):  LM: minor irregularities  LAD: 70% stenosis  D1: 80% stenosis   LCx: complete occlusion  RCA: 80% stenosis s/p NOMAN      ECG (1/4/25): normal sinus rhythm, LVH  Repeat ECG with atrial fibrillation with RVR with LBBB    TTE (1/5/25):  LVEF 55-60%, moderate LVH  Normal RV size and function   Mild MR, Mild TR, Mild-moderate AR      
SEMAJ ROBERT  [id number]    Language Line  ID#: [id number]    Chief Complaint: Chest pain/Dyspnea/Elevated troponin/Pleural effusions/Pneumonia    Interval History: The patient reports that his breathing is improving. Denies recurrent chest pain. not on tele monitoring      Current meds:   acetaminophen     Tablet .. 650 milliGRAM(s) Oral every 6 hours PRN  apixaban 5 milliGRAM(s) Oral every 12 hours  atorvastatin 40 milliGRAM(s) Oral at bedtime  cefTRIAXone   IVPB 1000 milliGRAM(s) IV Intermittent every 24 hours  clopidogrel Tablet 75 milliGRAM(s) Oral daily  dextrose 5%. 1000 milliLiter(s) IV Continuous <Continuous>  dextrose 5%. 1000 milliLiter(s) IV Continuous <Continuous>  dextrose 50% Injectable 25 Gram(s) IV Push once  dextrose 50% Injectable 12.5 Gram(s) IV Push once  dextrose 50% Injectable 25 Gram(s) IV Push once  dextrose Oral Gel 15 Gram(s) Oral once PRN  doxycycline IVPB 100 milliGRAM(s) IV Intermittent every 12 hours  doxycycline IVPB      glucagon  Injectable 1 milliGRAM(s) IntraMuscular once  hydrALAZINE 100 milliGRAM(s) Oral every 8 hours  insulin lispro (ADMELOG) corrective regimen sliding scale   SubCutaneous three times a day before meals  insulin lispro (ADMELOG) corrective regimen sliding scale   SubCutaneous at bedtime  labetalol 300 milliGRAM(s) Oral two times a day  melatonin 3 milliGRAM(s) Oral at bedtime PRN      Objective:     Vital Signs:   T(C): 36.4 (01-07-25 @ 05:19), Max: 36.8 (01-06-25 @ 13:23)  HR: 57 (01-07-25 @ 05:19) (55 - 122)  BP: 125/67 (01-07-25 @ 05:19) (125/67 - 157/65)  RR: 16 (01-07-25 @ 05:19) (16 - 18)  SpO2: 95% (01-07-25 @ 05:19) (93% - 95%)  Wt(kg): --        Physical Exam:   General: no acute distress  Neck: supple   CVS: JVP ~ 7 cm H20, RRR, s1, s2  Pulm: decreased breath sounds  Ext: no lower extremity edema b/l   Neuro: awake, alert and oriented  Psych: Normal affect      Labs:   07 Jan 2025 06:42    136    |  101    |  54     ----------------------------<  101    3.9     |  28     |  2.29     Ca    8.6        07 Jan 2025 06:42  Mg     2.2       07 Jan 2025 06:42                            10.8   11.75 )-----------( 287      ( 07 Jan 2025 06:42 )             32.4         Coronary angiogram (11/2022):  LM: minor irregularities  LAD: 70% stenosis  D1: 80% stenosis   LCx: complete occlusion  RCA: 80% stenosis s/p NOMAN      ECG (1/4/25): normal sinus rhythm, LVH  Repeat ECG with atrial fibrillation with RVR with LBBB    TTE (1/5/25):  LVEF 55-60%, moderate LVH  Normal RV size and function   Mild MR, Mild TR, Mild-moderate AR

## 2025-01-09 NOTE — DIETITIAN INITIAL EVALUATION ADULT - PERTINENT LABORATORY DATA
01-09    136  |  101  |  42[H]  ----------------------------<  102[H]  4.3   |  25  |  2.03[H]    Ca    9.0      09 Jan 2025 05:22    TPro  6.5  /  Alb  2.4[L]  /  TBili  0.6  /  DBili  x   /  AST  20  /  ALT  16  /  AlkPhos  129[H]  01-09  POCT Blood Glucose.: 122 mg/dL (01-09-25 @ 07:53)  A1C with Estimated Average Glucose Result: 6.2 % (01-07-25 @ 06:42)

## 2025-01-09 NOTE — DIETITIAN INITIAL EVALUATION ADULT - NS FNS DIET ORDER
Diet, DASH/TLC:   Sodium & Cholesterol Restricted  Consistent Carbohydrate {Evening Snack} (01-05-25 @ 00:57) [Active]

## 2025-01-09 NOTE — DIETITIAN INITIAL EVALUATION ADULT - ORAL INTAKE PTA/DIET HISTORY
Monegasque interpretation provided by [employer] (ID# [id number]). Patient endorses a fait appetite prior to admission. Reports consuming 2-3 meals per day. Patient does not follow any therapeutic meal patterns.

## 2025-01-09 NOTE — PROGRESS NOTE ADULT - NS ATTEND AMEND GEN_ALL_CORE FT
David Faye is a 79 year old man with past medical history of Coronary artery disease (s/p NOMAN to proximal and mid RCA in 2022) with brief Paroxysmal atrial fibrillation (on Eliquis), Hypertension and Chronic kidney disease who was sent in from cardiology office due to elevated troponins, found to have signs of congestive heart failure and pneumonia.    ECG on admission consistent with sinus rhythm with LVH, repeat ECG with atrial fibrillation with RVR and rate related LBBB. Prior coronary angiogram in 11/2022 consistent with triple vessel CAD s/p 2 NOMAN to pRCA and mRCA. Troponins elevated and have peaked, likely demand ischemia from CHF and renal dysfunction. Echo report with normal LVEF 55-60%, moderate LVH and mild-moderate valvular disease. CT chest consistent with extensive bilateral lung opacities with small bilateral pleural effusions. Pro BNP markedly elevated and pro calcitonin elevated. D-dimer elevated, leg US negative for DVT and VQ scan with low probability of pulmonary embolism.     Recommendations:  [] Pleural effusions, concerning for HFpEF: Cautious diuresis given advanced CKD, patient follows in CV office and family has been informed on previous visits to follow up with nephrologist which he has not done. Follow up Nephrology regarding diuresis. Will benefit from amyloid scan. Follow up Pulmonary, appears pleural effusion too small to drain, continue antibiotics for pneumonia. Wean oxygen as tolerated.  [] CAD: Known multivessel CAD, has residual LAD, D1 and chronic occlusion of LCx that was medically managed by interventional cardiology. He is s/p NOMAN of RCA. Continue Plavix 75 mg daily, Atorvastatin 40 mg daily. Plan for outpatient nuclear stress test when recovers from pneumonia.   [] Hypertension: BP stable. Continue Labetalol 300 mg BID, Hydralazine 100 mg q8h.   [] Paroxysmal atrial fibrillation, rate related LBBB: Now back in sinus rhythm. Continue Eliquis for stroke prophylaxis   [] Left external carotid artery stenosis: Recommend Neuro/Vascular evaluation     Pt seen and examined. Doing well with no active cardiac complaints. Appears medically optimized. Plan for outpt stress test with primary cardiologist.
Seen and examined. Chart reviewed.   patient is improving clinically.   Agree with above a/p    c/w current rx
Patient reported SOB while eating pan cake. SLP cx noted. changed diet from regular to soft and bite sized. paitent ambulated with PT today sat 93% on RA.PT suggested home PT. repeat CT shows extensive changes. but stable pulm rec to repeat CT OP after Abx and diuresis. discussed with renal Dr. Adames. suggested patent can be dced with PO lasix 40 mg daily with close renal, pulm and acrd f/u. cardio noted. ISchaemia w/u OP after finishing abx.    Dispo: likely in am. will be finishing IV  CTX for 5 days tomorrow. will DC with PO Abx. duration per pulm.
Seen and examined. Chart reviewed.   patient is improved clinically.   Agree with above a/p  Edited where ever is necessary    overnight small blood at the penis and urine. suspected to be due to primafit. none this am. no blood in urine. patient reported no complaints. ROS neg. exam stable. stable for DC to home with home PT/care, advised patient to follow up with PCP/cardiology/pulm in 1-2 weeks/scheduled.     Current comorbidities, plan of care, return precautions, fall preventions discussed with the patient. verbalized understanding and agreed with the plan. All questions were answered at his/her satisfaction.     plan of care discussed with daughter christiano    communication via Ugandan speaking bedside Aide
Seen and examined. Chart reviewed.   patient is improving clinically.   Agree with above a/p  Edited where ever is necessary    resume Eliquis. parapneumonic effusion vs CHF. pulm, cardio noted. call renal. continue current rx. effusion too small to drain.

## 2025-01-09 NOTE — DIETITIAN NUTRITION RISK NOTIFICATION - TREATMENT: THE FOLLOWING DIET HAS BEEN RECOMMENDED
Diet, Soft and Bite Sized:   Consistent Carbohydrate {No Snacks}  DASH/TLC {Sodium & Cholesterol Restricted}  Supplement Feeding Modality:  Oral  Glucerna Shake Cans or Servings Per Day:  1       Frequency:  Daily (01-09-25 @ 12:32) [Pending Verification By Attending]  Diet, DASH/TLC:   Sodium & Cholesterol Restricted  Consistent Carbohydrate {Evening Snack} (01-05-25 @ 00:57) [Active]

## 2025-01-09 NOTE — PROVIDER CONTACT NOTE (OTHER) - ASSESSMENT
Patient A&Ox4. BP of 154/66, HR 66, RA at 91%. New onset of bleeding from uretha area, blood on gown and blood tinged urine in urinal. Pt denies any pain, no s/s of acute distress or discomfort.

## 2025-01-09 NOTE — DIETITIAN INITIAL EVALUATION ADULT - OTHER INFO
Patient with good-fair appetite, consuming % of meals per nursing flowsheets. Denies any chewing/swallowing difficulties. Hx T2DM noted. A1C: 6.2% (1/7/25). Discussed consistent carbohydrate diet including food sources of carbohydrates, portions and serving sizes, pairing protein with carbohydrates, limiting sugar sweetened beverages in diet and the importance of consistent eating pattern to help optimize glycemic control. Provided DASH/TLC nutrition therapy education; low sodium, cholesterol and saturated fat, w/ increased whole grains, fruits and vegetables/balanced meals, avoidance of take-out/processed foods. Patient with good-fair appetite, consuming % of meals per nursing flowsheets. Denies any chewing/swallowing difficulties. Hx T2DM noted. A1C: 6.2% (1/7/25). Discussed consistent carbohydrate diet including food sources of carbohydrates, portions and serving sizes, pairing protein with carbohydrates, limiting sugar sweetened beverages in diet and the importance of consistent eating pattern to help optimize glycemic control. Provided DASH/TLC nutrition therapy education; low sodium, cholesterol and saturated fat, w/ increased whole grains, fruits and vegetables/balanced meals, avoidance of take-out/processed foods. CKD3 noted. Na and K+ WNL at this time. Recommend obtaining Phos levels. NFPE consistent with moderate protein calorie malnutrition as evidenced by muscle depletion and subcutaneous fat loss.

## 2025-01-09 NOTE — PROGRESS NOTE ADULT - PROVIDER SPECIALTY LIST ADULT
Hospitalist
Hospitalist
Nephrology
Pulmonology
Pulmonology
Hospitalist
Nephrology
Pulmonology
Cardiology
Cardiology
Hospitalist
Nephrology
Cardiology

## 2025-01-09 NOTE — DIETITIAN INITIAL EVALUATION ADULT - NUTRITIONGOAL OUTCOME2
Patient to show improvement in glycemic control by following a consistent carbohydrate meal pattern

## 2025-01-09 NOTE — PROGRESS NOTE ADULT - ASSESSMENT
Patient is a 78 yo M, originally from [place], former cigarette smoker w/ PMHx of HTN, CKD, paroxysmal afib on eliquis, w/ hx of abnormal nuclear stress test s/p coronary angiogram and NOMAN to proximal and mid RCA (11/2022) presenting to [facility] for SOB, wheezing over the past 3 weeks with associated exertional dizziness, b/l neck pain and ear fullness, admitted to r/o ACS.    Acute Respiratory Failure with Hypoxia likely due to Pneumonia (resolved)- now on room air  Pleural Effusions, likely Chronic Diastolic CHF  CAD s/p NOMAN, rule out ACS   Paroxysmal Atrial Fibrillation - now back in sinus  Cardiology following, Tn were elevated peaked, now downtrending likely due to demand from CHF  ECHO normal EF, mod LVH, mild to mod VHD  CT chest c/w extensive bilateral lung opacities with small bilateral pleural effusions, pro BNP elevated  LE SONO negative for DVT, VQ scan low probability PE, Repeat CXR done without much improvement  Carotid dopplers negative for hemodynamic significant stenosis, though external carotid artery stenosis present  plan for cautious diuresis given CKD, continue rocephin and doxy for pneumonia  Stable respiratory status on room air, feeling less SOB  Continue plavix and statin,  plan for outpatient stress  test when recovered from pneumonia  Continue eliquis for stroke ppx, PT evaluation, Speech evaluation  Pulm appreciated, no plans for thoracentesis, recommend outpatient follow up CT after abx and diuresis, may need bronch with TB biopsy    Hemodynamic BRUNILDA/CKD3 in setting of diuresis  renal following, baseline Cr ~ 2  would dose lasix as needed, follow renal serologies, renal sono without hydro  avoid nephrotoxins, continue to monitor    Hyponatremia  resolved, monitor    HTN  chronic, continue home meds hydralazine 100 q 8, labetalol 300 BID    Type 2 DM  chronic dx 2022, A1c 6.2  diet controlled, continue consistent carb diet  maintain ISS for now, monitor blood sugar    Goals of Care  full code    Prophylactic Measure  eliquis    updated daughter Yoselin Patient is a 78 yo M, originally from [facility], former cigarette smoker w/ PMHx of HTN, CKD, paroxysmal afib on eliquis, w/ hx of abnormal nuclear stress test s/p coronary angiogram and NOMAN to proximal and mid RCA (11/2022) presenting to [facility] for SOB, wheezing over the past 3 weeks with associated exertional dizziness, b/l neck pain and ear fullness, admitted to r/o ACS.    Acute Respiratory Failure with Hypoxia likely due to Pneumonia (resolved) and CHF- now on room air  Pleural Effusions, likely new Acute Diastolic CHF  CAD s/p NOMAN, rule out ACS   Paroxysmal Atrial Fibrillation - now back in sinus  Cardiology following, Tn were elevated peaked, now downtrending likely due to demand from CHF  ECHO normal EF, mod LVH, mild to mod VHD  CT chest c/w extensive bilateral lung opacities with small bilateral pleural effusions, pro BNP elevated  LE SONO negative for DVT, VQ scan low probability PE, Repeat CXR done without much improvement  Carotid dopplers negative for hemodynamic significant stenosis, though external carotid artery stenosis present  plan for cautious diuresis given CKD, continue rocephin and doxy for pneumonia  Stable respiratory status on room air, feeling less SOB  Continue plavix and statin,  plan for outpatient stress  test when recovered from pneumonia  Continue eliquis for stroke ppx, PT evaluation, Speech evaluation  Pulm appreciated, no plans for thoracentesis, recommend outpatient follow up CT after abx and diuresis, may need bronch with TB biopsy    Hemodynamic BRUNILDA/CKD3 in setting of diuresis  renal following, baseline Cr ~ 2  would dose lasix as needed, follow renal serologies, renal sono without hydro  avoid nephrotoxins, continue to monitor    Hyponatremia  resolved, monitor    HTN  chronic, continue home meds hydralazine 100 q 8, labetalol 300 BID    Type 2 DM  chronic dx 2022, A1c 6.2  diet controlled, continue consistent carb diet  maintain ISS for now, monitor blood sugar    Goals of Care  full code    Prophylactic Measure  eliquis    updated daughter Yoselin

## 2025-01-09 NOTE — PROVIDER CONTACT NOTE (OTHER) - SITUATION
Upon emptying pt urinal, blood tinged urine noted- which is a change from previous observed urine. Assessed pt penis and uretha, drops of blood observed.

## 2025-01-09 NOTE — DIETITIAN INITIAL EVALUATION ADULT - PERTINENT MEDS FT
MEDICATIONS  (STANDING):  apixaban 5 milliGRAM(s) Oral every 12 hours  atorvastatin 40 milliGRAM(s) Oral at bedtime  cefTRIAXone   IVPB 1000 milliGRAM(s) IV Intermittent every 24 hours  clopidogrel Tablet 75 milliGRAM(s) Oral daily  dextrose 5%. 1000 milliLiter(s) (100 mL/Hr) IV Continuous <Continuous>  dextrose 5%. 1000 milliLiter(s) (50 mL/Hr) IV Continuous <Continuous>  dextrose 50% Injectable 25 Gram(s) IV Push once  dextrose 50% Injectable 12.5 Gram(s) IV Push once  dextrose 50% Injectable 25 Gram(s) IV Push once  doxycycline IVPB 100 milliGRAM(s) IV Intermittent every 12 hours  doxycycline IVPB      furosemide    Tablet 40 milliGRAM(s) Oral daily  glucagon  Injectable 1 milliGRAM(s) IntraMuscular once  hydrALAZINE 100 milliGRAM(s) Oral every 8 hours  insulin lispro (ADMELOG) corrective regimen sliding scale   SubCutaneous three times a day before meals  insulin lispro (ADMELOG) corrective regimen sliding scale   SubCutaneous at bedtime  labetalol 300 milliGRAM(s) Oral two times a day    MEDICATIONS  (PRN):  acetaminophen     Tablet .. 650 milliGRAM(s) Oral every 6 hours PRN Temp greater or equal to 38C (100.4F), Mild Pain (1 - 3)  dextrose Oral Gel 15 Gram(s) Oral once PRN Blood Glucose LESS THAN 70 milliGRAM(s)/deciliter  melatonin 3 milliGRAM(s) Oral at bedtime PRN Insomnia

## 2025-01-10 RX ORDER — DOXYCYCLINE MONOHYDRATE 100 MG
1 TABLET ORAL
Qty: 4 | Refills: 0
Start: 2025-01-10 | End: 2025-01-11

## 2025-01-14 LAB
% ALBUMIN: 47.3 % — SIGNIFICANT CHANGE UP
% ALPHA 1: 8.9 % — SIGNIFICANT CHANGE UP
% ALPHA 2: 15.2 % — SIGNIFICANT CHANGE UP
% BETA: 14.1 % — SIGNIFICANT CHANGE UP
% GAMMA: 14.5 % — SIGNIFICANT CHANGE UP
ALBUMIN SERPL ELPH-MCNC: 2.7 G/DL — LOW (ref 3.6–5.5)
ALBUMIN/GLOB SERPL ELPH: 0.9 RATIO — SIGNIFICANT CHANGE UP
ALPHA1 GLOB SERPL ELPH-MCNC: 0.5 G/DL — HIGH (ref 0.1–0.4)
ALPHA2 GLOB SERPL ELPH-MCNC: 0.9 G/DL — SIGNIFICANT CHANGE UP (ref 0.5–1)
B-GLOBULIN SERPL ELPH-MCNC: 0.8 G/DL — SIGNIFICANT CHANGE UP (ref 0.5–1)
GAMMA GLOBULIN: 0.8 G/DL — SIGNIFICANT CHANGE UP (ref 0.6–1.6)
INTERPRETATION SERPL IFE-IMP: SIGNIFICANT CHANGE UP
PROT PATTERN SERPL ELPH-IMP: SIGNIFICANT CHANGE UP
PROT SERPL-MCNC: 5.8 G/DL — LOW (ref 6–8.3)

## 2025-01-15 ENCOUNTER — APPOINTMENT (OUTPATIENT)
Dept: CARDIOLOGY | Facility: CLINIC | Age: 80
End: 2025-01-15

## 2025-01-21 ENCOUNTER — APPOINTMENT (OUTPATIENT)
Dept: RADIOLOGY | Facility: HOSPITAL | Age: 80
End: 2025-01-21
Payer: MEDICARE

## 2025-01-21 ENCOUNTER — OUTPATIENT (OUTPATIENT)
Dept: OUTPATIENT SERVICES | Facility: HOSPITAL | Age: 80
LOS: 1 days | End: 2025-01-21
Payer: MEDICARE

## 2025-01-21 DIAGNOSIS — Z00.8 ENCOUNTER FOR OTHER GENERAL EXAMINATION: ICD-10-CM

## 2025-01-21 PROCEDURE — 71046 X-RAY EXAM CHEST 2 VIEWS: CPT | Mod: 26

## 2025-01-24 ENCOUNTER — INPATIENT (INPATIENT)
Facility: HOSPITAL | Age: 80
LOS: 10 days | Discharge: HOME | DRG: 195 | End: 2025-02-04
Attending: INTERNAL MEDICINE | Admitting: FAMILY MEDICINE
Payer: MEDICARE

## 2025-01-24 VITALS
DIASTOLIC BLOOD PRESSURE: 75 MMHG | OXYGEN SATURATION: 89 % | RESPIRATION RATE: 19 BRPM | HEIGHT: 66 IN | SYSTOLIC BLOOD PRESSURE: 130 MMHG | WEIGHT: 164.91 LBS | HEART RATE: 62 BPM | TEMPERATURE: 98 F

## 2025-01-24 DIAGNOSIS — J10.1 INFLUENZA DUE TO OTHER IDENTIFIED INFLUENZA VIRUS WITH OTHER RESPIRATORY MANIFESTATIONS: ICD-10-CM

## 2025-01-24 LAB
ALBUMIN SERPL ELPH-MCNC: 2.7 G/DL — LOW (ref 3.3–5)
ALP SERPL-CCNC: 127 U/L — HIGH (ref 40–120)
ALT FLD-CCNC: 31 U/L — SIGNIFICANT CHANGE UP (ref 10–45)
ANION GAP SERPL CALC-SCNC: 7 MMOL/L — SIGNIFICANT CHANGE UP (ref 5–17)
AST SERPL-CCNC: 49 U/L — HIGH (ref 10–40)
BASOPHILS # BLD AUTO: 0.13 K/UL — SIGNIFICANT CHANGE UP (ref 0–0.2)
BASOPHILS NFR BLD AUTO: 2 % — SIGNIFICANT CHANGE UP (ref 0–2)
BILIRUB SERPL-MCNC: 0.8 MG/DL — SIGNIFICANT CHANGE UP (ref 0.2–1.2)
BUN SERPL-MCNC: 54 MG/DL — HIGH (ref 7–23)
CALCIUM SERPL-MCNC: 8 MG/DL — LOW (ref 8.4–10.5)
CHLORIDE SERPL-SCNC: 91 MMOL/L — LOW (ref 96–108)
CO2 SERPL-SCNC: 32 MMOL/L — HIGH (ref 22–31)
CREAT SERPL-MCNC: 2.07 MG/DL — HIGH (ref 0.5–1.3)
EGFR: 32 ML/MIN/1.73M2 — LOW
ELLIPTOCYTES BLD QL SMEAR: SLIGHT — SIGNIFICANT CHANGE UP
EOSINOPHIL # BLD AUTO: 0 K/UL — SIGNIFICANT CHANGE UP (ref 0–0.5)
EOSINOPHIL NFR BLD AUTO: 0 % — SIGNIFICANT CHANGE UP (ref 0–6)
FLUAV AG NPH QL: DETECTED
FLUBV AG NPH QL: SIGNIFICANT CHANGE UP
GLUCOSE BLDC GLUCOMTR-MCNC: 159 MG/DL — HIGH (ref 70–99)
GLUCOSE SERPL-MCNC: 105 MG/DL — HIGH (ref 70–99)
HCT VFR BLD CALC: 27.8 % — LOW (ref 39–50)
HGB BLD-MCNC: 9.1 G/DL — LOW (ref 13–17)
LACTATE SERPL-SCNC: 1.1 MMOL/L — SIGNIFICANT CHANGE UP (ref 0.7–2)
LYMPHOCYTES # BLD AUTO: 0.76 K/UL — LOW (ref 1–3.3)
LYMPHOCYTES # BLD AUTO: 12 % — LOW (ref 13–44)
MANUAL SMEAR VERIFICATION: SIGNIFICANT CHANGE UP
MCHC RBC-ENTMCNC: 25.6 PG — LOW (ref 27–34)
MCHC RBC-ENTMCNC: 32.7 G/DL — SIGNIFICANT CHANGE UP (ref 32–36)
MCV RBC AUTO: 78.3 FL — LOW (ref 80–100)
MONOCYTES # BLD AUTO: 1.01 K/UL — HIGH (ref 0–0.9)
MONOCYTES NFR BLD AUTO: 16 % — HIGH (ref 2–14)
NEUTROPHILS # BLD AUTO: 4.1 K/UL — SIGNIFICANT CHANGE UP (ref 1.8–7.4)
NEUTROPHILS NFR BLD AUTO: 65 % — SIGNIFICANT CHANGE UP (ref 43–77)
NRBC # BLD: 0 /100 WBCS — SIGNIFICANT CHANGE UP (ref 0–0)
NRBC BLD-RTO: 0 /100 WBCS — SIGNIFICANT CHANGE UP (ref 0–0)
NT-PROBNP SERPL-SCNC: HIGH PG/ML (ref 0–300)
OVALOCYTES BLD QL SMEAR: SLIGHT — SIGNIFICANT CHANGE UP
PLAT MORPH BLD: NORMAL — SIGNIFICANT CHANGE UP
PLATELET # BLD AUTO: 171 K/UL — SIGNIFICANT CHANGE UP (ref 150–400)
POTASSIUM SERPL-MCNC: 3.4 MMOL/L — LOW (ref 3.5–5.3)
POTASSIUM SERPL-SCNC: 3.4 MMOL/L — LOW (ref 3.5–5.3)
PROT SERPL-MCNC: 6.5 G/DL — SIGNIFICANT CHANGE UP (ref 6–8.3)
RBC # BLD: 3.55 M/UL — LOW (ref 4.2–5.8)
RBC # FLD: 16.8 % — HIGH (ref 10.3–14.5)
RBC BLD AUTO: SIGNIFICANT CHANGE UP
RSV RNA NPH QL NAA+NON-PROBE: SIGNIFICANT CHANGE UP
SARS-COV-2 RNA SPEC QL NAA+PROBE: SIGNIFICANT CHANGE UP
SCHISTOCYTES BLD QL AUTO: SLIGHT — SIGNIFICANT CHANGE UP
SMUDGE CELLS # BLD: PRESENT — SIGNIFICANT CHANGE UP
SODIUM SERPL-SCNC: 130 MMOL/L — LOW (ref 135–145)
VARIANT LYMPHS # BLD: 5 % — SIGNIFICANT CHANGE UP (ref 0–6)
VARIANT LYMPHS NFR BLD MANUAL: 5 % — SIGNIFICANT CHANGE UP (ref 0–6)
WBC # BLD: 6.3 K/UL — SIGNIFICANT CHANGE UP (ref 3.8–10.5)
WBC # FLD AUTO: 6.3 K/UL — SIGNIFICANT CHANGE UP (ref 3.8–10.5)

## 2025-01-24 PROCEDURE — 99223 1ST HOSP IP/OBS HIGH 75: CPT

## 2025-01-24 PROCEDURE — 71045 X-RAY EXAM CHEST 1 VIEW: CPT | Mod: 26

## 2025-01-24 PROCEDURE — 93010 ELECTROCARDIOGRAM REPORT: CPT

## 2025-01-24 PROCEDURE — 99285 EMERGENCY DEPT VISIT HI MDM: CPT

## 2025-01-24 RX ORDER — BACTERIOSTATIC SODIUM CHLORIDE 0.9 %
1000 VIAL (ML) INJECTION ONCE
Refills: 0 | Status: COMPLETED | OUTPATIENT
Start: 2025-01-24 | End: 2025-01-24

## 2025-01-24 RX ORDER — HYDRALAZINE HCL 100 MG
100 TABLET ORAL EVERY 8 HOURS
Refills: 0 | Status: DISCONTINUED | OUTPATIENT
Start: 2025-01-24 | End: 2025-02-04

## 2025-01-24 RX ORDER — HEPARIN SODIUM,PORCINE 10000/ML
5000 VIAL (ML) INJECTION EVERY 12 HOURS
Refills: 0 | Status: DISCONTINUED | OUTPATIENT
Start: 2025-01-24 | End: 2025-01-24

## 2025-01-24 RX ORDER — METHYLPREDNISOLONE ACETATE 40 MG/ML
125 VIAL (ML) INJECTION ONCE
Refills: 0 | Status: COMPLETED | OUTPATIENT
Start: 2025-01-24 | End: 2025-01-24

## 2025-01-24 RX ORDER — IPRATROPIUM BROMIDE AND ALBUTEROL SULFATE .5; 2.5 MG/3ML; MG/3ML
3 SOLUTION RESPIRATORY (INHALATION) EVERY 6 HOURS
Refills: 0 | Status: DISCONTINUED | OUTPATIENT
Start: 2025-01-24 | End: 2025-01-26

## 2025-01-24 RX ORDER — LABETALOL HCL 300 MG/1
1 TABLET, FILM COATED ORAL
Refills: 0 | DISCHARGE

## 2025-01-24 RX ORDER — AZITHROMYCIN DIHYDRATE 500 MG/1
500 TABLET, FILM COATED ORAL ONCE
Refills: 0 | Status: COMPLETED | OUTPATIENT
Start: 2025-01-24 | End: 2025-01-24

## 2025-01-24 RX ORDER — IPRATROPIUM BROMIDE AND ALBUTEROL SULFATE .5; 2.5 MG/3ML; MG/3ML
3 SOLUTION RESPIRATORY (INHALATION)
Refills: 0 | Status: COMPLETED | OUTPATIENT
Start: 2025-01-24 | End: 2025-01-24

## 2025-01-24 RX ORDER — OSELTAMIVIR PHOSPHATE 75 MG/1
30 CAPSULE ORAL
Refills: 0 | Status: COMPLETED | OUTPATIENT
Start: 2025-01-24 | End: 2025-01-29

## 2025-01-24 RX ORDER — BACTERIOSTATIC SODIUM CHLORIDE 0.9 %
1000 VIAL (ML) INJECTION
Refills: 0 | Status: DISCONTINUED | OUTPATIENT
Start: 2025-01-24 | End: 2025-01-29

## 2025-01-24 RX ORDER — LABETALOL HYDROCHLORIDE 300 MG/1
300 TABLET, FILM COATED ORAL
Refills: 0 | Status: DISCONTINUED | OUTPATIENT
Start: 2025-01-24 | End: 2025-01-26

## 2025-01-24 RX ORDER — ATORVASTATIN CALCIUM 80 MG/1
40 TABLET, FILM COATED ORAL AT BEDTIME
Refills: 0 | Status: DISCONTINUED | OUTPATIENT
Start: 2025-01-24 | End: 2025-02-04

## 2025-01-24 RX ORDER — CEFTRIAXONE 250 MG/1
1000 INJECTION, POWDER, FOR SOLUTION INTRAMUSCULAR; INTRAVENOUS ONCE
Refills: 0 | Status: COMPLETED | OUTPATIENT
Start: 2025-01-24 | End: 2025-01-24

## 2025-01-24 RX ORDER — METHYLPREDNISOLONE ACETATE 40 MG/ML
40 VIAL (ML) INJECTION EVERY 8 HOURS
Refills: 0 | Status: DISCONTINUED | OUTPATIENT
Start: 2025-01-25 | End: 2025-01-26

## 2025-01-24 RX ORDER — POTASSIUM CHLORIDE 750 MG/1
20 TABLET, EXTENDED RELEASE ORAL
Refills: 0 | Status: COMPLETED | OUTPATIENT
Start: 2025-01-24 | End: 2025-01-24

## 2025-01-24 RX ORDER — APIXABAN 5 MG/1
5 TABLET, FILM COATED ORAL EVERY 12 HOURS
Refills: 0 | Status: DISCONTINUED | OUTPATIENT
Start: 2025-01-24 | End: 2025-02-04

## 2025-01-24 RX ADMIN — ATORVASTATIN CALCIUM 40 MILLIGRAM(S): 80 TABLET, FILM COATED ORAL at 23:34

## 2025-01-24 RX ADMIN — CEFTRIAXONE 100 MILLIGRAM(S): 250 INJECTION, POWDER, FOR SOLUTION INTRAMUSCULAR; INTRAVENOUS at 16:40

## 2025-01-24 RX ADMIN — OSELTAMIVIR PHOSPHATE 30 MILLIGRAM(S): 75 CAPSULE ORAL at 20:19

## 2025-01-24 RX ADMIN — AZITHROMYCIN DIHYDRATE 255 MILLIGRAM(S): 500 TABLET, FILM COATED ORAL at 17:11

## 2025-01-24 RX ADMIN — POTASSIUM CHLORIDE 20 MILLIEQUIVALENT(S): 750 TABLET, EXTENDED RELEASE ORAL at 23:33

## 2025-01-24 RX ADMIN — Medication 1000 MILLILITER(S): at 16:40

## 2025-01-24 RX ADMIN — POTASSIUM CHLORIDE 20 MILLIEQUIVALENT(S): 750 TABLET, EXTENDED RELEASE ORAL at 20:19

## 2025-01-24 RX ADMIN — IPRATROPIUM BROMIDE AND ALBUTEROL SULFATE 3 MILLILITER(S): .5; 2.5 SOLUTION RESPIRATORY (INHALATION) at 18:27

## 2025-01-24 RX ADMIN — Medication 100 MILLIGRAM(S): at 23:34

## 2025-01-24 RX ADMIN — Medication 125 MILLIGRAM(S): at 16:55

## 2025-01-24 RX ADMIN — IPRATROPIUM BROMIDE AND ALBUTEROL SULFATE 3 MILLILITER(S): .5; 2.5 SOLUTION RESPIRATORY (INHALATION) at 20:39

## 2025-01-24 RX ADMIN — IPRATROPIUM BROMIDE AND ALBUTEROL SULFATE 3 MILLILITER(S): .5; 2.5 SOLUTION RESPIRATORY (INHALATION) at 23:06

## 2025-01-24 RX ADMIN — IPRATROPIUM BROMIDE AND ALBUTEROL SULFATE 3 MILLILITER(S): .5; 2.5 SOLUTION RESPIRATORY (INHALATION) at 16:56

## 2025-01-24 NOTE — ED PROVIDER NOTE - IV ALTEPLASE EXCL ABS HIDDEN
show

## 2025-01-24 NOTE — H&P ADULT - ASSESSMENT
Patient is a 80 yo M, originally from [facility], former cigarette smoker w/ PMHx of HTN, CKD, paroxysmal afib on eliquis, w/ hx of abnormal nuclear stress test s/p coronary angiogram and NOMAN to proximal and mid RCA (11/2022) presenting to [facility] from primary care office due to sob and hypoxia. Patient positive for Flu A.    #Acute hypoxic respiratory failure secondary to influenza A  -Patient O2 sat 89% on RA, currently on 4LNC satting in 90s   -CXR reviewed  -Wean off supplemental O2 as tolerated, patient not on home oxygen   -Patient received CTX and Azithromycin in ED  -Start Tamiflu, renally dosed   -Received 125mg IV Solumedrol in ED  -Start solumedrol 40mg IV Q 8hrs   -Standing duonebs q6hrs   -Received 1L IVF bolus in ED, continue gentle IVF for overnight and reassess in am    #HTN  -Continue Labetalol, hydralazine    #CKD 3  -Avoid nephrotoxic medications  -Monitor Cr, currently appears at baseline     #Hyponatremia  -Encourage po intake  -Monitor     #Hypokalemia  -Replete  -Monitor     #Microcytic Anemia  -Monitor CBC  -F/U iron studies, b12, folate    #Paroxysmal afib on Eliquis  -Continue Eliquis  -Continue Labetalol   -Monitor on tele    #Hx of CAD s/p NOMAN  -Continue Plavix  -Continue Lipitor     #Chronic CHF not in acute exacerbation  -Continue Lasix 20mg daily  -Per patient's family he was previously on 40mg daily prior to previous hospital admission however with renal function was switched to 20mg daily     #DVT prophylaxis  -Heparin sq    Family updated at bedside  Patient is a 80 yo M, originally from [facility], former cigarette smoker w/ PMHx of HTN, CKD, paroxysmal afib on eliquis, w/ hx of abnormal nuclear stress test s/p coronary angiogram and NOMAN to proximal and mid RCA (11/2022) presenting to [facility] from primary care office due to sob and hypoxia. Patient positive for Flu A.    #Acute hypoxic respiratory failure secondary to influenza A  -Patient O2 sat 89% on RA, currently on 4LNC satting in 90s   -CXR reviewed  -Wean off supplemental O2 as tolerated, patient not on home oxygen   -Patient received CTX and Azithromycin in ED  -Start Tamiflu, renally dosed   -Received 125mg IV Solumedrol in ED  -Start solumedrol 40mg IV Q 8hrs   -Standing duonebs q6hrs   -Received 1L IVF bolus in ED, continue gentle IVF for overnight and reassess in am    #HTN  -Continue Labetalol, hydralazine    #CKD 3  -Avoid nephrotoxic medications  -Monitor Cr, currently appears at baseline     #Hyponatremia  -Encourage po intake  -Monitor     #Hypokalemia  -Replete  -Monitor     #Microcytic Anemia  -Monitor CBC  -F/U iron studies, b12, folate    #Paroxysmal afib on Eliquis  -Continue Eliquis  -Continue Labetalol   -Monitor on tele    #Hx of CAD s/p NOMAN  -Continue Plavix  -Continue Lipitor     #Chronic CHF not in acute exacerbation  -Continue Lasix 20mg daily  -Per patient's family he was previously on 40mg daily prior to previous hospital admission however with renal function was switched to 20mg daily     #DVT prophylaxis  -Eliquis    Family updated at bedside  Patient is a 78 yo M, originally from [facility], former cigarette smoker w/ PMHx of HTN, CKD, paroxysmal afib on eliquis, w/ hx of abnormal nuclear stress test s/p coronary angiogram and NOMAN to proximal and mid RCA (11/2022) presenting to [facility] from primary care office due to sob and hypoxia. Patient positive for Flu A.    #Acute hypoxic respiratory failure secondary to influenza A  -Patient O2 sat 89% on RA, currently on 4LNC saturating in the high 90s   -CXR reviewed  -Wean off supplemental O2 as tolerated, patient not on home oxygen   -Patient received CTX and Azithromycin in ED  -Start Tamiflu, renally dosed   -Received 125mg IV Solumedrol in ED  -Start solumedrol 40mg IV Q 8hrs   -Standing duonebs q6hrs   -Received 1L IVF bolus in ED, continue gentle IVF for overnight and reassess in am    #HTN  -Continue Labetalol, hydralazine  -monitor BP    #CKD 3  -Avoid nephrotoxic medications  -Monitor Cr, currently appears at baseline     #Hyponatremia  -Encourage po intake  -Monitor     #Hypokalemia  -Replete  -Monitor     #Microcytic Anemia  -Monitor CBC  -F/U iron studies, b12, folate    #Paroxysmal afib on Eliquis  -Continue Eliquis  -Continue Labetalol   -Monitor on tele    #Hx of CAD s/p NOMAN  -Continue Plavix  -Continue Lipitor     #Chronic CHF not in acute exacerbation  -Continue Lasix 20mg daily  -Per patient's family he was previously on 40mg daily prior to previous hospital admission however with renal function was switched to 20mg daily     #DVT prophylaxis  -Eliquis    Family updated at bedside

## 2025-01-24 NOTE — ED PROVIDER NOTE - WR ORDER STATUS 1
Resulted Cephalexin Counseling: I counseled the patient regarding use of cephalexin as an antibiotic for prophylactic and/or therapeutic purposes. Cephalexin (commonly prescribed under brand name Keflex) is a cephalosporin antibiotic which is active against numerous classes of bacteria, including most skin bacteria. Side effects may include nausea, diarrhea, gastrointestinal upset, rash, hives, yeast infections, and in rare cases, hepatitis, kidney disease, seizures, fever, confusion, neurologic symptoms, and others. Patients with severe allergies to penicillin medications are cautioned that there is about a 10% incidence of cross-reactivity with cephalosporins. When possible, patients with penicillin allergies should use alternatives to cephalosporins for antibiotic therapy.

## 2025-01-24 NOTE — ED ADULT NURSE NOTE - NSFALLUNIVINTERV_ED_ALL_ED
Bed/Stretcher in lowest position, wheels locked, appropriate side rails in place/Call bell, personal items and telephone in reach/Instruct patient to call for assistance before getting out of bed/chair/stretcher/Non-slip footwear applied when patient is off stretcher/[place] to call system/Physically safe environment - no spills, clutter or unnecessary equipment/Purposeful proactive rounding/Room/bathroom lighting operational, light cord in reach

## 2025-01-24 NOTE — ED PROVIDER NOTE - OBJECTIVE STATEMENT
79-year-old male with a history of hypertension paroxysmal A-fib on Eliquis and CAD, former smoker but no history of , CKD, on lasix presents with cough shortness of breath x 3 days.  As per family having cough shortness of breath over the last 3 days.  2 days ago saw his primary care doctor and was given the flu vaccine.  Denies any chest pain abdominal pain nausea vomiting diarrhea  Went to PCP today and found to be hypoxic to mid 80s on RA

## 2025-01-24 NOTE — H&P ADULT - NSHPREVIEWOFSYSTEMS_GEN_ALL_CORE
REVIEW OF SYSTEMS:  CONSTITUTIONAL: + fever, no weight loss, or fatigue  EYES: No eye pain, visual disturbances, or discharge  ENMT:  No difficulty hearing, tinnitus, vertigo; No sinus + throat pain  NECK: No neck pain or neck stiffness  BREASTS: No pain, masses, or nipple discharge  RESPIRATORY: + cough, wheezing, no chills or hemoptysis; + shortness of breath  CARDIOVASCULAR: No chest pain, palpitations, dizziness, or leg swelling  GASTROINTESTINAL: No abdominal pain, No nausea, vomiting, or hematemesis; No diarrhea or constipation  GENITOURINARY: No dysuria, frequency, hematuria, or incontinence  NEUROLOGICAL: No headaches, memory loss, loss of strength, numbness, or tremors  SKIN: No itching, burning, rashes, or lesions   LYMPH NODES: No enlarged glands  ENDOCRINE: No heat or cold intolerance; No hair loss  MUSCULOSKELETAL: No joint pain or swelling; No muscle, back, or extremity pain  PSYCHIATRIC: No depression, anxiety, mood swings, or difficulty sleeping  HEME/LYMPH: No easy bruising or bleeding  ALLERGY AND IMMUNOLOGIC: No hives or eczema    All other ROS reviewed and negative except as otherwise stated

## 2025-01-24 NOTE — H&P ADULT - HISTORY OF PRESENT ILLNESS
Patient is a 80 yo M, originally from [facility], former cigarette smoker w/ PMHx of HTN, CKD, paroxysmal afib on eliquis, w/ hx of abnormal nuclear stress test s/p coronary angiogram and NOMAN to proximal and mid RCA (11/2022) presenting to [facility] from primary care office due to sob and hypoxia. Patient's family translating at bedside, as well as providing history. Patient started not feeling well on Tuesday and developed a harsh persistent cough. Patient went to [facility] and per family he was given a flu vaccine, as well as a nasal spray. Patient did not improve and became increasingly worse. Patient then had a fever yesterday. Patient returned to primary care provider and was found to have O2 saturation of 89%. Patient was advised to come to ED. Patient states he also feels congestion in his chest as well as pain with both of his ears and throat. Patient denies chest pain, nausea, vomiting, dizziness, weakness, sick contacts, recent travel.  Patient is a 78 yo M, originally from [facility], former cigarette smoker w/ PMHx of HTN, CKD, paroxysmal afib on eliquis, w/ hx of abnormal nuclear stress test s/p coronary angiogram and NOMAN to proximal and mid RCA (11/2022), chronic CHF presenting to [facility] from primary care office due to sob and hypoxia. Patient's family translating at bedside, as well as providing history. Patient started not feeling well on Tuesday and developed a harsh persistent cough. Patient went to [facility] and per family he was given a flu vaccine, as well as a nasal spray. Patient did not improve and became increasingly worse. Patient then had a fever yesterday. Patient returned to primary care provider and was found to have O2 saturation of 89%. Patient was advised to come to ED. Patient states he also feels congestion in his chest as well as pain with both of his ears and throat. Patient denies chest pain, nausea, vomiting, dizziness, weakness, sick contacts, recent travel.

## 2025-01-24 NOTE — ED ADULT NURSE NOTE - OBJECTIVE STATEMENT
daughter reports cough and not well since tuesday, went to [facility] on Wednesday for evaluation, skin warm dry color pale RR 20 frequent cough, dry. Audible wheeze. Sat 100% on NC.

## 2025-01-24 NOTE — H&P ADULT - NSHPPHYSICALEXAM_GEN_ALL_CORE
Vital Signs Last 24 Hrs  T(F): 97.6 (24 Jan 2025 16:00), Max: 97.6 (24 Jan 2025 16:00)  HR: 62 (24 Jan 2025 16:00) (62 - 62)  BP: 130/75 (24 Jan 2025 16:00) (130/75 - 130/75)  RR: 19 (24 Jan 2025 16:00) (19 - 19)  SpO2: 89% (24 Jan 2025 16:00) (89% - 89%)    PHYSICAL EXAM:  GENERAL: NAD, well-groomed, well-developed  HEAD:  Atraumatic, Normocephalic  EYES: L eye blindness mostly closed, EOMI, conjunctiva and sclera clear of right eye   ENMT: Moist mucous membranes, Good dentition, no thrush  NECK: Supple, No JVD  CHEST/LUNG: Diffuse wheezing to auscultation bilaterally, good air entry, non-labored breathing  HEART: RRR; S1/S2, No murmur  ABDOMEN: Soft, Nontender, mildly distended; Bowel sounds present  EXTREMITIES: No calf tenderness, No cyanosis, No edema  SKIN: Warm, Intact  PSYCH: Normal mood, Normal affect  NERVOUS SYSTEM:  A/O x3, No focal deficits

## 2025-01-24 NOTE — ED PROVIDER NOTE - PHYSICAL EXAMINATION
CONSTITUTIONAL: NAD  SKIN: Warm dry  HEAD: NCAT  EYES: NL inspection  ENT: MMM  NECK: Supple  CARD: RRR  RESP: BL coarse BS  ABD: S/NT no R/G  EXT: +BL pedal edema  NEURO: Grossly unremarkable  PSYCH: Cooperative, appropriate.

## 2025-01-24 NOTE — ED PROVIDER NOTE - CLINICAL SUMMARY MEDICAL DECISION MAKING FREE TEXT BOX
79-year-old male with a history of hypertension paroxysmal A-fib on Eliquis and CAD, former smoker but no history of , CKD, on lasix presents with cough shortness of breath x 3 days.  As per family having cough shortness of breath over the last 3 days.  2 days ago saw his primary care doctor and was given the flu vaccine.  Denies any chest pain abdominal pain nausea vomiting diarrhea  Went to PCP today and found to be hypoxic to mid 80s on RA  Exam, as stated   Found to be Flu+ and requiring o2, sxs improved after nebs steroids, abx given for ho smoking and subjective fever. XR increasing infiltrate bl  Patient to be admitted to an inpatient floor. Pt/family notified and agreeable to plan. Case discussed with and care endorsed to hospitalist.

## 2025-01-24 NOTE — H&P ADULT - NSHPLABSRESULTS_GEN_ALL_CORE
9.1    6.30  )-----------( 171      ( 24 Jan 2025 16:30 )             27.8       01-24    130[L]  |  91[L]  |  54[H]  ----------------------------<  105[H]  3.4[L]   |  32[H]  |  2.07[H]    Ca    8.0[L]      24 Jan 2025 16:30    TPro  6.5  /  Alb  2.7[L]  /  TBili  0.8  /  DBili  x   /  AST  49[H]  /  ALT  31  /  AlkPhos  127[H]  01-24              Urinalysis Basic - ( 24 Jan 2025 16:30 )    Color: x / Appearance: x / SG: x / pH: x  Gluc: 105 mg/dL / Ketone: x  / Bili: x / Urobili: x   Blood: x / Protein: x / Nitrite: x   Leuk Esterase: x / RBC: x / WBC x   Sq Epi: x / Non Sq Epi: x / Bacteria: x            Lactate Trend  01-24 @ 16:30 Lactate:1.1             CAPILLARY BLOOD GLUCOSE    < from: Xray Chest 1 View- PORTABLE-Urgent (01.24.25 @ 16:49) >    IMPRESSION: Present infiltrates are diffuse without tyrone consolidation   showing evolution from prior.:    < end of copied text >

## 2025-01-24 NOTE — H&P ADULT - NS ATTEND AMEND GEN_ALL_CORE FT
agree with above  pt a/w acute hypoxic resp failure  start tamiflu, renally dosed  continue iv steroids as pt actively wheezing on exam  duonebs q6h  f/u am labs  renal fxn at baseline, Hx of CKD  d/w pt and family at bedside, all questions answered
4

## 2025-01-25 LAB
ALBUMIN SERPL ELPH-MCNC: 2.5 G/DL — LOW (ref 3.3–5)
ALP SERPL-CCNC: 115 U/L — SIGNIFICANT CHANGE UP (ref 40–120)
ALT FLD-CCNC: 31 U/L — SIGNIFICANT CHANGE UP (ref 10–45)
ANION GAP SERPL CALC-SCNC: 11 MMOL/L — SIGNIFICANT CHANGE UP (ref 5–17)
AST SERPL-CCNC: 43 U/L — HIGH (ref 10–40)
BASOPHILS # BLD AUTO: 0.01 K/UL — SIGNIFICANT CHANGE UP (ref 0–0.2)
BASOPHILS NFR BLD AUTO: 0.3 % — SIGNIFICANT CHANGE UP (ref 0–2)
BILIRUB SERPL-MCNC: 0.6 MG/DL — SIGNIFICANT CHANGE UP (ref 0.2–1.2)
BUN SERPL-MCNC: 51 MG/DL — HIGH (ref 7–23)
CALCIUM SERPL-MCNC: 8.1 MG/DL — LOW (ref 8.4–10.5)
CHLORIDE SERPL-SCNC: 95 MMOL/L — LOW (ref 96–108)
CO2 SERPL-SCNC: 27 MMOL/L — SIGNIFICANT CHANGE UP (ref 22–31)
CREAT SERPL-MCNC: 1.79 MG/DL — HIGH (ref 0.5–1.3)
EGFR: 39 ML/MIN/1.73M2 — LOW
EOSINOPHIL # BLD AUTO: 0 K/UL — SIGNIFICANT CHANGE UP (ref 0–0.5)
EOSINOPHIL NFR BLD AUTO: 0 % — SIGNIFICANT CHANGE UP (ref 0–6)
FERRITIN SERPL-MCNC: 200 NG/ML — SIGNIFICANT CHANGE UP (ref 30–400)
FOLATE SERPL-MCNC: 11 NG/ML — SIGNIFICANT CHANGE UP
GLUCOSE BLDC GLUCOMTR-MCNC: 163 MG/DL — HIGH (ref 70–99)
GLUCOSE BLDC GLUCOMTR-MCNC: 185 MG/DL — HIGH (ref 70–99)
GLUCOSE SERPL-MCNC: 151 MG/DL — HIGH (ref 70–99)
HCT VFR BLD CALC: 27.6 % — LOW (ref 39–50)
HGB BLD-MCNC: 8.9 G/DL — LOW (ref 13–17)
IMM GRANULOCYTES NFR BLD AUTO: 0.6 % — SIGNIFICANT CHANGE UP (ref 0–0.9)
IRON SATN MFR SERPL: 10 % — LOW (ref 16–55)
IRON SATN MFR SERPL: 23 UG/DL — LOW (ref 45–165)
LYMPHOCYTES # BLD AUTO: 0.61 K/UL — LOW (ref 1–3.3)
LYMPHOCYTES # BLD AUTO: 19.1 % — SIGNIFICANT CHANGE UP (ref 13–44)
MAGNESIUM SERPL-MCNC: 2.1 MG/DL — SIGNIFICANT CHANGE UP (ref 1.6–2.6)
MCHC RBC-ENTMCNC: 25.3 PG — LOW (ref 27–34)
MCHC RBC-ENTMCNC: 32.2 G/DL — SIGNIFICANT CHANGE UP (ref 32–36)
MCV RBC AUTO: 78.4 FL — LOW (ref 80–100)
MONOCYTES # BLD AUTO: 0.23 K/UL — SIGNIFICANT CHANGE UP (ref 0–0.9)
MONOCYTES NFR BLD AUTO: 7.2 % — SIGNIFICANT CHANGE UP (ref 2–14)
NEUTROPHILS # BLD AUTO: 2.33 K/UL — SIGNIFICANT CHANGE UP (ref 1.8–7.4)
NEUTROPHILS NFR BLD AUTO: 72.8 % — SIGNIFICANT CHANGE UP (ref 43–77)
NRBC # BLD: 0 /100 WBCS — SIGNIFICANT CHANGE UP (ref 0–0)
NRBC BLD-RTO: 0 /100 WBCS — SIGNIFICANT CHANGE UP (ref 0–0)
PLATELET # BLD AUTO: 176 K/UL — SIGNIFICANT CHANGE UP (ref 150–400)
POTASSIUM SERPL-MCNC: 3.8 MMOL/L — SIGNIFICANT CHANGE UP (ref 3.5–5.3)
POTASSIUM SERPL-SCNC: 3.8 MMOL/L — SIGNIFICANT CHANGE UP (ref 3.5–5.3)
PROT SERPL-MCNC: 6.2 G/DL — SIGNIFICANT CHANGE UP (ref 6–8.3)
RBC # BLD: 3.52 M/UL — LOW (ref 4.2–5.8)
RBC # FLD: 16.8 % — HIGH (ref 10.3–14.5)
SODIUM SERPL-SCNC: 133 MMOL/L — LOW (ref 135–145)
TIBC SERPL-MCNC: 236 UG/DL — SIGNIFICANT CHANGE UP (ref 220–430)
UIBC SERPL-MCNC: 213 UG/DL — SIGNIFICANT CHANGE UP (ref 110–370)
VIT B12 SERPL-MCNC: 503 PG/ML — SIGNIFICANT CHANGE UP (ref 232–1245)
WBC # BLD: 3.2 K/UL — LOW (ref 3.8–10.5)
WBC # FLD AUTO: 3.2 K/UL — LOW (ref 3.8–10.5)

## 2025-01-25 PROCEDURE — 99233 SBSQ HOSP IP/OBS HIGH 50: CPT

## 2025-01-25 RX ORDER — FERROUS SULFATE 325(65) MG
325 TABLET ORAL DAILY
Refills: 0 | Status: DISCONTINUED | OUTPATIENT
Start: 2025-01-25 | End: 2025-02-04

## 2025-01-25 RX ADMIN — Medication 100 MILLIGRAM(S): at 13:29

## 2025-01-25 RX ADMIN — Medication 100 MILLIGRAM(S): at 21:29

## 2025-01-25 RX ADMIN — LABETALOL HYDROCHLORIDE 300 MILLIGRAM(S): 300 TABLET, FILM COATED ORAL at 06:05

## 2025-01-25 RX ADMIN — IPRATROPIUM BROMIDE AND ALBUTEROL SULFATE 3 MILLILITER(S): .5; 2.5 SOLUTION RESPIRATORY (INHALATION) at 21:12

## 2025-01-25 RX ADMIN — OSELTAMIVIR PHOSPHATE 30 MILLIGRAM(S): 75 CAPSULE ORAL at 06:04

## 2025-01-25 RX ADMIN — Medication 40 MILLIGRAM(S): at 13:29

## 2025-01-25 RX ADMIN — OSELTAMIVIR PHOSPHATE 30 MILLIGRAM(S): 75 CAPSULE ORAL at 18:00

## 2025-01-25 RX ADMIN — APIXABAN 5 MILLIGRAM(S): 5 TABLET, FILM COATED ORAL at 06:05

## 2025-01-25 RX ADMIN — ATORVASTATIN CALCIUM 40 MILLIGRAM(S): 80 TABLET, FILM COATED ORAL at 21:29

## 2025-01-25 RX ADMIN — IPRATROPIUM BROMIDE AND ALBUTEROL SULFATE 3 MILLILITER(S): .5; 2.5 SOLUTION RESPIRATORY (INHALATION) at 04:20

## 2025-01-25 RX ADMIN — Medication 75 MILLIGRAM(S): at 11:46

## 2025-01-25 RX ADMIN — LABETALOL HYDROCHLORIDE 300 MILLIGRAM(S): 300 TABLET, FILM COATED ORAL at 18:00

## 2025-01-25 RX ADMIN — Medication 20 MILLIGRAM(S): at 06:33

## 2025-01-25 RX ADMIN — Medication 40 MILLIGRAM(S): at 06:04

## 2025-01-25 RX ADMIN — Medication 40 MILLIGRAM(S): at 21:29

## 2025-01-25 RX ADMIN — IPRATROPIUM BROMIDE AND ALBUTEROL SULFATE 3 MILLILITER(S): .5; 2.5 SOLUTION RESPIRATORY (INHALATION) at 14:48

## 2025-01-25 RX ADMIN — Medication 100 MILLIGRAM(S): at 06:04

## 2025-01-25 RX ADMIN — IPRATROPIUM BROMIDE AND ALBUTEROL SULFATE 3 MILLILITER(S): .5; 2.5 SOLUTION RESPIRATORY (INHALATION) at 08:59

## 2025-01-25 RX ADMIN — APIXABAN 5 MILLIGRAM(S): 5 TABLET, FILM COATED ORAL at 18:00

## 2025-01-25 NOTE — PHYSICAL THERAPY INITIAL EVALUATION ADULT - PERTINENT HX OF CURRENT PROBLEM, REHAB EVAL
Patient is a 78 yo M, originally from [facility], former cigarette smoker w/ PMHx of HTN, CKD, paroxysmal afib on eliquis, w/ hx of abnormal nuclear stress test s/p coronary angiogram and NOMAN to proximal and mid RCA (11/2022) presenting to [facility] from primary care office due to sob and hypoxia. Patient positive for Flu A.    #Acute hypoxic respiratory failure secondary to influenza A in a setting of underlying b/l inflitrates  -Patient O2 sat 89% on RA on admission. O2 weaned down to 3L  -CXR reviewed by me

## 2025-01-25 NOTE — PROGRESS NOTE ADULT - ASSESSMENT
Patient is a 80 yo M, originally from [facility], former cigarette smoker w/ PMHx of HTN, CKD, paroxysmal afib on eliquis, w/ hx of abnormal nuclear stress test s/p coronary angiogram and NOMAN to proximal and mid RCA (11/2022) presenting to [facility] from primary care office due to sob and hypoxia. Patient positive for Flu A.    #Acute hypoxic respiratory failure secondary to influenza A in a setting of underlying b/l inflitrates  -Patient O2 sat 89% on RA on admission. O2 weaned down to 3L  -CXR reviewed by me  - continue  Tamiflu, renally dosed   -continue  Solumedrol 40mg IV Q 8hrs   -Standing duonebs q6hrs   -wean off Oxygen as tolerated  - will ask Pulmonary to evaluate    #HTN  -Continue Labetalol, hydralazine  -monitor BP    #CKD 3  - creatinine around baseline  - Avoid nephrotoxic medications  - Monitor Creatinine closely    #Hyponatremia  - improved from 130 to 133  -Encourage po intake  -Monitor     #Hypokalemia  -resolved  -continue to Monitor     #Microcytic Anemia  -Monitor CBC  -low Iron, will supplement    #Paroxysmal afib on Eliquis  -Continue Eliquis  -Continue Labetalol   -Monitor on tele    #Hx of CAD s/p NOMAN  -Continue Plavix  -Continue Lipitor     #Chronic CHF not in acute exacerbation  -Per patient's family he was previously on 40mg daily prior to previous hospital admission however with renal function was switched to 20mg daily   - continue Lasix 20 mg daily for now and monitor closely  - proBNP improved from previous. Will give PRN IV lasix doses if needed    #DVT prophylaxis  -Eliquis    Message left for the daughter

## 2025-01-25 NOTE — PHYSICAL THERAPY INITIAL EVALUATION ADULT - ADDITIONAL COMMENTS
pt lives with wife and daughter in private home.  pt used SAC prior to admission and required assist for amb and alll ADL's.

## 2025-01-25 NOTE — PHYSICAL THERAPY INITIAL EVALUATION ADULT - GENERAL OBSERVATIONS, REHAB EVAL
pt received semi supine in bed. awake alert very [employer] [place]. +tele and pulse ox,+2 Lo2 via nc

## 2025-01-25 NOTE — PROGRESS NOTE ADULT - SUBJECTIVE AND OBJECTIVE BOX
CC: Patient is a 79y old  Male who presents with a chief complaint of Hypoxic due to flu (24 Jan 2025 18:18)      Interval History:  Patient seen and examined at bedside.  No overnight events  Patient states that he feels a little better    ALLERGIES:  No Known Allergies    MEDICATIONS  (STANDING):  albuterol/ipratropium for Nebulization 3 milliLiter(s) Nebulizer every 6 hours  apixaban 5 milliGRAM(s) Oral every 12 hours  atorvastatin 40 milliGRAM(s) Oral at bedtime  clopidogrel Tablet 75 milliGRAM(s) Oral daily  furosemide    Tablet 20 milliGRAM(s) Oral daily  hydrALAZINE 100 milliGRAM(s) Oral every 8 hours  labetalol 300 milliGRAM(s) Oral two times a day  methylPREDNISolone sodium succinate Injectable 40 milliGRAM(s) IV Push every 8 hours  oseltamivir 30 milliGRAM(s) Oral two times a day  sodium chloride 0.9%. 1000 milliLiter(s) (50 mL/Hr) IV Continuous <Continuous>    MEDICATIONS  (PRN):    Vital Signs Last 24 Hrs  T(F): 97.9 (25 Jan 2025 13:14), Max: 97.9 (25 Jan 2025 13:14)  HR: 66 (25 Jan 2025 13:14) (60 - 78)  BP: 152/78 (25 Jan 2025 13:14) (127/80 - 174/85)  RR: 18 (25 Jan 2025 13:14) (18 - 22)  SpO2: 95% (25 Jan 2025 13:14) (89% - 100%)  I&O's Summary    24 Jan 2025 07:01  -  25 Jan 2025 07:00  --------------------------------------------------------  IN: 500 mL / OUT: 400 mL / NET: 100 mL      BMI (kg/m2): 26.6 (01-24-25 @ 16:00)    PHYSICAL EXAM:  GENERAL: NAD, well-groomed, well-developed  EYES: L eye blindness mostly closed, EOMI, conjunctiva and sclera clear of right eye   ENMT: Moist mucous membranes, Good dentition, no thrush  NECK: Supple, No JVD  CHEST/LUNG: Diffuse wheezing and scattered rhonchi  to auscultation bilaterally  HEART: RRR; S1/S2, No murmur  ABDOMEN: Soft, Nontender, mildly distended; Bowel sounds present  EXTREMITIES: No calf tenderness, No cyanosis, No edema  SKIN: Warm, Intact. large exophytic lesion central chest old  NERVOUS SYSTEM:  A/O x3, No focal     LABS:                        8.9    3.20  )-----------( 176      ( 25 Jan 2025 08:36 )             27.6       01-25    133  |  95  |  51  ----------------------------<  151  3.8   |  27  |  1.79    Ca    8.1      25 Jan 2025 08:36  Mg     2.1     01-25    TPro  6.2  /  Alb  2.5  /  TBili  0.6  /  DBili  x   /  AST  43  /  ALT  31  /  AlkPhos  115  01-25          Lactate, Blood: 1.1 mmol/L (01-24 @ 16:30)                      POCT Blood Glucose.: 185 mg/dL (25 Jan 2025 11:45)  POCT Blood Glucose.: 163 mg/dL (25 Jan 2025 08:14)  POCT Blood Glucose.: 159 mg/dL (24 Jan 2025 22:41)      Urinalysis Basic - ( 25 Jan 2025 08:36 )    Color: x / Appearance: x / SG: x / pH: x  Gluc: 151 mg/dL / Ketone: x  / Bili: x / Urobili: x   Blood: x / Protein: x / Nitrite: x   Leuk Esterase: x / RBC: x / WBC x   Sq Epi: x / Non Sq Epi: x / Bacteria: x            Care Discussed with Consultants/Other Providers: Yes

## 2025-01-25 NOTE — PATIENT PROFILE ADULT - DO YOU LACK THE NECESSARY SUPPORT TO HELP YOU COPE WITH LIFE CHALLENGES?
Quality 137: Melanoma: Continuity Of Care - Recall System: Patient information entered into a recall system that includes: target date for the next exam specified AND a process to follow up with patients regarding missed or unscheduled appointments Detail Level: Detailed no

## 2025-01-25 NOTE — PATIENT PROFILE ADULT - FALL HARM RISK - HARM RISK INTERVENTIONS
Assistance with ambulation/Assistance OOB with selected safe patient handling equipment/Communicate Risk of Fall with Harm to all staff/Reinforce activity limits and safety measures with patient and family/Tailored Fall Risk Interventions/Visual Cue: Yellow wristband and red socks/Bed in lowest position, wheels locked, appropriate side rails in place/Call bell, personal items and telephone in reach/Instruct patient to call for assistance before getting out of bed or chair/Non-slip footwear when patient is out of bed/[place] to call system/Physically safe environment - no spills, clutter or unnecessary equipment/Purposeful Proactive Rounding/Room/bathroom lighting operational, light cord in reach Assistance with ambulation/Assistance OOB with selected safe patient handling equipment/Communicate Risk of Fall with Harm to all staff/Discuss with provider need for PT consult/Monitor gait and stability/Provide patient with walking aids - walker, cane, crutches/Reinforce activity limits and safety measures with patient and family/Tailored Fall Risk Interventions/Visual Cue: Yellow wristband and red socks/Bed in lowest position, wheels locked, appropriate side rails in place/Call bell, personal items and telephone in reach/Instruct patient to call for assistance before getting out of bed or chair/Non-slip footwear when patient is out of bed/[place] to call system/Physically safe environment - no spills, clutter or unnecessary equipment/Purposeful Proactive Rounding/Room/bathroom lighting operational, light cord in reach

## 2025-01-25 NOTE — PATIENT PROFILE ADULT - VISION (WITH CORRECTIVE LENSES IF THE PATIENT USUALLY WEARS THEM):
Normal vision: sees adequately in most situations; can see medication labels, newsprint pt state, left eye can not see. 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

## 2025-01-26 LAB
ALBUMIN SERPL ELPH-MCNC: 2.6 G/DL — LOW (ref 3.3–5)
ALP SERPL-CCNC: 112 U/L — SIGNIFICANT CHANGE UP (ref 40–120)
ALT FLD-CCNC: 30 U/L — SIGNIFICANT CHANGE UP (ref 10–45)
ANION GAP SERPL CALC-SCNC: 12 MMOL/L — SIGNIFICANT CHANGE UP (ref 5–17)
AST SERPL-CCNC: 39 U/L — SIGNIFICANT CHANGE UP (ref 10–40)
BILIRUB SERPL-MCNC: 0.7 MG/DL — SIGNIFICANT CHANGE UP (ref 0.2–1.2)
BUN SERPL-MCNC: 67 MG/DL — HIGH (ref 7–23)
CALCIUM SERPL-MCNC: 8.4 MG/DL — SIGNIFICANT CHANGE UP (ref 8.4–10.5)
CHLORIDE SERPL-SCNC: 97 MMOL/L — SIGNIFICANT CHANGE UP (ref 96–108)
CO2 SERPL-SCNC: 27 MMOL/L — SIGNIFICANT CHANGE UP (ref 22–31)
CREAT SERPL-MCNC: 2.02 MG/DL — HIGH (ref 0.5–1.3)
EGFR: 33 ML/MIN/1.73M2 — LOW
GLUCOSE BLDC GLUCOMTR-MCNC: 190 MG/DL — HIGH (ref 70–99)
GLUCOSE SERPL-MCNC: 159 MG/DL — HIGH (ref 70–99)
HCT VFR BLD CALC: 27.8 % — LOW (ref 39–50)
HGB BLD-MCNC: 9 G/DL — LOW (ref 13–17)
MCHC RBC-ENTMCNC: 25.5 PG — LOW (ref 27–34)
MCHC RBC-ENTMCNC: 32.4 G/DL — SIGNIFICANT CHANGE UP (ref 32–36)
MCV RBC AUTO: 78.8 FL — LOW (ref 80–100)
NRBC # BLD: 0 /100 WBCS — SIGNIFICANT CHANGE UP (ref 0–0)
NRBC BLD-RTO: 0 /100 WBCS — SIGNIFICANT CHANGE UP (ref 0–0)
PLATELET # BLD AUTO: 177 K/UL — SIGNIFICANT CHANGE UP (ref 150–400)
POTASSIUM SERPL-MCNC: 3.8 MMOL/L — SIGNIFICANT CHANGE UP (ref 3.5–5.3)
POTASSIUM SERPL-SCNC: 3.8 MMOL/L — SIGNIFICANT CHANGE UP (ref 3.5–5.3)
PROT SERPL-MCNC: 6.3 G/DL — SIGNIFICANT CHANGE UP (ref 6–8.3)
RBC # BLD: 3.53 M/UL — LOW (ref 4.2–5.8)
RBC # FLD: 17 % — HIGH (ref 10.3–14.5)
SODIUM SERPL-SCNC: 136 MMOL/L — SIGNIFICANT CHANGE UP (ref 135–145)
TROPONIN I, HIGH SENSITIVITY RESULT: 1600.6 NG/L — HIGH
TROPONIN I, HIGH SENSITIVITY RESULT: 2444.2 NG/L — HIGH
TROPONIN I, HIGH SENSITIVITY RESULT: 291.8 NG/L — HIGH
TROPONIN I, HIGH SENSITIVITY RESULT: 570.8 NG/L — HIGH
WBC # BLD: 7.14 K/UL — SIGNIFICANT CHANGE UP (ref 3.8–10.5)
WBC # FLD AUTO: 7.14 K/UL — SIGNIFICANT CHANGE UP (ref 3.8–10.5)

## 2025-01-26 PROCEDURE — 93010 ELECTROCARDIOGRAM REPORT: CPT

## 2025-01-26 PROCEDURE — 99233 SBSQ HOSP IP/OBS HIGH 50: CPT

## 2025-01-26 PROCEDURE — 93308 TTE F-UP OR LMTD: CPT | Mod: 26

## 2025-01-26 PROCEDURE — 99222 1ST HOSP IP/OBS MODERATE 55: CPT

## 2025-01-26 RX ORDER — DILTIAZEM HYDROCHLORIDE 60 MG/1
10 TABLET ORAL ONCE
Refills: 0 | Status: COMPLETED | OUTPATIENT
Start: 2025-01-26 | End: 2025-01-26

## 2025-01-26 RX ORDER — POLYETHYLENE GLYCOL 3350 17 G/17G
17 POWDER, FOR SOLUTION ORAL DAILY
Refills: 0 | Status: DISCONTINUED | OUTPATIENT
Start: 2025-01-26 | End: 2025-01-30

## 2025-01-26 RX ORDER — ACETAMINOPHEN 160 MG/5ML
650 SUSPENSION ORAL ONCE
Refills: 0 | Status: COMPLETED | OUTPATIENT
Start: 2025-01-26 | End: 2025-01-26

## 2025-01-26 RX ORDER — BISACODYL 5 MG
5 TABLET, DELAYED RELEASE (ENTERIC COATED) ORAL EVERY 12 HOURS
Refills: 0 | Status: DISCONTINUED | OUTPATIENT
Start: 2025-01-26 | End: 2025-01-30

## 2025-01-26 RX ORDER — METOPROLOL SUCCINATE 25 MG
5 TABLET, EXTENDED RELEASE 24 HR ORAL ONCE
Refills: 0 | Status: COMPLETED | OUTPATIENT
Start: 2025-01-26 | End: 2025-01-26

## 2025-01-26 RX ORDER — LABETALOL HYDROCHLORIDE 300 MG/1
400 TABLET, FILM COATED ORAL
Refills: 0 | Status: DISCONTINUED | OUTPATIENT
Start: 2025-01-26 | End: 2025-02-04

## 2025-01-26 RX ADMIN — DILTIAZEM HYDROCHLORIDE 10 MILLIGRAM(S): 60 TABLET ORAL at 01:44

## 2025-01-26 RX ADMIN — ACETAMINOPHEN 650 MILLIGRAM(S): 160 SUSPENSION ORAL at 00:28

## 2025-01-26 RX ADMIN — Medication 5 MILLIGRAM(S): at 01:00

## 2025-01-26 RX ADMIN — OSELTAMIVIR PHOSPHATE 30 MILLIGRAM(S): 75 CAPSULE ORAL at 05:28

## 2025-01-26 RX ADMIN — POLYETHYLENE GLYCOL 3350 17 GRAM(S): 17 POWDER, FOR SOLUTION ORAL at 14:42

## 2025-01-26 RX ADMIN — APIXABAN 5 MILLIGRAM(S): 5 TABLET, FILM COATED ORAL at 05:06

## 2025-01-26 RX ADMIN — Medication 100 MILLIGRAM(S): at 05:06

## 2025-01-26 RX ADMIN — ATORVASTATIN CALCIUM 40 MILLIGRAM(S): 80 TABLET, FILM COATED ORAL at 21:13

## 2025-01-26 RX ADMIN — Medication 100 MILLIGRAM(S): at 21:13

## 2025-01-26 RX ADMIN — OSELTAMIVIR PHOSPHATE 30 MILLIGRAM(S): 75 CAPSULE ORAL at 17:45

## 2025-01-26 RX ADMIN — LABETALOL HYDROCHLORIDE 400 MILLIGRAM(S): 300 TABLET, FILM COATED ORAL at 17:47

## 2025-01-26 RX ADMIN — Medication 0.63 MILLIGRAM(S): at 21:16

## 2025-01-26 RX ADMIN — Medication 75 MILLIGRAM(S): at 12:08

## 2025-01-26 RX ADMIN — ACETAMINOPHEN 650 MILLIGRAM(S): 160 SUSPENSION ORAL at 01:28

## 2025-01-26 RX ADMIN — Medication 100 MILLIGRAM(S): at 14:55

## 2025-01-26 RX ADMIN — Medication 40 MILLIGRAM(S): at 05:06

## 2025-01-26 RX ADMIN — Medication 20 MILLIGRAM(S): at 05:30

## 2025-01-26 RX ADMIN — APIXABAN 5 MILLIGRAM(S): 5 TABLET, FILM COATED ORAL at 17:45

## 2025-01-26 RX ADMIN — Medication 40 MILLIGRAM(S): at 12:08

## 2025-01-26 RX ADMIN — Medication 500 MICROGRAM(S): at 22:47

## 2025-01-26 RX ADMIN — Medication 325 MILLIGRAM(S): at 12:08

## 2025-01-26 RX ADMIN — LABETALOL HYDROCHLORIDE 300 MILLIGRAM(S): 300 TABLET, FILM COATED ORAL at 05:28

## 2025-01-26 NOTE — CHART NOTE - NSCHARTNOTEFT_GEN_A_CORE
Notified by RN, patient with , BP: 144/88, afebrile, sating 93% on 3L NC, additionally patient was complaining of sore throat, b/l ear pain, and palpitations. Went to examine patient at bedside. Patient lying comfortably in bed. Patient endorsed to chest pain and sore throat. Ordered stat EKG and troponin. Spoke with the tele tech, patient in AFib in 120s. Ordered Lopressor IV 5mg. Patient continued to be in 120s, ordered Cardizem 10mg IV.   Troponin: 291, likely demand ischemia.     Discussed with Dr. Waddell.

## 2025-01-26 NOTE — PROVIDER CONTACT NOTE (CRITICAL VALUE NOTIFICATION) - BACKGROUND
Pt is a 78 y/o male. Admit dx + Influenza A. PMHx CAD, Diabetic, vitreous hemorrhage, Hypokalemia, DM2 Left ventricular hypertrophy, HTN
Patient positive influenza, came in with sob and hypoxia. Patient denies pain, no distress.

## 2025-01-26 NOTE — PROVIDER CONTACT NOTE (CRITICAL VALUE NOTIFICATION) - SITUATION
Patient positive influenza, came in with sob and hypoxia. Patient denies pain, no distress.
Pt's troponin 291. 8

## 2025-01-26 NOTE — CONSULT NOTE ADULT - ASSESSMENT
Assessment:  David Faye is a 79 year old man with past medical history of Coronary artery disease (s/p NOMAN to proximal and mid RCA in 2022) with brief Paroxysmal atrial fibrillation (on Eliquis), Hypertension and Chronic kidney disease with recent hospitalization here earlier this month for pneumonia and congestive heart failure now presents with progressive dyspnea, found to have acute hypoxic respiratory failure secondary to Influenza A pneumonia.    ECG consistent with atrial fibrillation with RVR and rate related LBBB of which patient has a history of. Troponins elevated, likely demand ischemia from sepsis from pneumonia, rapid atrial fibrillation and CKD. CXR with diffuse infiltrates. Pro BNP elevated from lung disease and cardiomyopathy. Prior coronary angiogram in 11/2022 consistent with triple vessel CAD s/p 2 NOMAN to pRCA and mRCA. Recent echo report with normal LVEF 55-60%, moderate LVH and mild-moderate valvular disease.     Recommendations:  [] Acute hypoxic respiratory failure/Influenza A Pneumonia: Management per primary team, continue supplemental oxygen, Oseltamivir and IV steroids, recommend Infectious Disease consult due to recurrent admissions with pneumonia  [] HFpEF: No signs of volume overload at this time. Due to BRUNILDA on CKD. Follow up limited echo to evaluate LVEF. Outpatient amyloid scan.   [] Elevated troponins, CAD: Known multivessel CAD, has residual LAD, D1 and chronic occlusion of LCx that was medically managed by interventional cardiology. He is s/p NOMAN of RCA. Continue Plavix 75 mg daily, Atorvastatin 40 mg daily. Plan for outpatient nuclear stress test when recovers from pneumonia.   [] Hypertension: BP stable. Continue Labetalol 300 mg BID, Hydralazine 100 mg q8h.   [] Paroxysmal atrial fibrillation, rate related LBBB: Rates elevated due to sepsis. Continue beta blocker as dosed for now. Continue Eliquis for stroke prophylaxis. Treatment of sepsis per above    We will continue to follow along.    Bharati Albright MD  Cardiology   
80 yo M, originally from [facility], former cigarette smoker w/ PMHx of HTN, CKD, paroxysmal afib on eliquis, w/ hx of abnormal nuclear stress test s/p coronary angiogram and NOMAN to proximal and mid RCA (11/2022) presenting to [facility] from primary care office due to sob and hypoxia. Patient positive for Flu A. Pulmonary consulted for acute hypoxia. Clinically appears euvolemic. Symptoms likely due to acute viral illness.       Assessment:  1. Influenza A infection  2. Acute hypoxia  3. HFPEF   4. Paroxysmal Afib    Plan:  - Cont treatment for influenza   - D/c steroids  - Cont. Bronchodilators  - Maintain euvolemic volume status  - Titrate down oxygen as tolerated   - Oxygen support to maintain saturation > 92%  - DVT prophylaxis  - Rest of management per primary team

## 2025-01-26 NOTE — PROGRESS NOTE ADULT - ASSESSMENT
Patient is a 78 yo M, originally from [facility], former cigarette smoker w/ PMHx of HTN, CKD, paroxysmal afib on eliquis, w/ hx of abnormal nuclear stress test s/p coronary angiogram and NOMAN to proximal and mid RCA (11/2022) presenting to [facility] from primary care office due to sob and hypoxia. Patient positive for Flu A.    #Paroxysmal afib on Eliquis  - went into rapid a/fib overnight, HR now improved but still elevated  -Continue Eliquis  -Increase Labetalol  to 400 mg bid  -Monitor on tele    # elevated troponin/Chest pain  in a setting of CAD and rapid a/fib  - cardiology consult appreciated  - continue Plavix, Lipitor, b blocker  - adjust medications for rate contro  - repeat ECHO pending      #Acute hypoxic respiratory failure secondary to influenza A in a setting of underlying b/l infiltrates.  -Patient O2 sat 89% on RA on admission. O2 weaned down to 3L and remains on 3L  -CXR reviewed by me  - continue  Tamiflu  -continue  Solumedrol 40mg IV Q 8hrs   -Standing duonebs q6hrs   -wean off Oxygen as tolerated  - Pulmonary to evaluate    #HTN  -Continue Labetalol, hydralazine  -monitor BP    #CKD 3  - creatinine around baseline  - Avoid nephrotoxic medications  - Monitor Creatinine closely    #Hyponatremia  - improved from 130 to 133 > 136  -Encourage po intake  -Monitor     #Hypokalemia  -resolved  -continue to Monitor     #Microcytic Anemia  -Monitor CBC  -low Iron, will supplement        #Hx of CAD s/p NOMAN  -Continue Plavix  -Continue Lipitor     #Chronic CHF not in acute exacerbation  -Per patient's family he was previously on 40mg daily prior to previous hospital admission however with renal function was switched to 20mg daily   - continue Lasix 20 mg daily for now   - will give additional  dose of Lasix 40 mg IV today and monitor    #DVT prophylaxis  -Eliquis    Plan of care discussed with patient's daughter Yoselin over the phone

## 2025-01-26 NOTE — PROGRESS NOTE ADULT - SUBJECTIVE AND OBJECTIVE BOX
CC: Patient is a 79y old  Male who presents with a chief complaint of Hypoxic due to flu (26 Jan 2025 09:57)      Interval History:  Patient seen and examined at bedside.  overnight developed CP, went into rapid A/fib. Was treated with improvement in HR and chest pain. Now reports that CP is very mild  EKG without acute ischemic changes.   Troponin elevated.   Denies worsening SOB  No abd pain, N/V/D    ALLERGIES:  No Known Allergies    MEDICATIONS  (STANDING):  apixaban 5 milliGRAM(s) Oral every 12 hours  atorvastatin 40 milliGRAM(s) Oral at bedtime  clopidogrel Tablet 75 milliGRAM(s) Oral daily  ferrous    sulfate 325 milliGRAM(s) Oral daily  furosemide    Tablet 20 milliGRAM(s) Oral daily  hydrALAZINE 100 milliGRAM(s) Oral every 8 hours  ipratropium    for Nebulization 500 MICROGram(s) Nebulizer every 6 hours  labetalol 300 milliGRAM(s) Oral two times a day  levalbuterol Inhalation 0.63 milliGRAM(s) Inhalation every 6 hours  methylPREDNISolone sodium succinate Injectable 40 milliGRAM(s) IV Push every 8 hours  oseltamivir 30 milliGRAM(s) Oral two times a day  sodium chloride 0.9%. 1000 milliLiter(s) (50 mL/Hr) IV Continuous <Continuous>    MEDICATIONS  (PRN):    Vital Signs Last 24 Hrs  T(F): 97.3 (26 Jan 2025 05:03), Max: 97.9 (25 Jan 2025 13:14)  HR: 116 (26 Jan 2025 05:03) (66 - 125)  BP: 128/83 (26 Jan 2025 05:03) (124/89 - 162/83)  RR: 18 (26 Jan 2025 05:03) (18 - 20)  SpO2: 95% (26 Jan 2025 05:03) (92% - 99%)  I&O's Summary    25 Jan 2025 07:01  -  26 Jan 2025 07:00  --------------------------------------------------------  IN: 0 mL / OUT: 900 mL / NET: -900 mL      BMI (kg/m2): 26.6 (01-24-25 @ 16:00)    PHYSICAL EXAM:    GENERAL: NAD, well-groomed, well-developed  EYES: L eye blindness mostly closed, EOMI, conjunctiva and sclera clear of right eye   ENMT: Moist mucous membranes, Good dentition, no thrush  NECK: Supple, No JVD  CHEST/LUNG: Diffuse wheezing and scattered rhonchi  to auscultation bilaterally  HEART: RRR; S1/S2, No murmur  ABDOMEN: Soft, Nontender, mildly distended; Bowel sounds present  EXTREMITIES: No calf tenderness, No cyanosis, No edema  SKIN: Warm, Intact. large exophytic lesion central chest old  NERVOUS SYSTEM:  A/O x3, No focal deficits  LABS:                        9.0    7.14  )-----------( 177      ( 26 Jan 2025 06:56 )             27.8       01-26    136  |  97  |  67  ----------------------------<  159  3.8   |  27  |  2.02    Ca    8.4      26 Jan 2025 06:56  Mg     2.1     01-25    TPro  6.3  /  Alb  2.6  /  TBili  0.7  /  DBili  x   /  AST  39  /  ALT  30  /  AlkPhos  112  01-26          Lactate, Blood: 1.1 mmol/L (01-24 @ 16:30)    CARDIAC MARKERS ( 26 Jan 2025 06:56 )  x     / 570.8 ng/L / x     / x     / x      CARDIAC MARKERS ( 26 Jan 2025 00:30 )  x     / 291.8 ng/L / x     / x     / x                        POCT Blood Glucose.: 190 mg/dL (26 Jan 2025 00:14)      Urinalysis Basic - ( 26 Jan 2025 06:56 )    Color: x / Appearance: x / SG: x / pH: x  Gluc: 159 mg/dL / Ketone: x  / Bili: x / Urobili: x   Blood: x / Protein: x / Nitrite: x   Leuk Esterase: x / RBC: x / WBC x   Sq Epi: x / Non Sq Epi: x / Bacteria: x            Care Discussed with Consultants/Other Providers: Yes. Cardiology, pulmonology

## 2025-01-26 NOTE — PROVIDER CONTACT NOTE (CRITICAL VALUE NOTIFICATION) - ASSESSMENT
Labs
Pt c/o generalized chest pain, HA, Ear Pain, sore throat at 23:58 on 1/25/2025. VS taken see flowsheet. MS came to assess pt at bedside

## 2025-01-27 LAB
ALBUMIN SERPL ELPH-MCNC: 2.6 G/DL — LOW (ref 3.3–5)
ALP SERPL-CCNC: 108 U/L — SIGNIFICANT CHANGE UP (ref 40–120)
ALT FLD-CCNC: 35 U/L — SIGNIFICANT CHANGE UP (ref 10–45)
ANION GAP SERPL CALC-SCNC: 12 MMOL/L — SIGNIFICANT CHANGE UP (ref 5–17)
AST SERPL-CCNC: 43 U/L — HIGH (ref 10–40)
BILIRUB SERPL-MCNC: 0.7 MG/DL — SIGNIFICANT CHANGE UP (ref 0.2–1.2)
BUN SERPL-MCNC: 77 MG/DL — HIGH (ref 7–23)
CALCIUM SERPL-MCNC: 8 MG/DL — LOW (ref 8.4–10.5)
CHLORIDE SERPL-SCNC: 99 MMOL/L — SIGNIFICANT CHANGE UP (ref 96–108)
CO2 SERPL-SCNC: 29 MMOL/L — SIGNIFICANT CHANGE UP (ref 22–31)
CREAT SERPL-MCNC: 2.07 MG/DL — HIGH (ref 0.5–1.3)
EGFR: 32 ML/MIN/1.73M2 — LOW
GLUCOSE SERPL-MCNC: 158 MG/DL — HIGH (ref 70–99)
HCT VFR BLD CALC: 26.4 % — LOW (ref 39–50)
HGB BLD-MCNC: 8.6 G/DL — LOW (ref 13–17)
MCHC RBC-ENTMCNC: 25.7 PG — LOW (ref 27–34)
MCHC RBC-ENTMCNC: 32.6 G/DL — SIGNIFICANT CHANGE UP (ref 32–36)
MCV RBC AUTO: 79 FL — LOW (ref 80–100)
NRBC # BLD: 0 /100 WBCS — SIGNIFICANT CHANGE UP (ref 0–0)
NRBC BLD-RTO: 0 /100 WBCS — SIGNIFICANT CHANGE UP (ref 0–0)
NT-PROBNP SERPL-SCNC: HIGH PG/ML (ref 0–300)
PLATELET # BLD AUTO: 166 K/UL — SIGNIFICANT CHANGE UP (ref 150–400)
POTASSIUM SERPL-MCNC: 3.5 MMOL/L — SIGNIFICANT CHANGE UP (ref 3.5–5.3)
POTASSIUM SERPL-SCNC: 3.5 MMOL/L — SIGNIFICANT CHANGE UP (ref 3.5–5.3)
PROT SERPL-MCNC: 6.1 G/DL — SIGNIFICANT CHANGE UP (ref 6–8.3)
RBC # BLD: 3.34 M/UL — LOW (ref 4.2–5.8)
RBC # FLD: 17 % — HIGH (ref 10.3–14.5)
SODIUM SERPL-SCNC: 140 MMOL/L — SIGNIFICANT CHANGE UP (ref 135–145)
TROPONIN I, HIGH SENSITIVITY RESULT: 1654.3 NG/L — HIGH
WBC # BLD: 8.54 K/UL — SIGNIFICANT CHANGE UP (ref 3.8–10.5)
WBC # FLD AUTO: 8.54 K/UL — SIGNIFICANT CHANGE UP (ref 3.8–10.5)

## 2025-01-27 PROCEDURE — 99232 SBSQ HOSP IP/OBS MODERATE 35: CPT

## 2025-01-27 PROCEDURE — 99233 SBSQ HOSP IP/OBS HIGH 50: CPT

## 2025-01-27 RX ORDER — BISACODYL 5 MG
10 TABLET, DELAYED RELEASE (ENTERIC COATED) ORAL DAILY
Refills: 0 | Status: DISCONTINUED | OUTPATIENT
Start: 2025-01-27 | End: 2025-02-04

## 2025-01-27 RX ORDER — METOPROLOL SUCCINATE 25 MG
5 TABLET, EXTENDED RELEASE 24 HR ORAL ONCE
Refills: 0 | Status: COMPLETED | OUTPATIENT
Start: 2025-01-27 | End: 2025-01-27

## 2025-01-27 RX ORDER — ACETAMINOPHEN 160 MG/5ML
650 SUSPENSION ORAL ONCE
Refills: 0 | Status: COMPLETED | OUTPATIENT
Start: 2025-01-27 | End: 2025-01-27

## 2025-01-27 RX ORDER — POLYETHYLENE GLYCOL 3350 17 G/17G
17 POWDER, FOR SOLUTION ORAL DAILY
Refills: 0 | Status: DISCONTINUED | OUTPATIENT
Start: 2025-01-27 | End: 2025-02-04

## 2025-01-27 RX ORDER — PHENOL 1.4 %
1 AEROSOL, SPRAY (ML) MUCOUS MEMBRANE ONCE
Refills: 0 | Status: COMPLETED | OUTPATIENT
Start: 2025-01-27 | End: 2025-01-28

## 2025-01-27 RX ADMIN — Medication 500 MICROGRAM(S): at 04:24

## 2025-01-27 RX ADMIN — Medication 500 MICROGRAM(S): at 08:29

## 2025-01-27 RX ADMIN — Medication 0.63 MILLIGRAM(S): at 08:29

## 2025-01-27 RX ADMIN — ATORVASTATIN CALCIUM 40 MILLIGRAM(S): 80 TABLET, FILM COATED ORAL at 21:29

## 2025-01-27 RX ADMIN — Medication 20 MILLIGRAM(S): at 05:13

## 2025-01-27 RX ADMIN — APIXABAN 5 MILLIGRAM(S): 5 TABLET, FILM COATED ORAL at 05:07

## 2025-01-27 RX ADMIN — Medication 100 MILLIGRAM(S): at 13:35

## 2025-01-27 RX ADMIN — Medication 0.63 MILLIGRAM(S): at 21:55

## 2025-01-27 RX ADMIN — Medication 5 MILLIGRAM(S): at 20:27

## 2025-01-27 RX ADMIN — Medication 100 MILLIGRAM(S): at 21:29

## 2025-01-27 RX ADMIN — Medication 75 MILLIGRAM(S): at 12:07

## 2025-01-27 RX ADMIN — LABETALOL HYDROCHLORIDE 400 MILLIGRAM(S): 300 TABLET, FILM COATED ORAL at 18:17

## 2025-01-27 RX ADMIN — OSELTAMIVIR PHOSPHATE 30 MILLIGRAM(S): 75 CAPSULE ORAL at 18:18

## 2025-01-27 RX ADMIN — Medication 500 MICROGRAM(S): at 21:55

## 2025-01-27 RX ADMIN — ACETAMINOPHEN 650 MILLIGRAM(S): 160 SUSPENSION ORAL at 21:27

## 2025-01-27 RX ADMIN — APIXABAN 5 MILLIGRAM(S): 5 TABLET, FILM COATED ORAL at 18:18

## 2025-01-27 RX ADMIN — ACETAMINOPHEN 650 MILLIGRAM(S): 160 SUSPENSION ORAL at 20:27

## 2025-01-27 RX ADMIN — OSELTAMIVIR PHOSPHATE 30 MILLIGRAM(S): 75 CAPSULE ORAL at 05:13

## 2025-01-27 RX ADMIN — Medication 500 MICROGRAM(S): at 15:13

## 2025-01-27 RX ADMIN — POLYETHYLENE GLYCOL 3350 17 GRAM(S): 17 POWDER, FOR SOLUTION ORAL at 12:07

## 2025-01-27 RX ADMIN — Medication 100 MILLIGRAM(S): at 05:07

## 2025-01-27 RX ADMIN — Medication 0.63 MILLIGRAM(S): at 04:24

## 2025-01-27 RX ADMIN — LABETALOL HYDROCHLORIDE 400 MILLIGRAM(S): 300 TABLET, FILM COATED ORAL at 05:08

## 2025-01-27 RX ADMIN — Medication 0.63 MILLIGRAM(S): at 15:13

## 2025-01-27 RX ADMIN — Medication 325 MILLIGRAM(S): at 12:07

## 2025-01-27 NOTE — PROGRESS NOTE ADULT - ASSESSMENT
Physical Examination:  GENERAL:               Alert, Oriented, No acute distress.    HEENT:                   Missing left eye, right eye reactive to light.  Symmetric. No JVD, Moist MM  PULM:                     Bilateral air entry, No Rales, No Rhonchi, No Wheezing  CVS:                         S1, S2,  No Murmur  ABD:                        Soft, nondistended, nontender, normoactive bowel sounds,   EXT:                         No edema, nontender, No Cyanosis or Clubbing   Vascular:                Warm Extremities, Normal Capillary refill, Normal Distal Pulses  SKIN:                       Warm and well perfused, no rashes noted.   NEURO:                  Alert, oriented, interactive, nonfocal, follows commands  PSYC:                      Calm, + Insight.    78 yo M, originally from [facility], former cigarette smoker w/ PMHx of HTN, CKD, paroxysmal afib on eliquis, w/ hx of abnormal nuclear stress test s/p coronary angiogram and NOMAN to proximal and mid RCA (11/2022) presenting to [facility] from primary care office due to sob and hypoxia. Patient positive for Flu A. Pulmonary consulted for acute hypoxia. Clinically appears euvolemic. Symptoms likely due to acute viral illness.       Assessment:  1. Influenza A infection  2. Acute hypoxia  3. HFPEF   4. Paroxysmal Afib    Plan:  - Cont treatment for influenza as ordered  - Cont. Bronchodilators  - Maintain euvolemic volume status  - Titrate down oxygen as tolerated   - Oxygen support to maintain saturation > 92%  - DVT prophylaxis  - Rest of management per primary team

## 2025-01-27 NOTE — PROGRESS NOTE ADULT - ASSESSMENT
Assessment:  David Faye is a 79 year old man with past medical history of Coronary artery disease (s/p NOMAN to proximal and mid RCA in 2022) with brief Paroxysmal atrial fibrillation (on Eliquis), Hypertension and Chronic kidney disease with recent hospitalization here earlier this month for pneumonia and congestive heart failure now presents with progressive dyspnea, found to have acute hypoxic respiratory failure secondary to Influenza A pneumonia, also with rapid atrial fibrillation and mild cardiomyopathy.     ECG consistent with atrial fibrillation with RVR and rate related LBBB of which patient has a history of. Troponins elevated, likely demand ischemia from sepsis from pneumonia, rapid atrial fibrillation and CKD. CXR with diffuse infiltrates. Pro BNP elevated from lung disease and cardiomyopathy. Prior coronary angiogram in 11/2022 consistent with triple vessel CAD s/p 2 NOMAN to pRCA and mRCA. Recent echo report with normal LVEF 55-60%, moderate LVH and mild-moderate valvular disease.     Repeat echo with LVEF 45-50% and moderate mitral regurgitation.    Recommendations:  [] Acute hypoxic respiratory failure/Influenza A Pneumonia: Management per primary team and Pulmonary, continue supplemental oxygen, Oseltamivir and IV steroids, recommend Infectious Disease consult due to recurrent admissions with pneumonia.   [] HFmrEF: Likely tachy-mediated. No signs of volume overload at this time. Avoid guideline directed medical therapy due to BRUNILDA on CKD. Plan for repeat echo prior to discharge. Outpatient amyloid scan.   [] Elevated troponins, CAD: Known multivessel CAD, has residual LAD, D1 and chronic occlusion of LCx that was medically managed by interventional cardiology. He is s/p NOMAN of RCA. Continue Plavix 75 mg daily, Atorvastatin 40 mg daily. Plan for outpatient nuclear stress test when recovers from pneumonia.   [] Hypertension: BP stable. Continue Labetalol 400 mg BID, Hydralazine 100 mg q8h.   [] Paroxysmal atrial fibrillation, rate related LBBB: Now converted to sinus rhythm. Continue beta blocker. Continue Eliquis for stroke prophylaxis. Treatment of sepsis per above    We will continue to follow along.    Bharati Albright MD  Cardiology

## 2025-01-27 NOTE — PROGRESS NOTE ADULT - SUBJECTIVE AND OBJECTIVE BOX
SEMAJ ROBERT  [id number]      Chief Complaint: Influenza A Pneumonia/HFmrEF/NSTEMI    Interval History: The patient reports breathing is improving. Denies chest pain.     Tele: sinus rhythm 60s BPM      Current meds:   apixaban 5 milliGRAM(s) Oral every 12 hours  atorvastatin 40 milliGRAM(s) Oral at bedtime  bisacodyl 5 milliGRAM(s) Oral every 12 hours PRN  clopidogrel Tablet 75 milliGRAM(s) Oral daily  ferrous    sulfate 325 milliGRAM(s) Oral daily  furosemide    Tablet 20 milliGRAM(s) Oral daily  hydrALAZINE 100 milliGRAM(s) Oral every 8 hours  ipratropium    for Nebulization 500 MICROGram(s) Nebulizer every 6 hours  labetalol 400 milliGRAM(s) Oral two times a day  levalbuterol Inhalation 0.63 milliGRAM(s) Inhalation every 6 hours  oseltamivir 30 milliGRAM(s) Oral two times a day  polyethylene glycol 3350 17 Gram(s) Oral daily  sodium chloride 0.9%. 1000 milliLiter(s) IV Continuous <Continuous>      Objective:     Vital Signs:   T(C): 36.6 (01-27-25 @ 05:02), Max: 36.6 (01-26-25 @ 21:01)  HR: 74 (01-27-25 @ 08:30) (72 - 76)  BP: 158/79 (01-27-25 @ 05:02) (132/82 - 158/79)  RR: 17 (01-27-25 @ 05:02) (15 - 18)  SpO2: 92% (01-27-25 @ 08:30) (92% - 100%)  Wt(kg): --    Physical Exam:   General: fatigued   Neck: supple   CVS: JVP ~ 7 cm H20, RRR, s1, s2  Pulm: decreased breath sounds  Ext: no lower extremity edema b/l   Neuro: awake, alert and oriented  Psych: Normal affect        Labs:   27 Jan 2025 06:50    140    |  99     |  77     ----------------------------<  158    3.5     |  29     |  2.07     Ca    8.0        27 Jan 2025 06:50    TPro  6.1    /  Alb  2.6    /  TBili  0.7    /  DBili  x      /  AST  43     /  ALT  35     /  AlkPhos  108    27 Jan 2025 06:50                          8.6    8.54  )-----------( 166      ( 27 Jan 2025 06:50 )             26.4             Coronary angiogram (11/2022):  LM: minor irregularities  LAD: 70% stenosis  D1: 80% stenosis   LCx: complete occlusion  RCA: 80% stenosis s/p NOMAN    TTE (1/5/25):  LVEF 55-60%, moderate LVH  Normal RV size and function   Mild MR, Mild TR, Mild-moderate AR    TTE (1/26/25):   1. Limited echocardiogram performed.   2. The heart rate is irregular and tachycardic throughout the study. Consider repeating echocardiogram when patient is no longer tachycardic for more accurate assessment of LVEF, if clinically indicated.   3. Left ventricular systolic function is mildly decreased with an   ejection fraction of 45 % by Jordan's method of disks with an ejection fraction visually estimated at 45 to 50 %.   4. Mild global left ventricle hypokinesis.   5. Mildly enlarged right ventricle with low normal right ventricle   systolic function.   6. Left atrium is severely dilated.   7. The right atrium is dilated.   8. Moderate mitral regurgitation.      ECG (1/26/25): atrial fibrillation with RVR, rate related LBBB

## 2025-01-27 NOTE — PROGRESS NOTE ADULT - SUBJECTIVE AND OBJECTIVE BOX
Follow-up Pulmonary Progress Note  Chief Complaint : Influenza with other respiratory manifestations, other influenza virus identified    Comfortable and not in distress. States breathing is improved.     Allergies :No Known Allergies      PAST MEDICAL & SURGICAL HISTORY:  H/O: HTN (hypertension)    CAD (coronary artery disease)    Diabetic vitreous hemorrhage    Hypokalemia    Type 2 diabetes mellitus    Left ventricular hypertrophy    History of alcohol withdrawal delirium    S/P cardiac catheterization  11/17 3 stents to RCA        Medications:  MEDICATIONS  (STANDING):  apixaban 5 milliGRAM(s) Oral every 12 hours  atorvastatin 40 milliGRAM(s) Oral at bedtime  clopidogrel Tablet 75 milliGRAM(s) Oral daily  ferrous    sulfate 325 milliGRAM(s) Oral daily  furosemide    Tablet 20 milliGRAM(s) Oral daily  hydrALAZINE 100 milliGRAM(s) Oral every 8 hours  ipratropium    for Nebulization 500 MICROGram(s) Nebulizer every 6 hours  labetalol 400 milliGRAM(s) Oral two times a day  levalbuterol Inhalation 0.63 milliGRAM(s) Inhalation every 6 hours  oseltamivir 30 milliGRAM(s) Oral two times a day  polyethylene glycol 3350 17 Gram(s) Oral daily  sodium chloride 0.9%. 1000 milliLiter(s) (50 mL/Hr) IV Continuous <Continuous>    MEDICATIONS  (PRN):  bisacodyl 5 milliGRAM(s) Oral every 12 hours PRN Constipation      Antibiotics History  azithromycin  IVPB 500 milliGRAM(s) IV Intermittent once, 01-24-25 @ 16:19, Stop order after: 1 Doses  cefTRIAXone   IVPB 1000 milliGRAM(s) IV Intermittent once, 01-24-25 @ 16:19, Stop order after: 1 Doses  oseltamivir 30 milliGRAM(s) Oral two times a day, 01-24-25 @ 18:25, Stop order after: 10 Doses      Heme Medications   apixaban 5 milliGRAM(s) Oral every 12 hours, 01-24-25 @ 18:57  clopidogrel Tablet 75 milliGRAM(s) Oral daily, 01-24-25 @ 18:59      GI Medications  bisacodyl 5 milliGRAM(s) Oral every 12 hours, 01-26-25 @ 14:03, Routine PRN  polyethylene glycol 3350 17 Gram(s) Oral daily, 01-26-25 @ 14:03, Routine        LABS:                        8.6    8.54  )-----------( 166      ( 27 Jan 2025 06:50 )             26.4     01-27    140  |  99  |  77[H]  ----------------------------<  158[H]  3.5   |  29  |  2.07[H]    Ca    8.0[L]      27 Jan 2025 06:50    TPro  6.1  /  Alb  2.6[L]  /  TBili  0.7  /  DBili  x   /  AST  43[H]  /  ALT  35  /  AlkPhos  108  01-27    SHANEKA 1:160 01-07 @ 06:42  Anti SS-1 --  Anti SS-2 --  Anti RNP --  RF -- 01-07 @ 06:42    Atypical ANCA Indeterminate Method interference due to SHANEKA Fluorescence 01-07 @ 06:42  c-ANCA titer -- 01-07 @ 06:42  c-ANCA Negative 01-07 @ 06:42  p-ANCA Negative 01-07 @ 06:42    Urinalysis Basic - ( 27 Jan 2025 06:50 )    Color: x / Appearance: x / SG: x / pH: x  Gluc: 158 mg/dL / Ketone: x  / Bili: x / Urobili: x   Blood: x / Protein: x / Nitrite: x   Leuk Esterase: x / RBC: x / WBC x   Sq Epi: x / Non Sq Epi: x / Bacteria: x    POCT Blood Glucose.: 190 mg/dL (26 Jan 2025 00:14)    VITALS:  T(C): 36.6 (01-27-25 @ 05:02), Max: 36.6 (01-26-25 @ 21:01)  T(F): 97.9 (01-27-25 @ 05:02), Max: 97.9 (01-27-25 @ 05:02)  HR: 74 (01-27-25 @ 08:30) (72 - 74)  BP: 158/79 (01-27-25 @ 05:02) (134/80 - 158/79)  BP(mean): --  ABP: --  ABP(mean): --  RR: 17 (01-27-25 @ 05:02) (15 - 18)  SpO2: 92% (01-27-25 @ 08:30) (92% - 100%)  CVP(mm Hg): --  CVP(cm H2O): --    Ins and Outs     01-26-25 @ 07:01  -  01-27-25 @ 07:00  --------------------------------------------------------  IN: 0 mL / OUT: 900 mL / NET: -900 mL  Height (cm): 167.6 (01-24-25 @ 16:00)  Weight (kg): 74.8 (01-24-25 @ 16:00)  BMI (kg/m2): 26.6 (01-24-25 @ 16:00)    I&O's Detail    26 Jan 2025 07:01  -  27 Jan 2025 07:00  --------------------------------------------------------  IN:  Total IN: 0 mL    OUT:    Voided (mL): 900 mL  Total OUT: 900 mL    Total NET: -900 mL

## 2025-01-27 NOTE — PROGRESS NOTE ADULT - SUBJECTIVE AND OBJECTIVE BOX
CC: Patient is a 79y old  Male who presents with a chief complaint of Hypoxic due to flu (27 Jan 2025 12:34)      Interval History:  Patient seen and examined at bedside.  No overnight events  No complaints this morning  Denies CP, SOB, abd pain. Breathing slightly improved    ALLERGIES:  No Known Allergies    MEDICATIONS  (STANDING):  apixaban 5 milliGRAM(s) Oral every 12 hours  atorvastatin 40 milliGRAM(s) Oral at bedtime  clopidogrel Tablet 75 milliGRAM(s) Oral daily  ferrous    sulfate 325 milliGRAM(s) Oral daily  furosemide    Tablet 20 milliGRAM(s) Oral daily  hydrALAZINE 100 milliGRAM(s) Oral every 8 hours  ipratropium    for Nebulization 500 MICROGram(s) Nebulizer every 6 hours  labetalol 400 milliGRAM(s) Oral two times a day  levalbuterol Inhalation 0.63 milliGRAM(s) Inhalation every 6 hours  oseltamivir 30 milliGRAM(s) Oral two times a day  polyethylene glycol 3350 17 Gram(s) Oral daily  sodium chloride 0.9%. 1000 milliLiter(s) (50 mL/Hr) IV Continuous <Continuous>    MEDICATIONS  (PRN):  bisacodyl 5 milliGRAM(s) Oral every 12 hours PRN Constipation    Vital Signs Last 24 Hrs  T(F): 98.5 (27 Jan 2025 12:34), Max: 98.5 (27 Jan 2025 12:34)  HR: 72 (27 Jan 2025 15:28) (72 - 74)  BP: 143/79 (27 Jan 2025 12:34) (143/79 - 158/79)  RR: 16 (27 Jan 2025 12:34) (15 - 18)  SpO2: 92% (27 Jan 2025 15:28) (92% - 100%)  I&O's Summary    26 Jan 2025 07:01  -  27 Jan 2025 07:00  --------------------------------------------------------  IN: 0 mL / OUT: 900 mL / NET: -900 mL      BMI (kg/m2): 26.6 (01-24-25 @ 16:00)    PHYSICAL EXAM:  GENERAL: NAD, well-groomed, well-developed  EYES: L eye blindness mostly closed, EOMI, conjunctiva and sclera clear of right eye   ENMT: Moist mucous membranes, Good dentition, no thrush  NECK: Supple, No JVD  CHEST/LUNG: Diffuse wheezing and scattered rhonchi  to auscultation bilaterally. Slightly improved from yesterday  HEART: RRR; S1/S2, No murmur  ABDOMEN: Soft, Nontender, mildly distended; Bowel sounds present  EXTREMITIES: No calf tenderness, No cyanosis, No edema  SKIN: Warm, Intact. large exophytic lesion central chest old  NERVOUS SYSTEM:  A/O x3, No focal deficits    LABS:                        8.6    8.54  )-----------( 166      ( 27 Jan 2025 06:50 )             26.4       01-27    140  |  99  |  77  ----------------------------<  158  3.5   |  29  |  2.07    Ca    8.0      27 Jan 2025 06:50  Mg     2.1     01-25    TPro  6.1  /  Alb  2.6  /  TBili  0.7  /  DBili  x   /  AST  43  /  ALT  35  /  AlkPhos  108  01-27            CARDIAC MARKERS ( 27 Jan 2025 06:50 )  x     / 1654.3 ng/L / x     / x     / x      CARDIAC MARKERS ( 26 Jan 2025 18:09 )  x     / 2444.2 ng/L / x     / x     / x      CARDIAC MARKERS ( 26 Jan 2025 12:12 )  x     / 1600.6 ng/L / x     / x     / x      CARDIAC MARKERS ( 26 Jan 2025 06:56 )  x     / 570.8 ng/L / x     / x     / x      CARDIAC MARKERS ( 26 Jan 2025 00:30 )  x     / 291.8 ng/L / x     / x     / x                            Urinalysis Basic - ( 27 Jan 2025 06:50 )    Color: x / Appearance: x / SG: x / pH: x  Gluc: 158 mg/dL / Ketone: x  / Bili: x / Urobili: x   Blood: x / Protein: x / Nitrite: x   Leuk Esterase: x / RBC: x / WBC x   Sq Epi: x / Non Sq Epi: x / Bacteria: x            Care Discussed with Consultants/Other Providers: Yes Cardiology and Pulmonary

## 2025-01-27 NOTE — PROGRESS NOTE ADULT - ASSESSMENT
Patient is a 80 yo M, originally from [facility], former cigarette smoker w/ PMHx of HTN, CKD, paroxysmal afib on eliquis, w/ hx of abnormal nuclear stress test s/p coronary angiogram and NOMAN to proximal and mid RCA (11/2022) presenting to [facility] from primary care office due to sob and hypoxia. Patient positive for Flu A.    #Paroxysmal afib on Eliquis  - converted to sinus rhythm. HR controlled  -Continue Eliquis  -continue  Labetalol   400 mg bid  -Monitor on tele    # elevated troponin/Chest pain  in a setting of CAD and rapid a/fib  - cardiology consult appreciated  - pt with significant increase in troponin, now trending down.  - ECHO with decrease in EF from 55-60% to 45%  - discussed with cardiology. Continue medical management  - continue Plavix, Lipitor, b blocker      #Acute hypoxic respiratory failure secondary to influenza A in a setting of underlying b/l infiltrates.  -Patient O2 sat 89% on RA on admission. O2 weaned down to 3L and remains on 3L  -CXR reviewed by me  - continue  Tamiflu  - steroids d/c'd by Pulmonary  - continue Standing duonebs q6hrs   -wean off Oxygen as tolerated      #HTN  -Continue Labetalol, hydralazine  -monitor BP    #CKD 3  - creatinine around baseline  - Avoid nephrotoxic medications  - Monitor Creatinine closely    #Hyponatremia  - improved from 130 to 133 > 136  -Encourage po intake  -Monitor     #Hypokalemia  -resolved  -continue to Monitor     #Microcytic Anemia  -Monitor CBC  -low Iron, will supplement      #Hx of CAD s/p NOMAN  -Continue Plavix  -Continue Lipitor     #Chronic CHF not in acute exacerbation  -Per patient's family he was previously on 40mg daily prior to previous hospital admission however with renal function was switched to 20mg daily   - continue Lasix 20 mg daily for now   - pro BNP elevated. to 44K from 13K . As per cardiology  not in fluid overload clinically  - continue to monitor and adjust diuretics as needed    #DVT prophylaxis  -Eliquis

## 2025-01-27 NOTE — CHART NOTE - NSCHARTNOTEFT_GEN_A_CORE
As per nurse pt HR in 130s and c/o throat pain and bilateral ear pain. Assessed pt at bedside, pt states his throat feels sore and there is a dull pain in both ears. Pt denies any headache, dizziness, shortness of breath or chest pain. On telemetry, pt's HR ~120s, /82, sao2 93 on 3L.  Pt given 5mg lopressor IVP, 650mg Tylenol and lozenges. Will monitor As per nurse pt HR in 130s and c/o throat pain and bilateral ear pain. Assessed pt at bedside, pt states his throat feels sore and there is a dull pain in both ears. Pt denies any headache, dizziness, shortness of breath or chest pain. On telemetry, pt's HR ~120s, /82, sao2 93 on 3L.  Pt given 5mg lopressor IVPx2, 650mg Tylenol and lozenges. Will monitor

## 2025-01-28 LAB
ALBUMIN SERPL ELPH-MCNC: 2.3 G/DL — LOW (ref 3.3–5)
ALP SERPL-CCNC: 104 U/L — SIGNIFICANT CHANGE UP (ref 40–120)
ALT FLD-CCNC: 78 U/L — HIGH (ref 10–45)
ANION GAP SERPL CALC-SCNC: 7 MMOL/L — SIGNIFICANT CHANGE UP (ref 5–17)
AST SERPL-CCNC: 88 U/L — HIGH (ref 10–40)
BILIRUB SERPL-MCNC: 1 MG/DL — SIGNIFICANT CHANGE UP (ref 0.2–1.2)
BUN SERPL-MCNC: 66 MG/DL — HIGH (ref 7–23)
CALCIUM SERPL-MCNC: 7.9 MG/DL — LOW (ref 8.4–10.5)
CHLORIDE SERPL-SCNC: 99 MMOL/L — SIGNIFICANT CHANGE UP (ref 96–108)
CO2 SERPL-SCNC: 32 MMOL/L — HIGH (ref 22–31)
CREAT SERPL-MCNC: 1.71 MG/DL — HIGH (ref 0.5–1.3)
EGFR: 41 ML/MIN/1.73M2 — LOW
FLUAV H3 RNA SPEC QL NAA+PROBE: DETECTED
GLUCOSE SERPL-MCNC: 104 MG/DL — HIGH (ref 70–99)
HCT VFR BLD CALC: 26.3 % — LOW (ref 39–50)
HGB BLD-MCNC: 8.3 G/DL — LOW (ref 13–17)
MAGNESIUM SERPL-MCNC: 2.3 MG/DL — SIGNIFICANT CHANGE UP (ref 1.6–2.6)
MCHC RBC-ENTMCNC: 25.3 PG — LOW (ref 27–34)
MCHC RBC-ENTMCNC: 31.6 G/DL — LOW (ref 32–36)
MCV RBC AUTO: 80.2 FL — SIGNIFICANT CHANGE UP (ref 80–100)
NRBC # BLD: 0 /100 WBCS — SIGNIFICANT CHANGE UP (ref 0–0)
NRBC BLD-RTO: 0 /100 WBCS — SIGNIFICANT CHANGE UP (ref 0–0)
PLATELET # BLD AUTO: 164 K/UL — SIGNIFICANT CHANGE UP (ref 150–400)
POTASSIUM SERPL-MCNC: 3.2 MMOL/L — LOW (ref 3.5–5.3)
POTASSIUM SERPL-SCNC: 3.2 MMOL/L — LOW (ref 3.5–5.3)
PROT SERPL-MCNC: 5.7 G/DL — LOW (ref 6–8.3)
RAPID RVP RESULT: DETECTED
RBC # BLD: 3.28 M/UL — LOW (ref 4.2–5.8)
RBC # FLD: 16.8 % — HIGH (ref 10.3–14.5)
SARS-COV-2 RNA SPEC QL NAA+PROBE: SIGNIFICANT CHANGE UP
SODIUM SERPL-SCNC: 138 MMOL/L — SIGNIFICANT CHANGE UP (ref 135–145)
TROPONIN I, HIGH SENSITIVITY RESULT: 889.4 NG/L — HIGH
WBC # BLD: 8.97 K/UL — SIGNIFICANT CHANGE UP (ref 3.8–10.5)
WBC # FLD AUTO: 8.97 K/UL — SIGNIFICANT CHANGE UP (ref 3.8–10.5)

## 2025-01-28 PROCEDURE — 71250 CT THORAX DX C-: CPT | Mod: 26

## 2025-01-28 PROCEDURE — 99233 SBSQ HOSP IP/OBS HIGH 50: CPT

## 2025-01-28 PROCEDURE — 99232 SBSQ HOSP IP/OBS MODERATE 35: CPT

## 2025-01-28 RX ORDER — POTASSIUM CHLORIDE 750 MG/1
40 TABLET, EXTENDED RELEASE ORAL ONCE
Refills: 0 | Status: COMPLETED | OUTPATIENT
Start: 2025-01-28 | End: 2025-01-28

## 2025-01-28 RX ORDER — DILTIAZEM HYDROCHLORIDE 60 MG/1
10 TABLET ORAL ONCE
Refills: 0 | Status: DISCONTINUED | OUTPATIENT
Start: 2025-01-28 | End: 2025-01-28

## 2025-01-28 RX ORDER — METOPROLOL SUCCINATE 25 MG
5 TABLET, EXTENDED RELEASE 24 HR ORAL ONCE
Refills: 0 | Status: COMPLETED | OUTPATIENT
Start: 2025-01-28 | End: 2025-01-28

## 2025-01-28 RX ORDER — DILTIAZEM HYDROCHLORIDE 60 MG/1
10 TABLET ORAL ONCE
Refills: 0 | Status: COMPLETED | OUTPATIENT
Start: 2025-01-28 | End: 2025-01-28

## 2025-01-28 RX ADMIN — Medication 0.63 MILLIGRAM(S): at 09:01

## 2025-01-28 RX ADMIN — LABETALOL HYDROCHLORIDE 400 MILLIGRAM(S): 300 TABLET, FILM COATED ORAL at 17:18

## 2025-01-28 RX ADMIN — Medication 0.63 MILLIGRAM(S): at 05:08

## 2025-01-28 RX ADMIN — OSELTAMIVIR PHOSPHATE 30 MILLIGRAM(S): 75 CAPSULE ORAL at 17:18

## 2025-01-28 RX ADMIN — Medication 20 MILLIGRAM(S): at 05:08

## 2025-01-28 RX ADMIN — Medication 500 MICROGRAM(S): at 21:24

## 2025-01-28 RX ADMIN — Medication 325 MILLIGRAM(S): at 12:38

## 2025-01-28 RX ADMIN — APIXABAN 5 MILLIGRAM(S): 5 TABLET, FILM COATED ORAL at 17:18

## 2025-01-28 RX ADMIN — Medication 500 MICROGRAM(S): at 09:01

## 2025-01-28 RX ADMIN — Medication 100 MILLIGRAM(S): at 13:40

## 2025-01-28 RX ADMIN — POLYETHYLENE GLYCOL 3350 17 GRAM(S): 17 POWDER, FOR SOLUTION ORAL at 12:38

## 2025-01-28 RX ADMIN — Medication 100 MILLIGRAM(S): at 21:10

## 2025-01-28 RX ADMIN — LABETALOL HYDROCHLORIDE 400 MILLIGRAM(S): 300 TABLET, FILM COATED ORAL at 05:08

## 2025-01-28 RX ADMIN — Medication 500 MICROGRAM(S): at 15:42

## 2025-01-28 RX ADMIN — APIXABAN 5 MILLIGRAM(S): 5 TABLET, FILM COATED ORAL at 05:08

## 2025-01-28 RX ADMIN — Medication 0.63 MILLIGRAM(S): at 21:24

## 2025-01-28 RX ADMIN — Medication 40 MILLIGRAM(S): at 17:24

## 2025-01-28 RX ADMIN — DILTIAZEM HYDROCHLORIDE 10 MILLIGRAM(S): 60 TABLET ORAL at 06:51

## 2025-01-28 RX ADMIN — Medication 125 MICROGRAM(S): at 17:24

## 2025-01-28 RX ADMIN — Medication 100 MILLIGRAM(S): at 05:07

## 2025-01-28 RX ADMIN — ATORVASTATIN CALCIUM 40 MILLIGRAM(S): 80 TABLET, FILM COATED ORAL at 21:10

## 2025-01-28 RX ADMIN — Medication 1 LOZENGE: at 00:49

## 2025-01-28 RX ADMIN — Medication 75 MILLIGRAM(S): at 12:38

## 2025-01-28 RX ADMIN — OSELTAMIVIR PHOSPHATE 30 MILLIGRAM(S): 75 CAPSULE ORAL at 05:08

## 2025-01-28 RX ADMIN — Medication 500 MICROGRAM(S): at 05:08

## 2025-01-28 RX ADMIN — POTASSIUM CHLORIDE 40 MILLIEQUIVALENT(S): 750 TABLET, EXTENDED RELEASE ORAL at 17:23

## 2025-01-28 RX ADMIN — POLYETHYLENE GLYCOL 3350 17 GRAM(S): 17 POWDER, FOR SOLUTION ORAL at 12:44

## 2025-01-28 RX ADMIN — Medication 0.63 MILLIGRAM(S): at 15:42

## 2025-01-28 RX ADMIN — Medication 5 MILLIGRAM(S): at 00:49

## 2025-01-28 NOTE — PROGRESS NOTE ADULT - SUBJECTIVE AND OBJECTIVE BOX
SEMAJ ROBERT  [id number]      Chief Complaint: Influenza A Pneumonia/HFmrEF/NSTEMI/AF    Interval History: The patient reports chest discomfort and cough.     Tele: atrial fibrillation, aberrant conduction, NSVT, 120s BPM    Current meds:   apixaban 5 milliGRAM(s) Oral every 12 hours  atorvastatin 40 milliGRAM(s) Oral at bedtime  bisacodyl 5 milliGRAM(s) Oral every 12 hours PRN  bisacodyl 10 milliGRAM(s) Oral daily PRN  clopidogrel Tablet 75 milliGRAM(s) Oral daily  ferrous    sulfate 325 milliGRAM(s) Oral daily  furosemide    Tablet 20 milliGRAM(s) Oral daily  hydrALAZINE 100 milliGRAM(s) Oral every 8 hours  ipratropium    for Nebulization 500 MICROGram(s) Nebulizer every 6 hours  labetalol 400 milliGRAM(s) Oral two times a day  levalbuterol Inhalation 0.63 milliGRAM(s) Inhalation every 6 hours  oseltamivir 30 milliGRAM(s) Oral two times a day  polyethylene glycol 3350 17 Gram(s) Oral daily  polyethylene glycol 3350 17 Gram(s) Oral daily  sodium chloride 0.9%. 1000 milliLiter(s) IV Continuous <Continuous>      Objective:     Vital Signs:   T(C): 36.6 (01-28-25 @ 04:59), Max: 36.9 (01-27-25 @ 12:34)  HR: 75 (01-28-25 @ 09:11) (72 - 128)  BP: 124/85 (01-28-25 @ 06:43) (120/85 - 147/96)  RR: 18 (01-28-25 @ 04:59) (16 - 18)  SpO2: 94% (01-28-25 @ 09:11) (92% - 97%)  Wt(kg): --    Physical Exam:   General: fatigued, wheezing   Neck: supple   CVS: JVP ~ 7 cm H20, irregularly irregular, s1, s2  Pulm: decreased breath sounds  Ext: no lower extremity edema b/l   Neuro: awake, alert and oriented  Psych: Normal affect        Labs:   28 Jan 2025 06:30    138    |  99     |  66     ----------------------------<  104    3.2     |  32     |  1.71     Ca    7.9        28 Jan 2025 06:30  Mg     2.3       28 Jan 2025 06:30    TPro  5.7    /  Alb  2.3    /  TBili  1.0    /  DBili  x      /  AST  88     /  ALT  78     /  AlkPhos  104    28 Jan 2025 06:30                          8.3   8.97  )-----------( 164      ( 28 Jan 2025 06:30 )             26.3             Coronary angiogram (11/2022):  LM: minor irregularities  LAD: 70% stenosis  D1: 80% stenosis   LCx: complete occlusion  RCA: 80% stenosis s/p NOMAN    TTE (1/5/25):  LVEF 55-60%, moderate LVH  Normal RV size and function   Mild MR, Mild TR, Mild-moderate AR    TTE (1/26/25):   1. Limited echocardiogram performed.   2. The heart rate is irregular and tachycardic throughout the study. Consider repeating echocardiogram when patient is no longer tachycardic for more accurate assessment of LVEF, if clinically indicated.   3. Left ventricular systolic function is mildly decreased with an   ejection fraction of 45 % by Jordan's method of disks with an ejection fraction visually estimated at 45 to 50 %.   4. Mild global left ventricle hypokinesis.   5. Mildly enlarged right ventricle with low normal right ventricle   systolic function.   6. Left atrium is severely dilated.   7. The right atrium is dilated.   8. Moderate mitral regurgitation.      ECG (1/26/25): atrial fibrillation with RVR, rate related LBBB

## 2025-01-28 NOTE — PROGRESS NOTE ADULT - ASSESSMENT
Assessment:  David Faye is a 79 year old man with past medical history of Coronary artery disease (s/p NOMAN to proximal and mid RCA in 2022) with brief Paroxysmal atrial fibrillation (on Eliquis), Hypertension and Chronic kidney disease with recent hospitalization here earlier this month for pneumonia and congestive heart failure now presents with progressive dyspnea, found to have acute hypoxic respiratory failure secondary to Influenza A pneumonia, also with rapid atrial fibrillation and mild cardiomyopathy.     ECG consistent with atrial fibrillation with RVR and rate related LBBB of which patient has a history of. Troponins elevated, likely demand ischemia from sepsis from pneumonia, rapid atrial fibrillation and CKD. CXR with diffuse infiltrates. Pro BNP elevated from lung disease and cardiomyopathy. Prior coronary angiogram in 11/2022 consistent with triple vessel CAD s/p 2 NOMAN to pRCA and mRCA. Recent echo report with normal LVEF 55-60%, moderate LVH and mild-moderate valvular disease.     Repeat echo with LVEF 45-50% and moderate mitral regurgitation.    Recommendations:  [] Acute hypoxic respiratory failure/Influenza A Pneumonia: Management per primary team and Pulmonary, continue supplemental oxygen, Oseltamivir, recommend Infectious Disease consult due to recurrent admissions with pneumonia. Repeat chest imaging today due to increase in wheezing  [] HFmrEF: Likely tachy-mediated. No signs of volume overload at this time. Avoid guideline directed medical therapy due to BRUNILDA on CKD. Plan for repeat echo prior to discharge. Outpatient amyloid scan.   [] Elevated troponins, CAD: Peaked. Known multivessel CAD, has residual LAD, D1 and chronic occlusion of LCx that was medically managed by interventional cardiology. He is s/p NOMAN of RCA. Continue Plavix 75 mg daily, Atorvastatin 40 mg daily. Plan for outpatient nuclear stress test when recovers from pneumonia.   [] Hypertension: BP stable. Continue Labetalol 400 mg BID, Hydralazine 100 mg q8h.   [] Paroxysmal atrial fibrillation, rate related LBBB: Has paroxysms of AF with RVR due to sepsis, trial Digoxin IV loading today, already on Labetalol which is needed for control of his malignant hypertension, other beta blockers will not control his blood pressure. Continue Eliquis for stroke prophylaxis. Treatment of sepsis per above    Updated Dr. Griffin. Will sign out this case to cardiologist to follow along tomorrow.    Bharati Albright MD  Cardiology

## 2025-01-28 NOTE — PROGRESS NOTE ADULT - SUBJECTIVE AND OBJECTIVE BOX
Follow-up Pulmonary Progress Note  Chief Complaint : Influenza with other respiratory manifestations, other influenza virus identified      Worsening respiratory status this morning. Mild tachypenia     Allergies :No Known Allergies      PAST MEDICAL & SURGICAL HISTORY:  H/O: HTN (hypertension)    CAD (coronary artery disease)    Diabetic vitreous hemorrhage    Hypokalemia    Type 2 diabetes mellitus    Left ventricular hypertrophy    History of alcohol withdrawal delirium    S/P cardiac catheterization  11/17 3 stents to RCA        Medications:  MEDICATIONS  (STANDING):  apixaban 5 milliGRAM(s) Oral every 12 hours  atorvastatin 40 milliGRAM(s) Oral at bedtime  clopidogrel Tablet 75 milliGRAM(s) Oral daily  ferrous    sulfate 325 milliGRAM(s) Oral daily  furosemide    Tablet 20 milliGRAM(s) Oral daily  hydrALAZINE 100 milliGRAM(s) Oral every 8 hours  ipratropium    for Nebulization 500 MICROGram(s) Nebulizer every 6 hours  labetalol 400 milliGRAM(s) Oral two times a day  levalbuterol Inhalation 0.63 milliGRAM(s) Inhalation every 6 hours  oseltamivir 30 milliGRAM(s) Oral two times a day  polyethylene glycol 3350 17 Gram(s) Oral daily  polyethylene glycol 3350 17 Gram(s) Oral daily  sodium chloride 0.9%. 1000 milliLiter(s) (50 mL/Hr) IV Continuous <Continuous>    MEDICATIONS  (PRN):  bisacodyl 10 milliGRAM(s) Oral daily PRN Constipation  bisacodyl 5 milliGRAM(s) Oral every 12 hours PRN Constipation      Antibiotics History  azithromycin  IVPB 500 milliGRAM(s) IV Intermittent once, 01-24-25 @ 16:19, Stop order after: 1 Doses  cefTRIAXone   IVPB 1000 milliGRAM(s) IV Intermittent once, 01-24-25 @ 16:19, Stop order after: 1 Doses  oseltamivir 30 milliGRAM(s) Oral two times a day, 01-24-25 @ 18:25, Stop order after: 10 Doses      Heme Medications   apixaban 5 milliGRAM(s) Oral every 12 hours, 01-24-25 @ 18:57  clopidogrel Tablet 75 milliGRAM(s) Oral daily, 01-24-25 @ 18:59      GI Medications  bisacodyl 10 milliGRAM(s) Oral daily, 01-27-25 @ 16:56, Routine PRN  bisacodyl 5 milliGRAM(s) Oral every 12 hours, 01-26-25 @ 14:03, Routine PRN  polyethylene glycol 3350 17 Gram(s) Oral daily, 01-26-25 @ 14:03, Routine  polyethylene glycol 3350 17 Gram(s) Oral daily, 01-27-25 @ 16:56, Now        LABS:                        8.3    8.97  )-----------( 164      ( 28 Jan 2025 06:30 )             26.3     01-28    138  |  99  |  66[H]  ----------------------------<  104[H]  3.2[L]   |  32[H]  |  1.71[H]    Ca    7.9[L]      28 Jan 2025 06:30  Mg     2.3     01-28    TPro  5.7[L]  /  Alb  2.3[L]  /  TBili  1.0  /  DBili  x   /  AST  88[H]  /  ALT  78[H]  /  AlkPhos  104  01-28    SHANEKA 1:160 01-07 @ 06:42  Anti SS-1 --  Anti SS-2 --  Anti RNP --  RF -- 01-07 @ 06:42    Atypical ANCA Indeterminate Method interference due to SHANEKA Fluorescence 01-07 @ 06:42  c-ANCA titer -- 01-07 @ 06:42  c-ANCA Negative 01-07 @ 06:42  p-ANCA Negative 01-07 @ 06:42            Urinalysis Basic - ( 28 Jan 2025 06:30 )    Color: x / Appearance: x / SG: x / pH: x  Gluc: 104 mg/dL / Ketone: x  / Bili: x / Urobili: x   Blood: x / Protein: x / Nitrite: x   Leuk Esterase: x / RBC: x / WBC x   Sq Epi: x / Non Sq Epi: x / Bacteria: x              CULTURES: (if applicable)          CAPILLARY BLOOD GLUCOSE          RADIOLOGY  CXR:      CT:    ECHO:      VITALS:  T(C): 36.6 (01-28-25 @ 04:59), Max: 36.6 (01-28-25 @ 04:59)  T(F): 97.9 (01-28-25 @ 04:59), Max: 97.9 (01-28-25 @ 04:59)  HR: 75 (01-28-25 @ 09:11) (72 - 128)  BP: 124/85 (01-28-25 @ 06:43) (120/85 - 147/96)  BP(mean): --  ABP: --  ABP(mean): --  RR: 18 (01-28-25 @ 04:59) (16 - 18)  SpO2: 94% (01-28-25 @ 09:11) (92% - 97%)  CVP(mm Hg): --  CVP(cm H2O): --    Ins and Outs     01-28-25 @ 07:01  -  01-28-25 @ 13:26  --------------------------------------------------------  IN: 200 mL / OUT: 0 mL / NET: 200 mL                I&O's Detail    28 Jan 2025 07:01  -  28 Jan 2025 13:26  --------------------------------------------------------  IN:    Oral Fluid: 200 mL  Total IN: 200 mL    OUT:  Total OUT: 0 mL    Total NET: 200 mL

## 2025-01-28 NOTE — PROGRESS NOTE ADULT - SUBJECTIVE AND OBJECTIVE BOX
CC: Patient is a 79y old  Male who presents with a chief complaint of Hypoxic due to flu (28 Jan 2025 13:26)      Interval History:  Patient seen and examined at bedside.  Pt went into rapid a/fib overnight and eventually converted back to sinus rhythm  Reports less SOB but still with significant wheezing    ALLERGIES:  No Known Allergies    MEDICATIONS  (STANDING):  apixaban 5 milliGRAM(s) Oral every 12 hours  atorvastatin 40 milliGRAM(s) Oral at bedtime  clopidogrel Tablet 75 milliGRAM(s) Oral daily  digoxin     Tablet 125 MICROGram(s) Oral daily  ferrous    sulfate 325 milliGRAM(s) Oral daily  furosemide    Tablet 20 milliGRAM(s) Oral daily  furosemide   Injectable 40 milliGRAM(s) IV Push daily  hydrALAZINE 100 milliGRAM(s) Oral every 8 hours  ipratropium    for Nebulization 500 MICROGram(s) Nebulizer every 6 hours  labetalol 400 milliGRAM(s) Oral two times a day  levalbuterol Inhalation 0.63 milliGRAM(s) Inhalation every 6 hours  oseltamivir 30 milliGRAM(s) Oral two times a day  polyethylene glycol 3350 17 Gram(s) Oral daily  polyethylene glycol 3350 17 Gram(s) Oral daily  sodium chloride 0.9%. 1000 milliLiter(s) (50 mL/Hr) IV Continuous <Continuous>    MEDICATIONS  (PRN):  bisacodyl 10 milliGRAM(s) Oral daily PRN Constipation  bisacodyl 5 milliGRAM(s) Oral every 12 hours PRN Constipation    Vital Signs Last 24 Hrs  T(F): 98 (28 Jan 2025 14:44), Max: 98 (28 Jan 2025 14:44)  HR: 113 (28 Jan 2025 15:45) (72 - 128)  BP: 132/70 (28 Jan 2025 14:44) (120/85 - 147/96)  RR: 18 (28 Jan 2025 14:44) (16 - 18)  SpO2: 94% (28 Jan 2025 15:45) (93% - 97%)  I&O's Summary    28 Jan 2025 07:01  -  28 Jan 2025 16:14  --------------------------------------------------------  IN: 200 mL / OUT: 0 mL / NET: 200 mL      BMI (kg/m2): 26.6 (01-24-25 @ 16:00)    PHYSICAL EXAM:  GENERAL: NAD, well-groomed, well-developed  EYES: L eye blindness mostly closed, EOMI, conjunctiva and sclera clear of right eye   ENMT: Moist mucous membranes, Good dentition, no thrush  NECK: Supple, No JVD  CHEST/LUNG: Diffuse wheezing and scattered rhonchi  to auscultation bilaterally.   HEART: RRR; S1/S2, No murmur  ABDOMEN: Soft, Nontender, mildly distended; Bowel sounds present  EXTREMITIES: No calf tenderness, No cyanosis, No edema  SKIN: Warm, Intact. large exophytic lesion central chest old  NERVOUS SYSTEM:  A/O x3, No focal     LABS:                        8.3    8.97  )-----------( 164      ( 28 Jan 2025 06:30 )             26.3       01-28    138  |  99  |  66  ----------------------------<  104  3.2   |  32  |  1.71    Ca    7.9      28 Jan 2025 06:30  Mg     2.3     01-28    TPro  5.7  /  Alb  2.3  /  TBili  1.0  /  DBili  x   /  AST  88  /  ALT  78  /  AlkPhos  104  01-28            CARDIAC MARKERS ( 28 Jan 2025 06:30 )  x     / 889.4 ng/L / x     / x     / x      CARDIAC MARKERS ( 27 Jan 2025 06:50 )  x     / 1654.3 ng/L / x     / x     / x      CARDIAC MARKERS ( 26 Jan 2025 18:09 )  x     / 2444.2 ng/L / x     / x     / x      CARDIAC MARKERS ( 26 Jan 2025 12:12 )  x     / 1600.6 ng/L / x     / x     / x      CARDIAC MARKERS ( 26 Jan 2025 06:56 )  x     / 570.8 ng/L / x     / x     / x      CARDIAC MARKERS ( 26 Jan 2025 00:30 )  x     / 291.8 ng/L / x     / x     / x                            Urinalysis Basic - ( 28 Jan 2025 06:30 )    Color: x / Appearance: x / SG: x / pH: x  Gluc: 104 mg/dL / Ketone: x  / Bili: x / Urobili: x   Blood: x / Protein: x / Nitrite: x   Leuk Esterase: x / RBC: x / WBC x   Sq Epi: x / Non Sq Epi: x / Bacteria: x            Care Discussed with Consultants/Other Providers: Yes. Pulmonary and Cardiology

## 2025-01-28 NOTE — PROGRESS NOTE ADULT - ASSESSMENT
Physical Examination:  GENERAL:               Alert, Oriented, No acute distress.    HEENT:                   Missing left eye, right eye reactive to light.  Symmetric. No JVD, Moist MM  PULM:                     Bilateral air entry, No Rales, No Rhonchi, + Wheezing  CVS:                        S1, S2,  No Murmur  ABD:                        Soft, nondistended, nontender, normoactive bowel sounds,   EXT:                        No edema, nontender, No Cyanosis or Clubbing   Vascular:                 Warm Extremities, Normal Capillary refill, Normal Distal Pulses  SKIN:                      Warm and well perfused, no rashes noted.   NEURO:                  Alert, oriented, interactive, nonfocal, follows commands  PSYC:                      Calm, + Insight.    78 yo M, originally from [facility], former cigarette smoker w/ PMHx of HTN, CKD, paroxysmal afib on eliquis, w/ hx of abnormal nuclear stress test s/p coronary angiogram and NOMAN to proximal and mid RCA (11/2022) presenting to [facility] from primary care office due to sob and hypoxia. Patient positive for Flu A. Pulmonary consulted for acute hypoxia. Clinically appears euvolemic. Symptoms likely due to acute viral illness.       Assessment:  1. Influenza A infection  2. Acute hypoxia  3. HFPEF   4. Paroxysmal Afib    Plan:  - CT scan with enlarging pleural effusion and worsening ground glass   - Consider diuresis and aim for negative fluid balance   - Cont treatment for influenza as ordered  - Cont. Bronchodilators  - Titrate down oxygen as tolerated   - Oxygen support to maintain saturation > 92%  - DVT prophylaxis  - Rest of management per primary team

## 2025-01-28 NOTE — PROGRESS NOTE ADULT - ASSESSMENT
Patient is a 78 yo M, originally from [facility], former cigarette smoker w/ PMHx of HTN, CKD, paroxysmal afib on eliquis, w/ hx of abnormal nuclear stress test s/p coronary angiogram and NOMAN to proximal and mid RCA (11/2022) presenting to [facility] from primary care office due to sob and hypoxia. Patient positive for Flu A.    #Paroxysmal afib on Eliquis  - in and out of sinus rhythm.   -Continue Eliquis  -continue  Labetalol   400 mg bid  - discussed with cardiology, will  start Digoxin 125 mg daily and monitor      # elevated troponin/Chest pain  in a setting of CAD and rapid a/fib  - cardiology consult appreciated  - pt with significant increase in troponin, now trending down.  - ECHO with decrease in EF from 55-60% to 45%  - Continue medical management  - continue Plavix, Lipitor, b blocker      #Acute hypoxic respiratory failure secondary to influenza A in a setting of underlying b/l infiltrates.  -Patient O2 sat 89% on RA on admission. O2 weaned down to 3L and remains on 3L  - obtained CT chest,  worsening pulmonary edema  - continue  Tamiflu  - off steroids  - continue Standing duonebs q6hrs   - start Lasix 40 mg IV daily. \  - monitor renal function cloely  -wean off Oxygen as tolerated      #HTN  -Continue Labetalol, hydralazine  -monitor BP    #CKD 3  - creatinine around baseline  - Avoid nephrotoxic medications  - monitor closely on diuresis      #Hyponatremia  - resolved  -Monitor     #Hypokalemia  -will supplement  -continue to Monitor     #Microcytic Anemia  -Monitor CBC  -low Iron, will supplement      #Hx of CAD s/p NOMAN  -Continue Plavix  -Continue Lipitor     #Chronic CHF   -Per patient's family he was previously on 40mg daily prior to previous hospital admission however with renal function was switched to 20mg daily   -D/C Lasix  PO and start IV 40 mg daily  - pro BNP elevated. to 44K from 13K . CT chest with worsening pulmonary  edema  - continue to monitor and adjust diuretics as needed    #DVT prophylaxis  -Eliquis

## 2025-01-29 LAB
ALBUMIN SERPL ELPH-MCNC: 2.5 G/DL — LOW (ref 3.3–5)
ALP SERPL-CCNC: 108 U/L — SIGNIFICANT CHANGE UP (ref 40–120)
ALT FLD-CCNC: 74 U/L — HIGH (ref 10–45)
ANION GAP SERPL CALC-SCNC: 7 MMOL/L — SIGNIFICANT CHANGE UP (ref 5–17)
AST SERPL-CCNC: 71 U/L — HIGH (ref 10–40)
BILIRUB SERPL-MCNC: 1 MG/DL — SIGNIFICANT CHANGE UP (ref 0.2–1.2)
BUN SERPL-MCNC: 59 MG/DL — HIGH (ref 7–23)
CALCIUM SERPL-MCNC: 7.9 MG/DL — LOW (ref 8.4–10.5)
CHLORIDE SERPL-SCNC: 102 MMOL/L — SIGNIFICANT CHANGE UP (ref 96–108)
CO2 SERPL-SCNC: 33 MMOL/L — HIGH (ref 22–31)
CREAT SERPL-MCNC: 1.65 MG/DL — HIGH (ref 0.5–1.3)
EGFR: 42 ML/MIN/1.73M2 — LOW
GLUCOSE SERPL-MCNC: 124 MG/DL — HIGH (ref 70–99)
HCT VFR BLD CALC: 27.4 % — LOW (ref 39–50)
HGB BLD-MCNC: 9 G/DL — LOW (ref 13–17)
MAGNESIUM SERPL-MCNC: 2.3 MG/DL — SIGNIFICANT CHANGE UP (ref 1.6–2.6)
MCHC RBC-ENTMCNC: 26.1 PG — LOW (ref 27–34)
MCHC RBC-ENTMCNC: 32.8 G/DL — SIGNIFICANT CHANGE UP (ref 32–36)
MCV RBC AUTO: 79.4 FL — LOW (ref 80–100)
NRBC # BLD: 0 /100 WBCS — SIGNIFICANT CHANGE UP (ref 0–0)
NRBC BLD-RTO: 0 /100 WBCS — SIGNIFICANT CHANGE UP (ref 0–0)
PLATELET # BLD AUTO: 156 K/UL — SIGNIFICANT CHANGE UP (ref 150–400)
POTASSIUM SERPL-MCNC: 3.5 MMOL/L — SIGNIFICANT CHANGE UP (ref 3.5–5.3)
POTASSIUM SERPL-SCNC: 3.5 MMOL/L — SIGNIFICANT CHANGE UP (ref 3.5–5.3)
PROT SERPL-MCNC: 6 G/DL — SIGNIFICANT CHANGE UP (ref 6–8.3)
RBC # BLD: 3.45 M/UL — LOW (ref 4.2–5.8)
RBC # FLD: 16.8 % — HIGH (ref 10.3–14.5)
SODIUM SERPL-SCNC: 142 MMOL/L — SIGNIFICANT CHANGE UP (ref 135–145)
TROPONIN I, HIGH SENSITIVITY RESULT: 2571.4 NG/L — HIGH
WBC # BLD: 8.06 K/UL — SIGNIFICANT CHANGE UP (ref 3.8–10.5)
WBC # FLD AUTO: 8.06 K/UL — SIGNIFICANT CHANGE UP (ref 3.8–10.5)

## 2025-01-29 PROCEDURE — 99233 SBSQ HOSP IP/OBS HIGH 50: CPT

## 2025-01-29 PROCEDURE — 99232 SBSQ HOSP IP/OBS MODERATE 35: CPT

## 2025-01-29 RX ORDER — PHENOL 1.4 %
1 AEROSOL, SPRAY (ML) MUCOUS MEMBRANE DAILY
Refills: 0 | Status: DISCONTINUED | OUTPATIENT
Start: 2025-01-29 | End: 2025-01-30

## 2025-01-29 RX ADMIN — Medication 75 MILLIGRAM(S): at 12:16

## 2025-01-29 RX ADMIN — Medication 100 MILLIGRAM(S): at 05:14

## 2025-01-29 RX ADMIN — Medication 500 MICROGRAM(S): at 03:47

## 2025-01-29 RX ADMIN — Medication 0.63 MILLIGRAM(S): at 03:47

## 2025-01-29 RX ADMIN — Medication 100 MILLIGRAM(S): at 21:17

## 2025-01-29 RX ADMIN — APIXABAN 5 MILLIGRAM(S): 5 TABLET, FILM COATED ORAL at 05:15

## 2025-01-29 RX ADMIN — Medication 500 MICROGRAM(S): at 09:20

## 2025-01-29 RX ADMIN — Medication 0.63 MILLIGRAM(S): at 09:19

## 2025-01-29 RX ADMIN — POLYETHYLENE GLYCOL 3350 17 GRAM(S): 17 POWDER, FOR SOLUTION ORAL at 12:17

## 2025-01-29 RX ADMIN — LABETALOL HYDROCHLORIDE 400 MILLIGRAM(S): 300 TABLET, FILM COATED ORAL at 18:11

## 2025-01-29 RX ADMIN — LABETALOL HYDROCHLORIDE 400 MILLIGRAM(S): 300 TABLET, FILM COATED ORAL at 05:15

## 2025-01-29 RX ADMIN — Medication 100 MILLIGRAM(S): at 12:16

## 2025-01-29 RX ADMIN — Medication 40 MILLIGRAM(S): at 18:11

## 2025-01-29 RX ADMIN — Medication 325 MILLIGRAM(S): at 12:16

## 2025-01-29 RX ADMIN — Medication 1 LOZENGE: at 20:27

## 2025-01-29 RX ADMIN — Medication 500 MICROGRAM(S): at 15:15

## 2025-01-29 RX ADMIN — Medication 125 MICROGRAM(S): at 05:14

## 2025-01-29 RX ADMIN — Medication 0.63 MILLIGRAM(S): at 20:08

## 2025-01-29 RX ADMIN — APIXABAN 5 MILLIGRAM(S): 5 TABLET, FILM COATED ORAL at 18:11

## 2025-01-29 RX ADMIN — Medication 40 MILLIGRAM(S): at 05:14

## 2025-01-29 RX ADMIN — ATORVASTATIN CALCIUM 40 MILLIGRAM(S): 80 TABLET, FILM COATED ORAL at 21:17

## 2025-01-29 RX ADMIN — Medication 500 MICROGRAM(S): at 20:09

## 2025-01-29 RX ADMIN — Medication 0.63 MILLIGRAM(S): at 15:15

## 2025-01-29 RX ADMIN — OSELTAMIVIR PHOSPHATE 30 MILLIGRAM(S): 75 CAPSULE ORAL at 05:15

## 2025-01-29 NOTE — DIETITIAN INITIAL EVALUATION ADULT - ADD RECOMMEND
1. Glucerna 8oz PO BID (Provides 440kcal-20grams of Protein) to enhance PO intakes and optimize nutritional status   2. Consistent Carbohydrate + DASH/TLC diet  3. Provide ongoing diet education as needed   4. Monitor daily PO intakes, GI tolerance, labs, weights, skin integrity, & BM regularity

## 2025-01-29 NOTE — DIETITIAN INITIAL EVALUATION ADULT - ORAL INTAKE PTA/DIET HISTORY
Turkmen interpretation provided via [employer] (ID: [id number]). Patient reports consuming 2-3 meals per day prior to admission. No known food allergies/intolerances. Patient does not follow any therapeutic meal patterns.

## 2025-01-29 NOTE — DIETITIAN INITIAL EVALUATION ADULT - OTHER INFO
Patient noted with suboptimal PO intakes, consuming 0-75% of meals per nursing flowsheets. Hx T2DM noted. A1C: 6.2% (1/7/25). [facility] CHF noted. Provided DASH/TLC nutrition therapy education; low sodium, cholesterol and saturated fat, w/ increased whole grains, fruits and vegetables/balanced meals, avoidance of take-out/processed foods. NFPE consistent with moderate protein calorie malnutrition as evidenced by muscle depletion and subcutaneous fat loss. Recommend Glucerna 8oz PO BID (Provides 440kcal-20grams of Protein) to enhance PO intakes and optimize nutritional status.

## 2025-01-29 NOTE — PROGRESS NOTE ADULT - NS ATTEND AMEND GEN_ALL_CORE FT
David Faye is a 79 year old man with past medical history of Coronary artery disease (s/p NOMAN to proximal and mid RCA in 2022) with brief Paroxysmal atrial fibrillation (on Eliquis), Hypertension and Chronic kidney disease with recent hospitalization here earlier this month for pneumonia and congestive heart failure now presents with progressive dyspnea, found to have acute hypoxic respiratory failure secondary to Influenza A pneumonia, also with rapid atrial fibrillation and mild cardiomyopathy.     ECG consistent with atrial fibrillation with RVR and rate related LBBB of which patient has a history of. Troponins elevated, likely demand ischemia from sepsis from pneumonia, rapid atrial fibrillation and CKD. CXR with diffuse infiltrates. Pro BNP elevated from lung disease and cardiomyopathy. Prior coronary angiogram in 11/2022 consistent with triple vessel CAD s/p 2 NOMAN to pRCA and mRCA. Recent echo report with normal LVEF 55-60%, moderate LVH and mild-moderate valvular disease.     Repeat echo with LVEF 45-50% and moderate mitral regurgitation.    Recommendations:  [] Acute hypoxic respiratory failure/Influenza A Pneumonia: Management per primary team and Pulmonary, continue supplemental oxygen, Oseltamivir, recommend Infectious Disease consult due to recurrent admissions with pneumonia. Repeat chest imaging today due to increase in wheezing  [] HFmrEF: Likely tachy-mediated. No signs of volume overload at this time. Avoid guideline directed medical therapy due to BRUNILDA on CKD. Plan for repeat echo prior to discharge. Outpatient amyloid scan.   [] Elevated troponins, CAD: Peaked. Known multivessel CAD, has residual LAD, D1 and chronic occlusion of LCx that was medically managed by interventional cardiology. He is s/p NOMAN of RCA. Continue Plavix 75 mg daily, Atorvastatin 40 mg daily. Plan for outpatient nuclear stress test when recovers from pneumonia.   [] Hypertension: BP stable. Continue Labetalol 400 mg BID, Hydralazine 100 mg q8h.   [] Paroxysmal atrial fibrillation, rate related LBBB: Has paroxysms of AF with RVR due to sepsis, trial Digoxin IV loading today, already on Labetalol which is needed for control of his malignant hypertension, other beta blockers will not control his blood pressure. Continue Eliquis for stroke prophylaxis. Treatment of sepsis per above    Patient seen and examined. DW Primary team. Labetolol may not be best rate control, but is needed for BP. Dig started. Continue to monitor telemetry.

## 2025-01-29 NOTE — DIETITIAN INITIAL EVALUATION ADULT - PERTINENT MEDS FT
MEDICATIONS  (STANDING):  apixaban 5 milliGRAM(s) Oral every 12 hours  atorvastatin 40 milliGRAM(s) Oral at bedtime  clopidogrel Tablet 75 milliGRAM(s) Oral daily  digoxin     Tablet 125 MICROGram(s) Oral daily  ferrous    sulfate 325 milliGRAM(s) Oral daily  furosemide   Injectable 40 milliGRAM(s) IV Push daily  hydrALAZINE 100 milliGRAM(s) Oral every 8 hours  ipratropium    for Nebulization 500 MICROGram(s) Nebulizer every 6 hours  labetalol 400 milliGRAM(s) Oral two times a day  levalbuterol Inhalation 0.63 milliGRAM(s) Inhalation every 6 hours  polyethylene glycol 3350 17 Gram(s) Oral daily  polyethylene glycol 3350 17 Gram(s) Oral daily  sodium chloride 0.9%. 1000 milliLiter(s) (50 mL/Hr) IV Continuous <Continuous>    MEDICATIONS  (PRN):  bisacodyl 5 milliGRAM(s) Oral every 12 hours PRN Constipation  bisacodyl 10 milliGRAM(s) Oral daily PRN Constipation

## 2025-01-29 NOTE — PROGRESS NOTE ADULT - ASSESSMENT
Patient is a 78 yo M, originally from [facility], former cigarette smoker w/ PMHx of HTN, CKD, paroxysmal afib on eliquis, w/ hx of abnormal nuclear stress test s/p coronary angiogram and NOMAN to proximal and mid RCA (11/2022) presenting to [facility] from primary care office due to sob and hypoxia. Patient positive for Flu A.        #Acute hypoxic respiratory failure secondary to influenza A in a setting of underlying b/l infiltrates.  -Patient O2 sat 89% on RA on admission. Requires 4L of supplemental Oxygen to maintain saturation >90%  - CT chest with   worsening pulmonary edema  - continue  Tamiflu  - off steroids  - continue Standing duonebs q6hrs   - increase   Lasix 40 mg IV to bid\  - monitor renal function cloely  -wean off Oxygen as tolerated      #Paroxysmal afib on Eliquis  -remains in sinus  rhythm since started on Digoxin  -Continue Eliquis  -continue  Labetalol   400 mg bid  - continue  Digoxin 125 mg daily and monitor      # elevated troponin in a setting of CAD and rapid a/fib  - cardiology consult appreciated  - pt with significant increase in troponin, now trending up again  - ECHO with decrease in EF from 55-60% to 45%  - Continue medical management  - continue Plavix, Lipitor, b blocker  - will need o/p stress test once recovers from PNA. Will need repeat ECHO prior to discharge      #HTN  -Continue Labetalol, hydralazine  -as per cardiology, Labetalol is the only b blocker to control his BP   -monitor BP    #CKD 3  - creatinine around baseline  - Avoid nephrotoxic medications  - monitor closely on diuresis  - discussed with Nephrology, will see in consulttion given pt is being diuresed      #Hyponatremia  - resolved  -Monitor     #Hypokalemia  -will supplement  -continue to Monitor     #Microcytic Anemia  -Monitor CBC  -low Iron, will supplement      #Hx of CAD s/p NOMAN  -Continue Plavix  -Continue Lipitor   - plan as asbove    # CHF exacerbation  -Per patient's family he was previously on 40mg daily prior to previous hospital admission however with renal function was switched to 20mg daily   -increase Lasix to  IV 40 mg bid  - pro BNP elevated. to 44K from 13K . CT chest with worsening pulmonary  edema  - continue to monitor and adjust diuretics as needed    #DVT prophylaxis  -Eliquis

## 2025-01-29 NOTE — DIETITIAN INITIAL EVALUATION ADULT - PERTINENT LABORATORY DATA
01-28    138  |  99  |  66[H]  ----------------------------<  104[H]  3.2[L]   |  32[H]  |  1.71[H]    Ca    7.9[L]      28 Jan 2025 06:30  Mg     2.3     01-28    TPro  5.7[L]  /  Alb  2.3[L]  /  TBili  1.0  /  DBili  x   /  AST  88[H]  /  ALT  78[H]  /  AlkPhos  104  01-28  A1C with Estimated Average Glucose Result: 6.2 % (01-07-25 @ 06:42)

## 2025-01-29 NOTE — DIETITIAN NUTRITION RISK NOTIFICATION - TREATMENT: THE FOLLOWING DIET HAS BEEN RECOMMENDED
Diet, Consistent Carbohydrate/No Snacks:   DASH/TLC {Sodium & Cholesterol Restricted}  Supplement Feeding Modality:  Oral  Glucerna Shake Cans or Servings Per Day:  1       Frequency:  Two Times a day (01-29-25 @ 08:48) [Pending Verification By Attending]  Diet, Consistent Carbohydrate/No Snacks (01-24-25 @ 16:19) [Active]

## 2025-01-29 NOTE — PROGRESS NOTE ADULT - SUBJECTIVE AND OBJECTIVE BOX
Follow-up Pulmonary Progress Note  Chief Complaint : Influenza with other respiratory manifestations, other influenza virus identified    pt seen and examined  states feeling ok,  remains on n/c o2 to maintan sat  no fever, chills cough      Allergies :No Known Allergies      PAST MEDICAL & SURGICAL HISTORY:  H/O: HTN (hypertension)  CAD (coronary artery disease)  Diabetic vitreous hemorrhage  Hypokalemia  Type 2 diabetes mellitus  Left ventricular hypertrophy  History of alcohol withdrawal delirium  S/P cardiac catheterization 11/17 3 stents to RCA        Medications:  MEDICATIONS  (STANDING):  apixaban 5 milliGRAM(s) Oral every 12 hours  atorvastatin 40 milliGRAM(s) Oral at bedtime  clopidogrel Tablet 75 milliGRAM(s) Oral daily  digoxin     Tablet 125 MICROGram(s) Oral daily  ferrous    sulfate 325 milliGRAM(s) Oral daily  furosemide   Injectable 40 milliGRAM(s) IV Push daily  hydrALAZINE 100 milliGRAM(s) Oral every 8 hours  ipratropium    for Nebulization 500 MICROGram(s) Nebulizer every 6 hours  labetalol 400 milliGRAM(s) Oral two times a day  levalbuterol Inhalation 0.63 milliGRAM(s) Inhalation every 6 hours  polyethylene glycol 3350 17 Gram(s) Oral daily  polyethylene glycol 3350 17 Gram(s) Oral daily  sodium chloride 0.9%. 1000 milliLiter(s) (50 mL/Hr) IV Continuous <Continuous>    MEDICATIONS  (PRN):  bisacodyl 10 milliGRAM(s) Oral daily PRN Constipation  bisacodyl 5 milliGRAM(s) Oral every 12 hours PRN Constipation      Antibiotics History  azithromycin  IVPB 500 milliGRAM(s) IV Intermittent once, 01-24-25 @ 16:19, Stop order after: 1 Doses  cefTRIAXone   IVPB 1000 milliGRAM(s) IV Intermittent once, 01-24-25 @ 16:19, Stop order after: 1 Doses  oseltamivir 30 milliGRAM(s) Oral two times a day, 01-24-25 @ 18:25, Stop order after: 10 Doses      Heme Medications   apixaban 5 milliGRAM(s) Oral every 12 hours, 01-24-25 @ 18:57  clopidogrel Tablet 75 milliGRAM(s) Oral daily, 01-24-25 @ 18:59      GI Medications  bisacodyl 10 milliGRAM(s) Oral daily, 01-27-25 @ 16:56, Routine PRN  bisacodyl 5 milliGRAM(s) Oral every 12 hours, 01-26-25 @ 14:03, Routine PRN  polyethylene glycol 3350 17 Gram(s) Oral daily, 01-26-25 @ 14:03, Routine  polyethylene glycol 3350 17 Gram(s) Oral daily, 01-27-25 @ 16:56, Now        LABS:                        9.0    8.06  )-----------( 156      ( 29 Jan 2025 06:24 )             27.4     01-29    142  |  102  |  59[H]  ----------------------------<  124[H]  3.5   |  33[H]  |  1.65[H]    Ca    7.9[L]      29 Jan 2025 06:24  Mg     2.3     01-29    TPro  6.0  /  Alb  2.5[L]  /  TBili  1.0  /  DBili  x   /  AST  71[H]  /  ALT  74[H]  /  AlkPhos  108  01-29       COVID  01-24-25 @ 16:30  COVID -   NotDetec  01-05-25 @ 00:51  COVID -   NotDetec  12-01-22 @ 06:55  COVID -   NotDetec  11-11-22 @ 22:00  COVID -   NotDetec  05-24-22 @ 20:52  COVID -   NotDetec      COVID Biomarkers    01-25-25 @ 08:36 ESR --  ---  CRP --  ---  DDimer  --   ---   LDH --   ---   Ferritin 200    01-09-25 @ 05:22 ESR --  ---  CRP 84[H]  ---  DDimer  --   ---   LDH --   ---   Ferritin --    01-05-25 @ 00:00 ESR --  ---  CRP --  ---  DDimer  491[H]   ---   LDH --   ---   Ferritin --            Trend Cardiac Enzymes  01-29-25 @ 06:24  POF-ZTJMR-TWQQU-CPKMM/Trop I - -- - --  - --  -  --  /  2571.4[H]  01-28-25 @ 06:30  RMG-OBRFJ-EKYNR-CPKMM/Trop I - -- - --  - --  -  --  /  889.4[H]  01-27-25 @ 06:50  HSH-OFLMI-YYZKN-CPKMM/Trop I - -- - --  - --  -  --  /  1654.3[H]  01-26-25 @ 18:09  UTI-SATLY-MREJB-CPKMM/Trop I - -- - --  - --  -  --  /  2444.2[H]    Trend BNP  01-27-25 @ 06:50   -  12713[H]  01-24-25 @ 16:30   -  82238[H]  01-09-25 @ 05:22   -  55305[H]  01-04-25 @ 20:10   -  44529[H]  12-01-22 @ 06:55   -  9693[H]  11-11-22 @ 22:00   -  1778[H]    Procalcitonin Trend  01-05-25 @ 11:05   -   0.34[H]  12-01-22 @ 06:55   -   0.15[H]    WBC Trend  01-29-25 @ 06:24   -  8.06  01-28-25 @ 06:30   -  8.97  01-27-25 @ 06:50   -  8.54    H/H Trend  01-29-25 @ 06:24   -   9.0[L]/ 27.4[L]  01-28-25 @ 06:30   -   8.3[L]/ 26.3[L]  01-27-25 @ 06:50   -   8.6[L]/ 26.4[L]  01-26-25 @ 06:56   -   9.0[L]/ 27.8[L]  01-25-25 @ 08:36   -   8.9[L]/ 27.6[L]  01-24-25 @ 16:30   -   9.1[L]/ 27.8[L]    Stool Occult Blood    Platelet Trend  01-29-25 @ 06:24   -  156  01-28-25 @ 06:30   -  164  01-27-25 @ 06:50   -  166    Trend Sodium  01-29-25 @ 06:24   -  142  01-28-25 @ 06:30   -  138  01-27-25 @ 06:50   -  140    Trend Potassium  01-29-25 @ 06:24   -  3.5  01-28-25 @ 06:30   -  3.2[L]  01-27-25 @ 06:50   -  3.5    Trend Bun/Cr  01-29-25 @ 06:24  BUN/CR -  59[H] / 1.65[H]  01-28-25 @ 06:30  BUN/CR -  66[H] / 1.71[H]  01-27-25 @ 06:50  BUN/CR -  77[H] / 2.07[H]    Lactic Acid Trend  01-24-25 @ 16:30   -   1.1    ABG Trend    Trend AST/ALT/ALK Phos/Bili  01-29-25 @ 06:24   71[H]/74[H]/108/1.0  01-28-25 @ 06:30   88[H]/78[H]/104/1.0  01-27-25 @ 06:50   43[H]/35/108/0.7  01-26-25 @ 06:56   39/30/112/0.7  01-25-25 @ 08:36   43[H]/31/115/0.6  01-24-25 @ 16:30   49[H]/31/127[H]/0.8     Albumin Trend  01-29-25 @ 06:24   -   2.5[L]  01-28-25 @ 06:30   -   2.3[L]  01-27-25 @ 06:50   -   2.6[L]  01-26-25 @ 06:56   -   2.6[L]  01-25-25 @ 08:36   -   2.5[L]  01-24-25 @ 16:30   -   2.7[L]      PTT - PT - INR Trend  01-04-25 @ 20:10   -   38.3[H] - 21.4[H] - 1.82[H]    Glucose Trend  01-29-25 @ 06:24   -  124[H] -- --  01-28-25 @ 06:30   -  104[H] -- --  01-27-25 @ 06:50   -  158[H] -- --    A1C with Estimated Average Glucose Result: 6.2 % *H* [4.0 - 5.6] (01-07-25 @ 06:42)       Rapid RVP Result: Detected (01-24-25 @ 16:30)           RADIOLOGY  CXR:      CT:  CT done     < from: CT Chest No Cont (01.28.25 @ 12:01) >  PROCEDURE:  CT of the Chest was performed.  Sagittal and coronal reformats were performed.    LIMITATION: Evaluation of the vascular structures and elena is limited by   lack of IV contrast.    FINDINGS:    LUNGS AND LARGE AIRWAYS: Patent central airways. Worsening diffuse   bilateral airspace infiltrates on the basis of pulmonary edema or   pneumonia. No pulmonary nodules.  PLEURA: Stable moderate right and small left pleural effusion.  VESSELS: Coronaryartery calcifications and/or stents. Mildly   atherosclerotic aorta. Stable ectatic ascending aorta measuring up to 4.2   cm.  HEART: Cardiomegaly. No pericardial effusion.  MEDIASTINUM AND ELENA: No lymphadenopathy. Right upper and lower   paratracheal nodes measuring up to 7 mm short axis.  CHEST WALL AND LOWER NECK: Within normal limits.  VISUALIZED UPPER ABDOMEN: Right renal cyst.  BONES: Degenerative changes.    IMPRESSION:  Worsening diffuse bilateral pleural effusions on the basis of pulmonary   edema or pneumonia. Stable bilateral pleural effusions.    Cardiomegaly. Ectatic ascending aorta.        --- End of Report ---      < end of copied text >  ECHO:      VITALS:  T(C): 36.3 (01-29-25 @ 13:58), Max: 36.7 (01-29-25 @ 04:30)  T(F): 97.3 (01-29-25 @ 13:58), Max: 98.1 (01-29-25 @ 04:30)  HR: 62 (01-29-25 @ 15:17) (62 - 113)  BP: 145/72 (01-29-25 @ 13:58) (128/67 - 148/81)  BP(mean): --  ABP: --  ABP(mean): --  RR: 18 (01-29-25 @ 14:57) (18 - 18)  SpO2: 97% (01-29-25 @ 15:17) (94% - 99%)  CVP(mm Hg): --  CVP(cm H2O): --    Ins and Outs     01-28-25 @ 07:01  -  01-29-25 @ 07:00  --------------------------------------------------------  IN: 200 mL / OUT: 0 mL / NET: 200 mL                I&O's Detail    28 Jan 2025 07:01  -  29 Jan 2025 07:00  --------------------------------------------------------  IN:    Oral Fluid: 200 mL  Total IN: 200 mL    OUT:  Total OUT: 0 mL    Total NET: 200 mL

## 2025-01-29 NOTE — PROGRESS NOTE ADULT - SUBJECTIVE AND OBJECTIVE BOX
CC: Patient is a 79y old  Male who presents with a chief complaint of Hypoxic due to flu (29 Jan 2025 15:23)      Interval History:  Patient seen and examined at bedside.  No overnight events  No complaints this morning  Pt reports that he is feeling better but  requires more supplemental Oxygen this AM  Denies CP, abd pain, N/V/D    ALLERGIES:  No Known Allergies    MEDICATIONS  (STANDING):  apixaban 5 milliGRAM(s) Oral every 12 hours  atorvastatin 40 milliGRAM(s) Oral at bedtime  clopidogrel Tablet 75 milliGRAM(s) Oral daily  digoxin     Tablet 125 MICROGram(s) Oral daily  ferrous    sulfate 325 milliGRAM(s) Oral daily  furosemide   Injectable 40 milliGRAM(s) IV Push every 12 hours  hydrALAZINE 100 milliGRAM(s) Oral every 8 hours  ipratropium    for Nebulization 500 MICROGram(s) Nebulizer every 6 hours  labetalol 400 milliGRAM(s) Oral two times a day  levalbuterol Inhalation 0.63 milliGRAM(s) Inhalation every 6 hours  polyethylene glycol 3350 17 Gram(s) Oral daily  polyethylene glycol 3350 17 Gram(s) Oral daily    MEDICATIONS  (PRN):  bisacodyl 10 milliGRAM(s) Oral daily PRN Constipation  bisacodyl 5 milliGRAM(s) Oral every 12 hours PRN Constipation    Vital Signs Last 24 Hrs  T(F): 97.3 (29 Jan 2025 13:58), Max: 98.1 (29 Jan 2025 04:30)  HR: 62 (29 Jan 2025 15:17) (62 - 74)  BP: 145/72 (29 Jan 2025 13:58) (128/67 - 148/81)  RR: 18 (29 Jan 2025 14:57) (18 - 18)  SpO2: 97% (29 Jan 2025 15:17) (94% - 99%)  I&O's Summary    28 Jan 2025 07:01  -  29 Jan 2025 07:00  --------------------------------------------------------  IN: 200 mL / OUT: 0 mL / NET: 200 mL          PHYSICAL EXAM:  GENERAL: NAD, well-groomed, well-developed  EYES: L eye blindness mostly closed, EOMI, conjunctiva and sclera clear of right eye   ENMT: Moist mucous membranes, Good dentition, no thrush  NECK: Supple, No JVD  CHEST/LUNG: Diffuse wheezing and scattered rhonchi  to auscultation bilaterally slightly better today  HEART: RRR; S1/S2, No murmur  ABDOMEN: Soft, Nontender, mildly distended; Bowel sounds present  EXTREMITIES: No calf tenderness, No cyanosis, No edema  SKIN: Warm, Intact. large exophytic lesion central chest old  NERVOUS SYSTEM:  A/O x3, No focal     LABS:                        9.0    8.06  )-----------( 156      ( 29 Jan 2025 06:24 )             27.4       01-29    142  |  102  |  59  ----------------------------<  124  3.5   |  33  |  1.65    Ca    7.9      29 Jan 2025 06:24  Mg     2.3     01-29    TPro  6.0  /  Alb  2.5  /  TBili  1.0  /  DBili  x   /  AST  71  /  ALT  74  /  AlkPhos  108  01-29            CARDIAC MARKERS ( 29 Jan 2025 06:24 )  x     / 2571.4 ng/L / x     / x     / x      CARDIAC MARKERS ( 28 Jan 2025 06:30 )  x     / 889.4 ng/L / x     / x     / x      CARDIAC MARKERS ( 27 Jan 2025 06:50 )  x     / 1654.3 ng/L / x     / x     / x      CARDIAC MARKERS ( 26 Jan 2025 18:09 )  x     / 2444.2 ng/L / x     / x     / x                            Urinalysis Basic - ( 29 Jan 2025 06:24 )    Color: x / Appearance: x / SG: x / pH: x  Gluc: 124 mg/dL / Ketone: x  / Bili: x / Urobili: x   Blood: x / Protein: x / Nitrite: x   Leuk Esterase: x / RBC: x / WBC x   Sq Epi: x / Non Sq Epi: x / Bacteria: x            Care Discussed with Consultants/Other Providers: Yes. Pulmonology Cardiology and Nephrology

## 2025-01-29 NOTE — CONSULT NOTE ADULT - SUBJECTIVE AND OBJECTIVE BOX
NEPHROLOGY CONSULTATION    CHIEF COMPLAINT: SOB    HPI:  Patient is 80 yo M w/PMH of HTN, CKD, paroxysmal afib on eliquis, w/hx of abnormal nuclear stress test s/p coronary angiogram and NOMAN to proximal and mid RCA (11/2022), chronic CHF presenting to  ED 1/24 from primary care office due to sob and hypoxia. Tested positive for Flu A. No chest pain, nausea, vomiting, D/C. Poor historian. Noted to have elevated Troponin.    ROS:  as above    Allergies:  No Known Allergies    PAST MEDICAL & SURGICAL HISTORY:  HTN (hypertension)  CAD (coronary artery disease)  Diabetic vitreous hemorrhage  Hypokalemia  Type 2 diabetes mellitus  Left ventricular hypertrophy  S/P cardiac catheterization  11/17 3 stents to RCA    SOCIAL HISTORY:  ex Tob  ETOH    FAMILY HISTORY:  CVA (Mother)    MEDICATIONS  (STANDING):  apixaban 5 milliGRAM(s) Oral every 12 hours  atorvastatin 40 milliGRAM(s) Oral at bedtime  benzocaine/menthol Lozenge 1 Lozenge Oral daily  clopidogrel Tablet 75 milliGRAM(s) Oral daily  digoxin     Tablet 125 MICROGram(s) Oral daily  ferrous    sulfate 325 milliGRAM(s) Oral daily  furosemide   Injectable 40 milliGRAM(s) IV Push every 12 hours  hydrALAZINE 100 milliGRAM(s) Oral every 8 hours  ipratropium    for Nebulization 500 MICROGram(s) Nebulizer every 6 hours  labetalol 400 milliGRAM(s) Oral two times a day  levalbuterol Inhalation 0.63 milliGRAM(s) Inhalation every 6 hours  polyethylene glycol 3350 17 Gram(s) Oral daily  polyethylene glycol 3350 17 Gram(s) Oral daily    Vital Signs Last 24 Hrs  T(C): 36.5 (01-29-25 @ 19:44), Max: 36.7 (01-29-25 @ 04:30)  T(F): 97.7 (01-29-25 @ 19:44), Max: 98.1 (01-29-25 @ 04:30)  HR: 60 (01-29-25 @ 20:08) (60 - 119)  BP: 146/72 (01-29-25 @ 19:44) (145/72 - 148/81)  RR: 20 (01-29-25 @ 19:44) (18 - 20)  SpO2: 98% (01-29-25 @ 20:08) (93% - 99%)    s1s2  b/l air entry  soft, ND  no edema     LABS:                        9.0    8.06  )-----------( 156      ( 29 Jan 2025 06:24 )             27.4     01-29    142  |  102  |  59[H]  ----------------------------<  124[H]  3.5   |  33[H]  |  1.65[H]    Ca    7.9[L]      29 Jan 2025 06:24  Mg     2.3     01-29    TPro  6.0  /  Alb  2.5[L]  /  TBili  1.0  /  DBili  x   /  AST  71[H]  /  ALT  74[H]  /  AlkPhos  108  01-29    LIVER FUNCTIONS - ( 29 Jan 2025 06:24 )  Alb: 2.5 g/dL / Pro: 6.0 g/dL / ALK PHOS: 108 U/L / ALT: 74 U/L / AST: 71 U/L / GGT: x           A/P:    CM, Afib, EF 45 - 50%, mod MR   Adm w/CHF, PNA, Flu A, elevated troponin  Cardiology following   Known CKD 3 w/Cr near baseline   No objection to Lasix as ordered   Avoid nephrotoxins as able, no NSAID's  Will follow    [phone]      
Initial HPI on admission:  HPI:  Patient is a 78 yo M, originally from [facility], former cigarette smoker w/ PMHx of HTN, CKD, paroxysmal afib on eliquis, w/ hx of abnormal nuclear stress test s/p coronary angiogram and NOMAN to proximal and mid RCA (11/2022), chronic CHF presenting to [facility] from primary care office due to sob and hypoxia. Patient's family translating at bedside, as well as providing history. Patient started not feeling well on Tuesday and developed a harsh persistent cough. Patient went to [employer] and per family he was given a flu vaccine, as well as a nasal spray. Patient did not improve and became increasingly worse. Patient then had a fever yesterday. Patient returned to primary care provider and was found to have O2 saturation of 89%. Patient was advised to come to ED. Patient states he also feels congestion in his chest as well as pain with both of his ears and throat. Patient denies chest pain, nausea, vomiting, dizziness, weakness, sick contacts, recent travel.  (24 Jan 2025 18:18)      BRIEF HOSPITAL COURSE: As per daughter at bedside patient endorsing orthopnea. No history of lung disease. Worked in [employer] prior to moving to the . Distant smoking history, quit more than 40 years ago. No history of asthma or COPD. Endorsing feelings constipated     PAST MEDICAL & SURGICAL HISTORY:  H/O: HTN (hypertension)      CAD (coronary artery disease)      Diabetic vitreous hemorrhage      Hypokalemia      Type 2 diabetes mellitus      Left ventricular hypertrophy      History of alcohol withdrawal delirium      S/P cardiac catheterization  11/17 3 stents to RCA        Allergies    No Known Allergies    Intolerances      FAMILY HISTORY:  Family history of CVA (Mother)      Social history:      Smoking: Distant smoker     Drinking: Former ETOH abuse history     Drug use:    Review of Systems:  Negative except for above    Medications:  apixaban 5 milliGRAM(s) Oral every 12 hours  atorvastatin 40 milliGRAM(s) Oral at bedtime  bisacodyl 5 milliGRAM(s) Oral every 12 hours PRN Constipation  clopidogrel Tablet 75 milliGRAM(s) Oral daily  ferrous    sulfate 325 milliGRAM(s) Oral daily  furosemide    Tablet 20 milliGRAM(s) Oral daily  hydrALAZINE 100 milliGRAM(s) Oral every 8 hours  ipratropium    for Nebulization 500 MICROGram(s) Nebulizer every 6 hours  labetalol 400 milliGRAM(s) Oral two times a day  levalbuterol Inhalation 0.63 milliGRAM(s) Inhalation every 6 hours  methylPREDNISolone sodium succinate Injectable 40 milliGRAM(s) IV Push every 8 hours  oseltamivir 30 milliGRAM(s) Oral two times a day  polyethylene glycol 3350 17 Gram(s) Oral daily  sodium chloride 0.9%. 1000 milliLiter(s) (50 mL/Hr) IV Continuous <Continuous>    MEDICATIONS  (STANDING):  apixaban 5 milliGRAM(s) Oral every 12 hours  atorvastatin 40 milliGRAM(s) Oral at bedtime  clopidogrel Tablet 75 milliGRAM(s) Oral daily  ferrous    sulfate 325 milliGRAM(s) Oral daily  furosemide    Tablet 20 milliGRAM(s) Oral daily  hydrALAZINE 100 milliGRAM(s) Oral every 8 hours  ipratropium    for Nebulization 500 MICROGram(s) Nebulizer every 6 hours  labetalol 400 milliGRAM(s) Oral two times a day  levalbuterol Inhalation 0.63 milliGRAM(s) Inhalation every 6 hours  methylPREDNISolone sodium succinate Injectable 40 milliGRAM(s) IV Push every 8 hours  oseltamivir 30 milliGRAM(s) Oral two times a day  polyethylene glycol 3350 17 Gram(s) Oral daily  sodium chloride 0.9%. 1000 milliLiter(s) (50 mL/Hr) IV Continuous <Continuous>    MEDICATIONS  (PRN):  bisacodyl 5 milliGRAM(s) Oral every 12 hours PRN Constipation      Antibiotics History  azithromycin  IVPB 500 milliGRAM(s) IV Intermittent once, 01-24-25 @ 16:19, Stop order after: 1 Doses  cefTRIAXone   IVPB 1000 milliGRAM(s) IV Intermittent once, 01-24-25 @ 16:19, Stop order after: 1 Doses  oseltamivir 30 milliGRAM(s) Oral two times a day, 01-24-25 @ 18:25, Stop order after: 10 Doses      Heme Medications   apixaban 5 milliGRAM(s) Oral every 12 hours, 01-24-25 @ 18:57  clopidogrel Tablet 75 milliGRAM(s) Oral daily, 01-24-25 @ 18:59      GI Medications  bisacodyl 5 milliGRAM(s) Oral every 12 hours, 01-26-25 @ 14:03, Routine PRN  polyethylene glycol 3350 17 Gram(s) Oral daily, 01-26-25 @ 14:03, Routine        Home Medications:  Last Order Reconciliation Date: 01-24-25 @ 19:00 (Admission Reconciliation)  apixaban 5 mg oral tablet: 1 tab(s) orally every 12 hours (01-24-25 @ 18:55)  aspirin 81 mg oral delayed release tablet: 1 tab(s) orally once a day (01-05-25 @ 01:25)  atorvastatin 40 mg oral tablet: 1 tab(s) orally once a day (at bedtime) (01-24-25 @ 18:55)  carvedilol 25 mg oral tablet: 1 tab(s) orally every 12 hours (01-05-25 @ 01:25)  clopidogrel 75 mg oral tablet: 1 tab(s) orally once a day (01-24-25 @ 18:55)  doxycycline hyclate 100 mg oral capsule: 1 cap(s) orally 2 times a day (01-09-25 @ 17:59)  doxycycline hyclate 100 mg oral capsule: 1 cap(s) orally 2 times a day (01-24-25 @ 18:55)  doxycycline hyclate 100 mg oral capsule: 1 cap(s) orally 2 times a day (01-09-25 @ 17:55)  hydrALAZINE 100 mg oral tablet: 1 tab(s) orally every 8 hours (01-24-25 @ 18:55)  hydroCHLOROthiazide 25 mg oral tablet: 1 tab(s) orally once a day    NOTE: LAST FILLED ON OCT 2022 for 30 days supply (01-05-25 @ 01:25)  hydroCHLOROthiazide 25 mg oral tablet: 1 tab(s) orally once a day (01-05-25 @ 01:25)  isosorbide dinitrate 30 mg oral tablet: 1 tab(s) orally 3 times a day (01-05-25 @ 01:25)  labetalol 300 mg oral tablet: 1 tab(s) orally 2 times a day (01-24-25 @ 18:55)  Lasix 20 mg oral tablet: 1 tab(s) orally once a day (01-09-25 @ 17:59)  Lasix 20 mg oral tablet: 1 tab(s) orally once a day (01-24-25 @ 18:55)  Lasix 40 mg oral tablet: 1 tab(s) orally once a day (01-09-25 @ 13:08)  losartan 100 mg oral tablet: 1 tab(s) orally once a day (01-05-25 @ 01:25)  losartan 100 mg oral tablet: 1 tab(s) orally once a day    NOTE: LAST FILLED ON AUG 2022 for 90 days supply (01-05-25 @ 01:25)  Lovaza 1000 mg oral capsule: 2 cap(s) orally 2 times a day    NOTE: LAST FILLED ON OCT 2022 for 30days supply (01-05-25 @ 01:25)  meclizine 25 mg oral tablet: 1 tab(s) orally 3 times a day, As Needed (01-05-25 @ 01:25)  Omega-3 oral capsule:  (01-05-25 @ 01:25)  spironolactone 25 mg oral tablet: 1 tab(s) orally once a day    NOTE: LAST FILLED ON AUG 2022 for 90 days supply (01-05-25 @ 01:25)      LABS:                        9.0    7.14  )-----------( 177      ( 26 Jan 2025 06:56 )             27.8     01-26    136  |  97  |  67[H]  ----------------------------<  159[H]  3.8   |  27  |  2.02[H]    Ca    8.4      26 Jan 2025 06:56  Mg     2.1     01-25    TPro  6.3  /  Alb  2.6[L]  /  TBili  0.7  /  DBili  x   /  AST  39  /  ALT  30  /  AlkPhos  112  01-26    SHANEKA 1:160 01-07 @ 06:42  Anti SS-1 --  Anti SS-2 --  Anti RNP --  RF -- 01-07 @ 06:42    Atypical ANCA Indeterminate Method interference due to SHANEKA Fluorescence 01-07 @ 06:42  c-ANCA titer -- 01-07 @ 06:42  c-ANCA Negative 01-07 @ 06:42  p-ANCA Negative 01-07 @ 06:42            Urinalysis Basic - ( 26 Jan 2025 06:56 )    Color: x / Appearance: x / SG: x / pH: x  Gluc: 159 mg/dL / Ketone: x  / Bili: x / Urobili: x   Blood: x / Protein: x / Nitrite: x   Leuk Esterase: x / RBC: x / WBC x   Sq Epi: x / Non Sq Epi: x / Bacteria: x              CULTURES: (if applicable)          CAPILLARY BLOOD GLUCOSE      POCT Blood Glucose.: 190 mg/dL (26 Jan 2025 00:14)      RADIOLOGY  CXR:      CT:    ECHO:      VITALS:  T(C): 36.5 (01-26-25 @ 12:00), Max: 36.6 (01-25-25 @ 20:40)  T(F): 97.7 (01-26-25 @ 12:00), Max: 97.8 (01-25-25 @ 20:40)  HR: 76 (01-26-25 @ 12:00) (71 - 125)  BP: 132/82 (01-26-25 @ 12:00) (124/89 - 162/83)  BP(mean): --  ABP: --  ABP(mean): --  RR: 17 (01-26-25 @ 12:00) (17 - 20)  SpO2: 97% (01-26-25 @ 12:00) (92% - 99%)  CVP(mm Hg): --  CVP(cm H2O): --    Ins and Outs     01-25-25 @ 07:01  -  01-26-25 @ 07:00  --------------------------------------------------------  IN: 0 mL / OUT: 900 mL / NET: -900 mL        Height (cm): 167.6 (01-24-25 @ 16:00)  Weight (kg): 74.8 (01-24-25 @ 16:00)  BMI (kg/m2): 26.6 (01-24-25 @ 16:00)        I&O's Detail    25 Jan 2025 07:01  -  26 Jan 2025 07:00  --------------------------------------------------------  IN:  Total IN: 0 mL    OUT:    Voided (mL): 900 mL  Total OUT: 900 mL    Total NET: -900 mL          Physical Examination:  GENERAL:               Alert, Oriented, No acute distress.    HEENT:                   Missing left eye, right eye reactive to light.  Symmetric. No JVD, Moist MM  PULM:                     Bilateral air entry, No Rales, No Rhonchi, No Wheezing  CVS:                         S1, S2,  No Murmur  ABD:                        Soft, nondistended, nontender, normoactive bowel sounds,   EXT:                         No edema, nontender, No Cyanosis or Clubbing   Vascular:                Warm Extremities, Normal Capillary refill, Normal Distal Pulses  SKIN:                       Warm and well perfused, no rashes noted.   NEURO:                  Alert, oriented, interactive, nonfocal, follows commands  PSYC:                      Calm, + Insight.    
DAVID ROBERT  [id number]      HPI:    David Robert is a 79 year old man with past medical history of Coronary artery disease (s/p NOMAN to proximal and mid RCA in 2022) with brief Paroxysmal atrial fibrillation (on Eliquis), Hypertension and Chronic kidney disease with recent hospitalization here earlier this month for pneumonia and congestive heart failure now presents with progressive dyspnea, found to have acute hypoxic respiratory failure secondary to Influenza A.     ALLERGIES:  No Known Allergies    CURRENT MEDICATIONS:  apixaban 5 milliGRAM(s) Oral every 12 hours  atorvastatin 40 milliGRAM(s) Oral at bedtime  clopidogrel Tablet 75 milliGRAM(s) Oral daily  ferrous    sulfate 325 milliGRAM(s) Oral daily  furosemide    Tablet 20 milliGRAM(s) Oral daily  hydrALAZINE 100 milliGRAM(s) Oral every 8 hours  ipratropium    for Nebulization 500 MICROGram(s) Nebulizer every 6 hours  labetalol 300 milliGRAM(s) Oral two times a day  levalbuterol Inhalation 0.63 milliGRAM(s) Inhalation every 6 hours  methylPREDNISolone sodium succinate Injectable 40 milliGRAM(s) IV Push every 8 hours  oseltamivir 30 milliGRAM(s) Oral two times a day  sodium chloride 0.9%. 1000 milliLiter(s) IV Continuous <Continuous>    ROS:  All 10 systems reviewed and positives noted in HPI    OBJECTIVE:    VITAL SIGNS:  Vital Signs Last 24 Hrs  T(C): 36.3 (26 Jan 2025 05:03), Max: 36.6 (25 Jan 2025 13:14)  T(F): 97.3 (26 Jan 2025 05:03), Max: 97.9 (25 Jan 2025 13:14)  HR: 116 (26 Jan 2025 05:03) (66 - 125)  BP: 128/83 (26 Jan 2025 05:03) (124/89 - 162/83)  BP(mean): --  RR: 18 (26 Jan 2025 05:03) (18 - 20)  SpO2: 95% (26 Jan 2025 05:03) (92% - 99%)    Parameters below as of 26 Jan 2025 05:03  Patient On (Oxygen Delivery Method): nasal cannula  O2 Flow (L/min): 3      Physical Exam:   General: fatigued   Neck: supple   CVS: JVP ~ 7 cm H20, irregularly irregular, s1, s2  Pulm: decreased breath sounds  Ext: no lower extremity edema b/l   Neuro: awake, alert and oriented  Psych: Normal affect    LABS:                        9.0    7.14  )-----------( 177      ( 26 Jan 2025 06:56 )             27.8     01-26    136  |  97  |  67[H]  ----------------------------<  159[H]  3.8   |  27  |  2.02[H]    Ca    8.4      26 Jan 2025 06:56  Mg     2.1     01-25    TPro  6.3  /  Alb  2.6[L]  /  TBili  0.7  /  DBili  x   /  AST  39  /  ALT  30  /  AlkPhos  112  01-26      Coronary angiogram (11/2022):  LM: minor irregularities  LAD: 70% stenosis  D1: 80% stenosis   LCx: complete occlusion  RCA: 80% stenosis s/p NOMAN    TTE (1/5/25):  LVEF 55-60%, moderate LVH  Normal RV size and function   Mild MR, Mild TR, Mild-moderate AR    ECG (1/26/25): atrial fibrillation with RVR, rate related LBBB

## 2025-01-29 NOTE — PROGRESS NOTE ADULT - SUBJECTIVE AND OBJECTIVE BOX
SEMAJ ROBERT  [id number]      Chief Complaint: Influenza A Pneumonia/HFmrEF/NSTEMI/AF    Tele: sinus 60-70s    Current meds:   apixaban 5 milliGRAM(s) Oral every 12 hours  atorvastatin 40 milliGRAM(s) Oral at bedtime  bisacodyl 5 milliGRAM(s) Oral every 12 hours PRN  bisacodyl 10 milliGRAM(s) Oral daily PRN  clopidogrel Tablet 75 milliGRAM(s) Oral daily  ferrous    sulfate 325 milliGRAM(s) Oral daily  furosemide    Tablet 20 milliGRAM(s) Oral daily  hydrALAZINE 100 milliGRAM(s) Oral every 8 hours  ipratropium    for Nebulization 500 MICROGram(s) Nebulizer every 6 hours  labetalol 400 milliGRAM(s) Oral two times a day  levalbuterol Inhalation 0.63 milliGRAM(s) Inhalation every 6 hours  oseltamivir 30 milliGRAM(s) Oral two times a day  polyethylene glycol 3350 17 Gram(s) Oral daily  polyethylene glycol 3350 17 Gram(s) Oral daily  sodium chloride 0.9%. 1000 milliLiter(s) IV Continuous <Continuous>      Objective:     Vital Signs:   T(C): 36.6 (01-28-25 @ 04:59), Max: 36.9 (01-27-25 @ 12:34)  HR: 75 (01-28-25 @ 09:11) (72 - 128)  BP: 124/85 (01-28-25 @ 06:43) (120/85 - 147/96)  RR: 18 (01-28-25 @ 04:59) (16 - 18)  SpO2: 94% (01-28-25 @ 09:11) (92% - 97%)  Wt(kg): --    Physical Exam:   General: fatigued, wheezing   Neck: supple   CVS: JVP ~ 7 cm H20, irregularly irregular, s1, s2  Pulm: decreased breath sounds  Ext: no lower extremity edema b/l   Neuro: awake, alert and oriented  Psych: Normal affect        Labs:   28 Jan 2025 06:30    138    |  99     |  66     ----------------------------<  104    3.2     |  32     |  1.71     Ca    7.9        28 Jan 2025 06:30  Mg     2.3       28 Jan 2025 06:30    TPro  5.7    /  Alb  2.3    /  TBili  1.0    /  DBili  x      /  AST  88     /  ALT  78     /  AlkPhos  104    28 Jan 2025 06:30                          8.3   8.97  )-----------( 164      ( 28 Jan 2025 06:30 )             26.3             Coronary angiogram (11/2022):  LM: minor irregularities  LAD: 70% stenosis  D1: 80% stenosis   LCx: complete occlusion  RCA: 80% stenosis s/p NOMAN    TTE (1/5/25):  LVEF 55-60%, moderate LVH  Normal RV size and function   Mild MR, Mild TR, Mild-moderate AR    TTE (1/26/25):   1. Limited echocardiogram performed.   2. The heart rate is irregular and tachycardic throughout the study. Consider repeating echocardiogram when patient is no longer tachycardic for more accurate assessment of LVEF, if clinically indicated.   3. Left ventricular systolic function is mildly decreased with an   ejection fraction of 45 % by Jordan's method of disks with an ejection fraction visually estimated at 45 to 50 %.   4. Mild global left ventricle hypokinesis.   5. Mildly enlarged right ventricle with low normal right ventricle   systolic function.   6. Left atrium is severely dilated.   7. The right atrium is dilated.   8. Moderate mitral regurgitation.      ECG (1/26/25): atrial fibrillation with RVR, rate related LBBB

## 2025-01-29 NOTE — PROGRESS NOTE ADULT - ASSESSMENT
Physical Examination:  GENERAL:               Alert, Oriented, No acute distress.    HEENT:                   Missing left eye, right eye reactive to light.  Symmetric. No JVD, Dry MM  PULM:                     Bilateral air entry, +Rales, No Rhonchi, trace  Wheezing  CVS:                        S1, S2,  No Murmur     NEURO:                  Alert, oriented, interactive, nonfocal, follows commands  PSYC:                      Calm, + Insight.    78 yo M, originally from [facility], former cigarette smoker w/ PMHx of HTN, CKD, paroxysmal afib on eliquis, w/ hx of abnormal nuclear stress test s/p coronary angiogram and NOMAN to proximal and mid RCA (11/2022) presenting to [facility] from primary care office due to sob and hypoxia. Patient positive for Flu A. Pulmonary consulted for acute hypoxia. Clinically appears euvolemic. Symptoms likely due to acute viral illness.       Assessment:  1. Influenza A infection  2. Acute hypoxia   3. HFPEF   4. Paroxysmal Afib    Plan:  - CT scan with enlarging pleural effusion and worsening ground glass suspect from undrelying pulmonary edema   - Stop IVF and make diuretics BID  - monitor and supplement lytes  - will need further workup if o2 requirements not improve with diuresis   - Cont treatment for influenza as ordered  - Cont. Bronchodilators  - Titrate down oxygen as tolerated   - Oxygen support to maintain saturation > 92%  - DVT prophylaxis  - Rest of management per primary team

## 2025-01-29 NOTE — DIETITIAN NUTRITION RISK NOTIFICATION - MALNUTRITION EVALUATION AS DEMONSTRATED BY (ADULTS > 20 YEARS OF AGE)
[facility] Progress Note    Admitting Date and Time: 11/2/2021 12:12 PM  Admit Dx: Acalculous cholecystitis [K81.9]  Lactic acidosis [E87.2]  Cirrhosis ([facility] [address] [K74.60]  Acute cystitis with hematuria [N30.01]    Subjective:  Patient is being followed for Acalculous cholecystitis [K81.9]  Lactic acidosis [E87.2]  Cirrhosis ([facility] [address] [K74.60]  Acute cystitis with hematuria [N30.01]     Patient was seen and examined this AM.  Awake, alert and oriented X3. Still complains off abdominal pain radiating to both flanks and back. -GI following, pending MRCP. HIDA scan done no evidence of cholecystitis  -ID following, no indications for antibiotics. DC Rocephin. ROS: denies fever, chills, cp, sob, n/v, HA unless stated above.       nicotine  1 patch TransDERmal Daily    pantoprazole  40 mg Oral QAM AC    morphine  4 mg IntraVENous Once    sodium chloride flush  5-40 mL IntraVENous 2 times per day     acetaminophen, 650 mg, Q6H PRN   Or  acetaminophen, 650 mg, Q6H PRN  LORazepam, 1 mg, Q1H PRN   Or  LORazepam, 1 mg, Q1H PRN   Or  LORazepam, 2 mg, Q1H PRN   Or  LORazepam, 2 mg, Q1H PRN   Or  LORazepam, 3 mg, Q1H PRN   Or  LORazepam, 3 mg, Q1H PRN   Or  LORazepam, 4 mg, Q1H PRN   Or  LORazepam, 4 mg, Q1H PRN  ALPRAZolam, 1 mg, Q6H PRN  sodium chloride flush, 5-40 mL, PRN  sodium chloride, 25 mL, PRN  ondansetron, 4 mg, Q8H PRN   Or  ondansetron, 4 mg, Q6H PRN  polyethylene glycol, 17 g, Daily PRN  morphine, 2 mg, Q2H PRN   Or  morphine, 4 mg, Q2H PRN  melatonin, 6 mg, Nightly PRN         Objective:    /80   Pulse 89   Temp 97.8 °F (36.6 °C) (Oral)   Resp 18   Ht 5' 3\" (1.6 m)   Wt 104 lb (47.2 kg)   SpO2 100%   BMI 18.42 kg/m²     General: well-developed, well-nourished, no acute distress, cooperative  Skin: warm, dry, intact, normal color without cyanosis  HEENT: normocephalic, atraumatic, mucous membranes normal  Respiratory: clear to auscultation bilaterally without respiratory distress  Cardiovascular: regular rate and rhythm without murmur / rub / gallop  Abdominal: soft, tender, nondistended, normoactive bowel sounds  Extremities: no mottling, pulses intact, no edema  Neurologic: awake, alert, no focal deficits  Psychiatric: normal affect, cooperative      Recent Labs     11/02/21  1358 11/03/21  0747    136   K 3.8 3.4*   CL 96* 103   CO2 25 22   BUN 2* 2*   CREATININE 0.4* 0.4*   GLUCOSE 108* 82   CALCIUM 8.7 7.7*       Recent Labs     11/02/21  1358 11/03/21  0747   WBC 16.1* 14.3*   RBC 2.62* 2.53*   HGB 10.6* 10.3*   HCT 31.2* 30.5*   .1* 120.6*   MCH 40.5* 40.7*   MCHC 34.0 33.8   RDW 14.9 14.6    296   MPV 9.7 10.2       Assessment:    Active Problems:    Severe protein-calorie malnutrition (HCC)    Cirrhosis (HCC)  Resolved Problems:    * No resolved hospital problems. *    Assessment:  -Abdominal pain, concerns for acalculous cholecystitis versus acute pancreatitis  -Alcoholic hepatic cirrhosis with ascites  -Leukocytosis, reactive to the above  -Chronic macrocytic anemia -no evidence of GIB  -Hypokalemia  -Hx alcohol use disorder    Plan:  -GI following, awaiting MRCP to rule out pancreatitis in the light of normal lipase. Continue aggressive IV fluids and pain management. HIDA scan negative for cholecystitis  -Follow lipid panel. US cannot rule out distal CBD stone  -Follow cirrhosis work-up, H FE genotype with the elevated ferritin  -Depending on MRCP results, might need paracentesis. We will keep DVT prophylaxis on hold  -Follow EGD records per GI  -ID following, no indication for antibiotics as no concern for cholecystitis. -Supplement and recheck BMP in the p.m.  -CIWA protocol possible withdrawal.  No Ativan requirements overnight. PCD  Full code    NOTE: This report was transcribed using voice recognition software. Every effort was made to ensure accuracy; however, inadvertent computerized transcription errors may be present.   Electronically Loss of subcutaneous fat.../Loss of muscle... signed by Colin Wagner MD on 11/4/2021 at 9:19 AM

## 2025-01-29 NOTE — PROGRESS NOTE ADULT - ASSESSMENT
79 year old man with past medical history of Coronary artery disease (s/p NOMAN to proximal and mid RCA in 2022) with brief Paroxysmal atrial fibrillation (on Eliquis), Hypertension and Chronic kidney disease with recent hospitalization here earlier this month for pneumonia and congestive heart failure now presents with progressive dyspnea, found to have acute hypoxic respiratory failure secondary to Influenza A pneumonia, also with rapid atrial fibrillation and mild cardiomyopathy.     ECG consistent with atrial fibrillation with RVR and rate related LBBB of which patient has a history of. Troponins elevated, likely demand ischemia from sepsis from pneumonia, rapid atrial fibrillation and CKD. CXR with diffuse infiltrates. Pro BNP elevated from lung disease and cardiomyopathy. Prior coronary angiogram in 11/2022 consistent with triple vessel CAD s/p 2 NOMAN to pRCA and mRCA. Recent echo report with normal LVEF 55-60%, moderate LVH and mild-moderate valvular disease.     Repeat echo with LVEF 45-50% and moderate mitral regurgitation.    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 No signs of volume overload at this time. Avoid guideline directed medical therapy due to BRUNILDA on CKD. Plan for repeat echo prior to discharge. Outpatient amyloid scan.   - troponins peaked. Known multivessel CAD, has residual LAD, D1 and chronic occlusion of LCx that was medically managed by interventional cardiology. He is s/p NOMAN of RCA. Continue Plavix 75 mg daily, Atorvastatin 40 mg daily. Plan for outpatient nuclear stress test when recovers from pneumonia.    - paroxysms of AF with RVR due to sepsis, s/p dig loading. continue po dig and already on Labetalol

## 2025-01-30 ENCOUNTER — APPOINTMENT (OUTPATIENT)
Dept: CARDIOLOGY | Facility: CLINIC | Age: 80
End: 2025-01-30

## 2025-01-30 LAB
ALBUMIN SERPL ELPH-MCNC: 2.4 G/DL — LOW (ref 3.3–5)
ALP SERPL-CCNC: 117 U/L — SIGNIFICANT CHANGE UP (ref 40–120)
ALT FLD-CCNC: 77 U/L — HIGH (ref 10–45)
ANION GAP SERPL CALC-SCNC: 8 MMOL/L — SIGNIFICANT CHANGE UP (ref 5–17)
AST SERPL-CCNC: 67 U/L — HIGH (ref 10–40)
BILIRUB SERPL-MCNC: 1.3 MG/DL — HIGH (ref 0.2–1.2)
BUN SERPL-MCNC: 55 MG/DL — HIGH (ref 7–23)
CALCIUM SERPL-MCNC: 8.1 MG/DL — LOW (ref 8.4–10.5)
CHLORIDE SERPL-SCNC: 98 MMOL/L — SIGNIFICANT CHANGE UP (ref 96–108)
CO2 SERPL-SCNC: 33 MMOL/L — HIGH (ref 22–31)
CREAT SERPL-MCNC: 1.7 MG/DL — HIGH (ref 0.5–1.3)
EGFR: 41 ML/MIN/1.73M2 — LOW
GLUCOSE SERPL-MCNC: 132 MG/DL — HIGH (ref 70–99)
HCT VFR BLD CALC: 30.2 % — LOW (ref 39–50)
HGB BLD-MCNC: 9.7 G/DL — LOW (ref 13–17)
MCHC RBC-ENTMCNC: 25.7 PG — LOW (ref 27–34)
MCHC RBC-ENTMCNC: 32.1 G/DL — SIGNIFICANT CHANGE UP (ref 32–36)
MCV RBC AUTO: 80.1 FL — SIGNIFICANT CHANGE UP (ref 80–100)
NRBC # BLD: 0 /100 WBCS — SIGNIFICANT CHANGE UP (ref 0–0)
NRBC BLD-RTO: 0 /100 WBCS — SIGNIFICANT CHANGE UP (ref 0–0)
PLATELET # BLD AUTO: 174 K/UL — SIGNIFICANT CHANGE UP (ref 150–400)
POTASSIUM SERPL-MCNC: 3.5 MMOL/L — SIGNIFICANT CHANGE UP (ref 3.5–5.3)
POTASSIUM SERPL-SCNC: 3.5 MMOL/L — SIGNIFICANT CHANGE UP (ref 3.5–5.3)
PROT SERPL-MCNC: 6.4 G/DL — SIGNIFICANT CHANGE UP (ref 6–8.3)
RBC # BLD: 3.77 M/UL — LOW (ref 4.2–5.8)
RBC # FLD: 17.1 % — HIGH (ref 10.3–14.5)
SODIUM SERPL-SCNC: 139 MMOL/L — SIGNIFICANT CHANGE UP (ref 135–145)
TROPONIN I, HIGH SENSITIVITY RESULT: 1497 NG/L — HIGH
WBC # BLD: 9.09 K/UL — SIGNIFICANT CHANGE UP (ref 3.8–10.5)
WBC # FLD AUTO: 9.09 K/UL — SIGNIFICANT CHANGE UP (ref 3.8–10.5)

## 2025-01-30 PROCEDURE — 99233 SBSQ HOSP IP/OBS HIGH 50: CPT

## 2025-01-30 RX ORDER — SENNOSIDES 8.6 MG
2 TABLET ORAL AT BEDTIME
Refills: 0 | Status: DISCONTINUED | OUTPATIENT
Start: 2025-01-30 | End: 2025-02-04

## 2025-01-30 RX ORDER — PHENOL 1.4 %
1 AEROSOL, SPRAY (ML) MUCOUS MEMBRANE EVERY 6 HOURS
Refills: 0 | Status: DISCONTINUED | OUTPATIENT
Start: 2025-01-30 | End: 2025-02-04

## 2025-01-30 RX ORDER — PANTOPRAZOLE 20 MG/1
40 TABLET, DELAYED RELEASE ORAL
Refills: 0 | Status: DISCONTINUED | OUTPATIENT
Start: 2025-01-30 | End: 2025-02-04

## 2025-01-30 RX ADMIN — Medication 500 MICROGRAM(S): at 04:23

## 2025-01-30 RX ADMIN — Medication 500 MICROGRAM(S): at 15:52

## 2025-01-30 RX ADMIN — LABETALOL HYDROCHLORIDE 400 MILLIGRAM(S): 300 TABLET, FILM COATED ORAL at 17:42

## 2025-01-30 RX ADMIN — Medication 100 MILLIGRAM(S): at 05:41

## 2025-01-30 RX ADMIN — Medication 0.63 MILLIGRAM(S): at 21:19

## 2025-01-30 RX ADMIN — Medication 40 MILLIGRAM(S): at 17:26

## 2025-01-30 RX ADMIN — LABETALOL HYDROCHLORIDE 400 MILLIGRAM(S): 300 TABLET, FILM COATED ORAL at 05:41

## 2025-01-30 RX ADMIN — Medication 75 MILLIGRAM(S): at 12:02

## 2025-01-30 RX ADMIN — Medication 200 MILLIGRAM(S): at 17:41

## 2025-01-30 RX ADMIN — Medication 100 MILLIGRAM(S): at 12:01

## 2025-01-30 RX ADMIN — APIXABAN 5 MILLIGRAM(S): 5 TABLET, FILM COATED ORAL at 05:41

## 2025-01-30 RX ADMIN — PANTOPRAZOLE 40 MILLIGRAM(S): 20 TABLET, DELAYED RELEASE ORAL at 12:01

## 2025-01-30 RX ADMIN — Medication 1 LOZENGE: at 05:47

## 2025-01-30 RX ADMIN — Medication 325 MILLIGRAM(S): at 12:01

## 2025-01-30 RX ADMIN — Medication 1 LOZENGE: at 21:35

## 2025-01-30 RX ADMIN — Medication 2 TABLET(S): at 21:33

## 2025-01-30 RX ADMIN — Medication 200 MILLIGRAM(S): at 21:34

## 2025-01-30 RX ADMIN — POLYETHYLENE GLYCOL 3350 17 GRAM(S): 17 POWDER, FOR SOLUTION ORAL at 12:01

## 2025-01-30 RX ADMIN — ATORVASTATIN CALCIUM 40 MILLIGRAM(S): 80 TABLET, FILM COATED ORAL at 21:33

## 2025-01-30 RX ADMIN — Medication 125 MICROGRAM(S): at 05:41

## 2025-01-30 RX ADMIN — Medication 40 MILLIGRAM(S): at 05:42

## 2025-01-30 RX ADMIN — Medication 0.63 MILLIGRAM(S): at 10:33

## 2025-01-30 RX ADMIN — APIXABAN 5 MILLIGRAM(S): 5 TABLET, FILM COATED ORAL at 17:42

## 2025-01-30 RX ADMIN — Medication 100 MILLIGRAM(S): at 21:33

## 2025-01-30 RX ADMIN — Medication 500 MICROGRAM(S): at 10:32

## 2025-01-30 RX ADMIN — Medication 500 MICROGRAM(S): at 21:18

## 2025-01-30 RX ADMIN — Medication 0.63 MILLIGRAM(S): at 04:23

## 2025-01-30 RX ADMIN — Medication 0.63 MILLIGRAM(S): at 15:52

## 2025-01-30 NOTE — PROGRESS NOTE ADULT - SUBJECTIVE AND OBJECTIVE BOX
No distress    Vital Signs Last 24 Hrs  T(C): 36.7 (01-30-25 @ 19:29), Max: 36.8 (01-30-25 @ 05:26)  T(F): 98.1 (01-30-25 @ 19:29), Max: 98.3 (01-30-25 @ 05:26)  HR: 64 (01-30-25 @ 19:29) (62 - 68)  BP: 114/63 (01-30-25 @ 19:29) (114/63 - 146/74)  RR: 18 (01-30-25 @ 19:29) (18 - 18)  SpO2: 93% (01-30-25 @ 19:29) (92% - 98%)    s1s2  b/l air entry  soft, ND  no edema                           9.7    9.09  )-----------( 174      ( 30 Jan 2025 06:39 )             30.2     30 Jan 2025 06:39    139    |  98     |  55     ----------------------------<  132    3.5     |  33     |  1.70     Ca    8.1        30 Jan 2025 06:39  Mg     2.3       29 Jan 2025 06:24    TPro  6.4    /  Alb  2.4    /  TBili  1.3    /  DBili  x      /  AST  67     /  ALT  77     /  AlkPhos  117    30 Jan 2025 06:39    LIVER FUNCTIONS - ( 30 Jan 2025 06:39 )  Alb: 2.4 g/dL / Pro: 6.4 g/dL / ALK PHOS: 117 U/L / ALT: 77 U/L / AST: 67 U/L / GGT: x           apixaban 5 milliGRAM(s) Oral every 12 hours  atorvastatin 40 milliGRAM(s) Oral at bedtime  benzocaine/menthol Lozenge 1 Lozenge Oral every 6 hours PRN  benzonatate 200 milliGRAM(s) Oral every 8 hours  bisacodyl 10 milliGRAM(s) Oral daily PRN  clopidogrel Tablet 75 milliGRAM(s) Oral daily  digoxin     Tablet 125 MICROGram(s) Oral daily  ferrous    sulfate 325 milliGRAM(s) Oral daily  furosemide   Injectable 40 milliGRAM(s) IV Push every 12 hours  hydrALAZINE 100 milliGRAM(s) Oral every 8 hours  ipratropium    for Nebulization 500 MICROGram(s) Nebulizer every 6 hours  labetalol 400 milliGRAM(s) Oral two times a day  levalbuterol Inhalation 0.63 milliGRAM(s) Inhalation every 6 hours  pantoprazole    Tablet 40 milliGRAM(s) Oral before breakfast  polyethylene glycol 3350 17 Gram(s) Oral daily  senna 2 Tablet(s) Oral at bedtime    A/P:    CM, Afib, EF 45 - 50%, mod MR   Adm w/CHF, PNA, Flu A, elevated troponin  Cardiology following   Known CKD 3 w/Cr near baseline   No objection to Lasix as ordered   Avoid nephrotoxins as able, no NSAID's  F/u BMP, UO    [phone]

## 2025-01-30 NOTE — PROGRESS NOTE ADULT - SUBJECTIVE AND OBJECTIVE BOX
Follow-up Pulmonary Progress Note  Chief Complaint : Influenza with other respiratory manifestations, other influenza virus identified      patient feeling better  less sob,   remains on n/c oe         Allergies :No Known Allergies      PAST MEDICAL & SURGICAL HISTORY:  H/O: HTN (hypertension)    CAD (coronary artery disease)    Diabetic vitreous hemorrhage    Hypokalemia    Type 2 diabetes mellitus    Left ventricular hypertrophy    History of alcohol withdrawal delirium    S/P cardiac catheterization  11/17 3 stents to RCA        Medications:  MEDICATIONS  (STANDING):  apixaban 5 milliGRAM(s) Oral every 12 hours  atorvastatin 40 milliGRAM(s) Oral at bedtime  benzonatate 200 milliGRAM(s) Oral every 8 hours  clopidogrel Tablet 75 milliGRAM(s) Oral daily  digoxin     Tablet 125 MICROGram(s) Oral daily  ferrous    sulfate 325 milliGRAM(s) Oral daily  furosemide   Injectable 40 milliGRAM(s) IV Push every 12 hours  hydrALAZINE 100 milliGRAM(s) Oral every 8 hours  ipratropium    for Nebulization 500 MICROGram(s) Nebulizer every 6 hours  labetalol 400 milliGRAM(s) Oral two times a day  levalbuterol Inhalation 0.63 milliGRAM(s) Inhalation every 6 hours  pantoprazole    Tablet 40 milliGRAM(s) Oral before breakfast  polyethylene glycol 3350 17 Gram(s) Oral daily  senna 2 Tablet(s) Oral at bedtime    MEDICATIONS  (PRN):  benzocaine/menthol Lozenge 1 Lozenge Oral every 6 hours PRN Sore Throat  bisacodyl 10 milliGRAM(s) Oral daily PRN Constipation      Antibiotics History  azithromycin  IVPB 500 milliGRAM(s) IV Intermittent once, 01-24-25 @ 16:19, Stop order after: 1 Doses  cefTRIAXone   IVPB 1000 milliGRAM(s) IV Intermittent once, 01-24-25 @ 16:19, Stop order after: 1 Doses  oseltamivir 30 milliGRAM(s) Oral two times a day, 01-24-25 @ 18:25, Stop order after: 10 Doses      Heme Medications   apixaban 5 milliGRAM(s) Oral every 12 hours, 01-24-25 @ 18:57  clopidogrel Tablet 75 milliGRAM(s) Oral daily, 01-24-25 @ 18:59      GI Medications  bisacodyl 10 milliGRAM(s) Oral daily, 01-27-25 @ 16:56, Routine PRN  pantoprazole    Tablet 40 milliGRAM(s) Oral before breakfast, 01-30-25 @ 10:46, STAT  polyethylene glycol 3350 17 Gram(s) Oral daily, 01-27-25 @ 16:56, Now  senna 2 Tablet(s) Oral at bedtime, 01-30-25 @ 09:55, Routine        LABS:                        9.7    9.09  )-----------( 174      ( 30 Jan 2025 06:39 )             30.2     01-30    139  |  98  |  55[H]  ----------------------------<  132[H]  3.5   |  33[H]  |  1.70[H]    Ca    8.1[L]      30 Jan 2025 06:39  Mg     2.3     01-29    TPro  6.4  /  Alb  2.4[L]  /  TBili  1.3[H]  /  DBili  x   /  AST  67[H]  /  ALT  77[H]  /  AlkPhos  117  01-30    SHANEKA 1:160 01-07 @ 06:42  Anti SS-1 --  Anti SS-2 --  Anti RNP --  RF -- 01-07 @ 06:42    Atypical ANCA Indeterminate Method interference due to SHANEKA Fluorescence 01-07 @ 06:42  c-ANCA titer -- 01-07 @ 06:42  c-ANCA Negative 01-07 @ 06:42  p-ANCA Negative 01-07 @ 06:42    Trend Bun/Cr  01-30-25 @ 06:39  BUN/CR -  55[H] / 1.70[H]  01-29-25 @ 06:24  BUN/CR -  59[H] / 1.65[H]  01-28-25 @ 06:30  BUN/CR -  66[H] / 1.71[H]  01-27-25 @ 06:50  BUN/CR -  77[H] / 2.07[H]  01-26-25 @ 06:56  BUN/CR -  67[H] / 2.02[H]  01-25-25 @ 08:36  BUN/CR -  51[H] / 1.79[H]  01-24-25 @ 16:30  BUN/CR -  54[H] / 2.07[H]  01-09-25 @ 05:22  BUN/CR -  42[H] / 2.03[H]  01-08-25 @ 06:40  BUN/CR -  42[H] / 1.83[H]  01-07-25 @ 06:42  BUN/CR -  54[H] / 2.29[H]  01-06-25 @ 11:14  BUN/CR -  54[H] / 2.64[H]  01-05-25 @ 05:58  BUN/CR -  31[H] / 2.04[H]      Trend BNP  01-27-25 @ 06:50   -  19732[H]  01-24-25 @ 16:30   -  42201[H]  01-09-25 @ 05:22   -  94123[H]  01-04-25 @ 20:10   -  11450[H]  12-01-22 @ 06:55   -  9693[H]  11-11-22 @ 22:00   -  1778[H]        Urinalysis Basic - ( 30 Jan 2025 06:39 )    Color: x / Appearance: x / SG: x / pH: x  Gluc: 132 mg/dL / Ketone: x  / Bili: x / Urobili: x   Blood: x / Protein: x / Nitrite: x   Leuk Esterase: x / RBC: x / WBC x   Sq Epi: x / Non Sq Epi: x / Bacteria: x             VITALS:  T(C): 36.4 (01-30-25 @ 13:17), Max: 36.8 (01-30-25 @ 05:26)  T(F): 97.5 (01-30-25 @ 13:17), Max: 98.3 (01-30-25 @ 05:26)  HR: 62 (01-30-25 @ 15:55) (60 - 119)  BP: 122/65 (01-30-25 @ 13:17) (122/65 - 146/74)  BP(mean): --  ABP: --  ABP(mean): --  RR: 18 (01-30-25 @ 13:17) (18 - 20)  SpO2: 93% (01-30-25 @ 15:55) (92% - 98%)  CVP(mm Hg): --  CVP(cm H2O): --    Ins and Outs

## 2025-01-30 NOTE — PROGRESS NOTE ADULT - ASSESSMENT
79 year old male, originally from [facility], former cigarette smoker w/ PMHx of HTN, CKD, paroxysmal afib on eliquis, CAD s/p NOMAN to proximal and mid RCA (11/2022), HFpEF, recently admitted 1/4-1/9 for pneumonia and b/l pleural effusions s/p abx/diuresis, presenting to [facility] from primary care office due to sob and hypoxia. Patient positive for Flu A.    #Acute Hypoxic Respiratory Failure Secondary to Influenza w/ Possible Viral PNA  -Patient O2 sat 89% on RA on admission. Requires 4L of supplemental Oxygen to maintain saturation >90%, tapered to 2L  -CXR 1/24 showed Present infiltrates are diffuse without tyrone consolidation showing evolution from prior  -s/p tamiflu x 5 days (completed 1/29)  -Off steroids  -Continue nebulizer treatment Q6H  -Continue O2 supplementation, wean as tolerated    Elevated Troponins  CAD s/p NOMAN (11/2022)  Acute Decompensated Heart Failure  -Echo 1/26 showed EF 45 to 50% (from 55-60% on 1/25), Mild global left ventricle hypokinesis, severely dilated LA, moderate MR  -Trop peaked and downtrended  -BNP 75172 (1/27) and CT chest 1/28 shows Worsening diffuse bilateral pleural effusions on the basis of pulmonary edema or pneumonia. Stable bilateral pleural effusions. Cardiomegaly. Ectatic ascending aorta.  -Cardiology following, HFmrEF likely tachy mediated, known multivessel CAD, has residual LAD, D1 and chronic occlusion of LCx that was medically managed by interventional cardiology, plan for outpatient nuclear stress test when recovers from pneumonia     #Paroxysmal afib on Eliquis  -remains in sinus  rhythm since started on Digoxin  -Continue Eliquis  -continue  Labetalol   400 mg bid  - continue  Digoxin 125 mg daily and monitor      # elevated troponin in a setting of CAD and rapid a/fib  - cardiology consult appreciated  - pt with significant increase in troponin, now trending up again  - ECHO with decrease in EF from 55-60% to 45%  - Continue medical management  - continue Plavix, Lipitor, b blocker  - will need o/p stress test once recovers from PNA. Will need repeat ECHO prior to discharge      #HTN  -Continue Labetalol, hydralazine  -as per cardiology, Labetalol is the only b blocker to control his BP   -monitor BP    #CKD 3  - creatinine around baseline  - Avoid nephrotoxic medications  - monitor closely on diuresis  - discussed with Nephrology, will see in consulttion given pt is being diuresed      #Hyponatremia  - resolved  -Monitor     #Hypokalemia  -will supplement  -continue to Monitor     #Microcytic Anemia  -Monitor CBC  -low Iron, will supplement      #Hx of CAD s/p NOMAN  -Continue Plavix  -Continue Lipitor   - plan as asbove    Moderate protein-calorie malnutrition.    #DVT prophylaxis  -Eliquis      Epigastric Pain 79 year old male, originally from [facility], former cigarette smoker w/ PMHx of HTN, CKD, paroxysmal afib on eliquis, CAD s/p NOMAN to proximal and mid RCA (11/2022), HFpEF, recently admitted 1/4-1/9 for pneumonia and b/l pleural effusions s/p abx/diuresis, presenting to [facility] from primary care office due to sob and hypoxia. Patient positive for Flu A.    #Acute Hypoxic Respiratory Failure Secondary to Influenza w/ Possible Viral PNA  -Patient O2 sat 89% on RA on admission. Requires 4L of supplemental Oxygen to maintain saturation >90%, tapered to 2L  -CXR 1/24 showed Present infiltrates are diffuse without tyrone consolidation showing evolution from prior  -s/p tamiflu x 5 days (completed 1/29)  -Off steroids  -Continue nebulizer treatment Q6H  -Supportive care   -Continue O2 supplementation, wean as tolerated    Elevated Troponins  CAD s/p NOMAN (11/2022)  Acute Decompensated Heart Failure  -Echo 1/26 showed EF 45 to 50% (from 55-60% on 1/25), Mild global left ventricle hypokinesis, severely dilated LA, moderate MR  -Trop peaked and downtrended  -BNP 05685 (1/27) and CT chest 1/28 shows Worsening diffuse bilateral pleural effusions on the basis of pulmonary edema or pneumonia. Stable bilateral pleural effusions.   -Continue plavix  -Continue labetalol, hydralazine, atorvastatin   -Continue IV Lasix 40mg BID  -Daily weight   -Repeat BNP and CXR in AM  -Cardiology following, HFmrEF likely tachy mediated, known multivessel CAD, has residual LAD, D1 and chronic occlusion of LCx that was medically managed by interventional cardiology, plan for outpatient nuclear stress test when recovers from pneumonia     #Paroxysmal A.fib   -Remains in sinus  rhythm since started on Digoxin  -Continue Labetalol 400mg BID  -Continue Digoxin 125 mg daily and monitor  -Continue Eliquis    #BRUNILDA on CKD 3  -Baseline Cr appears to be ~1.7 range  -BRUNILDA on admission is improved, Cr appears to be at baseline  -Avoid nephrotoxic medications  -Monitor closely on diuresis  -Nephrology following     #Hyponatremia (Resolved)  -resolved  -Monitor     #Hypokalemia (Resolved)  -s/p repletion  -continue to Monitor     #Microcytic Anemia  -Appears chronic   -Iron panel reviewed  -Continue iron supplement  -Monitor CBC     #Mild Transaminitis (Stable)  -Will monitor    #Moderate protein-calorie malnutrition  -Dietary following     #Epigastric Pain  -Possible gastritis  -Trial of PPI     #DVT prophylaxis  -Eliquis    Dispo  -PT eval appreciated, recommended home PT    Plan of care discussed with patient and patient's wife at bedside.

## 2025-01-30 NOTE — PROGRESS NOTE ADULT - SUBJECTIVE AND OBJECTIVE BOX
: [id number]     : [id number]    Interval History  Patient seen and examined at bedside. No acute events noted.   Reports cough, shortness of breath is improved. No chest pain.   Also endorses epigastric pain after eating w/ occasional nausea, no vomiting. Last BM reportedly yesterday.    ALLERGIES:  No Known Allergies    MEDICATIONS  (STANDING):  apixaban 5 milliGRAM(s) Oral every 12 hours  atorvastatin 40 milliGRAM(s) Oral at bedtime  benzonatate 200 milliGRAM(s) Oral every 8 hours  clopidogrel Tablet 75 milliGRAM(s) Oral daily  digoxin     Tablet 125 MICROGram(s) Oral daily  ferrous    sulfate 325 milliGRAM(s) Oral daily  furosemide   Injectable 40 milliGRAM(s) IV Push every 12 hours  hydrALAZINE 100 milliGRAM(s) Oral every 8 hours  ipratropium    for Nebulization 500 MICROGram(s) Nebulizer every 6 hours  labetalol 400 milliGRAM(s) Oral two times a day  levalbuterol Inhalation 0.63 milliGRAM(s) Inhalation every 6 hours  pantoprazole    Tablet 40 milliGRAM(s) Oral before breakfast  polyethylene glycol 3350 17 Gram(s) Oral daily  senna 2 Tablet(s) Oral at bedtime    MEDICATIONS  (PRN):  benzocaine/menthol Lozenge 1 Lozenge Oral every 6 hours PRN Sore Throat  bisacodyl 10 milliGRAM(s) Oral daily PRN Constipation    Vital Signs Last 24 Hrs  T(F): 97.5 (30 Jan 2025 13:17), Max: 98.3 (30 Jan 2025 05:26)  HR: 64 (30 Jan 2025 13:17) (60 - 119)  BP: 122/65 (30 Jan 2025 13:17) (122/65 - 146/74)  RR: 18 (30 Jan 2025 13:17) (18 - 20)  SpO2: 92% (30 Jan 2025 13:17) (92% - 98%)  I&O's Summary    PHYSICAL EXAM:  GENERAL: NAD  HEENT: NCAT, NC in place at 2L   CHEST/LUNG: Good air entry; scattered wheeze   HEART: Regular rate and rhythm  ABDOMEN: Soft, Nontender, Nondistended; Bowel sounds present  MUSCULOSKELETAL/EXTREMITIES:  2+ Peripheral Pulses, No LE edema  PSYCH: Appropriate affect  NEURO: Awake and alert     I personally reviewed the below data/images/labs:    LABS:                        9.7    9.09  )-----------( 174      ( 30 Jan 2025 06:39 )             30.2       01-30    139  |  98  |  55  ----------------------------<  132  3.5   |  33  |  1.70    Ca    8.1      30 Jan 2025 06:39  Mg     2.3     01-29    TPro  6.4  /  Alb  2.4  /  TBili  1.3  /  DBili  x   /  AST  67  /  ALT  77  /  AlkPhos  117  01-30    CARDIAC MARKERS ( 30 Jan 2025 06:39 )  x     / 1497.0 ng/L / x     / x     / x      CARDIAC MARKERS ( 29 Jan 2025 06:24 )  x     / 2571.4 ng/L / x     / x     / x      CARDIAC MARKERS ( 28 Jan 2025 06:30 )  x     / 889.4 ng/L / x     / x     / x        Urinalysis Basic - ( 30 Jan 2025 06:39 )    Color: x / Appearance: x / SG: x / pH: x  Gluc: 132 mg/dL / Ketone: x  / Bili: x / Urobili: x   Blood: x / Protein: x / Nitrite: x   Leuk Esterase: x / RBC: x / WBC x   Sq Epi: x / Non Sq Epi: x / Bacteria: x    Consultant(s) Notes Reviewed:   Care Discused with Consultants/Other Providers:  Imaging Personally Reviewed:

## 2025-01-30 NOTE — PROGRESS NOTE ADULT - ASSESSMENT
Physical Examination:  GENERAL:               Alert, Oriented, No acute distress.    HEENT:                   Missing left eye, right eye reactive to light.  Symmetric. No JVD, Dry MM  PULM:                     Bilateral air entry, +Rales, No Rhonchi, trace  Wheezing  CVS:                        S1, S2,  No Murmur     NEURO:                  Alert, oriented, interactive, nonfocal, follows commands  PSYC:                      Calm, + Insight.    80 yo M, originally from [facility], former cigarette smoker w/ PMHx of HTN, CKD, paroxysmal afib on eliquis, w/ hx of abnormal nuclear stress test s/p coronary angiogram and NOMAN to proximal and mid RCA (11/2022) presenting to [facility] from primary care office due to sob and hypoxia. Patient positive for Flu A. Pulmonary consulted for acute hypoxia. Clinically appears euvolemic. Symptoms likely due to acute viral illness.       Assessment:  1. Influenza A infection  2. Acute hypoxia   3. HFPEF   4. Paroxysmal Afib    Plan:  - CT scan with enlarging pleural effusion and worsening ground glass suspect from undrelying pulmonary edema , diurese as tolerated  - check BNP in am   - monitor and supplement lytes  - will need further workup if o2 requirements not improve with diuresis , taper o2 to off as tolerated  - Continue tamilflu    - Cont. Bronchodilators  - Titrate down oxygen as tolerated     - Oxygen support to maintain saturation > 92%  - DVT prophylaxis  - Rest of management per primary team

## 2025-01-31 LAB
ALBUMIN SERPL ELPH-MCNC: 2.3 G/DL — LOW (ref 3.3–5)
ALP SERPL-CCNC: 108 U/L — SIGNIFICANT CHANGE UP (ref 40–120)
ALT FLD-CCNC: 59 U/L — HIGH (ref 10–45)
ANION GAP SERPL CALC-SCNC: 3 MMOL/L — LOW (ref 5–17)
AST SERPL-CCNC: 46 U/L — HIGH (ref 10–40)
BILIRUB SERPL-MCNC: 1.2 MG/DL — SIGNIFICANT CHANGE UP (ref 0.2–1.2)
BUN SERPL-MCNC: 44 MG/DL — HIGH (ref 7–23)
CALCIUM SERPL-MCNC: 8.1 MG/DL — LOW (ref 8.4–10.5)
CHLORIDE SERPL-SCNC: 97 MMOL/L — SIGNIFICANT CHANGE UP (ref 96–108)
CO2 SERPL-SCNC: 38 MMOL/L — HIGH (ref 22–31)
CREAT SERPL-MCNC: 1.66 MG/DL — HIGH (ref 0.5–1.3)
EGFR: 42 ML/MIN/1.73M2 — LOW
GLUCOSE SERPL-MCNC: 107 MG/DL — HIGH (ref 70–99)
HCT VFR BLD CALC: 30 % — LOW (ref 39–50)
HGB BLD-MCNC: 9.5 G/DL — LOW (ref 13–17)
MCHC RBC-ENTMCNC: 25.3 PG — LOW (ref 27–34)
MCHC RBC-ENTMCNC: 31.7 G/DL — LOW (ref 32–36)
MCV RBC AUTO: 80 FL — SIGNIFICANT CHANGE UP (ref 80–100)
NRBC # BLD: 0 /100 WBCS — SIGNIFICANT CHANGE UP (ref 0–0)
NRBC BLD-RTO: 0 /100 WBCS — SIGNIFICANT CHANGE UP (ref 0–0)
NT-PROBNP SERPL-SCNC: HIGH PG/ML (ref 0–300)
PLATELET # BLD AUTO: 191 K/UL — SIGNIFICANT CHANGE UP (ref 150–400)
POTASSIUM SERPL-MCNC: 3.3 MMOL/L — LOW (ref 3.5–5.3)
POTASSIUM SERPL-SCNC: 3.3 MMOL/L — LOW (ref 3.5–5.3)
PROT SERPL-MCNC: 6.2 G/DL — SIGNIFICANT CHANGE UP (ref 6–8.3)
RBC # BLD: 3.75 M/UL — LOW (ref 4.2–5.8)
RBC # FLD: 17 % — HIGH (ref 10.3–14.5)
SODIUM SERPL-SCNC: 138 MMOL/L — SIGNIFICANT CHANGE UP (ref 135–145)
URATE SERPL-MCNC: 10.4 MG/DL — HIGH (ref 3.4–8.8)
WBC # BLD: 11.3 K/UL — HIGH (ref 3.8–10.5)
WBC # FLD AUTO: 11.3 K/UL — HIGH (ref 3.8–10.5)

## 2025-01-31 PROCEDURE — 71045 X-RAY EXAM CHEST 1 VIEW: CPT | Mod: 26

## 2025-01-31 PROCEDURE — 99233 SBSQ HOSP IP/OBS HIGH 50: CPT

## 2025-01-31 PROCEDURE — 73620 X-RAY EXAM OF FOOT: CPT | Mod: 26,RT

## 2025-01-31 RX ORDER — POTASSIUM CHLORIDE 750 MG/1
40 TABLET, EXTENDED RELEASE ORAL ONCE
Refills: 0 | Status: COMPLETED | OUTPATIENT
Start: 2025-01-31 | End: 2025-01-31

## 2025-01-31 RX ORDER — PREDNISONE 5 MG/1
40 TABLET ORAL DAILY
Refills: 0 | Status: COMPLETED | OUTPATIENT
Start: 2025-01-31 | End: 2025-02-03

## 2025-01-31 RX ADMIN — Medication 100 MILLIGRAM(S): at 21:49

## 2025-01-31 RX ADMIN — ATORVASTATIN CALCIUM 40 MILLIGRAM(S): 80 TABLET, FILM COATED ORAL at 21:50

## 2025-01-31 RX ADMIN — Medication 100 MILLIGRAM(S): at 05:07

## 2025-01-31 RX ADMIN — APIXABAN 5 MILLIGRAM(S): 5 TABLET, FILM COATED ORAL at 05:07

## 2025-01-31 RX ADMIN — Medication 125 MICROGRAM(S): at 05:07

## 2025-01-31 RX ADMIN — Medication 500 MICROGRAM(S): at 09:13

## 2025-01-31 RX ADMIN — Medication 2 TABLET(S): at 21:49

## 2025-01-31 RX ADMIN — Medication 0.63 MILLIGRAM(S): at 21:53

## 2025-01-31 RX ADMIN — Medication 200 MILLIGRAM(S): at 21:51

## 2025-01-31 RX ADMIN — Medication 0.63 MILLIGRAM(S): at 09:13

## 2025-01-31 RX ADMIN — Medication 75 MILLIGRAM(S): at 12:08

## 2025-01-31 RX ADMIN — POTASSIUM CHLORIDE 40 MILLIEQUIVALENT(S): 750 TABLET, EXTENDED RELEASE ORAL at 12:08

## 2025-01-31 RX ADMIN — Medication 200 MILLIGRAM(S): at 13:19

## 2025-01-31 RX ADMIN — POLYETHYLENE GLYCOL 3350 17 GRAM(S): 17 POWDER, FOR SOLUTION ORAL at 12:08

## 2025-01-31 RX ADMIN — PANTOPRAZOLE 40 MILLIGRAM(S): 20 TABLET, DELAYED RELEASE ORAL at 05:07

## 2025-01-31 RX ADMIN — Medication 100 MILLIGRAM(S): at 13:19

## 2025-01-31 RX ADMIN — Medication 325 MILLIGRAM(S): at 12:08

## 2025-01-31 RX ADMIN — Medication 1 LOZENGE: at 21:52

## 2025-01-31 RX ADMIN — Medication 200 MILLIGRAM(S): at 05:07

## 2025-01-31 RX ADMIN — Medication 500 MICROGRAM(S): at 21:53

## 2025-01-31 RX ADMIN — Medication 40 MILLIGRAM(S): at 05:08

## 2025-01-31 RX ADMIN — LABETALOL HYDROCHLORIDE 400 MILLIGRAM(S): 300 TABLET, FILM COATED ORAL at 05:07

## 2025-01-31 RX ADMIN — APIXABAN 5 MILLIGRAM(S): 5 TABLET, FILM COATED ORAL at 17:52

## 2025-01-31 NOTE — PROGRESS NOTE ADULT - SUBJECTIVE AND OBJECTIVE BOX
Follow-up Pulmonary Progress Note  Chief Complaint : Influenza with other respiratory manifestations, other influenza virus identified    patient seen and examined  comfortable  remains on n/c        Allergies :No Known Allergies      PAST MEDICAL & SURGICAL HISTORY:  H/O: HTN (hypertension)    CAD (coronary artery disease)    Diabetic vitreous hemorrhage    Hypokalemia    Type 2 diabetes mellitus    Left ventricular hypertrophy    History of alcohol withdrawal delirium    S/P cardiac catheterization  11/17 3 stents to RCA        Medications:  MEDICATIONS  (STANDING):  apixaban 5 milliGRAM(s) Oral every 12 hours  atorvastatin 40 milliGRAM(s) Oral at bedtime  benzonatate 200 milliGRAM(s) Oral every 8 hours  clopidogrel Tablet 75 milliGRAM(s) Oral daily  digoxin     Tablet 125 MICROGram(s) Oral daily  ferrous    sulfate 325 milliGRAM(s) Oral daily  furosemide   Injectable 40 milliGRAM(s) IV Push every 12 hours  hydrALAZINE 100 milliGRAM(s) Oral every 8 hours  ipratropium    for Nebulization 500 MICROGram(s) Nebulizer every 6 hours  labetalol 400 milliGRAM(s) Oral two times a day  levalbuterol Inhalation 0.63 milliGRAM(s) Inhalation every 6 hours  pantoprazole    Tablet 40 milliGRAM(s) Oral before breakfast  polyethylene glycol 3350 17 Gram(s) Oral daily  predniSONE   Tablet 40 milliGRAM(s) Oral daily  senna 2 Tablet(s) Oral at bedtime    MEDICATIONS  (PRN):  benzocaine/menthol Lozenge 1 Lozenge Oral every 6 hours PRN Sore Throat  bisacodyl 10 milliGRAM(s) Oral daily PRN Constipation      Antibiotics History  azithromycin  IVPB 500 milliGRAM(s) IV Intermittent once, 01-24-25 @ 16:19, Stop order after: 1 Doses  cefTRIAXone   IVPB 1000 milliGRAM(s) IV Intermittent once, 01-24-25 @ 16:19, Stop order after: 1 Doses  oseltamivir 30 milliGRAM(s) Oral two times a day, 01-24-25 @ 18:25, Stop order after: 10 Doses      Heme Medications   apixaban 5 milliGRAM(s) Oral every 12 hours, 01-24-25 @ 18:57  clopidogrel Tablet 75 milliGRAM(s) Oral daily, 01-24-25 @ 18:59      GI Medications  bisacodyl 10 milliGRAM(s) Oral daily, 01-27-25 @ 16:56, Routine PRN  pantoprazole    Tablet 40 milliGRAM(s) Oral before breakfast, 01-30-25 @ 10:46, STAT  polyethylene glycol 3350 17 Gram(s) Oral daily, 01-27-25 @ 16:56, Now  senna 2 Tablet(s) Oral at bedtime, 01-30-25 @ 09:55, Routine        LABS:                        9.5    11.30 )-----------( 191      ( 31 Jan 2025 06:54 )             30.0     01-31    138  |  97  |  44[H]  ----------------------------<  107[H]  3.3[L]   |  38[H]  |  1.66[H]    Ca    8.1[L]      31 Jan 2025 06:54    TPro  6.2  /  Alb  2.3[L]  /  TBili  1.2  /  DBili  x   /  AST  46[H]  /  ALT  59[H]  /  AlkPhos  108  01-31        Trend Bun/Cr  01-31-25 @ 06:54  BUN/CR -  44[H] / 1.66[H]  01-30-25 @ 06:39  BUN/CR -  55[H] / 1.70[H]  01-29-25 @ 06:24  BUN/CR -  59[H] / 1.65[H]  01-28-25 @ 06:30  BUN/CR -  66[H] / 1.71[H]  01-27-25 @ 06:50  BUN/CR -  77[H] / 2.07[H]  01-26-25 @ 06:56  BUN/CR -  67[H] / 2.02[H]  01-25-25 @ 08:36  BUN/CR -  51[H] / 1.79[H]  01-24-25 @ 16:30  BUN/CR -  54[H] / 2.07[H]  01-09-25 @ 05:22  BUN/CR -  42[H] / 2.03[H]  01-08-25 @ 06:40  BUN/CR -  42[H] / 1.83[H]  01-07-25 @ 06:42  BUN/CR -  54[H] / 2.29[H]  01-06-25 @ 11:14  BUN/CR -  54[H] / 2.64[H]      Trend BNP  01-31-25 @ 06:54   -  07639[H]  01-27-25 @ 06:50   -  61468[H]  01-24-25 @ 16:30   -  41995[H]  01-09-25 @ 05:22   -  24045[H]  01-04-25 @ 20:10   -  72907[H]  12-01-22 @ 06:55   -  9693[H]        Urinalysis Basic - ( 31 Jan 2025 06:54 )    Color: x / Appearance: x / SG: x / pH: x  Gluc: 107 mg/dL / Ketone: x  / Bili: x / Urobili: x   Blood: x / Protein: x / Nitrite: x   Leuk Esterase: x / RBC: x / WBC x   Sq Epi: x / Non Sq Epi: x / Bacteria: x              CULTURES: (if applicable)    Rapid RVP Result: Detected (01-24-25 @ 16:30)         VITALS:  T(C): 36.9 (01-31-25 @ 14:50), Max: 36.9 (01-31-25 @ 14:50)  T(F): 98.4 (01-31-25 @ 14:50), Max: 98.4 (01-31-25 @ 14:50)  HR: 67 (01-31-25 @ 17:54) (64 - 86)  BP: 104/62 (01-31-25 @ 17:54) (104/62 - 150/72)  BP(mean): --  ABP: --  ABP(mean): --  RR: 18 (01-31-25 @ 17:54) (16 - 18)  SpO2: 91% (01-31-25 @ 17:54) (90% - 96%)  CVP(mm Hg): --  CVP(cm H2O): --    Ins and Outs     01-30-25 @ 07:01  -  01-31-25 @ 07:00  --------------------------------------------------------  IN: 0 mL / OUT: 200 mL / NET: -200 mL                I&O's Detail    30 Jan 2025 07:01  -  31 Jan 2025 07:00  --------------------------------------------------------  IN:  Total IN: 0 mL    OUT:    Voided (mL): 200 mL  Total OUT: 200 mL    Total NET: -200 mL

## 2025-01-31 NOTE — PROGRESS NOTE ADULT - ASSESSMENT
Physical Examination:  GENERAL:               Alert, Oriented, No acute distress.    HEENT:                   Missing left eye, right eye reactive to light.  Symmetric. No JVD, Dry MM  PULM:                     Bilateral air entry, +Rales, No Rhonchi, trace  Wheezing  CVS:                        S1, S2,  No Murmur     NEURO:                  Alert, oriented, interactive, nonfocal, follows commands  PSYC:                      Calm, + Insight.    78 yo M, originally from [facility], former cigarette smoker w/ PMHx of HTN, CKD, paroxysmal afib on eliquis, w/ hx of abnormal nuclear stress test s/p coronary angiogram and NOMAN to proximal and mid RCA (11/2022) presenting to [facility] from primary care office due to sob and hypoxia. Patient positive for Flu A. Pulmonary consulted for acute hypoxia. Clinically appears euvolemic. Symptoms likely due to acute viral illness.       Assessment:  1. Influenza A infection  2. Acute hypoxia   3. HFPEF   4. Paroxysmal Afib    Plan:  - CT scan with enlarging pleural effusion and worsening ground glass suspect from undrelying pulmonary edema , diurese as tolerated  - check CXR in am   - monitor and supplement lytes  - will need further workup if o2 requirements not improve with diuresis , taper o2 to off as tolerated       - Cont. Bronchodilators  - Titrate down oxygen as tolerated     - Oxygen support to maintain saturation > 92%  - DVT prophylaxis  - Rest of management per primary team

## 2025-01-31 NOTE — PROGRESS NOTE ADULT - SUBJECTIVE AND OBJECTIVE BOX
: [id number]    Interval History  Patient seen and examined at bedside. No acute overnight events noted. No event on telemetry  This morning patient reports right 1st MTP joint pain. No trauma/injury to foot. No previous history of gout.   Epigastric pain is improved    ALLERGIES:  No Known Allergies    MEDICATIONS  (STANDING):  apixaban 5 milliGRAM(s) Oral every 12 hours  atorvastatin 40 milliGRAM(s) Oral at bedtime  benzonatate 200 milliGRAM(s) Oral every 8 hours  clopidogrel Tablet 75 milliGRAM(s) Oral daily  digoxin     Tablet 125 MICROGram(s) Oral daily  ferrous    sulfate 325 milliGRAM(s) Oral daily  furosemide   Injectable 40 milliGRAM(s) IV Push every 12 hours  hydrALAZINE 100 milliGRAM(s) Oral every 8 hours  ipratropium    for Nebulization 500 MICROGram(s) Nebulizer every 6 hours  labetalol 400 milliGRAM(s) Oral two times a day  levalbuterol Inhalation 0.63 milliGRAM(s) Inhalation every 6 hours  pantoprazole    Tablet 40 milliGRAM(s) Oral before breakfast  polyethylene glycol 3350 17 Gram(s) Oral daily  senna 2 Tablet(s) Oral at bedtime    MEDICATIONS  (PRN):  benzocaine/menthol Lozenge 1 Lozenge Oral every 6 hours PRN Sore Throat  bisacodyl 10 milliGRAM(s) Oral daily PRN Constipation    Vital Signs Last 24 Hrs  T(F): 98 (31 Jan 2025 04:46), Max: 98.1 (30 Jan 2025 19:29)  HR: 86 (31 Jan 2025 09:25) (62 - 86)  BP: 150/72 (31 Jan 2025 04:46) (114/63 - 150/72)  RR: 16 (31 Jan 2025 04:46) (16 - 18)  SpO2: 90% (31 Jan 2025 09:25) (90% - 96%)  I&O's Summary    30 Jan 2025 07:01  -  31 Jan 2025 07:00  --------------------------------------------------------  IN: 0 mL / OUT: 200 mL / NET: -200 mL    PHYSICAL EXAM:  GENERAL: NAD  HEENT: NCAT, NC in place at 2L   CHEST/LUNG: Good air entry; no wheeze   HEART: Regular rate and rhythm  ABDOMEN: Soft, Nontender, Nondistended; Bowel sounds present  MUSCULOSKELETAL/EXTREMITIES:  2+ Peripheral Pulses  Right MTP pain with light tough w/ some warmth  PSYCH: Appropriate affect  NEURO: Awake and alert     I personally reviewed the below data/images/labs:    LABS:                        9.5    11.30 )-----------( 191      ( 31 Jan 2025 06:54 )             30.0       01-31    138  |  97  |  44  ----------------------------<  107  3.3   |  38  |  1.66    Ca    8.1      31 Jan 2025 06:54  Mg     2.3     01-29    TPro  6.2  /  Alb  2.3  /  TBili  1.2  /  DBili  x   /  AST  46  /  ALT  59  /  AlkPhos  108  01-31     CARDIAC MARKERS ( 30 Jan 2025 06:39 )  x     / 1497.0 ng/L / x     / x     / x      CARDIAC MARKERS ( 29 Jan 2025 06:24 )  x     / 2571.4 ng/L / x     / x     / x        Urinalysis Basic - ( 31 Jan 2025 06:54 )    Color: x / Appearance: x / SG: x / pH: x  Gluc: 107 mg/dL / Ketone: x  / Bili: x / Urobili: x   Blood: x / Protein: x / Nitrite: x   Leuk Esterase: x / RBC: x / WBC x   Sq Epi: x / Non Sq Epi: x / Bacteria: x    Consultant(s) Notes Reviewed:   Care Discused with Consultants/Other Providers:  Imaging Personally Reviewed:

## 2025-01-31 NOTE — PROGRESS NOTE ADULT - SUBJECTIVE AND OBJECTIVE BOX
No distress    Vital Signs Last 24 Hrs  T(C): 36.9 (01-31-25 @ 14:50), Max: 36.9 (01-31-25 @ 14:50)  T(F): 98.4 (01-31-25 @ 14:50), Max: 98.4 (01-31-25 @ 14:50)  HR: 67 (01-31-25 @ 17:54) (64 - 86)  BP: 104/62 (01-31-25 @ 17:54) (104/62 - 150/72)  RR: 18 (01-31-25 @ 17:54) (16 - 18)  SpO2: 91% (01-31-25 @ 17:54) (90% - 96%)    s1s2  b/l air entry  soft, ND  no edema                                    9.5    11.30 )-----------( 191      ( 31 Jan 2025 06:54 )             30.0     31 Jan 2025 06:54    138    |  97     |  44     ----------------------------<  107    3.3     |  38     |  1.66     Ca    8.1        31 Jan 2025 06:54    TPro  6.2    /  Alb  2.3    /  TBili  1.2    /  DBili  x      /  AST  46     /  ALT  59     /  AlkPhos  108    31 Jan 2025 06:54    LIVER FUNCTIONS - ( 31 Jan 2025 06:54 )  Alb: 2.3 g/dL / Pro: 6.2 g/dL / ALK PHOS: 108 U/L / ALT: 59 U/L / AST: 46 U/L / GGT: x           apixaban 5 milliGRAM(s) Oral every 12 hours  atorvastatin 40 milliGRAM(s) Oral at bedtime  benzocaine/menthol Lozenge 1 Lozenge Oral every 6 hours PRN  benzonatate 200 milliGRAM(s) Oral every 8 hours  bisacodyl 10 milliGRAM(s) Oral daily PRN  clopidogrel Tablet 75 milliGRAM(s) Oral daily  digoxin     Tablet 125 MICROGram(s) Oral daily  ferrous    sulfate 325 milliGRAM(s) Oral daily  furosemide   Injectable 40 milliGRAM(s) IV Push every 12 hours  hydrALAZINE 100 milliGRAM(s) Oral every 8 hours  ipratropium    for Nebulization 500 MICROGram(s) Nebulizer every 6 hours  labetalol 400 milliGRAM(s) Oral two times a day  levalbuterol Inhalation 0.63 milliGRAM(s) Inhalation every 6 hours  pantoprazole    Tablet 40 milliGRAM(s) Oral before breakfast  polyethylene glycol 3350 17 Gram(s) Oral daily  predniSONE   Tablet 40 milliGRAM(s) Oral daily  senna 2 Tablet(s) Oral at bedtime    A/P:    CM, Afib, EF 45 - 50%, mod MR   Adm w/CHF, PNA, Flu A, elevated troponin  Cardiology following   Known CKD 3 w/Cr near baseline   F/u BMP on Lasix   Avoid nephrotoxins as able, no NSAID's    [phone]

## 2025-01-31 NOTE — PROGRESS NOTE ADULT - ASSESSMENT
79 year old male, originally from [facility], former cigarette smoker w/ PMHx of HTN, CKD, paroxysmal afib on eliquis, CAD s/p NOMAN to proximal and mid RCA (11/2022), HFpEF, recently admitted 1/4-1/9 for pneumonia and b/l pleural effusions s/p abx/diuresis, presenting to [facility] on 1/24 from primary care office due to sob and hypoxia. Patient positive for Flu A.    #Acute Hypoxic Respiratory Failure Secondary to Influenza w/ Possible Viral PNA  -Patient O2 sat 89% on RA on admission. Requires 4L of supplemental Oxygen to maintain saturation >90%, tapered to 2L  -CXR 1/24 showed Present infiltrates are diffuse without tyrone consolidation showing evolution from prior  -s/p tamiflu x 5 days (completed 1/29)  -Continue nebulizer treatment Q6H  -Supportive care   -Continue O2 supplementation, wean as tolerated  -Mild leukocytosis this morning, afebrile, continue to monitor     Elevated Troponins  CAD s/p NOMAN (11/2022)  Acute Decompensated Heart Failure  -Echo 1/26 showed EF 45 to 50% (from 55-60% on 1/5), Mild global left ventricle hypokinesis, severely dilated LA, moderate MR  -Trop peaked and downtrended  -BNP 14034 (1/27) and CT chest 1/28 shows Worsening diffuse bilateral pleural effusions on the basis of pulmonary edema or pneumonia. Stable bilateral pleural effusions.   -Continue plavix  -Continue labetalol, hydralazine, atorvastatin   -Continue IV Lasix 40mg BID, plan to transition to oral in next 24-48 hrs   -Daily weight   -Repeat BNP 85942 (1/31), CXR done this morning pending read   -Cardiology following, HFmrEF likely tachy mediated, known multivessel CAD, has residual LAD, D1 and chronic occlusion of LCx that was medically managed by interventional cardiology, plan for outpatient nuclear stress test when recovers from pneumonia     #Paroxysmal A.fib   -Remains in sinus  rhythm since started on Digoxin  -Continue Labetalol 400mg BID  -Continue Digoxin 125 mg daily and monitor  -Continue Eliquis    #BRUNILDA on CKD 3  -Baseline Cr appears to be ~1.7 range  -BRUNILDA on admission is improved, Cr appears to be at baseline  -Avoid nephrotoxic medications  -Monitor closely on diuresis  -Nephrology following     #Hyponatremia (Resolved)  -resolved  -Monitor     #Hypokalemia 2/2 Diuresis  -Will replete   -continue to Monitor     #Microcytic Anemia  -Appears chronic   -Iron panel reviewed  -Continue iron supplement  -Monitor CBC     #Mild Transaminitis (Stable)  -Will monitor    #Moderate protein-calorie malnutrition  -Dietary following     #Epigastric Pain (Improved)  -Possible gastritis  -Continue PPI     #Right MTP Joint Pain Likely Acute Gout  -In setting of diuresis   -Uric acid 10.4  -Follow up xray read   -Start prednisone 40mg QD with taper   -Monitor response     #DVT prophylaxis  -Eliquis    Dispo  -PT eval appreciated, recommended home PT    Plan of care discussed with patient and patient's daughter Yoselin over the phone, all questions were answered

## 2025-02-01 LAB
ANION GAP SERPL CALC-SCNC: 4 MMOL/L — LOW (ref 5–17)
BUN SERPL-MCNC: 51 MG/DL — HIGH (ref 7–23)
CALCIUM SERPL-MCNC: 8.3 MG/DL — LOW (ref 8.4–10.5)
CHLORIDE SERPL-SCNC: 97 MMOL/L — SIGNIFICANT CHANGE UP (ref 96–108)
CO2 SERPL-SCNC: 34 MMOL/L — HIGH (ref 22–31)
CREAT SERPL-MCNC: 1.79 MG/DL — HIGH (ref 0.5–1.3)
EGFR: 38 ML/MIN/1.73M2 — LOW
GLUCOSE SERPL-MCNC: 103 MG/DL — HIGH (ref 70–99)
HCT VFR BLD CALC: 29.1 % — LOW (ref 39–50)
HGB BLD-MCNC: 9.4 G/DL — LOW (ref 13–17)
MAGNESIUM SERPL-MCNC: 2.3 MG/DL — SIGNIFICANT CHANGE UP (ref 1.6–2.6)
MCHC RBC-ENTMCNC: 25.5 PG — LOW (ref 27–34)
MCHC RBC-ENTMCNC: 32.3 G/DL — SIGNIFICANT CHANGE UP (ref 32–36)
MCV RBC AUTO: 79.1 FL — LOW (ref 80–100)
NRBC # BLD: 0 /100 WBCS — SIGNIFICANT CHANGE UP (ref 0–0)
NRBC BLD-RTO: 0 /100 WBCS — SIGNIFICANT CHANGE UP (ref 0–0)
PLATELET # BLD AUTO: 192 K/UL — SIGNIFICANT CHANGE UP (ref 150–400)
POTASSIUM SERPL-MCNC: 3.8 MMOL/L — SIGNIFICANT CHANGE UP (ref 3.5–5.3)
POTASSIUM SERPL-SCNC: 3.8 MMOL/L — SIGNIFICANT CHANGE UP (ref 3.5–5.3)
RBC # BLD: 3.68 M/UL — LOW (ref 4.2–5.8)
RBC # FLD: 16.9 % — HIGH (ref 10.3–14.5)
SODIUM SERPL-SCNC: 135 MMOL/L — SIGNIFICANT CHANGE UP (ref 135–145)
WBC # BLD: 12.42 K/UL — HIGH (ref 3.8–10.5)
WBC # FLD AUTO: 12.42 K/UL — HIGH (ref 3.8–10.5)

## 2025-02-01 PROCEDURE — 99233 SBSQ HOSP IP/OBS HIGH 50: CPT

## 2025-02-01 PROCEDURE — 71045 X-RAY EXAM CHEST 1 VIEW: CPT | Mod: 26

## 2025-02-01 RX ORDER — ACETAMINOPHEN 160 MG/5ML
650 SUSPENSION ORAL EVERY 8 HOURS
Refills: 0 | Status: DISCONTINUED | OUTPATIENT
Start: 2025-02-01 | End: 2025-02-04

## 2025-02-01 RX ADMIN — PREDNISONE 40 MILLIGRAM(S): 5 TABLET ORAL at 06:23

## 2025-02-01 RX ADMIN — Medication 325 MILLIGRAM(S): at 11:56

## 2025-02-01 RX ADMIN — Medication 100 MILLIGRAM(S): at 06:24

## 2025-02-01 RX ADMIN — Medication 75 MILLIGRAM(S): at 11:55

## 2025-02-01 RX ADMIN — LABETALOL HYDROCHLORIDE 400 MILLIGRAM(S): 300 TABLET, FILM COATED ORAL at 06:23

## 2025-02-01 RX ADMIN — PANTOPRAZOLE 40 MILLIGRAM(S): 20 TABLET, DELAYED RELEASE ORAL at 06:24

## 2025-02-01 RX ADMIN — Medication 0.63 MILLIGRAM(S): at 20:50

## 2025-02-01 RX ADMIN — Medication 200 MILLIGRAM(S): at 15:28

## 2025-02-01 RX ADMIN — ATORVASTATIN CALCIUM 40 MILLIGRAM(S): 80 TABLET, FILM COATED ORAL at 21:31

## 2025-02-01 RX ADMIN — Medication 200 MILLIGRAM(S): at 06:23

## 2025-02-01 RX ADMIN — Medication 100 MILLIGRAM(S): at 21:30

## 2025-02-01 RX ADMIN — Medication 100 MILLIGRAM(S): at 15:28

## 2025-02-01 RX ADMIN — Medication 500 MICROGRAM(S): at 20:52

## 2025-02-01 RX ADMIN — Medication 2 TABLET(S): at 21:30

## 2025-02-01 RX ADMIN — POLYETHYLENE GLYCOL 3350 17 GRAM(S): 17 POWDER, FOR SOLUTION ORAL at 11:56

## 2025-02-01 RX ADMIN — Medication 1 LOZENGE: at 21:31

## 2025-02-01 RX ADMIN — Medication 125 MICROGRAM(S): at 06:23

## 2025-02-01 RX ADMIN — ACETAMINOPHEN 650 MILLIGRAM(S): 160 SUSPENSION ORAL at 16:28

## 2025-02-01 RX ADMIN — APIXABAN 5 MILLIGRAM(S): 5 TABLET, FILM COATED ORAL at 17:31

## 2025-02-01 RX ADMIN — APIXABAN 5 MILLIGRAM(S): 5 TABLET, FILM COATED ORAL at 06:23

## 2025-02-01 RX ADMIN — ACETAMINOPHEN 650 MILLIGRAM(S): 160 SUSPENSION ORAL at 15:30

## 2025-02-01 RX ADMIN — Medication 40 MILLIGRAM(S): at 06:22

## 2025-02-01 RX ADMIN — Medication 0.63 MILLIGRAM(S): at 05:27

## 2025-02-01 RX ADMIN — Medication 500 MICROGRAM(S): at 05:28

## 2025-02-01 RX ADMIN — Medication 200 MILLIGRAM(S): at 21:30

## 2025-02-01 NOTE — PROGRESS NOTE ADULT - ASSESSMENT
79 year old male, originally from [facility], former cigarette smoker w/ PMHx of HTN, CKD, paroxysmal afib on eliquis, CAD s/p NOMAN to proximal and mid RCA (11/2022), HFpEF, recently admitted 1/4-1/9 for pneumonia and b/l pleural effusions s/p abx/diuresis, presenting to [facility] on 1/24 from primary care office due to sob and hypoxia. Patient positive for Flu A.    #Acute Hypoxic Respiratory Failure Secondary to Influenza w/ Possible Viral PNA  -Patient O2 sat 89% on RA on admission. Requires 4L of supplemental Oxygen to maintain saturation >90%, tapered to 1.5L  -CXR 1/24 showed Present infiltrates are diffuse without tyrone consolidation showing evolution from prior  -s/p tamiflu x 5 days (completed 1/29)  -Continue nebulizer treatment Q6H  -Supportive care   -Continue O2 supplementation, wean as tolerated  -Mild leukocytosis, may be due acute gout as below, afebrile, continue to monitor   -Pulmonary following     Elevated Troponins  CAD s/p NOMAN (11/2022)  Acute Decompensated Heart Failure  -Echo 1/26 showed EF 45 to 50% (from 55-60% on 1/5), Mild global left ventricle hypokinesis, severely dilated LA, moderate MR  -Trop peaked and downtrended  -BNP 79276 (1/27) and CT chest 1/28 shows Worsening diffuse bilateral pleural effusions on the basis of pulmonary edema or pneumonia. Stable bilateral pleural effusions.   -Continue plavix  -Continue labetalol, hydralazine, atorvastatin   -Continue IV Lasix 40mg, changed to QD (2/1), plan to transition to oral in next 24-48 hrs   -Daily weight   -Repeat BNP 62239 (1/31), CXR 1/31 showed Residual mild perihilar/upper and lower zone interstitial/airspace   disease. Small RIGHT pleural effusion and/or basilar airspace disease obscures RIGHT lateral diaphragm contour.   -Cardiology following, HFmrEF likely tachy mediated, known multivessel CAD, has residual LAD, D1 and chronic occlusion of LCx that was medically managed by interventional cardiology, plan for outpatient nuclear stress test when recovers from pneumonia     #Paroxysmal A.fib   -Remains in sinus  rhythm since started on Digoxin  -Continue Labetalol 400mg BID  -Continue Digoxin 125 mg daily and monitor  -Continue Eliquis    #BRUNILDA on CKD 3  -Baseline Cr appears to be ~1.7 range  -BRUNILDA on admission is improved, Cr appears to be at baseline  -Avoid nephrotoxic medications  -Monitor closely on diuresis  -Nephrology following     #Hyponatremia (Resolved)  -resolved  -Monitor     #Hypokalemia 2/2 Diuresis - Resolved  -s/p repletion   -continue to Monitor     #Microcytic Anemia  -Appears chronic   -Iron panel reviewed  -Continue iron supplement  -Monitor CBC     #Mild Transaminitis (Stable)  -Will monitor    #Moderate protein-calorie malnutrition  -Dietary following     #Epigastric Pain (Improved)  -Possible gastritis  -Continue PPI     #Right MTP Joint Pain Likely Acute Gout  -In setting of diuresis   -Uric acid 10.4  -Xray right foot 1/31 showed Coxa valgus deformity with moderate degenerative change of the first MTP joint. Mild multifocal interphalangeal joint arthrosis. Plantar calcaneal spurring. No fracture.  -Started prednisone 40mg QD with taper (day 1)  -Monitor response     #DVT prophylaxis  -Eliquis    Dispo  -PT eval appreciated, recommended home PT    Plan of care discussed with patient, unable to reach patient's daughter over the phone for update.

## 2025-02-01 NOTE — PROGRESS NOTE ADULT - SUBJECTIVE AND OBJECTIVE BOX
: [id number]    Interval History  Patient seen and examined at bedside. No acute events noted.  Patient denies any acute complaints, no chest pain/SOB. +Cough. Epigastric pain resolved. Right foot pain is improved.  O2Sat 90% on 1.5L NC.     ALLERGIES:  No Known Allergies    MEDICATIONS  (STANDING):  apixaban 5 milliGRAM(s) Oral every 12 hours  atorvastatin 40 milliGRAM(s) Oral at bedtime  benzonatate 200 milliGRAM(s) Oral every 8 hours  clopidogrel Tablet 75 milliGRAM(s) Oral daily  digoxin     Tablet 125 MICROGram(s) Oral daily  ferrous    sulfate 325 milliGRAM(s) Oral daily  hydrALAZINE 100 milliGRAM(s) Oral every 8 hours  ipratropium    for Nebulization 500 MICROGram(s) Nebulizer every 6 hours  labetalol 400 milliGRAM(s) Oral two times a day  levalbuterol Inhalation 0.63 milliGRAM(s) Inhalation every 6 hours  pantoprazole    Tablet 40 milliGRAM(s) Oral before breakfast  polyethylene glycol 3350 17 Gram(s) Oral daily  predniSONE   Tablet 40 milliGRAM(s) Oral daily  senna 2 Tablet(s) Oral at bedtime    MEDICATIONS  (PRN):  benzocaine/menthol Lozenge 1 Lozenge Oral every 6 hours PRN Sore Throat  bisacodyl 10 milliGRAM(s) Oral daily PRN Constipation    Vital Signs Last 24 Hrs  T(F): 98.6 (01 Feb 2025 06:14), Max: 98.6 (01 Feb 2025 06:14)  HR: 69 (01 Feb 2025 08:34) (64 - 69)  BP: 121/69 (01 Feb 2025 06:14) (104/62 - 133/64)  RR: 18 (01 Feb 2025 06:14) (18 - 18)  SpO2: 92% (01 Feb 2025 08:34) (90% - 95%)  I&O's Summary    31 Jan 2025 07:01  -  01 Feb 2025 07:00  --------------------------------------------------------  IN: 100 mL / OUT: 325 mL / NET: -225 mL    PHYSICAL EXAM:  GENERAL: NAD  HEENT: NCAT, NC in place at 1.5L  CHEST/LUNG: Good air entry; no wheeze   HEART: Regular rate and rhythm  ABDOMEN: Soft, Nontender, Nondistended; Bowel sounds present  MUSCULOSKELETAL/EXTREMITIES:  2+ Peripheral Pulses  Right MTP pain with light touch w/ some warmth - somewhat improved   PSYCH: Appropriate affect  NEURO: Awake and alert     I personally reviewed the below data/images/labs:    LABS:                        9.4    12.42 )-----------( 192      ( 01 Feb 2025 06:37 )             29.1       02-01    135  |  97  |  51  ----------------------------<  103  3.8   |  34  |  1.79    Ca    8.3      01 Feb 2025 06:37  Mg     2.3     02-01    TPro  6.2  /  Alb  2.3  /  TBili  1.2  /  DBili  x   /  AST  46  /  ALT  59  /  AlkPhos  108  01-31    CARDIAC MARKERS ( 30 Jan 2025 06:39 )  x     / 1497.0 ng/L / x     / x     / x        Urinalysis Basic - ( 01 Feb 2025 06:37 )    Color: x / Appearance: x / SG: x / pH: x  Gluc: 103 mg/dL / Ketone: x  / Bili: x / Urobili: x   Blood: x / Protein: x / Nitrite: x   Leuk Esterase: x / RBC: x / WBC x   Sq Epi: x / Non Sq Epi: x / Bacteria: x    Consultant(s) Notes Reviewed:   Care Discused with Consultants/Other Providers:  Imaging Personally Reviewed:

## 2025-02-01 NOTE — PROGRESS NOTE ADULT - SUBJECTIVE AND OBJECTIVE BOX
No distress, on NC O2    Vital Signs Last 24 Hrs  T(C): 36.8 (02-01-25 @ 12:55), Max: 37 (02-01-25 @ 06:14)  T(F): 98.2 (02-01-25 @ 12:55), Max: 98.6 (02-01-25 @ 06:14)  HR: 68 (02-01-25 @ 14:48) (65 - 69)  BP: 117/65 (02-01-25 @ 12:55) (104/62 - 133/64)  RR: 17 (02-01-25 @ 12:55) (17 - 18)  SpO2: 92% (02-01-25 @ 14:48) (91% - 95%)    I&O's Detail    31 Jan 2025 07:01  -  01 Feb 2025 07:00  --------------------------------------------------------  IN:    Oral Fluid: 100 mL  Total IN: 100 mL    OUT:    Voided (mL): 325 mL  Total OUT: 325 mL    s1s2  b/l air entry  soft, ND  no edema                                            9.4    12.42 )-----------( 192      ( 01 Feb 2025 06:37 )             29.1     01 Feb 2025 06:37    135    |  97     |  51     ----------------------------<  103    3.8     |  34     |  1.79     Ca    8.3        01 Feb 2025 06:37  Mg     2.3       01 Feb 2025 06:37    TPro  6.2    /  Alb  2.3    /  TBili  1.2    /  DBili  x      /  AST  46     /  ALT  59     /  AlkPhos  108    31 Jan 2025 06:54    LIVER FUNCTIONS - ( 31 Jan 2025 06:54 )  Alb: 2.3 g/dL / Pro: 6.2 g/dL / ALK PHOS: 108 U/L / ALT: 59 U/L / AST: 46 U/L / GGT: x           acetaminophen     Tablet .. 650 milliGRAM(s) Oral every 8 hours PRN  apixaban 5 milliGRAM(s) Oral every 12 hours  atorvastatin 40 milliGRAM(s) Oral at bedtime  benzocaine/menthol Lozenge 1 Lozenge Oral every 6 hours PRN  benzonatate 200 milliGRAM(s) Oral every 8 hours  bisacodyl 10 milliGRAM(s) Oral daily PRN  clopidogrel Tablet 75 milliGRAM(s) Oral daily  digoxin     Tablet 125 MICROGram(s) Oral daily  ferrous    sulfate 325 milliGRAM(s) Oral daily  hydrALAZINE 100 milliGRAM(s) Oral every 8 hours  ipratropium    for Nebulization 500 MICROGram(s) Nebulizer every 6 hours  labetalol 400 milliGRAM(s) Oral two times a day  levalbuterol Inhalation 0.63 milliGRAM(s) Inhalation every 6 hours  pantoprazole    Tablet 40 milliGRAM(s) Oral before breakfast  polyethylene glycol 3350 17 Gram(s) Oral daily  predniSONE   Tablet 40 milliGRAM(s) Oral daily  senna 2 Tablet(s) Oral at bedtime    A/P:    CM, Afib, EF 45 - 50%, mod MR   Adm w/CHF, PNA, Flu A, elevated troponin  Cardiology following   Known CKD 3 w/renal indices fairly stable    F/u BMP on Lasix   Avoid nephrotoxins as able, no NSAID's    [phone]

## 2025-02-01 NOTE — PROGRESS NOTE ADULT - SUBJECTIVE AND OBJECTIVE BOX
Time of Visit:  Patient seen and examined. pat seen earlier today asleep inbed     MEDICATIONS  (STANDING):  apixaban 5 milliGRAM(s) Oral every 12 hours  atorvastatin 40 milliGRAM(s) Oral at bedtime  benzonatate 200 milliGRAM(s) Oral every 8 hours  clopidogrel Tablet 75 milliGRAM(s) Oral daily  digoxin     Tablet 125 MICROGram(s) Oral daily  ferrous    sulfate 325 milliGRAM(s) Oral daily  hydrALAZINE 100 milliGRAM(s) Oral every 8 hours  ipratropium    for Nebulization 500 MICROGram(s) Nebulizer every 6 hours  labetalol 400 milliGRAM(s) Oral two times a day  levalbuterol Inhalation 0.63 milliGRAM(s) Inhalation every 6 hours  pantoprazole    Tablet 40 milliGRAM(s) Oral before breakfast  polyethylene glycol 3350 17 Gram(s) Oral daily  predniSONE   Tablet 40 milliGRAM(s) Oral daily  senna 2 Tablet(s) Oral at bedtime      MEDICATIONS  (PRN):  acetaminophen     Tablet .. 650 milliGRAM(s) Oral every 8 hours PRN Mild Pain (1 - 3)  benzocaine/menthol Lozenge 1 Lozenge Oral every 6 hours PRN Sore Throat  bisacodyl 10 milliGRAM(s) Oral daily PRN Constipation       Medications up to date at time of exam.      PHYSICAL EXAMINATION:  Patient has no new complaints.  GENERAL: The patient  in no apparent distress.     Vital Signs Last 24 Hrs  T(C): 36.8 (01 Feb 2025 12:55), Max: 37 (01 Feb 2025 06:14)  T(F): 98.2 (01 Feb 2025 12:55), Max: 98.6 (01 Feb 2025 06:14)  HR: 68 (01 Feb 2025 14:48) (65 - 69)  BP: 109/56 (01 Feb 2025 17:27) (109/56 - 133/64)  BP(mean): --  RR: 17 (01 Feb 2025 12:55) (17 - 18)  SpO2: 92% (01 Feb 2025 14:48) (92% - 95%)    Parameters below as of 01 Feb 2025 14:48  Patient On (Oxygen Delivery Method): nasal cannula       (if applicable)    Chest Tube (if applicable)    HEENT: Head is normocephalic and atraumatic. Extraocular muscles are intact. Mucous membranes are moist.     NECK: Supple, no palpable adenopathy.    LUNGS: Fair bilateral air entry   no wheezing, rales, or rhonchi.    HEART: Regular rate and rhythm without murmur.    ABDOMEN: Soft, nontender, and nondistended.  No hepatosplenomegaly is noted.    : No painful voiding, no pelvic pain    EXTREMITIES: Without any cyanosis, clubbing, rash, lesions or edema.    NEUROLOGIC: Awake, alert, oriented, grossly intact    SKIN: Warm, dry, good turgor.      LABS:                        9.4    12.42 )-----------( 192      ( 01 Feb 2025 06:37 )             29.1     02-01    135  |  97  |  51[H]  ----------------------------<  103[H]  3.8   |  34[H]  |  1.79[H]    Ca    8.3[L]      01 Feb 2025 06:37  Mg     2.3     02-01    TPro  6.2  /  Alb  2.3[L]  /  TBili  1.2  /  DBili  x   /  AST  46[H]  /  ALT  59[H]  /  AlkPhos  108  01-31      Urinalysis Basic - ( 01 Feb 2025 06:37 )    Color: x / Appearance: x / SG: x / pH: x  Gluc: 103 mg/dL / Ketone: x  / Bili: x / Urobili: x   Blood: x / Protein: x / Nitrite: x   Leuk Esterase: x / RBC: x / WBC x   Sq Epi: x / Non Sq Epi: x / Bacteria: x      MICROBIOLOGY: (if applicable)    RADIOLOGY & ADDITIONAL STUDIES:  EKG:   CXR:  ECHO:    IMPRESSION: 79y Male PAST MEDICAL & SURGICAL HISTORY:  H/O: HTN (hypertension)      CAD (coronary artery disease)      Diabetic vitreous hemorrhage      Hypokalemia      Type 2 diabetes mellitus      Left ventricular hypertrophy      History of alcohol withdrawal delirium      S/P cardiac catheterization  11/17 3 stents to RCA       p/w         IMP: This is a 79 yr old man  originally from [facility], former cigarette smoker  HTN, CKD, paroxysmal afib on eliquis, w/ hx of abnormal nuclear stress test s/p coronary angiogram and NOMAN to proximal and mid RCA (11/2022) presenting to [facility] from primary care office due to sob and hypoxia. Patient positive for Flu A. Pulmonary consulted for acute hypoxia. Clinically appears euvolemic. Symptoms likely due to acute viral illness.       Assessment:  1. Influenza A infection  2. Acute hypoxic resp failure   3. HFPEF   4. Paroxysmal Afib    Plan:  - CT scan with enlarging pleural effusion and worsening ground glass suspect from undrelying pulmonary edema , diurese as tolerate  - monitor and supplement lytes  - will need further workup if o2 requirements not improve with diuresis , taper o2 to off as tolerated  - Cont. Bronchodilators  - Titrate down oxygen as tolerated   - Oxygen support to maintain saturation > 92%  - DVT prophylaxis  - Rest of management per primary team

## 2025-02-02 LAB
ANION GAP SERPL CALC-SCNC: 5 MMOL/L — SIGNIFICANT CHANGE UP (ref 5–17)
BUN SERPL-MCNC: 54 MG/DL — HIGH (ref 7–23)
CALCIUM SERPL-MCNC: 8.3 MG/DL — LOW (ref 8.4–10.5)
CHLORIDE SERPL-SCNC: 97 MMOL/L — SIGNIFICANT CHANGE UP (ref 96–108)
CO2 SERPL-SCNC: 33 MMOL/L — HIGH (ref 22–31)
CREAT SERPL-MCNC: 1.83 MG/DL — HIGH (ref 0.5–1.3)
EGFR: 38 ML/MIN/1.73M2 — LOW
GLUCOSE SERPL-MCNC: 131 MG/DL — HIGH (ref 70–99)
HCT VFR BLD CALC: 28 % — LOW (ref 39–50)
HGB BLD-MCNC: 9.1 G/DL — LOW (ref 13–17)
MCHC RBC-ENTMCNC: 25.5 PG — LOW (ref 27–34)
MCHC RBC-ENTMCNC: 32.5 G/DL — SIGNIFICANT CHANGE UP (ref 32–36)
MCV RBC AUTO: 78.4 FL — LOW (ref 80–100)
NRBC # BLD: 0 /100 WBCS — SIGNIFICANT CHANGE UP (ref 0–0)
NRBC BLD-RTO: 0 /100 WBCS — SIGNIFICANT CHANGE UP (ref 0–0)
PLATELET # BLD AUTO: 186 K/UL — SIGNIFICANT CHANGE UP (ref 150–400)
POTASSIUM SERPL-MCNC: 3.6 MMOL/L — SIGNIFICANT CHANGE UP (ref 3.5–5.3)
POTASSIUM SERPL-SCNC: 3.6 MMOL/L — SIGNIFICANT CHANGE UP (ref 3.5–5.3)
PROCALCITONIN SERPL-MCNC: 0.15 NG/ML — HIGH
RBC # BLD: 3.57 M/UL — LOW (ref 4.2–5.8)
RBC # FLD: 16.9 % — HIGH (ref 10.3–14.5)
SODIUM SERPL-SCNC: 135 MMOL/L — SIGNIFICANT CHANGE UP (ref 135–145)
WBC # BLD: 12.8 K/UL — HIGH (ref 3.8–10.5)
WBC # FLD AUTO: 12.8 K/UL — HIGH (ref 3.8–10.5)

## 2025-02-02 PROCEDURE — 99233 SBSQ HOSP IP/OBS HIGH 50: CPT

## 2025-02-02 RX ADMIN — ATORVASTATIN CALCIUM 40 MILLIGRAM(S): 80 TABLET, FILM COATED ORAL at 21:41

## 2025-02-02 RX ADMIN — Medication 1 LOZENGE: at 05:57

## 2025-02-02 RX ADMIN — Medication 75 MILLIGRAM(S): at 12:01

## 2025-02-02 RX ADMIN — Medication 200 MILLIGRAM(S): at 21:41

## 2025-02-02 RX ADMIN — Medication 1 LOZENGE: at 21:41

## 2025-02-02 RX ADMIN — Medication 100 MILLIGRAM(S): at 12:00

## 2025-02-02 RX ADMIN — PREDNISONE 40 MILLIGRAM(S): 5 TABLET ORAL at 05:56

## 2025-02-02 RX ADMIN — Medication 200 MILLIGRAM(S): at 05:55

## 2025-02-02 RX ADMIN — Medication 500 MICROGRAM(S): at 08:06

## 2025-02-02 RX ADMIN — POLYETHYLENE GLYCOL 3350 17 GRAM(S): 17 POWDER, FOR SOLUTION ORAL at 12:00

## 2025-02-02 RX ADMIN — Medication 2 TABLET(S): at 21:41

## 2025-02-02 RX ADMIN — LABETALOL HYDROCHLORIDE 400 MILLIGRAM(S): 300 TABLET, FILM COATED ORAL at 17:16

## 2025-02-02 RX ADMIN — Medication 0.63 MILLIGRAM(S): at 22:04

## 2025-02-02 RX ADMIN — PANTOPRAZOLE 40 MILLIGRAM(S): 20 TABLET, DELAYED RELEASE ORAL at 05:56

## 2025-02-02 RX ADMIN — Medication 100 MILLIGRAM(S): at 21:40

## 2025-02-02 RX ADMIN — APIXABAN 5 MILLIGRAM(S): 5 TABLET, FILM COATED ORAL at 05:55

## 2025-02-02 RX ADMIN — Medication 500 MICROGRAM(S): at 22:04

## 2025-02-02 RX ADMIN — Medication 0.63 MILLIGRAM(S): at 14:53

## 2025-02-02 RX ADMIN — APIXABAN 5 MILLIGRAM(S): 5 TABLET, FILM COATED ORAL at 17:16

## 2025-02-02 RX ADMIN — Medication 1 LOZENGE: at 12:06

## 2025-02-02 RX ADMIN — ACETAMINOPHEN 650 MILLIGRAM(S): 160 SUSPENSION ORAL at 12:00

## 2025-02-02 RX ADMIN — Medication 0.63 MILLIGRAM(S): at 08:06

## 2025-02-02 RX ADMIN — ACETAMINOPHEN 650 MILLIGRAM(S): 160 SUSPENSION ORAL at 02:16

## 2025-02-02 RX ADMIN — Medication 600 MILLIGRAM(S): at 17:16

## 2025-02-02 RX ADMIN — Medication 500 MICROGRAM(S): at 14:53

## 2025-02-02 RX ADMIN — ACETAMINOPHEN 650 MILLIGRAM(S): 160 SUSPENSION ORAL at 01:30

## 2025-02-02 RX ADMIN — Medication 325 MILLIGRAM(S): at 12:00

## 2025-02-02 RX ADMIN — Medication 200 MILLIGRAM(S): at 12:00

## 2025-02-02 RX ADMIN — Medication 125 MICROGRAM(S): at 05:56

## 2025-02-02 NOTE — PROGRESS NOTE ADULT - SUBJECTIVE AND OBJECTIVE BOX
No distress, on RA    Vital Signs Last 24 Hrs  T(C): 36.3 (02-02-25 @ 13:00), Max: 36.6 (02-02-25 @ 05:20)  T(F): 97.4 (02-02-25 @ 13:00), Max: 97.8 (02-02-25 @ 05:20)  HR: 63 (02-02-25 @ 15:11) (61 - 115)  BP: 156/58 (02-02-25 @ 13:00) (103/69 - 156/58)  RR: 18 (02-02-25 @ 13:00) (18 - 18)  SpO2: 100% (02-02-25 @ 15:11) (94% - 100%)    s1s2  b/l air entry  soft, ND  no edema                                                   9.1    12.80 )-----------( 186      ( 02 Feb 2025 06:55 )             28.0     02 Feb 2025 06:55    135    |  97     |  54     ----------------------------<  131    3.6     |  33     |  1.83     Ca    8.3        02 Feb 2025 06:55  Mg     2.3       01 Feb 2025 06:37    acetaminophen     Tablet .. 650 milliGRAM(s) Oral every 8 hours PRN  apixaban 5 milliGRAM(s) Oral every 12 hours  atorvastatin 40 milliGRAM(s) Oral at bedtime  benzocaine/menthol Lozenge 1 Lozenge Oral every 6 hours PRN  benzonatate 200 milliGRAM(s) Oral every 8 hours  bisacodyl 10 milliGRAM(s) Oral daily PRN  clopidogrel Tablet 75 milliGRAM(s) Oral daily  digoxin     Tablet 125 MICROGram(s) Oral daily  ferrous    sulfate 325 milliGRAM(s) Oral daily  furosemide   Injectable 40 milliGRAM(s) IV Push daily  guaiFENesin  milliGRAM(s) Oral every 12 hours  hydrALAZINE 100 milliGRAM(s) Oral every 8 hours  ipratropium    for Nebulization 500 MICROGram(s) Nebulizer every 6 hours  labetalol 400 milliGRAM(s) Oral two times a day  levalbuterol Inhalation 0.63 milliGRAM(s) Inhalation every 6 hours  pantoprazole    Tablet 40 milliGRAM(s) Oral before breakfast  polyethylene glycol 3350 17 Gram(s) Oral daily  predniSONE   Tablet 40 milliGRAM(s) Oral daily  senna 2 Tablet(s) Oral at bedtime      A/P:    CM, Afib, EF 45 - 50%, mod MR   Adm w/CHF, PNA, Flu A, elevated troponin  Cardiology following   Known CKD 3 w/renal indices fairly stable    F/u BMP, UO on Lasix   Avoid nephrotoxins as able, no NSAID's    [phone]

## 2025-02-02 NOTE — PROGRESS NOTE ADULT - SUBJECTIVE AND OBJECTIVE BOX
: [id number]    Interval History  Patient seen and examined at bedside. No acute events noted.  Patient reports feeling a little bit better. Complains of cough productive of whitish sputum. No chest pain/shortness of breath at this time.  Right foot pain is improved.    ALLERGIES:  No Known Allergies    MEDICATIONS  (STANDING):  apixaban 5 milliGRAM(s) Oral every 12 hours  atorvastatin 40 milliGRAM(s) Oral at bedtime  benzonatate 200 milliGRAM(s) Oral every 8 hours  clopidogrel Tablet 75 milliGRAM(s) Oral daily  digoxin     Tablet 125 MICROGram(s) Oral daily  ferrous    sulfate 325 milliGRAM(s) Oral daily  furosemide   Injectable 40 milliGRAM(s) IV Push daily  guaiFENesin  milliGRAM(s) Oral every 12 hours  hydrALAZINE 100 milliGRAM(s) Oral every 8 hours  ipratropium    for Nebulization 500 MICROGram(s) Nebulizer every 6 hours  labetalol 400 milliGRAM(s) Oral two times a day  levalbuterol Inhalation 0.63 milliGRAM(s) Inhalation every 6 hours  pantoprazole    Tablet 40 milliGRAM(s) Oral before breakfast  polyethylene glycol 3350 17 Gram(s) Oral daily  predniSONE   Tablet 40 milliGRAM(s) Oral daily  senna 2 Tablet(s) Oral at bedtime    MEDICATIONS  (PRN):  acetaminophen     Tablet .. 650 milliGRAM(s) Oral every 8 hours PRN Mild Pain (1 - 3)  benzocaine/menthol Lozenge 1 Lozenge Oral every 6 hours PRN Sore Throat  bisacodyl 10 milliGRAM(s) Oral daily PRN Constipation    Vital Signs Last 24 Hrs  T(F): 97.8 (02 Feb 2025 05:20), Max: 97.8 (02 Feb 2025 05:20)  HR: 115 (02 Feb 2025 08:06) (61 - 115)  BP: 103/69 (02 Feb 2025 05:20) (103/69 - 109/56)  RR: 18 (02 Feb 2025 05:20) (18 - 18)  SpO2: 94% (02 Feb 2025 08:06) (92% - 95%)  I&O's Summary    PHYSICAL EXAM:  GENERAL: NAD  HEENT: NCAT, NC in place at 1.5L with O2Sat 96%  CHEST/LUNG: Good air entry; no wheeze   HEART: Regular rate and rhythm  ABDOMEN: Soft, Nontender, Nondistended; Bowel sounds present  MUSCULOSKELETAL/EXTREMITIES:  2+ Peripheral Pulses  Right MTP pain is improved compared to yesterday   PSYCH: Appropriate affect  NEURO: Awake and alert     I personally reviewed the below data/images/labs:    LABS:                        9.1    12.80 )-----------( 186      ( 02 Feb 2025 06:55 )             28.0       02-02    135  |  97  |  54  ----------------------------<  131  3.6   |  33  |  1.83    Ca    8.3      02 Feb 2025 06:55  Mg     2.3     02-01    TPro  6.2  /  Alb  2.3  /  TBili  1.2  /  DBili  x   /  AST  46  /  ALT  59  /  AlkPhos  108  01-31    Urinalysis Basic - ( 02 Feb 2025 06:55 )    Color: x / Appearance: x / SG: x / pH: x  Gluc: 131 mg/dL / Ketone: x  / Bili: x / Urobili: x   Blood: x / Protein: x / Nitrite: x   Leuk Esterase: x / RBC: x / WBC x   Sq Epi: x / Non Sq Epi: x / Bacteria: x    Consultant(s) Notes Reviewed:   Care Discused with Consultants/Other Providers:  Imaging Personally Reviewed:

## 2025-02-02 NOTE — PROGRESS NOTE ADULT - ASSESSMENT
Physical Examination:  GENERAL:               Alert, Oriented, No acute distress.    HEENT:                   Missing left eye, right eye reactive to light.  Symmetric. No JVD, Dry MM  PULM:                     Bilateral air entry, +Rales, No Rhonchi, trace  Wheezing  CVS:                        S1, S2,  No Murmur     NEURO:                  Alert, oriented, interactive, nonfocal, follows commands  PSYC:                      Calm, + Insight.      Assessment  80 yo M, originally from [facility], former cigarette smoker w/ PMHx of HTN, CKD, paroxysmal afib on eliquis, w/ hx of abnormal nuclear stress test s/p coronary angiogram and NOMAN to proximal and mid RCA (11/2022) presenting to [facility] from primary care office due to sob and hypoxia. Patient positive for Flu A. Pulmonary consulted for acute hypoxia. Clinically appears euvolemic. Symptoms likely due to acute viral illness.     Problem List  1. Influenza A infection  2. Acute hypoxia   3. HFPEF   4. Paroxysmal Afib    Plan:  - CT scan with enlarging pleural effusion and worsening ground glass suspect from underlying pulmonary edema , diurese as tolerated  - o2 requirments improving with diuresis  - taper o2  - check o2 on ambulation  - prednisone taper to off     - Cont. Bronchodilators  - Titrate down oxygen as tolerated     - Oxygen support to maintain saturation > 92%  - DVT prophylaxis  - Rest of management per primary team

## 2025-02-02 NOTE — PROGRESS NOTE ADULT - ASSESSMENT
79 year old male, originally from [facility], former cigarette smoker w/ PMHx of HTN, CKD, paroxysmal afib on eliquis, CAD s/p NOMAN to proximal and mid RCA (11/2022), HFpEF, recently admitted 1/4-1/9 for pneumonia and b/l pleural effusions s/p abx/diuresis, presenting to [facility] on 1/24 from primary care office due to sob and hypoxia. Patient positive for Flu A.    #Acute Hypoxic Respiratory Failure Secondary to Influenza w/ Possible Viral PNA  -Patient O2 sat 89% on RA on admission. Requires 4L of supplemental Oxygen to maintain saturation >90%, tapered to 1.5L  -CXR 1/24 showed Present infiltrates are diffuse without tyrone consolidation showing evolution from prior  -s/p tamiflu x 5 days (completed 1/29)  -Continue nebulizer treatment Q6H  -Supportive care   -Incentive spirometry   -Continue O2 supplementation, wean as tolerated  -Mild leukocytosis - stable, may be due acute gout as below, afebrile, continue to monitor   -Pulmonary following - discussed with Dr. Guerrero     Elevated Troponins  CAD s/p NOMAN (11/2022)  Acute Decompensated Heart Failure  -Echo 1/26 showed EF 45 to 50% (from 55-60% on 1/5), Mild global left ventricle hypokinesis, severely dilated LA, moderate MR  -Trop peaked and downtrended  -BNP 54777 (1/27) and CT chest 1/28 shows Worsening diffuse bilateral pleural effusions on the basis of pulmonary edema or pneumonia. Stable bilateral pleural effusions.   -Continue plavix  -Continue labetalol, hydralazine, atorvastatin   -Continue IV Lasix 40mg, changed to QD (2/1), plan to transition to oral in next 24-48 hrs   -Daily weight   -Repeat BNP 82837 (1/31), CXR 2/1 showed Persistent small infiltrate lateral to the left hilum. Procal 0.15, low suspicion for bacterial PNA  -Cardiology following, HFmrEF likely tachy mediated, known multivessel CAD, has residual LAD, D1 and chronic occlusion of LCx that was medically managed by interventional cardiology, plan for outpatient nuclear stress test when recovers from pneumonia     #Paroxysmal A.fib   -Remains in sinus  rhythm since started on Digoxin  -Continue Labetalol 400mg BID  -Continue Digoxin 125 mg daily and monitor  -Continue Eliquis    #BRUNILDA on CKD 3  -Baseline Cr appears to be ~1.7 range  -BRUNILDA on admission is improved, Cr appears to be at baseline  -Avoid nephrotoxic medications  -Monitor closely on diuresis  -Nephrology following     #Hyponatremia (Resolved)  -resolved  -Monitor     #Hypokalemia 2/2 Diuresis - Resolved  -s/p repletion   -continue to Monitor     #Microcytic Anemia  -Appears chronic   -Iron panel reviewed  -Continue iron supplement  -Monitor CBC     #Mild Transaminitis (Stable)  -Will monitor    #Moderate protein-calorie malnutrition  -Dietary following     #Epigastric Pain (Improved)  -Possible gastritis  -Continue PPI     #Right MTP Joint Pain Likely Acute Gout (Improved)  -In setting of diuresis   -Uric acid 10.4  -Xray right foot 1/31 showed Coxa valgus deformity with moderate degenerative change of the first MTP joint. Mild multifocal interphalangeal joint arthrosis. Plantar calcaneal spurring. No fracture.  -Started prednisone 40mg QD with taper (day 2)  -Monitor response     #DVT prophylaxis  -Eliquis    Dispo  -PT eval appreciated, recommended home PT    Plan of care discussed with patient and patient's daughter Yoselin over the phone, all questions were answered   Discussed with Dr. Guerrero

## 2025-02-02 NOTE — PROGRESS NOTE ADULT - SUBJECTIVE AND OBJECTIVE BOX
Follow-up Pulmonary Progress Note  Chief Complaint : Influenza with other respiratory manifestations, other influenza virus identified        pt seen asiya examined  now on room air 96%  no cp, sob, palp, nv      Allergies :No Known Allergies      PAST MEDICAL & SURGICAL HISTORY:  H/O: HTN (hypertension)    CAD (coronary artery disease)    Diabetic vitreous hemorrhage    Hypokalemia    Type 2 diabetes mellitus    Left ventricular hypertrophy    History of alcohol withdrawal delirium    S/P cardiac catheterization  11/17 3 stents to RCA        Medications:  MEDICATIONS  (STANDING):  apixaban 5 milliGRAM(s) Oral every 12 hours  atorvastatin 40 milliGRAM(s) Oral at bedtime  benzonatate 200 milliGRAM(s) Oral every 8 hours  clopidogrel Tablet 75 milliGRAM(s) Oral daily  digoxin     Tablet 125 MICROGram(s) Oral daily  ferrous    sulfate 325 milliGRAM(s) Oral daily  furosemide   Injectable 40 milliGRAM(s) IV Push daily  guaiFENesin  milliGRAM(s) Oral every 12 hours  hydrALAZINE 100 milliGRAM(s) Oral every 8 hours  ipratropium    for Nebulization 500 MICROGram(s) Nebulizer every 6 hours  labetalol 400 milliGRAM(s) Oral two times a day  levalbuterol Inhalation 0.63 milliGRAM(s) Inhalation every 6 hours  pantoprazole    Tablet 40 milliGRAM(s) Oral before breakfast  polyethylene glycol 3350 17 Gram(s) Oral daily  predniSONE   Tablet 40 milliGRAM(s) Oral daily  senna 2 Tablet(s) Oral at bedtime    MEDICATIONS  (PRN):  acetaminophen     Tablet .. 650 milliGRAM(s) Oral every 8 hours PRN Mild Pain (1 - 3)  benzocaine/menthol Lozenge 1 Lozenge Oral every 6 hours PRN Sore Throat  bisacodyl 10 milliGRAM(s) Oral daily PRN Constipation      Antibiotics History  azithromycin  IVPB 500 milliGRAM(s) IV Intermittent once, 01-24-25 @ 16:19, Stop order after: 1 Doses  cefTRIAXone   IVPB 1000 milliGRAM(s) IV Intermittent once, 01-24-25 @ 16:19, Stop order after: 1 Doses  oseltamivir 30 milliGRAM(s) Oral two times a day, 01-24-25 @ 18:25, Stop order after: 10 Doses      Heme Medications   apixaban 5 milliGRAM(s) Oral every 12 hours, 01-24-25 @ 18:57  clopidogrel Tablet 75 milliGRAM(s) Oral daily, 01-24-25 @ 18:59      GI Medications  bisacodyl 10 milliGRAM(s) Oral daily, 01-27-25 @ 16:56, Routine PRN  pantoprazole    Tablet 40 milliGRAM(s) Oral before breakfast, 01-30-25 @ 10:46, STAT  polyethylene glycol 3350 17 Gram(s) Oral daily, 01-27-25 @ 16:56, Now  senna 2 Tablet(s) Oral at bedtime, 01-30-25 @ 09:55, Routine        LABS:                        9.1    12.80 )-----------( 186      ( 02 Feb 2025 06:55 )             28.0     02-02    135  |  97  |  54[H]  ----------------------------<  131[H]  3.6   |  33[H]  |  1.83[H]    Ca    8.3[L]      02 Feb 2025 06:55  Mg     2.3     02-01                Urinalysis Basic - ( 02 Feb 2025 06:55 )    Color: x / Appearance: x / SG: x / pH: x  Gluc: 131 mg/dL / Ketone: x  / Bili: x / Urobili: x   Blood: x / Protein: x / Nitrite: x   Leuk Esterase: x / RBC: x / WBC x   Sq Epi: x / Non Sq Epi: x / Bacteria: x      Procalcitonin: 0.15 ng/mL (02-02-25 @ 06:55)          CULTURES: (if applicable)    Rapid RVP Result: Detected (01-24-25 @ 16:30)           VITALS:  T(C): 36.3 (02-02-25 @ 19:32), Max: 36.6 (02-02-25 @ 05:20)  T(F): 97.3 (02-02-25 @ 19:32), Max: 97.8 (02-02-25 @ 05:20)  HR: 61 (02-02-25 @ 19:32) (61 - 115)  BP: 159/70 (02-02-25 @ 19:32) (103/69 - 159/70)  BP(mean): --  ABP: --  ABP(mean): --  RR: 18 (02-02-25 @ 19:32) (18 - 18)  SpO2: 96% (02-02-25 @ 19:32) (94% - 100%)  CVP(mm Hg): --  CVP(cm H2O): --    Ins and Outs

## 2025-02-03 ENCOUNTER — RESULT REVIEW (OUTPATIENT)
Age: 80
End: 2025-02-03

## 2025-02-03 ENCOUNTER — TRANSCRIPTION ENCOUNTER (OUTPATIENT)
Age: 80
End: 2025-02-03

## 2025-02-03 LAB
ALBUMIN SERPL ELPH-MCNC: 2.2 G/DL — LOW (ref 3.3–5)
ALP SERPL-CCNC: 105 U/L — SIGNIFICANT CHANGE UP (ref 40–120)
ALT FLD-CCNC: 38 U/L — SIGNIFICANT CHANGE UP (ref 10–45)
ANION GAP SERPL CALC-SCNC: 6 MMOL/L — SIGNIFICANT CHANGE UP (ref 5–17)
AST SERPL-CCNC: 28 U/L — SIGNIFICANT CHANGE UP (ref 10–40)
BILIRUB SERPL-MCNC: 0.6 MG/DL — SIGNIFICANT CHANGE UP (ref 0.2–1.2)
BUN SERPL-MCNC: 59 MG/DL — HIGH (ref 7–23)
CALCIUM SERPL-MCNC: 8.7 MG/DL — SIGNIFICANT CHANGE UP (ref 8.4–10.5)
CHLORIDE SERPL-SCNC: 97 MMOL/L — SIGNIFICANT CHANGE UP (ref 96–108)
CO2 SERPL-SCNC: 33 MMOL/L — HIGH (ref 22–31)
CREAT SERPL-MCNC: 1.67 MG/DL — HIGH (ref 0.5–1.3)
EGFR: 42 ML/MIN/1.73M2 — LOW
GLUCOSE SERPL-MCNC: 150 MG/DL — HIGH (ref 70–99)
HCT VFR BLD CALC: 28.7 % — LOW (ref 39–50)
HGB BLD-MCNC: 9.2 G/DL — LOW (ref 13–17)
MCHC RBC-ENTMCNC: 25.1 PG — LOW (ref 27–34)
MCHC RBC-ENTMCNC: 32.1 G/DL — SIGNIFICANT CHANGE UP (ref 32–36)
MCV RBC AUTO: 78.4 FL — LOW (ref 80–100)
NRBC # BLD: 0 /100 WBCS — SIGNIFICANT CHANGE UP (ref 0–0)
NRBC BLD-RTO: 0 /100 WBCS — SIGNIFICANT CHANGE UP (ref 0–0)
PLATELET # BLD AUTO: 197 K/UL — SIGNIFICANT CHANGE UP (ref 150–400)
POTASSIUM SERPL-MCNC: 3.5 MMOL/L — SIGNIFICANT CHANGE UP (ref 3.5–5.3)
POTASSIUM SERPL-SCNC: 3.5 MMOL/L — SIGNIFICANT CHANGE UP (ref 3.5–5.3)
PROT SERPL-MCNC: 6.3 G/DL — SIGNIFICANT CHANGE UP (ref 6–8.3)
RBC # BLD: 3.66 M/UL — LOW (ref 4.2–5.8)
RBC # FLD: 16.8 % — HIGH (ref 10.3–14.5)
SODIUM SERPL-SCNC: 136 MMOL/L — SIGNIFICANT CHANGE UP (ref 135–145)
WBC # BLD: 9.44 K/UL — SIGNIFICANT CHANGE UP (ref 3.8–10.5)
WBC # FLD AUTO: 9.44 K/UL — SIGNIFICANT CHANGE UP (ref 3.8–10.5)

## 2025-02-03 PROCEDURE — 93308 TTE F-UP OR LMTD: CPT | Mod: 26

## 2025-02-03 PROCEDURE — 99233 SBSQ HOSP IP/OBS HIGH 50: CPT

## 2025-02-03 RX ORDER — PREDNISONE 5 MG/1
30 TABLET ORAL DAILY
Refills: 0 | Status: DISCONTINUED | OUTPATIENT
Start: 2025-02-04 | End: 2025-02-04

## 2025-02-03 RX ADMIN — Medication 500 MICROGRAM(S): at 21:45

## 2025-02-03 RX ADMIN — PREDNISONE 40 MILLIGRAM(S): 5 TABLET ORAL at 06:43

## 2025-02-03 RX ADMIN — Medication 125 MICROGRAM(S): at 06:43

## 2025-02-03 RX ADMIN — Medication 500 MICROGRAM(S): at 15:08

## 2025-02-03 RX ADMIN — LABETALOL HYDROCHLORIDE 400 MILLIGRAM(S): 300 TABLET, FILM COATED ORAL at 06:44

## 2025-02-03 RX ADMIN — Medication 0.63 MILLIGRAM(S): at 08:48

## 2025-02-03 RX ADMIN — ATORVASTATIN CALCIUM 40 MILLIGRAM(S): 80 TABLET, FILM COATED ORAL at 22:13

## 2025-02-03 RX ADMIN — Medication 200 MILLIGRAM(S): at 06:44

## 2025-02-03 RX ADMIN — Medication 1 LOZENGE: at 22:14

## 2025-02-03 RX ADMIN — APIXABAN 5 MILLIGRAM(S): 5 TABLET, FILM COATED ORAL at 17:41

## 2025-02-03 RX ADMIN — POLYETHYLENE GLYCOL 3350 17 GRAM(S): 17 POWDER, FOR SOLUTION ORAL at 11:18

## 2025-02-03 RX ADMIN — Medication 500 MICROGRAM(S): at 05:01

## 2025-02-03 RX ADMIN — Medication 200 MILLIGRAM(S): at 14:34

## 2025-02-03 RX ADMIN — Medication 0.63 MILLIGRAM(S): at 21:44

## 2025-02-03 RX ADMIN — Medication 600 MILLIGRAM(S): at 06:46

## 2025-02-03 RX ADMIN — Medication 0.63 MILLIGRAM(S): at 15:08

## 2025-02-03 RX ADMIN — APIXABAN 5 MILLIGRAM(S): 5 TABLET, FILM COATED ORAL at 06:43

## 2025-02-03 RX ADMIN — PANTOPRAZOLE 40 MILLIGRAM(S): 20 TABLET, DELAYED RELEASE ORAL at 06:44

## 2025-02-03 RX ADMIN — Medication 75 MILLIGRAM(S): at 11:17

## 2025-02-03 RX ADMIN — Medication 325 MILLIGRAM(S): at 11:17

## 2025-02-03 RX ADMIN — LABETALOL HYDROCHLORIDE 400 MILLIGRAM(S): 300 TABLET, FILM COATED ORAL at 17:41

## 2025-02-03 RX ADMIN — Medication 600 MILLIGRAM(S): at 17:41

## 2025-02-03 RX ADMIN — Medication 100 MILLIGRAM(S): at 06:43

## 2025-02-03 RX ADMIN — Medication 2 TABLET(S): at 22:13

## 2025-02-03 RX ADMIN — Medication 100 MILLIGRAM(S): at 22:14

## 2025-02-03 RX ADMIN — Medication 40 MILLIGRAM(S): at 06:45

## 2025-02-03 RX ADMIN — Medication 0.63 MILLIGRAM(S): at 05:01

## 2025-02-03 RX ADMIN — Medication 100 MILLIGRAM(S): at 14:34

## 2025-02-03 RX ADMIN — Medication 200 MILLIGRAM(S): at 22:14

## 2025-02-03 RX ADMIN — Medication 500 MICROGRAM(S): at 08:48

## 2025-02-03 NOTE — DISCHARGE NOTE PROVIDER - DETAILS OF MALNUTRITION DIAGNOSIS/DIAGNOSES
This patient has been assessed with a concern for Malnutrition and was treated during this hospitalization for the following Nutrition diagnosis/diagnoses:     -  01/29/2025: Moderate protein-calorie malnutrition

## 2025-02-03 NOTE — PROGRESS NOTE ADULT - ASSESSMENT
79 year old male, originally from [facility], former cigarette smoker w/ PMHx of HTN, CKD, paroxysmal afib on eliquis, CAD s/p NOMAN to proximal and mid RCA (11/2022), HFpEF, recently admitted 1/4-1/9 for pneumonia and b/l pleural effusions s/p abx/diuresis, presenting to [facility] on 1/24 from primary care office due to sob and hypoxia. Patient positive for Flu A.    #Acute Hypoxic Respiratory Failure Secondary to Influenza w/ Possible Viral PNA  -Patient O2 sat 89% on RA on admission. Requires 4L of supplemental Oxygen to maintain saturation >90%, tapered to 1.5L  -CXR 1/24 showed Present infiltrates are diffuse without tyrone consolidation showing evolution from prior  -s/p tamiflu x 5 days (completed 1/29)  -Continue nebulizer treatment Q6H  -Supportive care   -Incentive spirometry   -Continue O2 supplementation, wean as tolerated  -Leukocytosis resolved   -Pulmonary following - discussed with Dr. Guerrero     Elevated Troponins  CAD s/p NOMAN (11/2022)  History of HFpEF   Acute Decompensated Heart Failure  -Echo 1/26 showed EF 45 to 50% (from 55-60% on 1/5), Mild global left ventricle hypokinesis, severely dilated LA, moderate MR  -Repeat Echo 2/3 showed EF 55 to 60%, mild-mod MR/AR  -Trop peaked and downtrended  -BNP 38743 (1/27) and CT chest 1/28 shows Worsening diffuse bilateral pleural effusions on the basis of pulmonary edema or pneumonia. Stable bilateral pleural effusions.   -Continue plavix  -Continue labetalol, hydralazine, atorvastatin   -s/p IV lasix, will transition to oral Lasix 40mg QD tomorrow   -Daily weight   -Repeat BNP 96353 (1/31), CXR 2/1 showed Persistent small infiltrate lateral to the left hilum. Procal 0.15, low suspicion for bacterial PNA  -Cardiology following, HFmrEF likely tachy mediated, known multivessel CAD, has residual LAD, D1 and chronic occlusion of LCx that was medically managed by interventional cardiology, plan for outpatient nuclear stress test when recovers from pneumonia     #Paroxysmal A.fib   -Remains in sinus  rhythm since started on Digoxin  -Continue Labetalol 400mg BID  -Continue Digoxin 125 mg daily and monitor  -Continue Eliquis    #BRUNILDA on CKD 3  -Baseline Cr appears to be ~1.7 range  -BRUNILDA on admission is improved, Cr appears to be at baseline  -Avoid nephrotoxic medications  -Monitor closely on diuresis  -Nephrology following     #Hyponatremia (Resolved)  -resolved  -Monitor     #Hypokalemia 2/2 Diuresis - Resolved  -s/p repletion   -continue to Monitor     #Microcytic Anemia  -Appears chronic   -Iron panel reviewed  -Continue iron supplement  -Monitor CBC     #Mild Transaminitis (Stable)  -Will monitor    #Moderate protein-calorie malnutrition  -Dietary following     #Epigastric Pain (Improved)  -Possible gastritis  -Continue PPI     #Right MTP Joint Pain Likely Acute Gout (Improved)  -In setting of diuresis   -Uric acid 10.4  -Xray right foot 1/31 showed Coxa valgus deformity with moderate degenerative change of the first MTP joint. Mild multifocal interphalangeal joint arthrosis. Plantar calcaneal spurring. No fracture.  -Started prednisone 40mg QD with taper (day 2)  -Monitor response     #DVT prophylaxis  -Eliquis    Dispo  -PT eval appreciated, recommended home PT    Plan of care discussed with patient and patient's daughter Yoselin over the phone, all questions were answered   Discussed with Dr. Guerrero     79 year old male, originally from [facility], former cigarette smoker w/ PMHx of HTN, CKD, paroxysmal afib on eliquis, CAD s/p NOMAN to proximal and mid RCA (11/2022), HFpEF, recently admitted 1/4-1/9 for pneumonia and b/l pleural effusions s/p abx/diuresis, presenting to [facility] on 1/24 from primary care office due to sob and hypoxia. Patient positive for Flu A.    #Acute Hypoxic Respiratory Failure Secondary to Influenza w/ Possible Viral PNA  -Patient O2 sat 89% on RA on admission. Requires 4L of supplemental Oxygen to maintain saturation >90%, tapered to 1.5L  -CXR 1/24 showed Present infiltrates are diffuse without tyrone consolidation showing evolution from prior  -s/p tamiflu x 5 days (completed 1/29)  -Continue nebulizer treatment Q6H  -Supportive care   -Incentive spirometry   -Continue O2 supplementation, wean as tolerated  -Leukocytosis resolved   -Pulmonary following - discussed with Dr. Guerrero     Elevated Troponins  CAD s/p NOMAN (11/2022)  History of HFpEF   Acute Decompensated Heart Failure (Improved)  -Echo 1/26 showed EF 45 to 50% (from 55-60% on 1/5), Mild global left ventricle hypokinesis, severely dilated LA, moderate MR  -Repeat Echo 2/3 showed EF 55 to 60%, mild-mod MR/AR  -Trop peaked and downtrended  -BNP 09462 (1/27) and CT chest 1/28 shows Worsening diffuse bilateral pleural effusions on the basis of pulmonary edema or pneumonia. Stable bilateral pleural effusions.   -Continue plavix  -Continue labetalol, hydralazine, atorvastatin   -s/p IV lasix, will transition to oral Lasix 40mg QD tomorrow   -Daily weight   -Repeat BNP 52394 (1/31), CXR 2/1 showed Persistent small infiltrate lateral to the left hilum. Procal 0.15, low suspicion for bacterial PNA  -Cardiology following, HFmrEF likely tachy mediated (EF has improved), known multivessel CAD, has residual LAD, D1 and chronic occlusion of LCx that was medically managed by interventional cardiology, plan for outpatient nuclear stress test when recovers from pneumonia   -Hypoxic with ambulation, will need home O2    #Paroxysmal A.fib   -Remains in sinus  rhythm since started on Digoxin  -Continue Labetalol 400mg BID  -Continue Digoxin 125 mg daily and monitor  -Continue Eliquis    #BRUNILDA on CKD 3  -Baseline Cr appears to be ~1.7 range  -BRUNILDA on admission is improved, Cr appears to be at baseline  -Avoid nephrotoxic medications  -Monitor closely on diuresis  -Nephrology following     #Hyponatremia (Resolved)  -resolved  -Monitor     #Hypokalemia 2/2 Diuresis - Resolved  -s/p repletion   -continue to Monitor     #Microcytic Anemia  -Appears chronic   -Iron panel reviewed  -Continue iron supplement  -Monitor CBC     #Mild Transaminitis (Stable)  -Will monitor    #Moderate protein-calorie malnutrition  -Dietary following     #Epigastric Pain (Improved)  -Possible gastritis  -Continue PPI     #Right MTP Joint Pain Likely Acute Gout (Improved)  -In setting of diuresis   -Uric acid 10.4  -Xray right foot 1/31 showed Coxa valgus deformity with moderate degenerative change of the first MTP joint. Mild multifocal interphalangeal joint arthrosis. Plantar calcaneal spurring. No fracture.  -Continue prednisone taper    #DVT prophylaxis  -Eliquis    Dispo  -PT eval appreciated, recommended home PT    Plan of care discussed with patient and patient's daughter Yoselin over the phone, all questions were answered   Discussed with Dr. Guerrero

## 2025-02-03 NOTE — PROGRESS NOTE ADULT - SUBJECTIVE AND OBJECTIVE BOX
No distress, on RA    Vital Signs Last 24 Hrs  T(C): 36.6 (02-03-25 @ 19:34), Max: 36.6 (02-03-25 @ 05:07)  T(F): 97.8 (02-03-25 @ 19:34), Max: 97.9 (02-03-25 @ 05:07)  HR: 69 (02-03-25 @ 22:16) (62 - 85)  BP: 134/61 (02-03-25 @ 22:16) (120/62 - 151/74)  RR: 17 (02-03-25 @ 22:16) (16 - 18)  SpO2: 93% (02-03-25 @ 22:16) (88% - 95%)    03 Feb 2025 07:01  -  04 Feb 2025 00:06  --------------------------------------------------------  IN:    Oral Fluid: 740 mL  Total IN: 740 mL    OUT:    Voided (mL): 650 mL  Total OUT: 650 mL    s1s2  b/l air entry  soft, ND  no edema                                                            9.2    9.44  )-----------( 197      ( 03 Feb 2025 07:32 )             28.7     03 Feb 2025 07:32    136    |  97     |  59     ----------------------------<  150    3.5     |  33     |  1.67     Ca    8.7        03 Feb 2025 07:32    TPro  6.3    /  Alb  2.2    /  TBili  0.6    /  DBili  x      /  AST  28     /  ALT  38     /  AlkPhos  105    03 Feb 2025 07:32    LIVER FUNCTIONS - ( 03 Feb 2025 07:32 )  Alb: 2.2 g/dL / Pro: 6.3 g/dL / ALK PHOS: 105 U/L / ALT: 38 U/L / AST: 28 U/L / GGT: x           acetaminophen     Tablet .. 650 milliGRAM(s) Oral every 8 hours PRN  apixaban 5 milliGRAM(s) Oral every 12 hours  atorvastatin 40 milliGRAM(s) Oral at bedtime  benzocaine/menthol Lozenge 1 Lozenge Oral every 6 hours PRN  benzonatate 200 milliGRAM(s) Oral every 8 hours  bisacodyl 10 milliGRAM(s) Oral daily PRN  clopidogrel Tablet 75 milliGRAM(s) Oral daily  digoxin     Tablet 125 MICROGram(s) Oral daily  ferrous    sulfate 325 milliGRAM(s) Oral daily  furosemide    Tablet 40 milliGRAM(s) Oral daily  guaiFENesin  milliGRAM(s) Oral every 12 hours  hydrALAZINE 100 milliGRAM(s) Oral every 8 hours  ipratropium    for Nebulization 500 MICROGram(s) Nebulizer every 6 hours  labetalol 400 milliGRAM(s) Oral two times a day  levalbuterol Inhalation 0.63 milliGRAM(s) Inhalation every 6 hours  pantoprazole    Tablet 40 milliGRAM(s) Oral before breakfast  polyethylene glycol 3350 17 Gram(s) Oral daily  predniSONE   Tablet 30 milliGRAM(s) Oral daily  senna 2 Tablet(s) Oral at bedtime    A/P:    CM, Afib, EF 45 - 50%, mod MR   Adm w/CHF, PNA, Flu A, elevated troponin  Cardiology following   Known CKD 3 w/renal indices fairly stable    F/u BMP, UO on Lasix, titrate as needed   Avoid nephrotoxins as able, no NSAID's    [phone]

## 2025-02-03 NOTE — DISCHARGE NOTE PROVIDER - PROVIDER TOKENS
PROVIDER:[TOKEN:[[id number]],FOLLOWUP:[1-3 days]],PROVIDER:[TOKEN:[[id number]],FOLLOWUP:[1 week]]

## 2025-02-03 NOTE — DISCHARGE NOTE PROVIDER - NSDCCPCAREPLAN_GEN_ALL_CORE_FT
PRINCIPAL DISCHARGE DIAGNOSIS  Diagnosis: Influenza A  Assessment and Plan of Treatment: You were admitted with the flu and viral pneumonia. You have completed course of tamiflu. Please follow up with your primary care provider on discharge.      SECONDARY DISCHARGE DIAGNOSES  Diagnosis: Acute respiratory failure with hypoxia  Assessment and Plan of Treatment: You were noted to have low oxygen level and requirement supplemental oxygen during your stay. Low oxygen level likely due to the flu and also fluids in your lungs due to heart failure. Your breathing have improved but you still require oxygen when you walk around. Please monitor your oxygen level at home and follow up with your primary care doctor and cardiologist.    Diagnosis: Chronic kidney disease, unspecified CKD stage  Assessment and Plan of Treatment: You were seen by the kidney doctor for chronic kidney disease during your hospital stay. Your kidney function is stable. Please follow up with your primary care doctor to repeat blood test.    Diagnosis: Rapid atrial fibrillation  Assessment and Plan of Treatment: You were noted to have fast heart rate due to atrial fibrillation during the beginning of your hospitalization. You were seen by the cardiologist and DIGOXIN was added with improvemetn of your heart rate. Please continue to take your medications as prescribed and follow up with your cardiologist on discharge.

## 2025-02-03 NOTE — DISCHARGE NOTE PROVIDER - CARE PROVIDER_API CALL
Sadaf Rockwell  Family Medicine  [address] [place] [address], [address]  [place] [address]  Phone: [phone]  Fax: [phone]  Follow Up Time: 1-3 days    Bharati Albright  Cardiovascular Disease  [address] [facility], [address]  [place] [address]  Phone: [phone]  Fax: [phone]  Follow Up Time: 1 week

## 2025-02-03 NOTE — DISCHARGE NOTE PROVIDER - HOSPITAL COURSE
Patient is medically cleared for discharge home. Will require home oxygen for 79 year old male, originally from [facility], former cigarette smoker w/ PMHx of HTN, CKD, paroxysmal afib on eliquis, CAD s/p NOMAN to proximal and mid RCA (11/2022), HFpEF, recently admitted 1/4-1/9 for pneumonia and b/l pleural effusions s/p abx/diuresis, presenting to [facility] from primary care office due to sob and hypoxia. Patient positive for Flu A.  He was admitted for acute hypoxic respiratory failure secondary to influenza with possible viral PNA requiring supplemental O2.  He has completed 5 days of tamiflu on 1/29. He was noted to have elevated troponins on admission which downtrended. He also had A.fib with RVR and was started on digoxin with improvement of his HR. Echo 1/26 showed EF 45 to 50% (from 55-60% on 1/5), Mild global left ventricle hypokinesis, severely dilated LA, moderate MR. He was seen by cardiology, HFmrEF likely tachy mediated, known multivessel CAD, has residual LAD, D1 and chronic occlusion of LCx that was medically managed by interventional cardiology, plan for outpatient nuclear stress test when recovers from pneumonia.  Due to persistent hypoxia, CT chest 1/28 shows Worsening diffuse bilateral pleural effusions on the basis of pulmonary edema or pneumonia. Stable bilateral pleural effusions.  He was seen by pulmonary and is s/p IV diuresis with improvement of respiratory status.   He was seen by nephrology for CKD, his renal function remains stable on diuretics.   Repeat Echo 2/3 showed EF 55 to 60%, mild-mod MR/AR  His hospital course was acute right MTP pain due to gout and was started on prednisone taper with improvement.   His overall condition has improved. His O2Sat is stable on room air but noted hypoxia with ambulation and was approved for home oxygen.     Patient is medically cleared for discharge home. Will require home oxygen for HFpEF. 79 year old male, originally from [facility], former cigarette smoker w/ PMHx of HTN, CKD, paroxysmal afib on eliquis, CAD s/p NOMAN to proximal and mid RCA (11/2022), HFpEF, recently admitted 1/4-1/9 for pneumonia and b/l pleural effusions s/p abx/diuresis, presenting to [facility] from primary care office due to sob and hypoxia. Patient positive for Flu A.  He was admitted for acute hypoxic respiratory failure secondary to influenza with possible viral PNA requiring supplemental O2.  He has completed 5 days of tamiflu on 1/29. He was noted to have elevated troponins on admission which downtrended. He also had A.fib with RVR and was started on digoxin with improvement of his HR. Echo 1/26 showed EF 45 to 50% (from 55-60% on 1/5), Mild global left ventricle hypokinesis, severely dilated LA, moderate MR. He was seen by cardiology, HFmrEF likely tachy mediated, known multivessel CAD, has residual LAD, D1 and chronic occlusion of LCx that was medically managed by interventional cardiology, plan for outpatient nuclear stress test when recovers from pneumonia.  Due to persistent hypoxia, CT chest 1/28 shows Worsening diffuse bilateral pleural effusions on the basis of pulmonary edema or pneumonia. Stable bilateral pleural effusions.  He was seen by pulmonary and is s/p IV diuresis with improvement of respiratory status.   He was seen by nephrology for CKD, his renal function remains stable on diuretics.   Repeat Echo 2/3 showed EF 55 to 60%, mild-mod MR/AR  His hospital course was acute right MTP pain due to gout and was started on prednisone taper with improvement.   His overall condition has improved. His O2Sat is stable on room air but noted hypoxia with ambulation and was approved for home oxygen.   He was seen by PT and home PT was recommended.     Patient is medically cleared for discharge home. Will require home oxygen for HFpEF.

## 2025-02-03 NOTE — DISCHARGE NOTE PROVIDER - CARE PROVIDERS DIRECT ADDRESSES
[email or web address]

## 2025-02-03 NOTE — DISCHARGE NOTE PROVIDER - NSDCMRMEDTOKEN_GEN_ALL_CORE_FT
apixaban 5 mg oral tablet: 1 tab(s) orally every 12 hours  atorvastatin 40 mg oral tablet: 1 tab(s) orally once a day (at bedtime)  clopidogrel 75 mg oral tablet: 1 tab(s) orally once a day  hydrALAZINE 100 mg oral tablet: 1 tab(s) orally every 8 hours  labetalol 300 mg oral tablet: 1 tab(s) orally 2 times a day  Lasix 20 mg oral tablet: 1 tab(s) orally once a day   apixaban 5 mg oral tablet: 1 tab(s) orally every 12 hours  atorvastatin 40 mg oral tablet: 1 tab(s) orally once a day (at bedtime)  benzonatate 100 mg oral capsule: 2 cap(s) orally every 8 hours as needed for  cough  clopidogrel 75 mg oral tablet: 1 tab(s) orally once a day  digoxin 125 mcg (0.125 mg) oral tablet: 1 tab(s) orally once a day  ferrous sulfate 325 mg (65 mg elemental iron) oral tablet: 1 tab(s) orally once a day  hydrALAZINE 100 mg oral tablet: 1 tab(s) orally every 8 hours  labetalol 200 mg oral tablet: 2 tab(s) orally 2 times a day  Lasix 20 mg oral tablet: 2 tab(s) orally once a day 40mg once a daily  pantoprazole 40 mg oral delayed release tablet: 1 tab(s) orally once a day (before a meal)  predniSONE 10 mg oral tablet: 3 tab(s) orally once a day Take 3 tabs (30mg) x 2 days, then 2 tabs (20mg) x 3 days, then 1 tab (10mg) x 3 days to complete course.

## 2025-02-03 NOTE — DISCHARGE NOTE PROVIDER - NSDCFUSCHEDAPPT_GEN_ALL_CORE_FT
Juan Bhatti  [facility] Physician [facility]  NEUROLOGY [address]  Scheduled Appointment: 02/04/2025

## 2025-02-03 NOTE — HOME OXYGEN EVALUATION NOTE - NSHOMEOXYEVAL_DIAGB_ACUTECONDS
CHF exacerbation Constitutional: (-) fever  Eyes/ENT: (-) blurry vision, (-) epistaxis (+)dizziness   Cardiovascular: (-) chest pain, (-) syncope (+) palpitations (see hpi)  Respiratory: (-) cough, (-) shortness of breath  Gastrointestinal: (-) vomiting, (-) diarrhea  Musculoskeletal: (-) neck pain, (-) back pain, (-) joint pain  Integumentary: (-) rash, (-) edema  Neurological: (-) headache, (-) altered mental status  Psychiatric: (-) hallucinations  Allergic/Immunologic: (-) pruritus

## 2025-02-03 NOTE — PROGRESS NOTE ADULT - ASSESSMENT
Physical Examination:  GENERAL:               Alert, Oriented, No acute distress.    HEENT:                   Missing left eye, right eye reactive to light.  Symmetric. No JVD, Dry MM  PULM:                     Bilateral air entry, +Rales, No Rhonchi, trace  Wheezing  CVS:                        S1, S2,  No Murmur     NEURO:                  Alert, oriented, interactive, nonfocal, follows commands  PSYC:                      Calm, + Insight.      Assessment  80 yo M, originally from [facility], former cigarette smoker w/ PMHx of HTN, CKD, paroxysmal afib on eliquis, w/ hx of abnormal nuclear stress test s/p coronary angiogram and NOMAN to proximal and mid RCA (11/2022) presenting to [facility] from primary care office due to sob and hypoxia. Patient positive for Flu A. Pulmonary consulted for acute hypoxia. Clinically appears euvolemic. Symptoms likely due to acute viral illness.     Problem List  1. Influenza A infection  2. Acute hypoxia   3. HFPEF   4. Paroxysmal Afib    Plan:  - CT scan with enlarging pleural effusion and worsening ground glass suspect from underlying pulmonary edema , diurese as tolerated  - o2 requirments improving with diuresis  - check o2 on ambulation  - prednisone taper to off   - noted desaturation on exertion, may need tawny o2.   - Cont. Bronchodilators  - Titrate down oxygen as tolerated     - Oxygen support to maintain saturation > 92%  - DVT prophylaxis  - Rest of management per primary team

## 2025-02-03 NOTE — PROGRESS NOTE ADULT - SUBJECTIVE AND OBJECTIVE BOX
:  [id number]    Interval History  Patient seen and examined at bedside. No acute events noted.  Patient reports feeling improved, cough is better, breathing is normal. Right foot pain is improved, able to bear weight.   O2Sat 88% with ambulation, improved on 3L NC with O2Sat 95-98%.     ALLERGIES:  No Known Allergies    MEDICATIONS  (STANDING):  apixaban 5 milliGRAM(s) Oral every 12 hours  atorvastatin 40 milliGRAM(s) Oral at bedtime  benzonatate 200 milliGRAM(s) Oral every 8 hours  clopidogrel Tablet 75 milliGRAM(s) Oral daily  digoxin     Tablet 125 MICROGram(s) Oral daily  ferrous    sulfate 325 milliGRAM(s) Oral daily  furosemide   Injectable 40 milliGRAM(s) IV Push daily  guaiFENesin  milliGRAM(s) Oral every 12 hours  hydrALAZINE 100 milliGRAM(s) Oral every 8 hours  ipratropium    for Nebulization 500 MICROGram(s) Nebulizer every 6 hours  labetalol 400 milliGRAM(s) Oral two times a day  levalbuterol Inhalation 0.63 milliGRAM(s) Inhalation every 6 hours  pantoprazole    Tablet 40 milliGRAM(s) Oral before breakfast  polyethylene glycol 3350 17 Gram(s) Oral daily  senna 2 Tablet(s) Oral at bedtime    MEDICATIONS  (PRN):  acetaminophen     Tablet .. 650 milliGRAM(s) Oral every 8 hours PRN Mild Pain (1 - 3)  benzocaine/menthol Lozenge 1 Lozenge Oral every 6 hours PRN Sore Throat  bisacodyl 10 milliGRAM(s) Oral daily PRN Constipation    Vital Signs Last 24 Hrs  T(F): 97.5 (03 Feb 2025 13:40), Max: 97.9 (03 Feb 2025 05:07)  HR: 75 (03 Feb 2025 15:28) (61 - 85)  BP: 151/74 (03 Feb 2025 14:37) (120/62 - 159/70)  RR: 18 (03 Feb 2025 15:28) (16 - 19)  SpO2: 94% (03 Feb 2025 15:28) (88% - 96%)  I&O's Summary    02 Feb 2025 07:01  -  03 Feb 2025 07:00  --------------------------------------------------------  IN: 0 mL / OUT: 200 mL / NET: -200 mL    PHYSICAL EXAM:  GENERAL: NAD  HEENT: NCAT, comfortable room air   CHEST/LUNG: Good air entry; no wheeze   HEART: Regular rate and rhythm  ABDOMEN: Soft, Nontender, Nondistended; Bowel sounds present  MUSCULOSKELETAL/EXTREMITIES:  2+ Peripheral Pulses  Right MTP pain is improved, ROM   PSYCH: Appropriate affect  NEURO: Awake and alert     I personally reviewed the below data/images/labs:    LABS:                        9.2    9.44  )-----------( 197      ( 03 Feb 2025 07:32 )             28.7       02-03    136  |  97  |  59  ----------------------------<  150  3.5   |  33  |  1.67    Ca    8.7      03 Feb 2025 07:32  Mg     2.3     02-01    TPro  6.3  /  Alb  2.2  /  TBili  0.6  /  DBili  x   /  AST  28  /  ALT  38  /  AlkPhos  105  02-03     Urinalysis Basic - ( 03 Feb 2025 07:32 )    Color: x / Appearance: x / SG: x / pH: x  Gluc: 150 mg/dL / Ketone: x  / Bili: x / Urobili: x   Blood: x / Protein: x / Nitrite: x   Leuk Esterase: x / RBC: x / WBC x   Sq Epi: x / Non Sq Epi: x / Bacteria: x    Cultant(s) Notes Reviewed:   Care Discused with Consultants/Other Providers:  Imaging Personally Reviewed:

## 2025-02-03 NOTE — PROGRESS NOTE ADULT - SUBJECTIVE AND OBJECTIVE BOX
Follow-up Pulmonary Progress Note  Chief Complaint : Influenza with other respiratory manifestations, other influenza virus identified      patient seeen and examined  feels well  no complians      Allergies :No Known Allergies      PAST MEDICAL & SURGICAL HISTORY:  H/O: HTN (hypertension)    CAD (coronary artery disease)    Diabetic vitreous hemorrhage    Hypokalemia    Type 2 diabetes mellitus    Left ventricular hypertrophy    History of alcohol withdrawal delirium    S/P cardiac catheterization  11/17 3 stents to RCA        Medications:  MEDICATIONS  (STANDING):  apixaban 5 milliGRAM(s) Oral every 12 hours  atorvastatin 40 milliGRAM(s) Oral at bedtime  benzonatate 200 milliGRAM(s) Oral every 8 hours  clopidogrel Tablet 75 milliGRAM(s) Oral daily  digoxin     Tablet 125 MICROGram(s) Oral daily  ferrous    sulfate 325 milliGRAM(s) Oral daily  furosemide   Injectable 40 milliGRAM(s) IV Push daily  guaiFENesin  milliGRAM(s) Oral every 12 hours  hydrALAZINE 100 milliGRAM(s) Oral every 8 hours  ipratropium    for Nebulization 500 MICROGram(s) Nebulizer every 6 hours  labetalol 400 milliGRAM(s) Oral two times a day  levalbuterol Inhalation 0.63 milliGRAM(s) Inhalation every 6 hours  pantoprazole    Tablet 40 milliGRAM(s) Oral before breakfast  polyethylene glycol 3350 17 Gram(s) Oral daily  senna 2 Tablet(s) Oral at bedtime    MEDICATIONS  (PRN):  acetaminophen     Tablet .. 650 milliGRAM(s) Oral every 8 hours PRN Mild Pain (1 - 3)  benzocaine/menthol Lozenge 1 Lozenge Oral every 6 hours PRN Sore Throat  bisacodyl 10 milliGRAM(s) Oral daily PRN Constipation      Antibiotics History  azithromycin  IVPB 500 milliGRAM(s) IV Intermittent once, 01-24-25 @ 16:19, Stop order after: 1 Doses  cefTRIAXone   IVPB 1000 milliGRAM(s) IV Intermittent once, 01-24-25 @ 16:19, Stop order after: 1 Doses  oseltamivir 30 milliGRAM(s) Oral two times a day, 01-24-25 @ 18:25, Stop order after: 10 Doses      Heme Medications   apixaban 5 milliGRAM(s) Oral every 12 hours, 01-24-25 @ 18:57  clopidogrel Tablet 75 milliGRAM(s) Oral daily, 01-24-25 @ 18:59      GI Medications  bisacodyl 10 milliGRAM(s) Oral daily, 01-27-25 @ 16:56, Routine PRN  pantoprazole    Tablet 40 milliGRAM(s) Oral before breakfast, 01-30-25 @ 10:46, STAT  polyethylene glycol 3350 17 Gram(s) Oral daily, 01-27-25 @ 16:56, Now  senna 2 Tablet(s) Oral at bedtime, 01-30-25 @ 09:55, Routine        LABS:                        9.2    9.44  )-----------( 197      ( 03 Feb 2025 07:32 )             28.7     02-03    136  |  97  |  59[H]  ----------------------------<  150[H]  3.5   |  33[H]  |  1.67[H]    Ca    8.7      03 Feb 2025 07:32    TPro  6.3  /  Alb  2.2[L]  /  TBili  0.6  /  DBili  x   /  AST  28  /  ALT  38  /  AlkPhos  105  02-03           VITALS:  T(C): 36.4 (02-03-25 @ 13:40), Max: 36.6 (02-03-25 @ 05:07)  T(F): 97.5 (02-03-25 @ 13:40), Max: 97.9 (02-03-25 @ 05:07)  HR: 62 (02-03-25 @ 14:37) (61 - 85)  BP: 151/74 (02-03-25 @ 14:37) (120/62 - 159/70)  BP(mean): --  ABP: --  ABP(mean): --  RR: 16 (02-03-25 @ 14:37) (16 - 19)  SpO2: 92% (02-03-25 @ 14:37) (88% - 100%)  CVP(mm Hg): --  CVP(cm H2O): --    Ins and Outs     02-02-25 @ 07:01  -  02-03-25 @ 07:00  --------------------------------------------------------  IN: 0 mL / OUT: 200 mL / NET: -200 mL                I&O's Detail    02 Feb 2025 07:01  -  03 Feb 2025 07:00  --------------------------------------------------------  IN:  Total IN: 0 mL    OUT:    Voided (mL): 200 mL  Total OUT: 200 mL    Total NET: -200 mL

## 2025-02-04 ENCOUNTER — TRANSCRIPTION ENCOUNTER (OUTPATIENT)
Age: 80
End: 2025-02-04

## 2025-02-04 ENCOUNTER — APPOINTMENT (OUTPATIENT)
Dept: NEUROLOGY | Facility: CLINIC | Age: 80
End: 2025-02-04

## 2025-02-04 VITALS
OXYGEN SATURATION: 94 % | TEMPERATURE: 98 F | SYSTOLIC BLOOD PRESSURE: 146 MMHG | HEART RATE: 61 BPM | RESPIRATION RATE: 16 BRPM | DIASTOLIC BLOOD PRESSURE: 80 MMHG

## 2025-02-04 LAB
ANION GAP SERPL CALC-SCNC: 6 MMOL/L — SIGNIFICANT CHANGE UP (ref 5–17)
BUN SERPL-MCNC: 65 MG/DL — HIGH (ref 7–23)
CALCIUM SERPL-MCNC: 8.4 MG/DL — SIGNIFICANT CHANGE UP (ref 8.4–10.5)
CHLORIDE SERPL-SCNC: 99 MMOL/L — SIGNIFICANT CHANGE UP (ref 96–108)
CO2 SERPL-SCNC: 31 MMOL/L — SIGNIFICANT CHANGE UP (ref 22–31)
CREAT SERPL-MCNC: 1.67 MG/DL — HIGH (ref 0.5–1.3)
EGFR: 42 ML/MIN/1.73M2 — LOW
GLUCOSE SERPL-MCNC: 153 MG/DL — HIGH (ref 70–99)
POTASSIUM SERPL-MCNC: 3.6 MMOL/L — SIGNIFICANT CHANGE UP (ref 3.5–5.3)
POTASSIUM SERPL-SCNC: 3.6 MMOL/L — SIGNIFICANT CHANGE UP (ref 3.5–5.3)
SODIUM SERPL-SCNC: 136 MMOL/L — SIGNIFICANT CHANGE UP (ref 135–145)

## 2025-02-04 PROCEDURE — 99239 HOSP IP/OBS DSCHRG MGMT >30: CPT

## 2025-02-04 PROCEDURE — 84484 ASSAY OF TROPONIN QUANT: CPT

## 2025-02-04 PROCEDURE — 97162 PT EVAL MOD COMPLEX 30 MIN: CPT

## 2025-02-04 PROCEDURE — 94640 AIRWAY INHALATION TREATMENT: CPT

## 2025-02-04 PROCEDURE — 82746 ASSAY OF FOLIC ACID SERUM: CPT

## 2025-02-04 PROCEDURE — 87637 SARSCOV2&INF A&B&RSV AMP PRB: CPT

## 2025-02-04 PROCEDURE — 73620 X-RAY EXAM OF FOOT: CPT

## 2025-02-04 PROCEDURE — 97116 GAIT TRAINING THERAPY: CPT

## 2025-02-04 PROCEDURE — 84145 PROCALCITONIN (PCT): CPT

## 2025-02-04 PROCEDURE — 71046 X-RAY EXAM CHEST 2 VIEWS: CPT

## 2025-02-04 PROCEDURE — 82728 ASSAY OF FERRITIN: CPT

## 2025-02-04 PROCEDURE — 83735 ASSAY OF MAGNESIUM: CPT

## 2025-02-04 PROCEDURE — 84550 ASSAY OF BLOOD/URIC ACID: CPT

## 2025-02-04 PROCEDURE — 0241U: CPT

## 2025-02-04 PROCEDURE — 99285 EMERGENCY DEPT VISIT HI MDM: CPT | Mod: 25

## 2025-02-04 PROCEDURE — 93308 TTE F-UP OR LMTD: CPT

## 2025-02-04 PROCEDURE — 80053 COMPREHEN METABOLIC PANEL: CPT

## 2025-02-04 PROCEDURE — 82962 GLUCOSE BLOOD TEST: CPT

## 2025-02-04 PROCEDURE — 96374 THER/PROPH/DIAG INJ IV PUSH: CPT

## 2025-02-04 PROCEDURE — 80048 BASIC METABOLIC PNL TOTAL CA: CPT

## 2025-02-04 PROCEDURE — 82607 VITAMIN B-12: CPT

## 2025-02-04 PROCEDURE — 83880 ASSAY OF NATRIURETIC PEPTIDE: CPT

## 2025-02-04 PROCEDURE — 96375 TX/PRO/DX INJ NEW DRUG ADDON: CPT

## 2025-02-04 PROCEDURE — 83540 ASSAY OF IRON: CPT

## 2025-02-04 PROCEDURE — 36415 COLL VENOUS BLD VENIPUNCTURE: CPT

## 2025-02-04 PROCEDURE — 83605 ASSAY OF LACTIC ACID: CPT

## 2025-02-04 PROCEDURE — 71250 CT THORAX DX C-: CPT | Mod: MC

## 2025-02-04 PROCEDURE — 83550 IRON BINDING TEST: CPT

## 2025-02-04 PROCEDURE — 85027 COMPLETE CBC AUTOMATED: CPT

## 2025-02-04 PROCEDURE — 93005 ELECTROCARDIOGRAM TRACING: CPT

## 2025-02-04 PROCEDURE — 94760 N-INVAS EAR/PLS OXIMETRY 1: CPT

## 2025-02-04 PROCEDURE — 85025 COMPLETE CBC W/AUTO DIFF WBC: CPT

## 2025-02-04 PROCEDURE — 0225U NFCT DS DNA&RNA 21 SARSCOV2: CPT

## 2025-02-04 PROCEDURE — 71045 X-RAY EXAM CHEST 1 VIEW: CPT

## 2025-02-04 RX ORDER — FERROUS SULFATE 325(65) MG
1 TABLET ORAL
Qty: 30 | Refills: 0
Start: 2025-02-04 | End: 2025-03-05

## 2025-02-04 RX ORDER — LABETALOL HYDROCHLORIDE 300 MG/1
2 TABLET, FILM COATED ORAL
Qty: 120 | Refills: 0
Start: 2025-02-04 | End: 2025-03-05

## 2025-02-04 RX ORDER — PANTOPRAZOLE 20 MG/1
1 TABLET, DELAYED RELEASE ORAL
Qty: 30 | Refills: 0
Start: 2025-02-04 | End: 2025-03-05

## 2025-02-04 RX ORDER — PREDNISONE 5 MG/1
3 TABLET ORAL
Qty: 15 | Refills: 0
Start: 2025-02-04

## 2025-02-04 RX ADMIN — PANTOPRAZOLE 40 MILLIGRAM(S): 20 TABLET, DELAYED RELEASE ORAL at 06:43

## 2025-02-04 RX ADMIN — Medication 500 MICROGRAM(S): at 08:33

## 2025-02-04 RX ADMIN — LABETALOL HYDROCHLORIDE 400 MILLIGRAM(S): 300 TABLET, FILM COATED ORAL at 06:43

## 2025-02-04 RX ADMIN — Medication 40 MILLIGRAM(S): at 06:44

## 2025-02-04 RX ADMIN — Medication 200 MILLIGRAM(S): at 06:43

## 2025-02-04 RX ADMIN — LABETALOL HYDROCHLORIDE 400 MILLIGRAM(S): 300 TABLET, FILM COATED ORAL at 17:47

## 2025-02-04 RX ADMIN — Medication 600 MILLIGRAM(S): at 06:43

## 2025-02-04 RX ADMIN — Medication 125 MICROGRAM(S): at 06:44

## 2025-02-04 RX ADMIN — Medication 100 MILLIGRAM(S): at 13:07

## 2025-02-04 RX ADMIN — Medication 0.63 MILLIGRAM(S): at 08:34

## 2025-02-04 RX ADMIN — Medication 325 MILLIGRAM(S): at 13:06

## 2025-02-04 RX ADMIN — Medication 600 MILLIGRAM(S): at 17:46

## 2025-02-04 RX ADMIN — PREDNISONE 30 MILLIGRAM(S): 5 TABLET ORAL at 07:47

## 2025-02-04 RX ADMIN — APIXABAN 5 MILLIGRAM(S): 5 TABLET, FILM COATED ORAL at 17:46

## 2025-02-04 RX ADMIN — APIXABAN 5 MILLIGRAM(S): 5 TABLET, FILM COATED ORAL at 06:44

## 2025-02-04 RX ADMIN — Medication 100 MILLIGRAM(S): at 06:43

## 2025-02-04 RX ADMIN — Medication 200 MILLIGRAM(S): at 13:08

## 2025-02-04 RX ADMIN — Medication 0.63 MILLIGRAM(S): at 16:00

## 2025-02-04 RX ADMIN — Medication 0.63 MILLIGRAM(S): at 04:14

## 2025-02-04 RX ADMIN — Medication 75 MILLIGRAM(S): at 13:07

## 2025-02-04 RX ADMIN — Medication 500 MICROGRAM(S): at 04:15

## 2025-02-04 RX ADMIN — POLYETHYLENE GLYCOL 3350 17 GRAM(S): 17 POWDER, FOR SOLUTION ORAL at 13:13

## 2025-02-04 RX ADMIN — Medication 500 MICROGRAM(S): at 16:01

## 2025-02-04 NOTE — PROGRESS NOTE ADULT - TIME BILLING
Time spent includes direct patient care  (interview and examination of patient), discussion with other providers, support staff and/or patient's family members, review of medical records, ordering diagnostic tests and analyzing results, and documentation.
Time spent includes direct patient care (interview and examination of patient), discussion with other providers, support staff and/or patient's family members, review of medical records, ordering diagnostic tests and analyzing results, and documentation
Time spent includes direct patient care  (interview and examination of patient), discussion with other providers, support staff and/or patient's family members, review of medical records, ordering diagnostic tests and analyzing results, and documentation.
Time spent includes direct patient care (interview and examination of patient), discussion with other providers, support staff and/or patient's family members, review of medical records, ordering diagnostic tests and analyzing results, and documentation
Time spent includes direct patient care (interview and examination of patient), discussion with other providers, support staff and/or patient's family members, review of medical records, ordering diagnostic tests and analyzing results, and documentation
Time spent includes direct patient care  (interview and examination of patient), discussion with other providers, support staff and/or patient's family members, review of medical records, ordering diagnostic tests and analyzing results, and documentation.
Time spent includes direct patient care (interview and examination of patient), discussion with other providers, support staff and/or patient's family members, review of medical records, ordering diagnostic tests and analyzing results, and documentation

## 2025-02-04 NOTE — PROGRESS NOTE ADULT - NUTRITIONAL ASSESSMENT
This patient has been assessed with a concern for Malnutrition and has been determined to have a diagnosis/diagnoses of Moderate protein-calorie malnutrition.    This patient is being managed with:   Diet Consistent Carbohydrate/No Snacks-  DASH/TLC {Sodium & Cholesterol Restricted}  Supplement Feeding Modality:  Oral  Glucerna Shake Cans or Servings Per Day:  1       Frequency:  Two Times a day  Entered: Jan 29 2025  8:48AM  
This patient has been assessed with a concern for Malnutrition and has been determined to have a diagnosis/diagnoses of Moderate protein-calorie malnutrition.    This patient is being managed with:   Diet Consistent Carbohydrate/No Snacks-  Entered: Jan 24 2025  4:18PM    The following pending diet order is being considered for treatment of Moderate protein-calorie malnutrition:  Diet Consistent Carbohydrate/No Snacks-  DASH/TLC {Sodium & Cholesterol Restricted}  Supplement Feeding Modality:  Oral  Glucerna Shake Cans or Servings Per Day:  1       Frequency:  Two Times a day  Entered: Jan 29 2025  8:48AM  
This patient has been assessed with a concern for Malnutrition and has been determined to have a diagnosis/diagnoses of Moderate protein-calorie malnutrition.    This patient is being managed with:   Diet Consistent Carbohydrate/No Snacks-  DASH/TLC {Sodium & Cholesterol Restricted}  Supplement Feeding Modality:  Oral  Glucerna Shake Cans or Servings Per Day:  1       Frequency:  Two Times a day  Entered: Jan 29 2025  8:48AM  
This patient has been assessed with a concern for Malnutrition and has been determined to have a diagnosis/diagnoses of Moderate protein-calorie malnutrition.    This patient is being managed with:   Diet Consistent Carbohydrate/No Snacks-  Entered: Jan 24 2025  4:18PM    The following pending diet order is being considered for treatment of Moderate protein-calorie malnutrition:  Diet Consistent Carbohydrate/No Snacks-  DASH/TLC {Sodium & Cholesterol Restricted}  Supplement Feeding Modality:  Oral  Glucerna Shake Cans or Servings Per Day:  1       Frequency:  Two Times a day  Entered: Jan 29 2025  8:48AM

## 2025-02-04 NOTE — PROGRESS NOTE ADULT - SUBJECTIVE AND OBJECTIVE BOX
Interval History  Patient seen and examined at bedside. No acute events noted.   Patient reports feeling well,  Interval History  Patient seen and examined at bedside. No acute events noted.   Patient reports feeling well, still with dry cough. Denies chest pain/SOB.  Off supplemental O2 at rest, however O2 drops to 87% with ambulation. Set up for home O2.       ALLERGIES:  No Known Allergies    MEDICATIONS  (STANDING):  apixaban 5 milliGRAM(s) Oral every 12 hours  atorvastatin 40 milliGRAM(s) Oral at bedtime  benzonatate 200 milliGRAM(s) Oral every 8 hours  clopidogrel Tablet 75 milliGRAM(s) Oral daily  digoxin     Tablet 125 MICROGram(s) Oral daily  ferrous    sulfate 325 milliGRAM(s) Oral daily  furosemide    Tablet 40 milliGRAM(s) Oral daily  guaiFENesin  milliGRAM(s) Oral every 12 hours  hydrALAZINE 100 milliGRAM(s) Oral every 8 hours  ipratropium    for Nebulization 500 MICROGram(s) Nebulizer every 6 hours  labetalol 400 milliGRAM(s) Oral two times a day  levalbuterol Inhalation 0.63 milliGRAM(s) Inhalation every 6 hours  pantoprazole    Tablet 40 milliGRAM(s) Oral before breakfast  polyethylene glycol 3350 17 Gram(s) Oral daily  predniSONE   Tablet 30 milliGRAM(s) Oral daily  senna 2 Tablet(s) Oral at bedtime    MEDICATIONS  (PRN):  acetaminophen     Tablet .. 650 milliGRAM(s) Oral every 8 hours PRN Mild Pain (1 - 3)  benzocaine/menthol Lozenge 1 Lozenge Oral every 6 hours PRN Sore Throat  bisacodyl 10 milliGRAM(s) Oral daily PRN Constipation    Vital Signs Last 24 Hrs  T(F): 97.2 (04 Feb 2025 13:37), Max: 97.8 (03 Feb 2025 19:34)  HR: 64 (04 Feb 2025 13:37) (63 - 71)  BP: 123/66 (04 Feb 2025 13:37) (123/66 - 136/65)  RR: 16 (04 Feb 2025 13:37) (16 - 20)  SpO2: 92% (04 Feb 2025 13:37) (87% - 98%)  I&O's Summary    03 Feb 2025 07:01  -  04 Feb 2025 07:00  --------------------------------------------------------  IN: 740 mL / OUT: 1150 mL / NET: -410 mL    04 Feb 2025 07:01  -  04 Feb 2025 16:34  --------------------------------------------------------  IN: 300 mL / OUT: 0 mL / NET: 300 mL          PHYSICAL EXAM:  GENERAL: NAD  HEENT: NCAT  CHEST/LUNG: Clear to percussion bilaterally; No rales, rhonchi, wheezing  HEART: Regular rate and rhythm; No murmurs  ABDOMEN: Soft, Nontender, Nondistended; Bowel sounds present  MUSCULOSKELETAL/EXTREMITIES:  2+ Peripheral Pulses, No LE edema  PSYCH: Appropriate affect  NEURO: Alert & Oriented    I personally reviewed the below data/images/labs:    LABS:                        9.2    9.44  )-----------( 197      ( 03 Feb 2025 07:32 )             28.7       02-04    136  |  99  |  65  ----------------------------<  153  3.6   |  31  |  1.67    Ca    8.4      04 Feb 2025 06:27    TPro  6.3  /  Alb  2.2  /  TBili  0.6  /  DBili  x   /  AST  28  /  ALT  38  /  AlkPhos  105  02-03                                  Urinalysis Basic - ( 04 Feb 2025 06:27 )    Color: x / Appearance: x / SG: x / pH: x  Gluc: 153 mg/dL / Ketone: x  / Bili: x / Urobili: x   Blood: x / Protein: x / Nitrite: x   Leuk Esterase: x / RBC: x / WBC x   Sq Epi: x / Non Sq Epi: x / Bacteria: x            Consultant(s) Notes Reviewed:   Care Discused with Consultants/Other Providers:  Imaging Personally Reviewed:        Interval History  Patient seen and examined at bedside. No acute events noted.   Patient reports feeling well, still with dry cough. Denies chest pain/SOB.  Off supplemental O2 at rest, however O2 drops to 87% with ambulation. Set up for home O2.     ALLERGIES:  No Known Allergies    MEDICATIONS  (STANDING):  apixaban 5 milliGRAM(s) Oral every 12 hours  atorvastatin 40 milliGRAM(s) Oral at bedtime  benzonatate 200 milliGRAM(s) Oral every 8 hours  clopidogrel Tablet 75 milliGRAM(s) Oral daily  digoxin     Tablet 125 MICROGram(s) Oral daily  ferrous    sulfate 325 milliGRAM(s) Oral daily  furosemide    Tablet 40 milliGRAM(s) Oral daily  guaiFENesin  milliGRAM(s) Oral every 12 hours  hydrALAZINE 100 milliGRAM(s) Oral every 8 hours  ipratropium    for Nebulization 500 MICROGram(s) Nebulizer every 6 hours  labetalol 400 milliGRAM(s) Oral two times a day  levalbuterol Inhalation 0.63 milliGRAM(s) Inhalation every 6 hours  pantoprazole    Tablet 40 milliGRAM(s) Oral before breakfast  polyethylene glycol 3350 17 Gram(s) Oral daily  predniSONE   Tablet 30 milliGRAM(s) Oral daily  senna 2 Tablet(s) Oral at bedtime    MEDICATIONS  (PRN):  acetaminophen     Tablet .. 650 milliGRAM(s) Oral every 8 hours PRN Mild Pain (1 - 3)  benzocaine/menthol Lozenge 1 Lozenge Oral every 6 hours PRN Sore Throat  bisacodyl 10 milliGRAM(s) Oral daily PRN Constipation    Vital Signs Last 24 Hrs  T(F): 97.2 (04 Feb 2025 13:37), Max: 97.8 (03 Feb 2025 19:34)  HR: 64 (04 Feb 2025 13:37) (63 - 71)  BP: 123/66 (04 Feb 2025 13:37) (123/66 - 136/65)  RR: 16 (04 Feb 2025 13:37) (16 - 20)  SpO2: 92% (04 Feb 2025 13:37) (87% - 98%)  I&O's Summary    03 Feb 2025 07:01  -  04 Feb 2025 07:00  --------------------------------------------------------  IN: 740 mL / OUT: 1150 mL / NET: -410 mL    04 Feb 2025 07:01  -  04 Feb 2025 16:34  --------------------------------------------------------  IN: 300 mL / OUT: 0 mL / NET: 300 mL    PHYSICAL EXAM:  GENERAL: NAD  HEENT: NCAT  CHEST/LUNG: Good air entry, No rales, rhonchi, wheezing  HEART: Regular rate and rhythm  ABDOMEN: Soft, Nontender, Nondistended; Bowel sounds present  MUSCULOSKELETAL/EXTREMITIES:  2+ Peripheral Pulses, No LE edema  PSYCH: Appropriate affect  NEURO: Alert & Oriented    I personally reviewed the below data/images/labs:    LABS:                        9.2    9.44  )-----------( 197      ( 03 Feb 2025 07:32 )             28.7       02-04    136  |  99  |  65  ----------------------------<  153  3.6   |  31  |  1.67    Ca    8.4      04 Feb 2025 06:27    TPro  6.3  /  Alb  2.2  /  TBili  0.6  /  DBili  x   /  AST  28  /  ALT  38  /  AlkPhos  105  02-03     Urinalysis Basic - ( 04 Feb 2025 06:27 )    Color: x / Appearance: x / SG: x / pH: x  Gluc: 153 mg/dL / Ketone: x  / Bili: x / Urobili: x   Blood: x / Protein: x / Nitrite: x   Leuk Esterase: x / RBC: x / WBC x   Sq Epi: x / Non Sq Epi: x / Bacteria: x    Consultant(s) Notes Reviewed:   Care Discused with Consultants/Other Providers:  Imaging Personally Reviewed:

## 2025-02-04 NOTE — PROGRESS NOTE ADULT - ASSESSMENT
79 year old male, originally from [facility], former cigarette smoker w/ PMHx of HTN, CKD, paroxysmal afib on eliquis, CAD s/p NOMAN to proximal and mid RCA (11/2022), HFpEF, recently admitted 1/4-1/9 for pneumonia and b/l pleural effusions s/p abx/diuresis, presenting to [facility] on 1/24 from primary care office due to sob and hypoxia. Patient positive for Flu A.    #Acute Hypoxic Respiratory Failure Secondary to Influenza w/ Possible Viral PNA  -Patient O2 sat 89% on RA on admission  -CXR 1/24 showed Present infiltrates are diffuse without tyrone consolidation showing evolution from prior  -s/p tamiflu x 5 days (completed 1/29)  -s/p nebulizer treatment Q6H  -Supportive care   -Incentive spirometry   -Leukocytosis resolved   -Pulmonary following     Elevated Troponins  CAD s/p NOMAN (11/2022)  History of HFpEF   Acute Decompensated Heart Failure (Improved)  -Echo 1/26 showed EF 45 to 50% (from 55-60% on 1/5), Mild global left ventricle hypokinesis, severely dilated LA, moderate MR  -Repeat Echo 2/3 showed EF 55 to 60%, mild-mod MR/AR  -Trop peaked and downtrended  -BNP 47013 (1/27) and CT chest 1/28 shows Worsening diffuse bilateral pleural effusions on the basis of pulmonary edema or pneumonia. Stable bilateral pleural effusions.   -Continue plavix  -Continue labetalol, hydralazine, atorvastatin   -s/p IV lasix, transitioned to Lasix 40mg QD   -Daily weight   -Repeat BNP 13346 (1/31), CXR 2/1 showed Persistent small infiltrate lateral to the left hilum. Procal 0.15, low suspicion for bacterial PNA  -Cardiology following, HFmrEF likely tachy mediated (EF has improved), known multivessel CAD, has residual LAD, D1 and chronic occlusion of LCx that was medically managed by interventional cardiology, plan for outpatient nuclear stress test when recovers from pneumonia   -Hypoxic with ambulation and approved for home O2, family was advised to monitor his O2Sat at home to keep O2Sat >92%  -Outpatient follow up with Dr. Prieto    #Paroxysmal A.fib   -Remains in sinus  rhythm since started on Digoxin  -Continue Labetalol 400mg BID  -Continue Digoxin 125 mg daily and monitor  -Continue Eliquis  -Outpatient follow up with Dr. Prieto.     #BRUNILDA on CKD 3  -Baseline Cr appears to be ~1.7 range  -BRUNILDA on admission is improved, Cr appears to be at baseline  -Avoid nephrotoxic medications  -Monitor closely on diuresis  -Nephrology following     #Hyponatremia (Resolved)  -resolved  -Monitor     #Hypokalemia 2/2 Diuresis - Resolved  -s/p repletion   -continue to Monitor     #Microcytic Anemia  -Appears chronic   -Iron panel reviewed  -Continue iron supplement  -Monitor CBC     #Mild Transaminitis (Resolved)  -Will monitor    #Moderate protein-calorie malnutrition  -Dietary following     #Epigastric Pain (Improved)  -Possible gastritis  -Continue PPI     #Right MTP Joint Pain Likely Acute Gout (Improved)  -In setting of diuresis   -Uric acid 10.4  -Xray right foot 1/31 showed Coxa valgus deformity with moderate degenerative change of the first MTP joint. Mild multifocal interphalangeal joint arthrosis. Plantar calcaneal spurring. No fracture.  -Continue prednisone taper    #DVT prophylaxis  -Eliquis    Dispo  -PT eval appreciated, recommended home PT    Plan of care discussed with patient and patient's daughter Yoselin over the phone, all questions were answered   Stable for discharge home with home care and home O2 with close outpatient follow up with his providers

## 2025-02-04 NOTE — DISCHARGE NOTE NURSING/CASE MANAGEMENT/SOCIAL WORK - PATIENT PORTAL LINK FT
You can access the FollowMyHealth Patient Portal offered by [facility] by registering at the following website: [email or web address]. By joining [facility]’s FollowMyHealth portal, you will also be able to view your health information using other applications (apps) compatible with our system.

## 2025-02-04 NOTE — PROGRESS NOTE ADULT - ASSESSMENT
Physical Examination:  GENERAL:               Alert, Oriented, No acute distress.    HEENT:                   Missing left eye, right eye reactive to light.  Symmetric. No JVD, Dry MM  PULM:                     Bilateral air entry, trace Rales, No Rhonchi, trace  Wheezing  CVS:                        S1, S2,  No Murmur     NEURO:                  Alert, oriented, interactive, nonfocal, follows commands  PSYC:                      Calm, + Insight.      Assessment  78 yo M, originally from [facility], former cigarette smoker w/ PMHx of HTN, CKD, paroxysmal afib on eliquis, w/ hx of abnormal nuclear stress test s/p coronary angiogram and NOMAN to proximal and mid RCA (11/2022) presenting to [facility] from primary care office due to sob and hypoxia. Patient positive for Flu A. Pulmonary consulted for acute hypoxia. Clinically appears euvolemic. Symptoms likely due to acute viral illness.     Problem List  1. Influenza A infection  2. Acute hypoxia   3. HFPEF   4. Paroxysmal Afib    Plan:  - CT scan with enlarging pleural effusion and worsening ground glass suspect from underlying pulmonary edema , diurese as tolerated  - o2 requirments improving with diuresis, on room air when seen  - check o2 on ambulation, may need home o2  - prednisone taper to off   - dispo planning

## 2025-02-04 NOTE — PHARMACOTHERAPY INTERVENTION NOTE - INTERVENTION TYPE RECOOMEND
Therapy Recommended - Additional therapy
Therapy Recommended - Drug indicated but not ordered
Therapy duplication
Therapy Recommended - Additional therapy

## 2025-02-04 NOTE — PHARMACOTHERAPY INTERVENTION NOTE - COMMENTS
New pEF consider starting Farxiga 10 mg QD    SGLT2-I recommended in HFpEF and CKD (AHA/ACC and KDIGO)

## 2025-02-04 NOTE — DISCHARGE NOTE NURSING/CASE MANAGEMENT/SOCIAL WORK - NSSCNAMETXT_GEN_ALL_CORE
Referred to [facility] care, nurse will call with visit, [phone]

## 2025-02-04 NOTE — PROGRESS NOTE ADULT - REASON FOR ADMISSION
Hypoxic due to flu

## 2025-02-04 NOTE — DISCHARGE NOTE NURSING/CASE MANAGEMENT/SOCIAL WORK - FINANCIAL ASSISTANCE
[facility] provides services at a reduced cost to those who are determined to be eligible through [facility]’s financial assistance program. Information regarding [facility]’s financial assistance program can be found by going to [email or web address] or by calling [phone].

## 2025-02-04 NOTE — PROGRESS NOTE ADULT - SUBJECTIVE AND OBJECTIVE BOX
Follow-up Pulmonary Progress Note  Chief Complaint : Influenza with other respiratory manifestations, other influenza virus identified          No new events overnight.  Denies SOB/CP.       Allergies :No Known Allergies      PAST MEDICAL & SURGICAL HISTORY:  H/O: HTN (hypertension)    CAD (coronary artery disease)    Diabetic vitreous hemorrhage    Hypokalemia    Type 2 diabetes mellitus    Left ventricular hypertrophy    History of alcohol withdrawal delirium    S/P cardiac catheterization  11/17 3 stents to RCA        Medications:  MEDICATIONS  (STANDING):  apixaban 5 milliGRAM(s) Oral every 12 hours  atorvastatin 40 milliGRAM(s) Oral at bedtime  benzonatate 200 milliGRAM(s) Oral every 8 hours  clopidogrel Tablet 75 milliGRAM(s) Oral daily  digoxin     Tablet 125 MICROGram(s) Oral daily  ferrous    sulfate 325 milliGRAM(s) Oral daily  furosemide    Tablet 40 milliGRAM(s) Oral daily  guaiFENesin  milliGRAM(s) Oral every 12 hours  hydrALAZINE 100 milliGRAM(s) Oral every 8 hours  ipratropium    for Nebulization 500 MICROGram(s) Nebulizer every 6 hours  labetalol 400 milliGRAM(s) Oral two times a day  levalbuterol Inhalation 0.63 milliGRAM(s) Inhalation every 6 hours  pantoprazole    Tablet 40 milliGRAM(s) Oral before breakfast  polyethylene glycol 3350 17 Gram(s) Oral daily  predniSONE   Tablet 30 milliGRAM(s) Oral daily  senna 2 Tablet(s) Oral at bedtime    MEDICATIONS  (PRN):  acetaminophen     Tablet .. 650 milliGRAM(s) Oral every 8 hours PRN Mild Pain (1 - 3)  benzocaine/menthol Lozenge 1 Lozenge Oral every 6 hours PRN Sore Throat  bisacodyl 10 milliGRAM(s) Oral daily PRN Constipation      Antibiotics History  azithromycin  IVPB 500 milliGRAM(s) IV Intermittent once, 01-24-25 @ 16:19, Stop order after: 1 Doses  cefTRIAXone   IVPB 1000 milliGRAM(s) IV Intermittent once, 01-24-25 @ 16:19, Stop order after: 1 Doses  oseltamivir 30 milliGRAM(s) Oral two times a day, 01-24-25 @ 18:25, Stop order after: 10 Doses      Heme Medications   apixaban 5 milliGRAM(s) Oral every 12 hours, 01-24-25 @ 18:57  clopidogrel Tablet 75 milliGRAM(s) Oral daily, 01-24-25 @ 18:59      GI Medications  bisacodyl 10 milliGRAM(s) Oral daily, 01-27-25 @ 16:56, Routine PRN  pantoprazole    Tablet 40 milliGRAM(s) Oral before breakfast, 01-30-25 @ 10:46, STAT  polyethylene glycol 3350 17 Gram(s) Oral daily, 01-27-25 @ 16:56, Now  senna 2 Tablet(s) Oral at bedtime, 01-30-25 @ 09:55, Routine        LABS:                        9.2    9.44  )-----------( 197      ( 03 Feb 2025 07:32 )             28.7     02-04    136  |  99  |  65[H]  ----------------------------<  153[H]  3.6   |  31  |  1.67[H]    Ca    8.4      04 Feb 2025 06:27    TPro  6.3  /  Alb  2.2[L]  /  TBili  0.6  /  DBili  x   /  AST  28  /  ALT  38  /  AlkPhos  105  02-03              Urinalysis Basic - ( 04 Feb 2025 06:27 )    Color: x / Appearance: x / SG: x / pH: x  Gluc: 153 mg/dL / Ketone: x  / Bili: x / Urobili: x   Blood: x / Protein: x / Nitrite: x   Leuk Esterase: x / RBC: x / WBC x   Sq Epi: x / Non Sq Epi: x / Bacteria: x      Procalcitonin: 0.15 ng/mL (02-02-25 @ 06:55)          CULTURES: (if applicable)    Rapid RVP Result: Detected (01-24-25 @ 16:30)        CAPILLARY BLOOD GLUCOSE          RADIOLOGY  CXR:      CT:    ECHO:      VITALS:  T(C): 36.2 (02-04-25 @ 13:37), Max: 36.6 (02-03-25 @ 19:34)  T(F): 97.2 (02-04-25 @ 13:37), Max: 97.8 (02-03-25 @ 19:34)  HR: 65 (02-04-25 @ 17:48) (63 - 71)  BP: 136/73 (02-04-25 @ 17:48) (123/66 - 136/73)  BP(mean): --  ABP: --  ABP(mean): --  RR: 16 (02-04-25 @ 17:48) (16 - 20)  SpO2: 94% (02-04-25 @ 17:48) (87% - 98%)  CVP(mm Hg): --  CVP(cm H2O): --    Ins and Outs     02-03-25 @ 07:01  -  02-04-25 @ 07:00  --------------------------------------------------------  IN: 740 mL / OUT: 1150 mL / NET: -410 mL    02-04-25 @ 07:01  -  02-04-25 @ 18:53  --------------------------------------------------------  IN: 300 mL / OUT: 0 mL / NET: 300 mL                I&O's Detail    03 Feb 2025 07:01  -  04 Feb 2025 07:00  --------------------------------------------------------  IN:    Oral Fluid: 740 mL  Total IN: 740 mL    OUT:    Voided (mL): 1150 mL  Total OUT: 1150 mL    Total NET: -410 mL      04 Feb 2025 07:01  -  04 Feb 2025 18:53  --------------------------------------------------------  IN:    Oral Fluid: 300 mL  Total IN: 300 mL    OUT:  Total OUT: 0 mL    Total NET: 300 mL

## 2025-02-04 NOTE — PROGRESS NOTE ADULT - SUBJECTIVE AND OBJECTIVE BOX
No distress, on RA    Vital Signs Last 24 Hrs  T(C): 36.4 (02-04-25 @ 18:54), Max: 36.6 (02-04-25 @ 05:00)  T(F): 97.5 (02-04-25 @ 18:54), Max: 97.8 (02-04-25 @ 05:00)  HR: 61 (02-04-25 @ 18:54) (61 - 71)  BP: 146/80 (02-04-25 @ 18:54) (123/66 - 146/80)  RR: 16 (02-04-25 @ 18:54) (16 - 20)  SpO2: 94% (02-04-25 @ 18:54) (87% - 98%)    I&O's Detail    03 Feb 2025 07:01  -  04 Feb 2025 07:00  --------------------------------------------------------  IN:    Oral Fluid: 740 mL  Total IN: 740 mL    OUT:    Voided (mL): 1150 mL  Total OUT: 1150 mL    s1s2  b/l air entry  soft, ND  no edema                                                                     9.2    9.44  )-----------( 197      ( 03 Feb 2025 07:32 )             28.7     04 Feb 2025 06:27    136    |  99     |  65     ----------------------------<  153    3.6     |  31     |  1.67     Ca    8.4        04 Feb 2025 06:27    TPro  6.3    /  Alb  2.2    /  TBili  0.6    /  DBili  x      /  AST  28     /  ALT  38     /  AlkPhos  105    03 Feb 2025 07:32    LIVER FUNCTIONS - ( 03 Feb 2025 07:32 )  Alb: 2.2 g/dL / Pro: 6.3 g/dL / ALK PHOS: 105 U/L / ALT: 38 U/L / AST: 28 U/L / GGT: x           acetaminophen     Tablet .. 650 milliGRAM(s) Oral every 8 hours PRN  apixaban 5 milliGRAM(s) Oral every 12 hours  atorvastatin 40 milliGRAM(s) Oral at bedtime  benzocaine/menthol Lozenge 1 Lozenge Oral every 6 hours PRN  benzonatate 200 milliGRAM(s) Oral every 8 hours  bisacodyl 10 milliGRAM(s) Oral daily PRN  clopidogrel Tablet 75 milliGRAM(s) Oral daily  digoxin     Tablet 125 MICROGram(s) Oral daily  ferrous    sulfate 325 milliGRAM(s) Oral daily  furosemide    Tablet 40 milliGRAM(s) Oral daily  guaiFENesin  milliGRAM(s) Oral every 12 hours  hydrALAZINE 100 milliGRAM(s) Oral every 8 hours  ipratropium    for Nebulization 500 MICROGram(s) Nebulizer every 6 hours  labetalol 400 milliGRAM(s) Oral two times a day  levalbuterol Inhalation 0.63 milliGRAM(s) Inhalation every 6 hours  pantoprazole    Tablet 40 milliGRAM(s) Oral before breakfast  polyethylene glycol 3350 17 Gram(s) Oral daily  predniSONE   Tablet 30 milliGRAM(s) Oral daily  senna 2 Tablet(s) Oral at bedtime    A/P:    CM, Afib, EF 45 - 50%, mod MR   Adm w/CHF, PNA, Flu A, elevated troponin  Known CKD 3 w/renal indices fairly stable    Avoid nephrotoxins as able, no NSAID's  Continue Lasix  F/u w/pmd, renal as op     [phone]

## 2025-02-04 NOTE — PROGRESS NOTE ADULT - PROVIDER SPECIALTY LIST ADULT
Cardiology
Nephrology
Pulmonology
Pulmonology
Cardiology
Cardiology
Hospitalist
Nephrology
Nephrology
Pulmonology
Hospitalist
Nephrology
Pulmonology
Hospitalist

## 2025-02-05 RX ORDER — DIGOXIN 125 UG/1
125 TABLET ORAL DAILY
Qty: 30 | Refills: 1 | Status: ACTIVE | COMMUNITY
Start: 2025-02-05 | End: 1900-01-01

## 2025-02-12 NOTE — DISCHARGE NOTE PROVIDER - NSDCHHHOMEBOUND_GEN_ALL_CORE
Pt is a 37yo M presents to ED c/o rash, body aches. Pt states "I have had staph infections throughout my body for a year or so. I've been having worsening rashes and spreading despite taking bactrim for the last few days. I feel like my pain everywhere is worsening." Denies SOB, N/V/D, weakness, dizziness, lightheadedness, blood in stools, hematuria, dysuria, urinary frequency, sick contacts. A&Ox3. Strong peripheral pulses. Pt asks repetitive questions and is anxious in affect. Ambulatory with steady gait in ED. Stretcher locked and in lowest position, appropriate side rails up. Pt instructed to notify RN if assistance is needed. Fall risk

## 2025-02-27 ENCOUNTER — RX RENEWAL (OUTPATIENT)
Age: 80
End: 2025-02-27

## 2025-03-06 ENCOUNTER — RX RENEWAL (OUTPATIENT)
Age: 80
End: 2025-03-06

## 2025-03-12 ENCOUNTER — APPOINTMENT (OUTPATIENT)
Dept: CARDIOLOGY | Facility: CLINIC | Age: 80
End: 2025-03-12
Payer: MEDICARE

## 2025-03-12 ENCOUNTER — NON-APPOINTMENT (OUTPATIENT)
Age: 80
End: 2025-03-12

## 2025-03-12 VITALS
BODY MASS INDEX: 25.71 KG/M2 | OXYGEN SATURATION: 96 % | HEART RATE: 66 BPM | WEIGHT: 160 LBS | HEIGHT: 66 IN | DIASTOLIC BLOOD PRESSURE: 53 MMHG | SYSTOLIC BLOOD PRESSURE: 106 MMHG

## 2025-03-12 DIAGNOSIS — N18.9 CHRONIC KIDNEY DISEASE, UNSPECIFIED: ICD-10-CM

## 2025-03-12 DIAGNOSIS — R79.89 OTHER SPECIFIED ABNORMAL FINDINGS OF BLOOD CHEMISTRY: ICD-10-CM

## 2025-03-12 DIAGNOSIS — I10 ESSENTIAL (PRIMARY) HYPERTENSION: ICD-10-CM

## 2025-03-12 DIAGNOSIS — I48.0 PAROXYSMAL ATRIAL FIBRILLATION: ICD-10-CM

## 2025-03-12 DIAGNOSIS — I42.8 OTHER CARDIOMYOPATHIES: ICD-10-CM

## 2025-03-12 DIAGNOSIS — I25.10 ATHEROSCLEROTIC HEART DISEASE OF NATIVE CORONARY ARTERY W/OUT ANGINA PECTORIS: ICD-10-CM

## 2025-03-12 PROCEDURE — 93000 ELECTROCARDIOGRAM COMPLETE: CPT

## 2025-03-12 PROCEDURE — 99214 OFFICE O/P EST MOD 30 MIN: CPT | Mod: 25

## 2025-03-27 ENCOUNTER — APPOINTMENT (OUTPATIENT)
Dept: CARDIOLOGY | Facility: CLINIC | Age: 80
End: 2025-03-27
Payer: MEDICARE

## 2025-03-27 PROCEDURE — A9500: CPT

## 2025-03-27 PROCEDURE — 93015 CV STRESS TEST SUPVJ I&R: CPT

## 2025-03-27 PROCEDURE — 78452 HT MUSCLE IMAGE SPECT MULT: CPT

## 2025-04-04 RX ORDER — FUROSEMIDE 40 MG/1
40 TABLET ORAL
Qty: 60 | Refills: 0 | Status: ACTIVE | COMMUNITY
Start: 2025-04-04 | End: 1900-01-01

## 2025-04-10 ENCOUNTER — NON-APPOINTMENT (OUTPATIENT)
Age: 80
End: 2025-04-10

## 2025-04-10 ENCOUNTER — APPOINTMENT (OUTPATIENT)
Dept: CARDIOLOGY | Facility: CLINIC | Age: 80
End: 2025-04-10
Payer: MEDICARE

## 2025-04-10 VITALS
RESPIRATION RATE: 18 BRPM | HEART RATE: 65 BPM | WEIGHT: 160 LBS | OXYGEN SATURATION: 93 % | SYSTOLIC BLOOD PRESSURE: 116 MMHG | DIASTOLIC BLOOD PRESSURE: 51 MMHG | HEIGHT: 66 IN | BODY MASS INDEX: 25.71 KG/M2

## 2025-04-10 DIAGNOSIS — I65.29 OCCLUSION AND STENOSIS OF UNSPECIFIED CAROTID ARTERY: ICD-10-CM

## 2025-04-10 DIAGNOSIS — I25.10 ATHEROSCLEROTIC HEART DISEASE OF NATIVE CORONARY ARTERY W/OUT ANGINA PECTORIS: ICD-10-CM

## 2025-04-10 DIAGNOSIS — I48.0 PAROXYSMAL ATRIAL FIBRILLATION: ICD-10-CM

## 2025-04-10 DIAGNOSIS — I10 ESSENTIAL (PRIMARY) HYPERTENSION: ICD-10-CM

## 2025-04-10 DIAGNOSIS — I42.8 OTHER CARDIOMYOPATHIES: ICD-10-CM

## 2025-04-10 DIAGNOSIS — I21.4 NON-ST ELEVATION (NSTEMI) MYOCARDIAL INFARCTION: ICD-10-CM

## 2025-04-10 DIAGNOSIS — N18.9 CHRONIC KIDNEY DISEASE, UNSPECIFIED: ICD-10-CM

## 2025-04-10 PROCEDURE — 99214 OFFICE O/P EST MOD 30 MIN: CPT | Mod: 25

## 2025-04-10 PROCEDURE — 93000 ELECTROCARDIOGRAM COMPLETE: CPT

## 2025-05-27 ENCOUNTER — RX RENEWAL (OUTPATIENT)
Age: 80
End: 2025-05-27

## 2025-05-31 ENCOUNTER — RX RENEWAL (OUTPATIENT)
Age: 80
End: 2025-05-31

## 2025-06-03 ENCOUNTER — RX RENEWAL (OUTPATIENT)
Age: 80
End: 2025-06-03

## 2025-06-22 NOTE — PHYSICAL THERAPY INITIAL EVALUATION ADULT - PERTINENT HX OF CURRENT PROBLEM, REHAB EVAL
Encounter Date: 6/22/2025       History     Chief Complaint   Patient presents with    Skin Check     Patient states procedure to remove cyst sometime in April. States since, site has been bothering him. Appears to be scared over, no redness, excess warmth, or drainage noted.     Patient is a 89-year-old male who presents to the ED with complaint of skin check.  Patient had a cyst removed from his back in April.  He states that it has been itching despite use of prescribed Kenalog.  Denies any drainage redness fevers or other complaints.      Review of patient's allergies indicates:   Allergen Reactions    Shellfish containing products Anaphylaxis    Iodine and iodide containing products     Pcn [penicillins] Rash     Past Medical History:   Diagnosis Date    Gout, unspecified     Hyperglycemia 1/11/2017    Hyperlipidemia     Hypertension     Nuclear sclerosis - Both Eyes 9/16/2013    Prediabetes 10/3/2017    Unspecified hereditary and idiopathic peripheral neuropathy      Past Surgical History:   Procedure Laterality Date    BACK SURGERY      CARPAL TUNNEL RELEASE Left 6/19/2023    Procedure: RELEASE, CARPAL TUNNEL,LEFT;  Surgeon: Anh An MD;  Location: [facility];  Service: Orthopedics;  Laterality: Left;    CATARACT EXTRACTION  10/29/13    left eye    CATARACT EXTRACTION W/  INTRAOCULAR LENS IMPLANT  10/15/13    OD (dr. grayson)    EYE SURGERY       Family History   Problem Relation Name Age of Onset    No Known Problems Mother      No Known Problems Father      Amblyopia Neg Hx      Blindness Neg Hx      Cancer Neg Hx      Cataracts Neg Hx      Diabetes Neg Hx      Glaucoma Neg Hx      Hypertension Neg Hx      Macular degeneration Neg Hx      Retinal detachment Neg Hx      Strabismus Neg Hx      Stroke Neg Hx      Thyroid disease Neg Hx       Social History[1]  Review of Systems   Skin:  Positive for rash.   All other systems reviewed and are negative.      Physical Exam     Initial Vitals  [06/22/25 1238]   BP Pulse Resp Temp SpO2   (!) 146/69 83 18 98.1 °F (36.7 °C) 98 %      MAP       --         Physical Exam    Constitutional: He appears well-developed and well-nourished.   Musculoskeletal:        Arms:       Comments: There is a well-healing scar from previous excision of presumptive cyst to the left upper back; no signs of infection such as drainage or erythema; there was no palpable mass; I did look at the area with an ultrasound and did not find any drainable abscess or other abnormality;           ED Course   Procedures  Labs Reviewed - No data to display       Imaging Results    None          Medications - No data to display  Medical Decision Making  Risk  OTC drugs.                          Medical Decision Making:   Initial Assessment:   See HPI  ED Management:  - workup and evaluation largely unremarkable; will change from Kenalog cream to hydrocortisone cream and the patient follow up with his physician that removed the cyst; no indication for antibiotics at this time  - No further intervention is indicated at this time after having taken into account the patient's history, physical exam findings, and empirical and objective data obtained during the patient's emergency department workup.   - The patient is at low risk for an emergent medical condition at this time, and I am of the belief that that it is safe to discharge the patient from the emergency department.   - The patient is instructed to follow up as outpatient as indicated on the discharge paperwork.    - I have discussed the specifics of the workup with the patient and the patient has verbalized understanding of the details of the workup, the diagnosis, the treatment plan, and the need for outpatient follow-up.    - Although the patient has no emergent etiology today this does not preclude the development of an emergent condition so, in addition, I have advised the patient that they can return to the ED and/or activate EMS at  any time with worsening of their symptoms, change of their symptoms, or with any other medical complaint.    - The patient remained comfortable and stable during their visit in the ED.    - Discharge and follow-up instructions discussed with the patient who expressed understanding and willingness to comply with my recommendations.  - Results of all emergency department tests  discussed thoroughly with patient; all patient questions answered; pt in agreement with plan  - Pt instructed to follow up with PCP in 2-3 days for recheck of today's complaints  - Pt given strict emergency department return precautions for any new or worsening of symptoms  - Pt discharged from the emergency department in stable condition, in no acute distress                Clinical Impression:  Final diagnoses:  [L98.9] Skin lesion (Primary)          ED Disposition Condition    Discharge Stable          ED Prescriptions       Medication Sig Dispense Start Date End Date Auth. Provider    hydrocortisone 0.5 % ointment Apply topically 2 (two) times daily. 56 g 2025 -- Rudi Munoz MD          Follow-up Information       Follow up With Specialties Details Why Contact Info    Juan Casey MD Family Medicine Schedule an appointment as soon as possible for a visit   [address] [place] [id number]  [phone]                     [1]   Social History  Tobacco Use    Smoking status: Former     Current packs/day: 0.00     Types: Cigarettes     Quit date: 1982     Years since quittin.8    Smokeless tobacco: Never   Substance Use Topics    Alcohol use: No    Drug use: No        Rudi Munoz MD  25     77y M w/ PMHx of HTN presents to the ED with likely acute on chronic hypertensive urgency, for which he is largely asymptomatic. He was triaged from outpatient cardiology in the context of +NST with inferior ischemia, and an interval decline in EF from 50-55% to 30% on stress.

## 2025-06-27 ENCOUNTER — RX RENEWAL (OUTPATIENT)
Age: 80
End: 2025-06-27

## 2025-07-01 ENCOUNTER — APPOINTMENT (OUTPATIENT)
Dept: VASCULAR SURGERY | Facility: CLINIC | Age: 80
End: 2025-07-01
Payer: MEDICARE

## 2025-07-01 PROCEDURE — 99204 OFFICE O/P NEW MOD 45 MIN: CPT

## 2025-08-20 ENCOUNTER — RX RENEWAL (OUTPATIENT)
Age: 80
End: 2025-08-20

## 2025-09-05 ENCOUNTER — APPOINTMENT (OUTPATIENT)
Dept: CARDIOLOGY | Facility: CLINIC | Age: 80
End: 2025-09-05
Payer: MEDICARE

## 2025-09-05 VITALS
BODY MASS INDEX: 25.39 KG/M2 | HEART RATE: 66 BPM | OXYGEN SATURATION: 98 % | RESPIRATION RATE: 17 BRPM | DIASTOLIC BLOOD PRESSURE: 58 MMHG | WEIGHT: 158 LBS | SYSTOLIC BLOOD PRESSURE: 141 MMHG | HEIGHT: 66 IN

## 2025-09-05 DIAGNOSIS — I42.8 OTHER CARDIOMYOPATHIES: ICD-10-CM

## 2025-09-05 DIAGNOSIS — I48.0 PAROXYSMAL ATRIAL FIBRILLATION: ICD-10-CM

## 2025-09-05 DIAGNOSIS — I10 ESSENTIAL (PRIMARY) HYPERTENSION: ICD-10-CM

## 2025-09-05 DIAGNOSIS — N18.9 CHRONIC KIDNEY DISEASE, UNSPECIFIED: ICD-10-CM

## 2025-09-05 DIAGNOSIS — I25.10 ATHEROSCLEROTIC HEART DISEASE OF NATIVE CORONARY ARTERY W/OUT ANGINA PECTORIS: ICD-10-CM

## 2025-09-05 DIAGNOSIS — I65.29 OCCLUSION AND STENOSIS OF UNSPECIFIED CAROTID ARTERY: ICD-10-CM

## 2025-09-05 PROCEDURE — 99214 OFFICE O/P EST MOD 30 MIN: CPT

## 2025-09-05 PROCEDURE — G2211 COMPLEX E/M VISIT ADD ON: CPT

## 2025-09-05 PROCEDURE — 93000 ELECTROCARDIOGRAM COMPLETE: CPT

## 2025-09-18 ENCOUNTER — APPOINTMENT (OUTPATIENT)
Dept: CARDIOLOGY | Facility: CLINIC | Age: 80
End: 2025-09-18